# Patient Record
Sex: FEMALE | Race: WHITE | NOT HISPANIC OR LATINO | Employment: OTHER | URBAN - METROPOLITAN AREA
[De-identification: names, ages, dates, MRNs, and addresses within clinical notes are randomized per-mention and may not be internally consistent; named-entity substitution may affect disease eponyms.]

---

## 2018-09-10 DIAGNOSIS — Z12.31 VISIT FOR SCREENING MAMMOGRAM: Primary | ICD-10-CM

## 2020-10-20 ENCOUNTER — APPOINTMENT (OUTPATIENT)
Dept: WOMENS IMAGING | Facility: HOSPITAL | Age: 85
End: 2020-10-20

## 2020-10-20 ENCOUNTER — PROCEDURE VISIT (OUTPATIENT)
Dept: OBSTETRICS AND GYNECOLOGY | Facility: CLINIC | Age: 85
End: 2020-10-20

## 2020-10-20 ENCOUNTER — OFFICE VISIT (OUTPATIENT)
Dept: OBSTETRICS AND GYNECOLOGY | Facility: CLINIC | Age: 85
End: 2020-10-20

## 2020-10-20 VITALS
DIASTOLIC BLOOD PRESSURE: 57 MMHG | HEIGHT: 61 IN | BODY MASS INDEX: 29.27 KG/M2 | SYSTOLIC BLOOD PRESSURE: 111 MMHG | WEIGHT: 155 LBS

## 2020-10-20 DIAGNOSIS — Z01.419 WELL WOMAN EXAM: Primary | ICD-10-CM

## 2020-10-20 DIAGNOSIS — Z12.31 ENCOUNTER FOR SCREENING MAMMOGRAM FOR MALIGNANT NEOPLASM OF BREAST: ICD-10-CM

## 2020-10-20 DIAGNOSIS — Z12.31 VISIT FOR SCREENING MAMMOGRAM: Primary | ICD-10-CM

## 2020-10-20 PROCEDURE — 77063 BREAST TOMOSYNTHESIS BI: CPT | Performed by: RADIOLOGY

## 2020-10-20 PROCEDURE — 77067 SCR MAMMO BI INCL CAD: CPT | Performed by: RADIOLOGY

## 2020-10-20 PROCEDURE — G0101 CA SCREEN;PELVIC/BREAST EXAM: HCPCS | Performed by: STUDENT IN AN ORGANIZED HEALTH CARE EDUCATION/TRAINING PROGRAM

## 2020-10-20 PROCEDURE — 77063 BREAST TOMOSYNTHESIS BI: CPT | Performed by: STUDENT IN AN ORGANIZED HEALTH CARE EDUCATION/TRAINING PROGRAM

## 2020-10-20 PROCEDURE — 77067 SCR MAMMO BI INCL CAD: CPT | Performed by: STUDENT IN AN ORGANIZED HEALTH CARE EDUCATION/TRAINING PROGRAM

## 2020-10-20 RX ORDER — PANTOPRAZOLE SODIUM 40 MG/1
TABLET, DELAYED RELEASE ORAL
COMMUNITY
Start: 2020-10-12

## 2020-10-20 RX ORDER — ATORVASTATIN CALCIUM 10 MG/1
TABLET, FILM COATED ORAL
COMMUNITY
Start: 2020-09-09

## 2020-10-20 RX ORDER — HYDRALAZINE HYDROCHLORIDE 100 MG/1
TABLET, FILM COATED ORAL
COMMUNITY
Start: 2020-08-20

## 2020-10-20 RX ORDER — METOPROLOL SUCCINATE 100 MG/1
TABLET, EXTENDED RELEASE ORAL
COMMUNITY
Start: 2020-08-04

## 2020-10-20 RX ORDER — GABAPENTIN 300 MG/1
CAPSULE ORAL
COMMUNITY
Start: 2020-08-02

## 2020-10-20 RX ORDER — APIXABAN 5 MG/1
TABLET, FILM COATED ORAL
COMMUNITY
Start: 2020-10-12

## 2020-10-20 RX ORDER — LOSARTAN POTASSIUM AND HYDROCHLOROTHIAZIDE 25; 100 MG/1; MG/1
TABLET ORAL
COMMUNITY
Start: 2020-09-09

## 2020-10-20 NOTE — PROGRESS NOTES
GYN Annual Exam     CC- Here for annual exam.     Mayuri Early is a 85 y.o. female who presents for annual well woman exam. Postmenopausal status. She has no complaints today and reports that she thinks she is doing pretty well for her age. She denies vaginal bleeding, spotting, or discharge.    OB History        4    Para   4    Term   4            AB        Living           SAB        TAB        Ectopic        Molar        Multiple        Live Births                  S/p Hysterectomy in  for uterine prolapse and painful periods   Current contraception: post menopausal status  History of abnormal Pap smear: no  Family history of uterine, colon or ovarian cancer: no  History of abnormal mammogram: yes- She has underwent needle aspiration and cyst excision of bilateral breasts with benign results.   Family history of breast cancer: no  Last Pap : unsure   Last mammogram: 2019- Normal per patient and performed on [facility] [place].   Last colonoscopy: Unsure of when it was performed but up to date per patient.  Last DEXA: Reports performed in 2019- normal   Parental Hip Fracture: denies    Past Medical History:   Diagnosis Date   • Fibrocystic breast        Past Surgical History:   Procedure Laterality Date   • BREAST CYST ASPIRATION     • BREAST CYST EXCISION     • HYSTERECTOMY           Current Outpatient Medications:   •  Aspirin Buf,CaCarb-MgCarb-MgO, 81 MG tablet, Take 81 mg by mouth Daily., Disp: , Rfl:   •  atorvastatin (LIPITOR) 10 MG tablet, , Disp: , Rfl:   •  Cetirizine HCl 10 MG capsule, Take 1 tablet by mouth., Disp: , Rfl:   •  Eliquis 5 MG tablet tablet, , Disp: , Rfl:   •  gabapentin (NEURONTIN) 300 MG capsule, , Disp: , Rfl:   •  hydrALAZINE (APRESOLINE) 100 MG tablet, , Disp: , Rfl:   •  losartan-hydrochlorothiazide (HYZAAR) 100-25 MG per tablet, , Disp: , Rfl:   •  metoprolol succinate XL (TOPROL-XL) 100 MG 24 hr tablet, , Disp: , Rfl:   •  pantoprazole (PROTONIX) 40 MG EC tablet, ,  "Disp: , Rfl:     No Known Allergies    Social History     Tobacco Use   • Smoking status: Former Smoker   Substance Use Topics   • Alcohol use: Never     Frequency: Never   • Drug use: Never       History reviewed. No pertinent family history.    Review of Systems   Constitutional: Negative for chills and fever.   HENT: Negative for congestion and sore throat.    Eyes: Negative for pain and discharge.   Respiratory: Negative for cough and shortness of breath.    Cardiovascular: Negative for chest pain.   Gastrointestinal: Negative for abdominal pain and blood in stool.   Endocrine: Negative for cold intolerance and heat intolerance.   Genitourinary: Negative for breast discharge, breast lump, breast pain, hematuria, vaginal bleeding and vaginal discharge.   Musculoskeletal: Negative for arthralgias and myalgias.   Skin: Negative for rash and bruise.   Allergic/Immunologic: Positive for environmental allergies.   Neurological: Negative for dizziness and headache.   Hematological: Negative for adenopathy. Does not bruise/bleed easily.   Psychiatric/Behavioral: Negative for agitation. The patient is not nervous/anxious.        /57   Ht 155 cm (61.02\")   Wt 70.3 kg (155 lb)   BMI 29.26 kg/m²     Physical Exam  Vitals signs reviewed. Exam conducted with a chaperone present.   Constitutional:       Appearance: Normal appearance.   HENT:      Head: Normocephalic and atraumatic.      Right Ear: External ear normal.      Left Ear: External ear normal.   Eyes:      Extraocular Movements: Extraocular movements intact.      Pupils: Pupils are equal, round, and reactive to light.   Neck:      Musculoskeletal: Normal range of motion and neck supple.   Cardiovascular:      Rate and Rhythm: Normal rate and regular rhythm.   Pulmonary:      Effort: Pulmonary effort is normal. No respiratory distress.   Chest:      Breasts:         Right: Normal. No swelling, bleeding, inverted nipple, mass, nipple discharge, skin change or " tenderness.         Left: Normal. No swelling, bleeding, inverted nipple, mass, nipple discharge, skin change or tenderness.   Abdominal:      General: There is no distension.      Palpations: Abdomen is soft. There is no mass.      Tenderness: There is no abdominal tenderness. There is no guarding or rebound.      Hernia: No hernia is present.   Genitourinary:     General: Normal vulva.      Labia:         Right: No rash, tenderness, lesion or injury.         Left: No rash, tenderness, lesion or injury.       Urethra: No prolapse or urethral swelling.      Vagina: No vaginal discharge, erythema, tenderness, bleeding or lesions.      Uterus: Absent.       Adnexa: Right adnexa normal and left adnexa normal.        Right: No mass, tenderness or fullness.          Left: No mass, tenderness or fullness.        Comments: Atrophic vaginal mucosa. Absent cervix.   Musculoskeletal: Normal range of motion.         General: No swelling.   Lymphadenopathy:      Upper Body:      Right upper body: No supraclavicular or axillary adenopathy.      Left upper body: No supraclavicular or axillary adenopathy.      Lower Body: No right inguinal adenopathy. No left inguinal adenopathy.   Skin:     General: Skin is warm and dry.   Neurological:      General: No focal deficit present.      Mental Status: She is alert and oriented to person, place, and time.   Psychiatric:         Mood and Affect: Mood normal.         Behavior: Behavior normal.       Assessment     1) GYN annual well woman exam.   2) Breast cancer screening      Plan     1) Breast Health - Clinical breast exam & mammogram yearly, Self breast awareness monthly. Mammogram ordered today.   2) Pap - not indicated given age and no history of cervical dysplasia.   3) Smoking status- non-smoker   4) Colon health - screening colonoscopy recommended if not up to date  5) Bone health - Weight bearing exercise, dietary calcium recommendations and vitamin D reviewed.   6) Activity  recommends - Adult 150-300 min/week of multi-component physical activities that include balance training, aerobic and physical strengthening.    7) Follow up prn and two years       Flor Mccloud MD  10/25/2020  22:45 EDT

## 2020-10-25 PROBLEM — Z01.419 WELL WOMAN EXAM: Status: ACTIVE | Noted: 2020-10-20

## 2021-10-26 ENCOUNTER — PROCEDURE VISIT (OUTPATIENT)
Dept: OBSTETRICS AND GYNECOLOGY | Facility: CLINIC | Age: 86
End: 2021-10-26

## 2021-10-26 ENCOUNTER — APPOINTMENT (OUTPATIENT)
Dept: WOMENS IMAGING | Facility: HOSPITAL | Age: 86
End: 2021-10-26

## 2021-10-26 DIAGNOSIS — Z12.31 VISIT FOR SCREENING MAMMOGRAM: Primary | ICD-10-CM

## 2021-10-26 PROCEDURE — 77067 SCR MAMMO BI INCL CAD: CPT | Performed by: RADIOLOGY

## 2021-10-26 PROCEDURE — 77067 SCR MAMMO BI INCL CAD: CPT | Performed by: STUDENT IN AN ORGANIZED HEALTH CARE EDUCATION/TRAINING PROGRAM

## 2021-10-26 PROCEDURE — 77063 BREAST TOMOSYNTHESIS BI: CPT | Performed by: RADIOLOGY

## 2021-10-26 PROCEDURE — 77063 BREAST TOMOSYNTHESIS BI: CPT | Performed by: STUDENT IN AN ORGANIZED HEALTH CARE EDUCATION/TRAINING PROGRAM

## 2024-01-04 ENCOUNTER — HOSPITAL ENCOUNTER (INPATIENT)
Facility: HOSPITAL | Age: 89
LOS: 4 days | Discharge: NURSING, REHAB OR LONG-TERM CARE | DRG: 552 | End: 2024-01-08
Attending: STUDENT IN AN ORGANIZED HEALTH CARE EDUCATION/TRAINING PROGRAM | Admitting: INTERNAL MEDICINE
Payer: MEDICARE

## 2024-01-04 ENCOUNTER — APPOINTMENT (OUTPATIENT)
Dept: CT IMAGING | Facility: HOSPITAL | Age: 89
DRG: 552 | End: 2024-01-04
Payer: MEDICARE

## 2024-01-04 ENCOUNTER — APPOINTMENT (OUTPATIENT)
Dept: GENERAL RADIOLOGY | Facility: HOSPITAL | Age: 89
DRG: 552 | End: 2024-01-04
Payer: MEDICARE

## 2024-01-04 DIAGNOSIS — S32.010A CLOSED COMPRESSION FRACTURE OF L1 VERTEBRA, INITIAL ENCOUNTER: ICD-10-CM

## 2024-01-04 DIAGNOSIS — S22.080A T12 COMPRESSION FRACTURE, INITIAL ENCOUNTER: Primary | ICD-10-CM

## 2024-01-04 PROBLEM — G89.29 CHRONIC BACK PAIN: Status: ACTIVE | Noted: 2024-01-04

## 2024-01-04 PROBLEM — Z95.0 PRESENCE OF CARDIAC PACEMAKER: Status: ACTIVE | Noted: 2024-01-04

## 2024-01-04 PROBLEM — I10 HTN (HYPERTENSION): Status: ACTIVE | Noted: 2024-01-04

## 2024-01-04 PROBLEM — N18.9 CKD (CHRONIC KIDNEY DISEASE): Status: ACTIVE | Noted: 2024-01-04

## 2024-01-04 PROBLEM — I48.0 PAF (PAROXYSMAL ATRIAL FIBRILLATION): Status: ACTIVE | Noted: 2024-01-04

## 2024-01-04 PROBLEM — M54.9 CHRONIC BACK PAIN: Status: ACTIVE | Noted: 2024-01-04

## 2024-01-04 PROBLEM — Z79.01 CHRONIC ANTICOAGULATION: Status: ACTIVE | Noted: 2024-01-04

## 2024-01-04 PROBLEM — J45.909 ASTHMA: Status: ACTIVE | Noted: 2024-01-04

## 2024-01-04 PROBLEM — D64.9 ANEMIA: Status: ACTIVE | Noted: 2024-01-04

## 2024-01-04 LAB
ALBUMIN SERPL-MCNC: 4.1 G/DL (ref 3.5–5.2)
ALBUMIN/GLOB SERPL: 1.4 G/DL
ALP SERPL-CCNC: 78 U/L (ref 39–117)
ALT SERPL W P-5'-P-CCNC: 14 U/L (ref 1–33)
ANION GAP SERPL CALCULATED.3IONS-SCNC: 10.9 MMOL/L (ref 5–15)
APTT PPP: 35.8 SECONDS (ref 22.7–35.4)
AST SERPL-CCNC: 16 U/L (ref 1–32)
BASOPHILS # BLD AUTO: 0.02 10*3/MM3 (ref 0–0.2)
BASOPHILS NFR BLD AUTO: 0.3 % (ref 0–1.5)
BILIRUB SERPL-MCNC: 1.1 MG/DL (ref 0–1.2)
BUN SERPL-MCNC: 21 MG/DL (ref 8–23)
BUN/CREAT SERPL: 18.9 (ref 7–25)
CALCIUM SPEC-SCNC: 9.8 MG/DL (ref 8.6–10.5)
CHLORIDE SERPL-SCNC: 101 MMOL/L (ref 98–107)
CO2 SERPL-SCNC: 27.1 MMOL/L (ref 22–29)
CREAT SERPL-MCNC: 1.11 MG/DL (ref 0.57–1)
DEPRECATED RDW RBC AUTO: 43.4 FL (ref 37–54)
EGFRCR SERPLBLD CKD-EPI 2021: 47.9 ML/MIN/1.73
EOSINOPHIL # BLD AUTO: 0.17 10*3/MM3 (ref 0–0.4)
EOSINOPHIL NFR BLD AUTO: 2.2 % (ref 0.3–6.2)
ERYTHROCYTE [DISTWIDTH] IN BLOOD BY AUTOMATED COUNT: 12.1 % (ref 12.3–15.4)
GLOBULIN UR ELPH-MCNC: 3 GM/DL
GLUCOSE SERPL-MCNC: 106 MG/DL (ref 65–99)
HCT VFR BLD AUTO: 34.7 % (ref 34–46.6)
HGB BLD-MCNC: 11.6 G/DL (ref 12–15.9)
IMM GRANULOCYTES # BLD AUTO: 0.02 10*3/MM3 (ref 0–0.05)
IMM GRANULOCYTES NFR BLD AUTO: 0.3 % (ref 0–0.5)
INR PPP: 1.55 (ref 0.9–1.1)
LYMPHOCYTES # BLD AUTO: 0.96 10*3/MM3 (ref 0.7–3.1)
LYMPHOCYTES NFR BLD AUTO: 12.2 % (ref 19.6–45.3)
MCH RBC QN AUTO: 32.9 PG (ref 26.6–33)
MCHC RBC AUTO-ENTMCNC: 33.4 G/DL (ref 31.5–35.7)
MCV RBC AUTO: 98.3 FL (ref 79–97)
MONOCYTES # BLD AUTO: 0.7 10*3/MM3 (ref 0.1–0.9)
MONOCYTES NFR BLD AUTO: 8.9 % (ref 5–12)
NEUTROPHILS NFR BLD AUTO: 6.03 10*3/MM3 (ref 1.7–7)
NEUTROPHILS NFR BLD AUTO: 76.1 % (ref 42.7–76)
NRBC BLD AUTO-RTO: 0 /100 WBC (ref 0–0.2)
PLATELET # BLD AUTO: 172 10*3/MM3 (ref 140–450)
PMV BLD AUTO: 9.1 FL (ref 6–12)
POTASSIUM SERPL-SCNC: 4.2 MMOL/L (ref 3.5–5.2)
PROT SERPL-MCNC: 7.1 G/DL (ref 6–8.5)
PROTHROMBIN TIME: 18.9 SECONDS (ref 11.7–14.2)
RBC # BLD AUTO: 3.53 10*6/MM3 (ref 3.77–5.28)
SODIUM SERPL-SCNC: 139 MMOL/L (ref 136–145)
WBC NRBC COR # BLD AUTO: 7.9 10*3/MM3 (ref 3.4–10.8)

## 2024-01-04 PROCEDURE — 85025 COMPLETE CBC W/AUTO DIFF WBC: CPT | Performed by: PHYSICIAN ASSISTANT

## 2024-01-04 PROCEDURE — 99285 EMERGENCY DEPT VISIT HI MDM: CPT

## 2024-01-04 PROCEDURE — 25010000002 ONDANSETRON PER 1 MG: Performed by: INTERNAL MEDICINE

## 2024-01-04 PROCEDURE — 73502 X-RAY EXAM HIP UNI 2-3 VIEWS: CPT

## 2024-01-04 PROCEDURE — 80053 COMPREHEN METABOLIC PANEL: CPT | Performed by: PHYSICIAN ASSISTANT

## 2024-01-04 PROCEDURE — 85610 PROTHROMBIN TIME: CPT | Performed by: PHYSICIAN ASSISTANT

## 2024-01-04 PROCEDURE — L0464 TLSO 4MOD SACRO-SCAP PRE: HCPCS

## 2024-01-04 PROCEDURE — 85730 THROMBOPLASTIN TIME PARTIAL: CPT | Performed by: PHYSICIAN ASSISTANT

## 2024-01-04 PROCEDURE — 72110 X-RAY EXAM L-2 SPINE 4/>VWS: CPT

## 2024-01-04 PROCEDURE — 25010000002 KETOROLAC TROMETHAMINE PER 15 MG: Performed by: PHYSICIAN ASSISTANT

## 2024-01-04 PROCEDURE — 99221 1ST HOSP IP/OBS SF/LOW 40: CPT | Performed by: NURSE PRACTITIONER

## 2024-01-04 PROCEDURE — 72131 CT LUMBAR SPINE W/O DYE: CPT

## 2024-01-04 RX ORDER — NALOXONE HCL 0.4 MG/ML
0.4 VIAL (ML) INJECTION
Status: DISCONTINUED | OUTPATIENT
Start: 2024-01-04 | End: 2024-01-08 | Stop reason: HOSPADM

## 2024-01-04 RX ORDER — BISACODYL 5 MG/1
5 TABLET, DELAYED RELEASE ORAL DAILY PRN
Status: DISCONTINUED | OUTPATIENT
Start: 2024-01-04 | End: 2024-01-08 | Stop reason: HOSPADM

## 2024-01-04 RX ORDER — ACETAMINOPHEN 325 MG/1
650 TABLET ORAL EVERY 4 HOURS PRN
Status: DISCONTINUED | OUTPATIENT
Start: 2024-01-04 | End: 2024-01-08 | Stop reason: HOSPADM

## 2024-01-04 RX ORDER — BISACODYL 10 MG
10 SUPPOSITORY, RECTAL RECTAL DAILY PRN
Status: DISCONTINUED | OUTPATIENT
Start: 2024-01-04 | End: 2024-01-08 | Stop reason: HOSPADM

## 2024-01-04 RX ORDER — ONDANSETRON 2 MG/ML
4 INJECTION INTRAMUSCULAR; INTRAVENOUS EVERY 6 HOURS PRN
Status: DISCONTINUED | OUTPATIENT
Start: 2024-01-04 | End: 2024-01-08 | Stop reason: HOSPADM

## 2024-01-04 RX ORDER — CHLORAL HYDRATE 500 MG
1000 CAPSULE ORAL
COMMUNITY

## 2024-01-04 RX ORDER — ALBUTEROL SULFATE 2.5 MG/3ML
2.5 SOLUTION RESPIRATORY (INHALATION) EVERY 6 HOURS PRN
Status: DISCONTINUED | OUTPATIENT
Start: 2024-01-04 | End: 2024-01-08 | Stop reason: HOSPADM

## 2024-01-04 RX ORDER — KETOROLAC TROMETHAMINE 30 MG/ML
30 INJECTION, SOLUTION INTRAMUSCULAR; INTRAVENOUS ONCE
Status: DISCONTINUED | OUTPATIENT
Start: 2024-01-04 | End: 2024-01-04

## 2024-01-04 RX ORDER — METOPROLOL SUCCINATE 100 MG/1
100 TABLET, EXTENDED RELEASE ORAL 2 TIMES DAILY
Status: DISCONTINUED | OUTPATIENT
Start: 2024-01-04 | End: 2024-01-08 | Stop reason: HOSPADM

## 2024-01-04 RX ORDER — PANTOPRAZOLE SODIUM 40 MG/1
40 TABLET, DELAYED RELEASE ORAL
Status: DISCONTINUED | OUTPATIENT
Start: 2024-01-05 | End: 2024-01-08 | Stop reason: HOSPADM

## 2024-01-04 RX ORDER — ACETAMINOPHEN 160 MG/5ML
650 SOLUTION ORAL EVERY 4 HOURS PRN
Status: DISCONTINUED | OUTPATIENT
Start: 2024-01-04 | End: 2024-01-08 | Stop reason: HOSPADM

## 2024-01-04 RX ORDER — ASPIRIN 81 MG/1
81 TABLET ORAL DAILY
COMMUNITY
End: 2024-01-08 | Stop reason: HOSPADM

## 2024-01-04 RX ORDER — MORPHINE SULFATE 2 MG/ML
4 INJECTION, SOLUTION INTRAMUSCULAR; INTRAVENOUS EVERY 4 HOURS PRN
Status: DISCONTINUED | OUTPATIENT
Start: 2024-01-04 | End: 2024-01-07

## 2024-01-04 RX ORDER — POLYETHYLENE GLYCOL 3350 17 G/17G
17 POWDER, FOR SOLUTION ORAL DAILY PRN
Status: DISCONTINUED | OUTPATIENT
Start: 2024-01-04 | End: 2024-01-08 | Stop reason: HOSPADM

## 2024-01-04 RX ORDER — KETOROLAC TROMETHAMINE 15 MG/ML
15 INJECTION, SOLUTION INTRAMUSCULAR; INTRAVENOUS ONCE
Status: COMPLETED | OUTPATIENT
Start: 2024-01-04 | End: 2024-01-04

## 2024-01-04 RX ORDER — SODIUM CHLORIDE 0.9 % (FLUSH) 0.9 %
10 SYRINGE (ML) INJECTION EVERY 12 HOURS SCHEDULED
Status: DISCONTINUED | OUTPATIENT
Start: 2024-01-04 | End: 2024-01-08 | Stop reason: HOSPADM

## 2024-01-04 RX ORDER — HYDROCHLOROTHIAZIDE 25 MG/1
25 TABLET ORAL
Status: DISCONTINUED | OUTPATIENT
Start: 2024-01-04 | End: 2024-01-06

## 2024-01-04 RX ORDER — LOSARTAN POTASSIUM 100 MG/1
100 TABLET ORAL
Status: DISCONTINUED | OUTPATIENT
Start: 2024-01-04 | End: 2024-01-06

## 2024-01-04 RX ORDER — SODIUM CHLORIDE 9 MG/ML
40 INJECTION, SOLUTION INTRAVENOUS AS NEEDED
Status: DISCONTINUED | OUTPATIENT
Start: 2024-01-04 | End: 2024-01-08 | Stop reason: HOSPADM

## 2024-01-04 RX ORDER — LIDOCAINE 4 G/G
2 PATCH TOPICAL ONCE
Status: COMPLETED | OUTPATIENT
Start: 2024-01-04 | End: 2024-01-05

## 2024-01-04 RX ORDER — SODIUM CHLORIDE 0.9 % (FLUSH) 0.9 %
10 SYRINGE (ML) INJECTION AS NEEDED
Status: DISCONTINUED | OUTPATIENT
Start: 2024-01-04 | End: 2024-01-08 | Stop reason: HOSPADM

## 2024-01-04 RX ORDER — ACETAMINOPHEN 650 MG/1
650 SUPPOSITORY RECTAL EVERY 4 HOURS PRN
Status: DISCONTINUED | OUTPATIENT
Start: 2024-01-04 | End: 2024-01-08 | Stop reason: HOSPADM

## 2024-01-04 RX ORDER — AMOXICILLIN 250 MG
2 CAPSULE ORAL 2 TIMES DAILY
Status: DISCONTINUED | OUTPATIENT
Start: 2024-01-04 | End: 2024-01-08 | Stop reason: HOSPADM

## 2024-01-04 RX ORDER — ATORVASTATIN CALCIUM 20 MG/1
10 TABLET, FILM COATED ORAL NIGHTLY
Status: DISCONTINUED | OUTPATIENT
Start: 2024-01-04 | End: 2024-01-08 | Stop reason: HOSPADM

## 2024-01-04 RX ORDER — MULTIPLE VITAMINS W/ MINERALS TAB 9MG-400MCG
1 TAB ORAL DAILY
COMMUNITY

## 2024-01-04 RX ORDER — CALCIUM CARBONATE 500 MG/1
2 TABLET, CHEWABLE ORAL 3 TIMES DAILY PRN
Status: DISCONTINUED | OUTPATIENT
Start: 2024-01-04 | End: 2024-01-08 | Stop reason: HOSPADM

## 2024-01-04 RX ORDER — ALBUTEROL SULFATE 90 UG/1
2 AEROSOL, METERED RESPIRATORY (INHALATION) EVERY 4 HOURS PRN
COMMUNITY

## 2024-01-04 RX ORDER — HYDROCODONE BITARTRATE AND ACETAMINOPHEN 5; 325 MG/1; MG/1
1 TABLET ORAL ONCE
Status: COMPLETED | OUTPATIENT
Start: 2024-01-04 | End: 2024-01-04

## 2024-01-04 RX ORDER — HYDROCODONE BITARTRATE AND ACETAMINOPHEN 5; 325 MG/1; MG/1
1 TABLET ORAL EVERY 4 HOURS PRN
Status: DISCONTINUED | OUTPATIENT
Start: 2024-01-04 | End: 2024-01-08 | Stop reason: HOSPADM

## 2024-01-04 RX ADMIN — LIDOCAINE 2 PATCH: 4 PATCH TOPICAL at 12:27

## 2024-01-04 RX ADMIN — METOPROLOL SUCCINATE 100 MG: 100 TABLET, EXTENDED RELEASE ORAL at 21:45

## 2024-01-04 RX ADMIN — ONDANSETRON HYDROCHLORIDE 4 MG: 2 INJECTION, SOLUTION INTRAMUSCULAR; INTRAVENOUS at 18:55

## 2024-01-04 RX ADMIN — SENNOSIDES AND DOCUSATE SODIUM 2 TABLET: 50; 8.6 TABLET ORAL at 21:44

## 2024-01-04 RX ADMIN — ANTACID TABLETS 2 TABLET: 500 TABLET, CHEWABLE ORAL at 21:51

## 2024-01-04 RX ADMIN — ATORVASTATIN CALCIUM 10 MG: 20 TABLET, FILM COATED ORAL at 21:44

## 2024-01-04 RX ADMIN — KETOROLAC TROMETHAMINE 15 MG: 15 INJECTION, SOLUTION INTRAMUSCULAR; INTRAVENOUS at 14:31

## 2024-01-04 RX ADMIN — Medication 10 ML: at 21:46

## 2024-01-04 RX ADMIN — HYDROCODONE BITARTRATE AND ACETAMINOPHEN 1 TABLET: 5; 325 TABLET ORAL at 12:25

## 2024-01-04 RX ADMIN — HYDROCODONE BITARTRATE AND ACETAMINOPHEN 1 TABLET: 5; 325 TABLET ORAL at 21:51

## 2024-01-04 NOTE — H&P
Internal medicine history and physical  INTERNAL MEDICINE   [facility]       Patient Identification:  Name: Mayuri Early  Age: 88 y.o.  Sex: female  :  1935  MRN: [id number]                   Primary Care Physician: Gunjan Hidalgo MD                               Date of admission:2024    Chief Complaint: Progressive pain and discomfort in her buttock and lower back after a fall on .    History of Present Illness:   Patient is a 88-year-old female with past medical history remarkable for paroxysmal atrial fibrillation on chronic anticoagulation therapy, history of hypertension, chronic kidney disease, chronic back pain as well as asthma and anemia was in her usual state of her health and was planning to go out to eat lunch with her friends and her son on .  She was waiting for her friends to come and was sitting in the recliner and dozed off.  According to her when the doorbell ring for her friends to come in she was half asleep and woke up and went to the door and lost her footing and fell and landed on her buttocks with subsequent pain and discomfort which has been progressively getting worse.  Initially she was able to move around but her symptom has gotten to the point that minimal movement is causing her to have pain.  She did not hit her head or loss consciousness.  Patient denies any loss of continence or pain shooting in her legs.  Her pain has reached a point of movement caused pain to go across her lower back.  Workup in the emergency room revealed T12 and L1 compression fracture.  Patient did take her dose of Eliquis this morning.  Patient denies any chest pain shortness of breath orthopnea PND or dyspnea on exertion or hematemesis or melena.      Past Medical History:  Past Medical History:   Diagnosis Date    Fibrocystic breast     Well woman exam 10/20/2020     Past Surgical History:  Past Surgical History:   Procedure Laterality Date     BREAST CYST ASPIRATION      BREAST CYST EXCISION      HYSTERECTOMY        Home Meds:  Medications Prior to Admission   Medication Sig Dispense Refill Last Dose    albuterol sulfate  (90 Base) MCG/ACT inhaler Inhale 2 puffs Every 4 (Four) Hours As Needed for Wheezing.       aspirin 81 MG EC tablet Take 1 tablet by mouth Daily.   1/4/2024    atorvastatin (LIPITOR) 10 MG tablet Take 1 tablet by mouth Every Night.   1/3/2024    Eliquis 5 MG tablet tablet Take 1 tablet by mouth Every 12 (Twelve) Hours.   1/4/2024    losartan-hydrochlorothiazide (HYZAAR) 100-25 MG per tablet Take 1 tablet by mouth Daily.   1/4/2024    metoprolol succinate XL (TOPROL-XL) 100 MG 24 hr tablet Take 1 tablet by mouth 2 (Two) Times a Day.   1/4/2024    multivitamin with minerals (MULTIVITAMIN ADULT PO) Take 1 tablet by mouth Daily.   1/4/2024    Omega-3 Fatty Acids (fish oil) 1000 MG capsule capsule Take 1 capsule by mouth Daily With Breakfast.   1/4/2024    Aspirin Buf,CaCarb-MgCarb-MgO, 81 MG tablet Take 81 mg by mouth Daily.       Cetirizine HCl 10 MG capsule Take 1 tablet by mouth.       gabapentin (NEURONTIN) 300 MG capsule        hydrALAZINE (APRESOLINE) 100 MG tablet        pantoprazole (PROTONIX) 40 MG EC tablet         Current Meds:     Current Facility-Administered Medications:     Lidocaine 4 % 2 patch, 2 patch, Transdermal, Once, King Rodriguez MD, 2 patch at 01/04/24 1227    [COMPLETED] Insert Peripheral IV, , , Once **AND** sodium chloride 0.9 % flush 10 mL, 10 mL, Intravenous, PRN, Daryl Nava PA  Allergies:  No Known Allergies  Social History:   Social History     Tobacco Use    Smoking status: Former    Smokeless tobacco: Not on file   Substance Use Topics    Alcohol use: Never      Family History:  No family history on file.       Review of Systems  See history of present illness and past medical history.    As described in history of presenting illness.      Vitals:   /82 (BP Location: Right arm,  "Patient Position: Lying)   Pulse 67   Temp 98 °F (36.7 °C) (Oral)   Resp 18   Ht 157.5 cm (62\")   Wt 67.1 kg (148 lb)   SpO2 97%   BMI 27.07 kg/m²   I/O: No intake or output data in the 24 hours ending 01/04/24 1696  Exam:  Patient is examined using the personal protective equipment as per guidelines from infection control for this particular patient as enacted.  Hand washing was performed before and after patient interaction.  General Appearance:    Alert, cooperative, no distress, appears stated age   Head:    Normocephalic, without obvious abnormality, atraumatic no cranial injury noted   Eyes:    PERRL, conjunctiva/corneas clear, EOM's intact, both eyes   Ears:    Normal external ear canals, both ears   Nose: No nasal drainage noted   Throat:   Lips, tongue, gums normal; oral mucosa pink and moist   Neck: Supple and no adenopathy noted   Back:   Lower back tenderness with paraspinal muscle spasm noted   Lungs:     Clear to auscultation bilaterally, respirations unlabored   Chest Wall:    No tenderness or deformity    Heart:  S1-S2 irregular   Abdomen:   Soft nontender   Extremities: Passive range of motion of the lower extremities intact.  No edema noted   Pulses:   Pulses palpable in all extremities; symmetric all extremities   Skin: Significant bruising in the right posterior thigh and gluteal and lower back area.   Neurologic: Gait not tested alert and oriented x 3       Data Review:      I reviewed the patient's new clinical results.  Results from last 7 days   Lab Units 01/04/24  1338   WBC 10*3/mm3 7.90   HEMOGLOBIN g/dL 11.6*   PLATELETS 10*3/mm3 172     Results from last 7 days   Lab Units 01/04/24  1338   SODIUM mmol/L 139   POTASSIUM mmol/L 4.2   CHLORIDE mmol/L 101   CO2 mmol/L 27.1   BUN mg/dL 21   CREATININE mg/dL 1.11*   CALCIUM mg/dL 9.8   GLUCOSE mg/dL 106*     CT Lumbar Spine Without Contrast    Result Date: 1/4/2024  1.  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 Careful correlation prior to any intervention is required given the presence of transitional anatomy. 2.  Compression deformities involving T12 and L1 are noted which are likely acute or acute to subacute as there is adjacent mild edema. A fracture plane is appreciated above the superior endplate of L1. There is mild bony retropulsion. The loss of vertical height is estimated to be approximately 40 to 50% in severity at each level. Further evaluation could be performed with MRI examination of the lumbar spine if the patient's cardiac pacer is MR-compatible. 3.  Multilevel degenerative disease involving lumbar spine is noted as described in detail above including a grade 1 anterolisthesis of L4 upon L5, grade 1 retrolisthesis of L1 upon L2, multilevel facet degenerative disease, broad-based disc osteophyte complexes and mild foraminal stenosis. See above. 4.  Multiple cysts are appreciated by the kidneys bilaterally. There are 2 areas of increased attenuation involving the left kidney which likely represent complex cysts. The largest measures approximately 2.5 cm in diameter. Comparison with prior studies is recommended.   Radiation dose reduction techniques were utilized, including automated exposure control and exposure modulation based on body size.   This report was finalized on 1/4/2024 7:45 PM by Dr. Ministerio Diez M.D on Workstation: [id number]      XR Spine Lumbar Complete 4+VW    Result Date: 1/4/2024  Compression fractures involving T12 and L1 are noted with loss of approximately 50% of vertical height centrally. There is no evidence of bony retropulsion. The fractures are age indeterminate. Further evaluation could be performed with MRI examination of the lumbar spine as indicated. Degenerative disease involving the lumbar spine is appreciated including anterolisthesis of L4 upon L5 and to lesser extent L5 upon S1 and facet degenerative disease. See above.  This report was finalized on  1/4/2024 7:21 PM by Dr. Ministerio Diez M.D on Workstation: [id number]     No orders to display     Microbiology Results (last 10 days)       ** No results found for the last 240 hours. **            Assessment:  Active Hospital Problems    Diagnosis  POA    **Closed compression fracture of L1 vertebra, initial encounter [S32.010A]  Yes    Compression fracture of T12 vertebra [S22.080A]  Yes    PAF (paroxysmal atrial fibrillation) [I48.0]  Unknown    Presence of cardiac pacemaker [Z95.0]  Unknown    HTN (hypertension) [I10]  Unknown    CKD (chronic kidney disease) [N18.9]  Unknown    Chronic anticoagulation [Z79.01]  Not Applicable    Chronic back pain [M54.9, G89.29]  Unknown    Anemia [D64.9]  Unknown    Asthma [J45.909]  Unknown       Medical decision making/care plan: See admitting orders  Mechanical fall with T12 and L1 compression fracture-plan is to admit the patient neurosurgery consultation, pain control OT PT evaluation fall precautions.  Hold her anticoagulation therapy including aspirin and aspirin related products and Eliquis.  Keep her n.p.o. after midnight until reassessed by neurosurgery service in case if she is a candidate for kyphoplasty.  Follow-up on CT scan of lumbar spine.  Paroxysmal atrial fibrillation with history of pacemaker placement-currently heart rate is controlled will check EKG continue her rate control regimen while her anticoagulation therapy is being held for potential intervention as stated above.  Hypertension-continue antihypertensive regimen and avoid hypotensive episodes.  Chronic kidney disease-avoid nephrotoxic agent and hypotensive episodes.  Chronic back pain-continue her pain regimen and monitor.  History of asthma-continue her as needed nebulizer treatment and provide her with continuous pulse ox monitoring while she is receiving narcotic pain medications.  Significant ecchymosis/bruising involving the lower back and buttock area plan is to monitor.    Jawed Bishop,  MD   1/4/2024  17:56 EST    Parts of this note may be an electronic transcription/translation of spoken language to printed text using the Dragon dictation system.

## 2024-01-04 NOTE — CONSULTS
[facility]   Consult Note    Patient Name: Mayuri Early  : 1935  MRN: [id number]  Primary Care Physician:  Gunjan Hidalgo MD  Referring Physician: King Rodriguez MD  Date of admission: 2024    Neurosurgery (on-call MD unless specified)  Consult performed by: Lizbeth Ruby APRN  Consult ordered by: Daryl Nava PA  Reason for consult: Back pain following a fall        Subjective   Subjective     Reason for Consult/ Chief Complaint: Back pain following a fall     History of Present Illness  Mayuri Early is a 88 y.o. female with a history of hypertension.  She had experienced a fall at home 2 days ago.  She has been having worsening pain in the low back and right hip region for the last 2 to 3 days.  The fall occurred after she was getting up from the couch to go answer the door.  She lost her balance and fell onto the right side.  She denied any head trauma.  She denied any headache or neck pain when she presented to the emergency department.  She denies any bowel or bladder incontinence.  She denies any weakness of her lower extremities or numbness or tingling.  Her pain is described as an ache and it radiates across the low back.  She had tried to manage her symptoms without any medical advice/intervention however her pain has become worse to the point that she is having difficulty with ambulation.  She therefore presented to the emergency room for further evaluation and management.  Lumbar spine x-rays revealed a compression fracture at both T12 and L1 with approximately 50% body height loss.  The x-rays did not show any evidence of bony retropulsion.  She also has lumbar degenerative changes and facet arthropathy with anterolisthesis of L4 on L5 and L5 on S1.  X-rays of the right hip were negative for fracture.  CT lumbar spine reveals grade 1 anterolisthesis of L4 on L5 by approximately 4 mm.  Compression fractures again noted at T12 and L1 with 40 to 50% body height loss and  mild bony retropulsion of L1 and T12.  These findings are believed to be acute or subacute in nature.  Also degenerative changes consisting of facet arthropathy L4-5 more so than L3-4 and bilateral foraminal stenosis at these above-mentioned levels.  Neurosurgery has been asked to evaluate give further recommendations.       Review of Systems   Constitutional:  Positive for activity change. Negative for appetite change, chills, fatigue and fever.   HENT: Negative.     Eyes: Negative.    Respiratory: Negative.     Cardiovascular: Negative.    Gastrointestinal: Negative.         Denies any history of bowel incontinence.   Endocrine: Negative.    Genitourinary: Negative.         Nuys any history of bladder incontinence.   Musculoskeletal:  Positive for back pain.        Right sided hip pain with hematoma secondary to recent fall   Skin: Negative.    Neurological:  Negative for dizziness, speech difficulty, weakness, light-headedness and numbness.   Hematological: Negative.    Psychiatric/Behavioral:  Positive for sleep disturbance.         Having difficulty sleeping due to severe lumbar pain.   All other systems reviewed and are negative.       Personal History     Past Medical History:   Diagnosis Date   • Fibrocystic breast    • Well woman exam 10/20/2020       Past Surgical History:   Procedure Laterality Date   • BREAST CYST ASPIRATION     • BREAST CYST EXCISION     • HYSTERECTOMY         Family History: family history is not on file. Otherwise pertinent FHx was reviewed and not pertinent to current issue.    Social History:  reports that she has quit smoking. She does not have any smokeless tobacco history on file. She reports that she does not drink alcohol and does not use drugs.    Home Medications:   Aspirin Buf(CaCarb-MgCarb-MgO), Cetirizine HCl, albuterol sulfate HFA, apixaban, aspirin, atorvastatin, fish oil, gabapentin, hydrALAZINE, losartan-hydrochlorothiazide, metoprolol succinate XL, multivitamin with  minerals, and pantoprazole    Allergies:  No Known Allergies    Objective    Objective     Vitals:  Temp:  [98 °F (36.7 °C)] 98 °F (36.7 °C)  Heart Rate:  [67-76] 75  Resp:  [16-20] 17  BP: (101-167)/(66-82) 101/66    Physical Exam  Vitals reviewed.   Constitutional:       General: She is not in acute distress.     Appearance: She is well-developed and normal weight. She is not ill-appearing, toxic-appearing or diaphoretic.   HENT:      Head: Normocephalic and atraumatic.      Right Ear: External ear normal.      Left Ear: External ear normal.      Nose: Nose normal.   Eyes:      General:         Right eye: No discharge.         Left eye: No discharge.      Extraocular Movements: Extraocular movements intact.      Conjunctiva/sclera: Conjunctivae normal.   Neck:      Trachea: No tracheal deviation.   Cardiovascular:      Rate and Rhythm: Normal rate.   Pulmonary:      Effort: Pulmonary effort is normal. No respiratory distress.   Abdominal:      General: There is no distension.      Palpations: Abdomen is soft.      Tenderness: There is no abdominal tenderness.   Musculoskeletal:         General: Tenderness present. Normal range of motion.      Cervical back: Normal range of motion and neck supple. No tenderness.      Comments: Tender to palpation in the upper lumbar spine   Skin:     General: Skin is warm and dry.      Findings: No erythema.   Neurological:      General: No focal deficit present.      Mental Status: She is alert and oriented to person, place, and time.      GCS: GCS eye subscore is 4. GCS verbal subscore is 5. GCS motor subscore is 6.      Sensory: No sensory deficit.      Motor: No weakness or abnormal muscle tone.      Coordination: Coordination normal.      Deep Tendon Reflexes: Reflexes are normal and symmetric. Reflexes normal.      Comments: No motor or sensory deficits. DTR's normal. Negative Whitaker's; negative clonus. SLR and Willy's negative bilaterally.  Gait not tested due to safety  concerns.   Psychiatric:         Behavior: Behavior is cooperative.         Thought Content: Thought content normal.         Result Review    Result Review:  I have personally reviewed the results from the time of this admission to 1/4/2024 21:41 EST and agree with these findings:  []  Laboratory list / accordion  []  Microbiology  []  Radiology  []  EKG/Telemetry   []  Cardiology/Vascular   []  Pathology  []  Old records  []  Other:  Most notable findings include:     Lumbar CT scan performed today was reviewed independently by myself and discussed further with Dr. Sha Mclain.    There are compression fractures at both T12 and L1 with approximately 40 to 50% body height loss and mild associated bony retropulsion.  Stranding adjacent to the vertebral bodies suggest these fractures are acute to subacute in nature.  There is also degenerative grade 1 anterolisthesis of L4 upon L5 estimated to be approximately 4 mm.  No evidence of a pars defect.    Assessment & Plan   Assessment / Plan     Brief Patient Summary:  Mayuri Early is a 88 y.o. female who fell at home a couple of days ago landing on her right hip.  She tried to manage at home however the back pain became more intractable interfering with sleep and ability to get around at home.  CT imaging of the lumbar spine indicates a T12 and L1 acute to subacute compression fracture.  There is no signs of right hip fracture although the patient does have a significant hematoma over the right hip.  She is not a candidate for ride due to pacemaker.      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  The patient has been admitted to the hospitalist service.  We will try some physical therapy to see if she is able to mobilize in the brace.  Further recommendations to follow.    A TLSO brace has been ordered due to diagnosis of T12, L1 compression fracture.  The purpose of the brace is to support weak muscles, stabilize and restrict movement of the trunk to aid in healing and pain relief of the compression fractures.    Lizbeth Ruby, APRN

## 2024-01-04 NOTE — ED NOTES
Jaime Called, patient had a medical back brace ordered and they will  be coming to give that to the patient today.

## 2024-01-04 NOTE — PROGRESS NOTES
Clinical Pharmacy Services: Medication History    Mayuri Early is a 88 y.o. female presenting to [facility] for   Chief Complaint   Patient presents with    Fall       She  has a past medical history of Fibrocystic breast.    Allergies as of 01/04/2024    (No Known Allergies)       Medication information was obtained from: Patient   Pharmacy and Phone Number:     Prior to Admission Medications       Prescriptions Last Dose Informant Patient Reported? Taking?    albuterol sulfate  (90 Base) MCG/ACT inhaler  Self Yes Yes    Inhale 2 puffs Every 4 (Four) Hours As Needed for Wheezing.    aspirin 81 MG EC tablet 1/4/2024 Self Yes Yes    Take 1 tablet by mouth Daily.    atorvastatin (LIPITOR) 10 MG tablet 1/3/2024 Self Yes Yes    Take 1 tablet by mouth Every Night.    Eliquis 5 MG tablet tablet 1/4/2024 Self Yes Yes    Take 1 tablet by mouth Every 12 (Twelve) Hours.    losartan-hydrochlorothiazide (HYZAAR) 100-25 MG per tablet 1/4/2024 Self Yes Yes    Take 1 tablet by mouth Daily.    metoprolol succinate XL (TOPROL-XL) 100 MG 24 hr tablet 1/4/2024 Self Yes Yes    Take 1 tablet by mouth 2 (Two) Times a Day.    multivitamin with minerals (MULTIVITAMIN ADULT PO) 1/4/2024 Self Yes Yes    Take 1 tablet by mouth Daily.    Omega-3 Fatty Acids (fish oil) 1000 MG capsule capsule 1/4/2024 Self Yes Yes    Take 1 capsule by mouth Daily With Breakfast.    Aspirin Buf,CaCarb-MgCarb-MgO, 81 MG tablet   Yes No    Take 81 mg by mouth Daily.    Cetirizine HCl 10 MG capsule   Yes No    Take 1 tablet by mouth.    gabapentin (NEURONTIN) 300 MG capsule   Yes No    hydrALAZINE (APRESOLINE) 100 MG tablet   Yes No    pantoprazole (PROTONIX) 40 MG EC tablet   Yes No              Medication notes: Patient stated she was taking gabapentin for sabina horses but stop taking due to running out of medication.     This medication list is complete to the best of my knowledge as of 1/4/2024    Please call if questions.    Milena  Steve  Medication History Technician   [phone]    1/4/2024 14:27 EST

## 2024-01-04 NOTE — ED PROVIDER NOTES
EMERGENCY DEPARTMENT ENCOUNTER    Room Number:  T02/02  Date of encounter:  1/4/2024  PCP: Gunjan Hidalgo MD  Historian: Patient  Full history not obtainable due to: None    HPI:  Chief Complaint: Right hip pain    Context: Mayuri Early is a 88 y.o. female with a PMH significant for HTN who presents to the ED c/o low back pain and right hip pain after fall 2 days ago.  The patient states that she was getting off the couch to answer the front door when she lost her balance and fell onto her right side.  No injury to the head or neck or loss of consciousness.  Since that time she has had difficulty ambulating secondary to pain.  No numbness or weakness of the extremities, bowel or bladder incontinence, saddle paresthesias.  She has noticed bruising over the right hip where there is aching and nonradiating pain.  She also describes an aching pain across her lower back diffusely.       MEDICAL RECORD REVIEW:    Upon review of the medical record it appears the patient was evaluated in the office with family medicine for cramping in the lower limbs on 11/22/2023.  The patient had a normal COVID test and UA on 10/6/2023.    PAST MEDICAL HISTORY    Active Ambulatory Problems     Diagnosis Date Noted    Well woman exam 10/20/2020     Resolved Ambulatory Problems     Diagnosis Date Noted    No Resolved Ambulatory Problems     Past Medical History:   Diagnosis Date    Fibrocystic breast          PAST SURGICAL HISTORY  Past Surgical History:   Procedure Laterality Date    BREAST CYST ASPIRATION      BREAST CYST EXCISION      HYSTERECTOMY           FAMILY HISTORY  No family history on file.      SOCIAL HISTORY  Social History     Socioeconomic History    Marital status:    Tobacco Use    Smoking status: Former   Substance and Sexual Activity    Alcohol use: Never    Drug use: Never    Sexual activity: Defer         ALLERGIES  Patient has no known allergies.        REVIEW OF SYSTEMS    All systems reviewed and marked  as negative except as listed in HPI     PHYSICAL EXAM    I have reviewed the triage vital signs and nursing notes.    ED Triage Vitals   Temp Heart Rate Resp BP SpO2   01/04/24 1030 01/04/24 1030 01/04/24 1030 01/04/24 1033 01/04/24 1030   98 °F (36.7 °C) 76 16 145/72 97 %      Temp src Heart Rate Source Patient Position BP Location FiO2 (%)   01/04/24 1030 01/04/24 1030 -- -- --   Tympanic Monitor          Physical Exam  Constitutional:       General: She is not in acute distress.     Appearance: She is well-developed.   HENT:      Head: Normocephalic and atraumatic.   Eyes:      General: No scleral icterus.     Conjunctiva/sclera: Conjunctivae normal.   Neck:      Trachea: No tracheal deviation.   Cardiovascular:      Rate and Rhythm: Normal rate and regular rhythm.   Pulmonary:      Effort: Pulmonary effort is normal.      Breath sounds: Normal breath sounds.   Abdominal:      Palpations: Abdomen is soft.      Tenderness: There is no abdominal tenderness.   Musculoskeletal:         General: No deformity.      Cervical back: Normal range of motion.      Lumbar back: No bony tenderness. Decreased range of motion. Negative right straight leg raise test and negative left straight leg raise test.        Back:    Lymphadenopathy:      Cervical: No cervical adenopathy.   Skin:     General: Skin is warm and dry.   Neurological:      Mental Status: She is alert and oriented to person, place, and time.      Sensory: Sensation is intact.      Motor: Motor function is intact.   Psychiatric:         Behavior: Behavior normal.         Vital signs and nursing notes reviewed.            LAB RESULTS  No results found for this or any previous visit (from the past 24 hour(s)).    Ordered the above labs and independently reviewed the results.        RADIOLOGY  XR Spine Lumbar Complete 4+VW    Result Date: 1/4/2024  LUMBAR SPINE FOUR VIEWS  HISTORY: Fall, back pain.  COMPARISON: None.  FINDINGS: AP, lateral and bilateral oblique  views of the lumbar spine demonstrates mild dextroscoliosis of the lumbar spine. There is a grade 1 anterolisthesis of L3 upon L4 estimated to be approximately 3 mm. Anterolisthesis of L4 upon L5 is appreciated estimated to be 5 to 6 mm. Moderate facet degenerative disease is noted at L4-L5 and L5-S1.  Mild to moderate compression deformities involving T12 and L1 are noted. Loss of vertical height centrally at each level is approximately 50%. The fractures are age indeterminate. A gastric band and extensive vascular calcification is noted.      Compression fractures involving T12 and L1 are noted with loss of approximately 50% of vertical height centrally. There is no evidence of bony retropulsion. The fractures are age indeterminate. Further evaluation could be performed with MRI examination of the lumbar spine as indicated. Degenerative disease involving the lumbar spine is appreciated including anterolisthesis of L4 upon L5 and to lesser extent L5 upon S1 and facet degenerative disease. See above.      XR Hip With or Without Pelvis 2 - 3 View Right    Result Date: 1/4/2024  2 VIEW RIGHT HIP AND 1 VIEW AP PELVIS  HISTORY: Fell 2 days ago. Right hip pain.  FINDINGS: There are prominent degenerative changes involving the right hip more than left with joint space narrowing and some marginal spurring. No acute fracture is seen.  This report was finalized on 1/4/2024 12:37 PM by Dr. Jd Biswas M.D on Workstation: [id number]       I ordered the above noted radiological studies. Independently reviewed by me and discussed with radiologist.  See dictation above for official radiology interpretation.      PROCEDURES    Procedures        MEDICATIONS GIVEN IN ER    Medications   Lidocaine 4 % 2 patch (2 patches Transdermal Medication Applied 1/4/24 1227)   ketorolac (TORADOL) injection 30 mg (has no administration in time range)   sodium chloride 0.9 % flush 10 mL (has no administration in time range)    HYDROcodone-acetaminophen (NORCO) 5-325 MG per tablet 1 tablet (1 tablet Oral Given 1/4/24 1225)         PROGRESS, DATA ANALYSIS, CONSULTS, AND MEDICAL DECISION MAKING    All labs have been independently interpreted by me.  All radiology studies have been interpreted by me.  Discussion below represents my analysis of pertinent findings related to patient's condition, differential diagnosis, treatment plan and final disposition.      - Chronic or social conditions impacting care: None      DIFFERENTIAL DIAGNOSIS INCLUDE BUT NOT LIMITED TO:     Differential diagnosis includes but is not limited to:    - Lumbar strain  - Lumbar radiculopathy  - Lumbar disc bulge/herniation  - Lumbar spinal stenosis  - Vertebral fracture  - Cauda equina syndrome  - Vertebral osteomyelitis  - Kidney stone  - Shingles      Orders placed during this visit:  Orders Placed This Encounter   Procedures    [facility].  Spine Brace TLSO    XR Spine Lumbar Complete 4+VW    XR Hip With or Without Pelvis 2 - 3 View Right    CT Lumbar Spine Without Contrast    Comprehensive Metabolic Panel    Protime-INR    aPTT    CBC Auto Differential    Neurosurgery (on-call MD unless specified)    LHA (on-call MD unless specified) Details    Insert Peripheral IV    Inpatient Admission    CBC & Differential         ED Course as of 01/04/24 1336   Thu Jan 04, 2024   1226 My independent interpretation of the XR Lumbar Spine is no acute fracture.   [DC]   1313 I discussed the case with ABI Nieves with NS at this time regarding the patient.  I discussed work-up, results, concerns.  I discussed the consulting provider's desire for obtaining CT scan without contrast of the lumbar spine to further evaluate her fractures.  States that if her pain is poorly controlled she can be admitted to the hospitalist service for neurosurgical consultation and further workup as needed.   [DC]   1329 Patient presentation and evaluation consistent with multiple  uncomplicated compression fractures with pain that is preventing her from been able to ambulate safely.  Plan to admit to the hospital for pain control, neurosurgical consultation. [DC]   1335 I discussed the case with MD Bishop at this time regarding the patient.  I discussed work-up, results, concerns.  I discussed the consulting provider's desire for med surg admit.   [DC]      ED Course User Index  [DC] Daryl Nava PA       AS OF 13:36 EST VITALS:    BP - 166/76  HR - 70  TEMP - 98 °F (36.7 °C) (Tympanic)  02 SATS - 97%    1336 I rechecked the patient.  I discussed the patient's labs, radiology findings (including all incidental findings), diagnosis, and plan for admission. The patient understands and agrees with the plan.      DIAGNOSIS  Final diagnoses:   T12 compression fracture, initial encounter   Closed compression fracture of L1 vertebra, initial encounter         DISPOSITION  Admit    Pt masked in first look. I wore a surgical mask throughout my encounters with the pt. I performed hand hygiene on entry into the pt room and upon exit.     Dictated utilizing Dragon dictation     Note Disclaimer: At [facility], we believe that sharing information builds trust and better relationships. You are receiving this note because you recently visited [facility]. It is possible you will see health information before a provider has talked with you about it. This kind of information can be easy to misunderstand. To help you fully understand what it means for your health, we urge you to discuss this note with your provider.      Daryl Nava PA  01/04/24 0224

## 2024-01-04 NOTE — ED PROVIDER NOTES
MD ATTESTATION NOTE    The ABI and I have discussed this patient's history, physical exam, MDM, and treatment plan.  I have reviewed the documentation and personally had a face to face interaction with the patient. I affirm the documentation and agree with the treatment and plan.  The attached note describes my personal findings.      I provided a substantive portion of the medical decision making.        Brief HPI: 88-year-old female presenting for evaluation of back and hip pain after a fall 2 days ago.  Fall was mechanical in nature.  Patient did not hit her head.  Patient has had some difficulty ambulating    PHYSICAL EXAM  ED Triage Vitals   Temp Heart Rate Resp BP SpO2   01/04/24 1030 01/04/24 1030 01/04/24 1030 01/04/24 1033 01/04/24 1030   98 °F (36.7 °C) 76 16 145/72 97 %      Temp src Heart Rate Source Patient Position BP Location FiO2 (%)   01/04/24 1030 01/04/24 1030 -- -- --   Tympanic Monitor            GENERAL: no acute distress  HENT: nares patent  EYES: no scleral icterus  CV: regular rhythm, normal rate  RESPIRATORY: normal effort  ABDOMEN: soft  MUSCULOSKELETAL: no deformity.  Tenderness to palpation in the left lumbar region  NEURO: alert, moves all extremities, follows commands  PSYCH:  calm, cooperative  SKIN: warm, dry    Vital signs and nursing notes reviewed.        Medical Decision Making:  ED Course as of 01/06/24 2143   Thu Jan 04, 2024   1226 My independent interpretation of the XR Lumbar Spine is no acute fracture.   [DC]   1313 I discussed the case with ABI Nieves with NS at this time regarding the patient.  I discussed work-up, results, concerns.  I discussed the consulting provider's desire for obtaining CT scan without contrast of the lumbar spine to further evaluate her fractures.  States that if her pain is poorly controlled she can be admitted to the hospitalist service for neurosurgical consultation and further workup as needed.   [DC]   1329 Patient presentation and evaluation  consistent with multiple uncomplicated compression fractures with pain that is preventing her from been able to ambulate safely.  Plan to admit to the hospital for pain control, neurosurgical consultation. [DC]   7439 I discussed the case with MD Bishop at this time regarding the patient.  I discussed work-up, results, concerns.  I discussed the consulting provider's desire for med surg admit.   [DC]      ED Course User Index  [DC] Daryl Nava, PA     88-year-old female presenting for evaluation of back and hip pain after a fall.  Radiological imaging demonstrates findings concerning for compression fractures in the T12 and L1 distribution.  Neurosurgery consulted and request CT of the lumbar spine.  Patient will be admitted for pain control, neurosurgical consultation and further management.       King Rodriguez MD  01/06/24 2729

## 2024-01-04 NOTE — PLAN OF CARE
Goal Outcome Evaluation:  Plan of Care Reviewed With: patient        Progress: improving  Outcome Evaluation: vss. alert and oriented x4, room air. pt/ot consulted. brace at bedside. pure-wick in place. bed alarm

## 2024-01-04 NOTE — ED NOTES
.Nursing report ED to floor  Mayuri Early  88 y.o.  female    HPI :   Chief Complaint   Patient presents with    Fall   Mayuri Early is a 88 y.o. female with a PMH significant for HTN who presents to the ED c/o low back pain and right hip pain after fall 2 days ago.  The patient states that she was getting off the couch to answer the front door when she lost her balance and fell onto her right side.  No injury to the head or neck or loss of consciousness.  Since that time she has had difficulty ambulating secondary to pain.  No numbness or weakness of the extremities, bowel or bladder incontinence, saddle paresthesias.  She has noticed bruising over the right hip where there is aching and nonradiating pain.  She also describes an aching pain across her lower back diffusely.       MEDICAL RECORD REVIEW:     Upon review of the medical record it appears the patient was evaluated in the office with family medicine for cramping in the lower limbs on 11/22/2023.  The patient had a normal COVID test and UA on 10/6/2023.    Admitting doctor:   Lg Alas MD    Admitting diagnosis:   The primary encounter diagnosis was T12 compression fracture, initial encounter. A diagnosis of Closed compression fracture of L1 vertebra, initial encounter was also pertinent to this visit.    Code status:   Current Code Status       Date Active Code Status Order ID Comments User Context       Not on file        Allergies:   Patient has no known allergies.    Isolation:   No active isolations    Intake and Output  No intake or output data in the 24 hours ending 01/04/24 1616    Weight:       01/04/24  1033   Weight: 67.1 kg (148 lb)     Most recent vitals:   Vitals:    01/04/24 1030 01/04/24 1033 01/04/24 1232 01/04/24 1429   BP:  145/72 166/76 167/70   BP Location:    Left arm   Patient Position:    Lying   Pulse: 76  70    Resp: 16  20 16   Temp: 98 °F (36.7 °C)      TempSrc: Tympanic      SpO2: 97%  97%    Weight:  67.1 kg (148 lb)    "  Height:  157.5 cm (62\")       Active LDAs/IV Access:   Lines, Drains & Airways       Active LDAs       Name Placement date Placement time Site Days    Peripheral IV 01/04/24 1339 Right Antecubital 01/04/24  1339  Antecubital  less than 1                  Labs (abnormal labs have a star):   Labs Reviewed   COMPREHENSIVE METABOLIC PANEL - Abnormal; Notable for the following components:       Result Value    Glucose 106 (*)     Creatinine 1.11 (*)     eGFR 47.9 (*)     All other components within normal limits    Narrative:     GFR Normal >60  Chronic Kidney Disease <60  Kidney Failure <15    The GFR formula is only valid for adults with stable renal function between ages 18 and 70.   PROTIME-INR - Abnormal; Notable for the following components:    Protime 18.9 (*)     INR 1.55 (*)     All other components within normal limits   APTT - Abnormal; Notable for the following components:    PTT 35.8 (*)     All other components within normal limits   CBC WITH AUTO DIFFERENTIAL - Abnormal; Notable for the following components:    RBC 3.53 (*)     Hemoglobin 11.6 (*)     MCV 98.3 (*)     RDW 12.1 (*)     Neutrophil % 76.1 (*)     Lymphocyte % 12.2 (*)     All other components within normal limits   CBC AND DIFFERENTIAL    Narrative:     The following orders were created for panel order CBC & Differential.  Procedure                               Abnormality         Status                     ---------                               -----------         ------                     CBC Auto Differential[[id number]]        Abnormal            Final result                 Please view results for these tests on the individual orders.     EKG:   No orders to display     Meds given in ED:   Medications   Lidocaine 4 % 2 patch (2 patches Transdermal Medication Applied 1/4/24 1227)   sodium chloride 0.9 % flush 10 mL (has no administration in time range)   HYDROcodone-acetaminophen (NORCO) 5-325 MG per tablet 1 tablet (1 tablet Oral " Given 1/4/24 1225)   ketorolac (TORADOL) injection 15 mg (15 mg Intravenous Given 1/4/24 1431)       Imaging results:  CT Lumbar Spine Without Contrast    Result Date: 1/4/2024  1.  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 Careful correlation prior to any intervention is required given the presence of transitional anatomy. 2.  Compression deformities involving T12 and L1 are noted which are likely acute or acute to subacute as there is adjacent mild edema. A fracture plane is appreciated above the superior endplate of L1. There is mild bony retropulsion. The loss of vertical height is estimated to be approximately 40 to 50% in severity at each level. Further evaluation could be performed with MRI examination of the lumbar spine if the patient's cardiac pacer is MR-compatible. 3.  Multilevel degenerative disease involving lumbar spine is noted as described in detail above including a grade 1 anterolisthesis of L4 upon L5, grade 1 retrolisthesis of L1 upon L2, multilevel facet degenerative disease, broad-based disc osteophyte complexes and mild foraminal stenosis. See above. 4.  Multiple cysts are appreciated by the kidneys bilaterally. There are 2 areas of increased attenuation involving the left kidney which likely represent complex cyst. The largest measures approximately 2.5 cm in diameter. Comparison with prior studies is recommended.   Radiation dose reduction techniques were utilized, including automated exposure control and exposure modulation based on body size.       XR Spine Lumbar Complete 4+VW    Result Date: 1/4/2024  Compression fractures involving T12 and L1 are noted with loss of approximately 50% of vertical height centrally. There is no evidence of bony retropulsion. The fractures are age indeterminate. Further evaluation could be performed with MRI examination of the lumbar spine as indicated. Degenerative disease involving the lumbar spine is appreciated  including anterolisthesis of L4 upon L5 and to lesser extent L5 upon S1 and facet degenerative disease. See above.       Ambulatory status:   - can get up w/ brace on     Social issues:   Social History     Socioeconomic History    Marital status:    Tobacco Use    Smoking status: Former   Substance and Sexual Activity    Alcohol use: Never    Drug use: Never    Sexual activity: Defer     NIH Stroke Scale:     Barbara Vanegas RN  01/04/24 16:16 EST

## 2024-01-05 LAB
ALBUMIN SERPL-MCNC: 3.2 G/DL (ref 3.5–5.2)
ALBUMIN/GLOB SERPL: 1.2 G/DL
ALP SERPL-CCNC: 61 U/L (ref 39–117)
ALT SERPL W P-5'-P-CCNC: 9 U/L (ref 1–33)
ANION GAP SERPL CALCULATED.3IONS-SCNC: 9.3 MMOL/L (ref 5–15)
AST SERPL-CCNC: 15 U/L (ref 1–32)
BASOPHILS # BLD AUTO: 0.02 10*3/MM3 (ref 0–0.2)
BASOPHILS NFR BLD AUTO: 0.3 % (ref 0–1.5)
BILIRUB SERPL-MCNC: 0.8 MG/DL (ref 0–1.2)
BUN SERPL-MCNC: 33 MG/DL (ref 8–23)
BUN/CREAT SERPL: 21.6 (ref 7–25)
CALCIUM SPEC-SCNC: 8.7 MG/DL (ref 8.6–10.5)
CHLORIDE SERPL-SCNC: 103 MMOL/L (ref 98–107)
CO2 SERPL-SCNC: 25.7 MMOL/L (ref 22–29)
CREAT SERPL-MCNC: 1.53 MG/DL (ref 0.57–1)
DEPRECATED RDW RBC AUTO: 41 FL (ref 37–54)
EGFRCR SERPLBLD CKD-EPI 2021: 32.6 ML/MIN/1.73
EOSINOPHIL # BLD AUTO: 0.37 10*3/MM3 (ref 0–0.4)
EOSINOPHIL NFR BLD AUTO: 5.4 % (ref 0.3–6.2)
ERYTHROCYTE [DISTWIDTH] IN BLOOD BY AUTOMATED COUNT: 11.8 % (ref 12.3–15.4)
GEN 5 2HR TROPONIN T REFLEX: 15 NG/L
GLOBULIN UR ELPH-MCNC: 2.6 GM/DL
GLUCOSE SERPL-MCNC: 105 MG/DL (ref 65–99)
HCT VFR BLD AUTO: 28 % (ref 34–46.6)
HGB BLD-MCNC: 9.3 G/DL (ref 12–15.9)
IMM GRANULOCYTES # BLD AUTO: 0.02 10*3/MM3 (ref 0–0.05)
IMM GRANULOCYTES NFR BLD AUTO: 0.3 % (ref 0–0.5)
LYMPHOCYTES # BLD AUTO: 1.25 10*3/MM3 (ref 0.7–3.1)
LYMPHOCYTES NFR BLD AUTO: 18.2 % (ref 19.6–45.3)
MCH RBC QN AUTO: 32 PG (ref 26.6–33)
MCHC RBC AUTO-ENTMCNC: 33.2 G/DL (ref 31.5–35.7)
MCV RBC AUTO: 96.2 FL (ref 79–97)
MONOCYTES # BLD AUTO: 0.75 10*3/MM3 (ref 0.1–0.9)
MONOCYTES NFR BLD AUTO: 10.9 % (ref 5–12)
NEUTROPHILS NFR BLD AUTO: 4.46 10*3/MM3 (ref 1.7–7)
NEUTROPHILS NFR BLD AUTO: 64.9 % (ref 42.7–76)
NRBC BLD AUTO-RTO: 0 /100 WBC (ref 0–0.2)
PLATELET # BLD AUTO: 151 10*3/MM3 (ref 140–450)
PMV BLD AUTO: 9 FL (ref 6–12)
POTASSIUM SERPL-SCNC: 4.4 MMOL/L (ref 3.5–5.2)
PROT SERPL-MCNC: 5.8 G/DL (ref 6–8.5)
QT INTERVAL: 385 MS
QT INTERVAL: 391 MS
QTC INTERVAL: 428 MS
QTC INTERVAL: 479 MS
RBC # BLD AUTO: 2.91 10*6/MM3 (ref 3.77–5.28)
SODIUM SERPL-SCNC: 138 MMOL/L (ref 136–145)
TROPONIN T DELTA: 2 NG/L
TROPONIN T SERPL HS-MCNC: 13 NG/L
WBC NRBC COR # BLD AUTO: 6.87 10*3/MM3 (ref 3.4–10.8)

## 2024-01-05 PROCEDURE — 84484 ASSAY OF TROPONIN QUANT: CPT | Performed by: STUDENT IN AN ORGANIZED HEALTH CARE EDUCATION/TRAINING PROGRAM

## 2024-01-05 PROCEDURE — 93010 ELECTROCARDIOGRAM REPORT: CPT | Performed by: INTERNAL MEDICINE

## 2024-01-05 PROCEDURE — 97535 SELF CARE MNGMENT TRAINING: CPT

## 2024-01-05 PROCEDURE — 25010000002 ONDANSETRON PER 1 MG: Performed by: INTERNAL MEDICINE

## 2024-01-05 PROCEDURE — 97162 PT EVAL MOD COMPLEX 30 MIN: CPT

## 2024-01-05 PROCEDURE — 93005 ELECTROCARDIOGRAM TRACING: CPT | Performed by: INTERNAL MEDICINE

## 2024-01-05 PROCEDURE — 97530 THERAPEUTIC ACTIVITIES: CPT

## 2024-01-05 PROCEDURE — 97166 OT EVAL MOD COMPLEX 45 MIN: CPT

## 2024-01-05 PROCEDURE — 25810000003 LACTATED RINGERS SOLUTION: Performed by: STUDENT IN AN ORGANIZED HEALTH CARE EDUCATION/TRAINING PROGRAM

## 2024-01-05 PROCEDURE — 85025 COMPLETE CBC W/AUTO DIFF WBC: CPT | Performed by: INTERNAL MEDICINE

## 2024-01-05 PROCEDURE — 93005 ELECTROCARDIOGRAM TRACING: CPT | Performed by: STUDENT IN AN ORGANIZED HEALTH CARE EDUCATION/TRAINING PROGRAM

## 2024-01-05 PROCEDURE — 99231 SBSQ HOSP IP/OBS SF/LOW 25: CPT | Performed by: NURSE PRACTITIONER

## 2024-01-05 PROCEDURE — 80053 COMPREHEN METABOLIC PANEL: CPT | Performed by: INTERNAL MEDICINE

## 2024-01-05 RX ORDER — ASPIRIN 81 MG/1
81 TABLET ORAL DAILY
Status: DISCONTINUED | OUTPATIENT
Start: 2024-01-06 | End: 2024-01-06

## 2024-01-05 RX ORDER — SODIUM CHLORIDE, SODIUM LACTATE, POTASSIUM CHLORIDE, CALCIUM CHLORIDE 600; 310; 30; 20 MG/100ML; MG/100ML; MG/100ML; MG/100ML
75 INJECTION, SOLUTION INTRAVENOUS CONTINUOUS
Status: DISCONTINUED | OUTPATIENT
Start: 2024-01-05 | End: 2024-01-07

## 2024-01-05 RX ORDER — GABAPENTIN 100 MG/1
100 CAPSULE ORAL NIGHTLY
Status: DISCONTINUED | OUTPATIENT
Start: 2024-01-05 | End: 2024-01-08 | Stop reason: HOSPADM

## 2024-01-05 RX ADMIN — Medication 10 ML: at 09:27

## 2024-01-05 RX ADMIN — SENNOSIDES AND DOCUSATE SODIUM 2 TABLET: 50; 8.6 TABLET ORAL at 20:54

## 2024-01-05 RX ADMIN — HYDROCODONE BITARTRATE AND ACETAMINOPHEN 1 TABLET: 5; 325 TABLET ORAL at 02:05

## 2024-01-05 RX ADMIN — METOPROLOL SUCCINATE 100 MG: 100 TABLET, EXTENDED RELEASE ORAL at 20:54

## 2024-01-05 RX ADMIN — ONDANSETRON HYDROCHLORIDE 4 MG: 2 INJECTION, SOLUTION INTRAMUSCULAR; INTRAVENOUS at 14:17

## 2024-01-05 RX ADMIN — SODIUM CHLORIDE, POTASSIUM CHLORIDE, SODIUM LACTATE AND CALCIUM CHLORIDE 500 ML: 600; 310; 30; 20 INJECTION, SOLUTION INTRAVENOUS at 15:10

## 2024-01-05 RX ADMIN — HYDROCODONE BITARTRATE AND ACETAMINOPHEN 1 TABLET: 5; 325 TABLET ORAL at 09:31

## 2024-01-05 RX ADMIN — HYDROCODONE BITARTRATE AND ACETAMINOPHEN 1 TABLET: 5; 325 TABLET ORAL at 14:17

## 2024-01-05 RX ADMIN — Medication 10 ML: at 20:55

## 2024-01-05 RX ADMIN — ATORVASTATIN CALCIUM 10 MG: 20 TABLET, FILM COATED ORAL at 20:53

## 2024-01-05 RX ADMIN — SENNOSIDES AND DOCUSATE SODIUM 2 TABLET: 50; 8.6 TABLET ORAL at 09:27

## 2024-01-05 RX ADMIN — APIXABAN 2.5 MG: 2.5 TABLET, FILM COATED ORAL at 20:54

## 2024-01-05 RX ADMIN — GABAPENTIN 100 MG: 100 CAPSULE ORAL at 20:54

## 2024-01-05 RX ADMIN — ONDANSETRON HYDROCHLORIDE 4 MG: 2 INJECTION, SOLUTION INTRAMUSCULAR; INTRAVENOUS at 09:27

## 2024-01-05 RX ADMIN — PANTOPRAZOLE SODIUM 40 MG: 40 TABLET, DELAYED RELEASE ORAL at 05:41

## 2024-01-05 RX ADMIN — METOPROLOL SUCCINATE 100 MG: 100 TABLET, EXTENDED RELEASE ORAL at 09:28

## 2024-01-05 RX ADMIN — HYDROCODONE BITARTRATE AND ACETAMINOPHEN 1 TABLET: 5; 325 TABLET ORAL at 21:06

## 2024-01-05 NOTE — SIGNIFICANT NOTE
01/05/24 1629   Post Acute Pre-Cert Documentation   Request Submitted by Facility - Type: Hospital   Post-Acute Authorization Type Submitted: SNF   Date Post Acute Pre-Cert Inititated per Facility 01/05/24   Date Post Acute Pre-Cert Completed 01/05/24   Accepting Facility [facility] Discharge Date Requested 01/06/24   All Clinicals Submitted? Yes   Had Accepting Facility at Time of Submission Yes   Response Received from Insurance? Approval   Response Communicated to: ;Accepting Facility Auth Department;Accepting Facility Liaison   Authorization Number: [id number]  (STARTING 1/6/24; PRE-CERT VALID TO ADMIT TO PAC FACILITY UP TO MIDNIGHT ON 1/8/24.)   Post Acute Pre-Cert Initiated Comment KRYSTLE DIAZ

## 2024-01-05 NOTE — PROGRESS NOTES
LOS: 1 day   Patient Care Team:  Gunjan Hidalgo MD as PCP - General (Family Medicine)    Chief Complaint:  back pain, compression fracture    Subjective       Interval History: Reports lumbar pain is more manageable with medications. Using brace when mobilizing and tolerating well. Ambulated 20 ft today with PT in brace.     History taken from: patient chart family    Objective        Vital Signs  Temp:  [97.3 °F (36.3 °C)-98 °F (36.7 °C)] 97.5 °F (36.4 °C)  Heart Rate:  [67-76] 70  Resp:  [16-18] 16  BP: (101-157)/(57-82) 105/57     Physical Exam:     AA&O x 3.    to palpation upper lumbar region.   Moving LE's without difficulty.     Assessment & Plan       Closed compression fracture of L1 vertebra, initial encounter    Compression fracture of T12 vertebra    PAF (paroxysmal atrial fibrillation)    Presence of cardiac pacemaker    HTN (hypertension)    CKD (chronic kidney disease)    Chronic anticoagulation    Chronic back pain    Anemia    Asthma    Acute/subacute T12, L1 compression fracture after a fall a few days ago  Back pain improved with medications; using TLSO brace with mobilization.    Patient states pain is improving. Not interested in any other treatment or kyphoplasty.   Pain should improve with time.   Instructions given to wear brace when out of bed except to shower. Use brace for comfort.   If pain does not improve, the patient was encouraged to call Neurosurgery office.   Nothing further to add at this time; call Neurosurgery for any questions or concerns.   OK to resume home AC anytime.     Lizbeth Ruby, APRN  01/05/24  16:30 EST

## 2024-01-05 NOTE — THERAPY EVALUATION
Patient Name: Mayuri Early  : 1935    MRN: [id number]                              Today's Date: 2024       Admit Date: 2024    Visit Dx:     ICD-10-CM ICD-9-CM   1. T12 compression fracture, initial encounter  S22.080A 805.2   2. Closed compression fracture of L1 vertebra, initial encounter  S32.010A 805.4     Patient Active Problem List   Diagnosis    Well woman exam    Closed compression fracture of L1 vertebra, initial encounter    Compression fracture of T12 vertebra    PAF (paroxysmal atrial fibrillation)    Presence of cardiac pacemaker    HTN (hypertension)    CKD (chronic kidney disease)    Chronic anticoagulation    Chronic back pain    Anemia    Asthma     Past Medical History:   Diagnosis Date    Fibrocystic breast     Well woman exam 10/20/2020     Past Surgical History:   Procedure Laterality Date    BREAST CYST ASPIRATION      BREAST CYST EXCISION      HYSTERECTOMY        General Information       Row Name 24 1034          Physical Therapy Time and Intention    Document Type evaluation  Pt. admitted with a T12 and L1 Compression Fx (non-operative)  -MS     Mode of Treatment occupational therapy;physical therapy;co-treatment  Activity limitation due to fatigue/weakness; Working on functional balance and strengthening while performing ADL's  -MS       Row Name 24 1034          General Information    Patient Profile Reviewed yes  -MS     Prior Level of Function independent:  No use of A.D.  -MS     Existing Precautions/Restrictions spinal;TLSO;fall   Exit alarm  -MS     Barriers to Rehab none identified  -MS       Row Name 24 1034          Cognition    Orientation Status (Cognition) oriented x 3  -MS       Row Name 24 1034          Safety Issues, Functional Mobility    Comment, Safety Issues/Impairments (Mobility) Gait belt used for safety.  -MS               User Key  (r) = Recorded By, (t) = Taken By, (c) = Cosigned By      Initials Name Provider Type    MS  [place], PT Physical Therapist                   Mobility       Row Name 01/05/24 1035          Bed Mobility    Bed Mobility supine-sit;sit-supine  -MS     Supine-Sit [place] (Bed Mobility) minimum assist (75% patient effort)  -MS     Sit-Supine [place] (Bed Mobility) minimum assist (75% patient effort)  -MS     Comment, (Bed Mobility) Via logroll;  Once sitting EOB, assisted pt. in donning her TLSO prior to ambulation  -MS       Row Name 01/05/24 1035          Sit-Stand Transfer    Sit-Stand [place] (Transfers) minimum assist (75% patient effort)  -MS     Assistive Device (Sit-Stand Transfers) --  HHA  -MS       Row Name 01/05/24 1035          Gait/Stairs (Locomotion)    [place] Level (Gait) minimum assist (75% patient effort);2 person assist  -MS     Assistive Device (Gait) --  HHA x 1-2  -MS     Distance in Feet (Gait) 20 feet  -MS     Deviations/Abnormal Patterns (Gait) mahesh decreased  -MS     Bilateral Gait Deviations forward flexed posture  -MS     Comment, (Gait/Stairs) Verbal/tactile cues given for posture correction.  -MS               User Key  (r) = Recorded By, (t) = Taken By, (c) = Cosigned By      Initials Name Provider Type    MS KearneyerKing PT Physical Therapist                   Obj/Interventions       Row Name 01/05/24 1036          Range of Motion Comprehensive    Comment, General Range of Motion BLE (WFL's)  -MS       Row Name 01/05/24 1036          Strength Comprehensive (MMT)    Comment, General Manual Muscle Testing (MMT) Assessment BLE (3/5)  -MS               User Key  (r) = Recorded By, (t) = Taken By, (c) = Cosigned By      Initials Name Provider Type    MS [place], PT Physical Therapist                   Goals/Plan       Row Name 01/05/24 1037          Bed Mobility Goal 1 (PT)    Activity/Assistive Device (Bed Mobility Goal 1, PT) bed mobility activities, all  -MS     [place] Level/Cues Needed (Bed Mobility Goal 1, PT) standby assist   -MS     Time Frame (Bed Mobility Goal 1, PT) long term goal (LTG);1 week  -MS     Strategies/Barriers (Bed Mobility Goal 1, PT) via logroll  -MS       Row Name 01/05/24 1037          Transfer Goal 1 (PT)    Activity/Assistive Device (Transfer Goal 1, PT) transfers, all;walker, rolling  -MS     [place] Level/Cues Needed (Transfer Goal 1, PT) standby assist  -MS     Time Frame (Transfer Goal 1, PT) long term goal (LTG);1 week  -MS       Row Name 01/05/24 1037          Gait Training Goal 1 (PT)    Activity/Assistive Device (Gait Training Goal 1, PT) gait (walking locomotion);walker, rolling  -MS     [place] Level (Gait Training Goal 1, PT) standby assist  -MS     Distance (Gait Training Goal 1, PT) 80 feet  -MS     Time Frame (Gait Training Goal 1, PT) long term goal (LTG);1 week  -MS       Row Name 01/05/24 1037          Therapy Assessment/Plan (PT)    Planned Therapy Interventions (PT) balance training;bed mobility training;gait training;home exercise program;patient/family education;postural re-education;transfer training;strengthening  -MS               User Key  (r) = Recorded By, (t) = Taken By, (c) = Cosigned By      Initials Name Provider Type    King Briggs, PT Physical Therapist                   Clinical Impression       Row Name 01/05/24 1037          Pain    Pretreatment Pain Rating 8/10  -MS     Posttreatment Pain Rating 8/10  -MS     Pain Location - back  -MS     Pain Intervention(s) Medication (See MAR);Nursing Notified;Repositioned  -MS       Row Name 01/05/24 1037          Plan of Care Review    Plan of Care Reviewed With patient  -MS       Row Name 01/05/24 1037          Therapy Assessment/Plan (PT)    Rehab Potential (PT) good, to achieve stated therapy goals  -MS     Criteria for Skilled Interventions Met (PT) skilled treatment is necessary  -MS     Therapy Frequency (PT) 6 times/wk  -MS       Row Name 01/05/24 1037          Positioning and Restraints    Pre-Treatment Position in  bed  -MS     Post Treatment Position bed  -MS     In Bed notified nsg;call light within reach;supine;encouraged to call for assist;exit alarm on  -MS               User Key  (r) = Recorded By, (t) = Taken By, (c) = Cosigned By      Initials Name Provider Type    King Briggs PT Physical Therapist                   Outcome Measures       Row Name 01/05/24 1038          How much help from another person do you currently need...    Turning from your back to your side while in flat bed without using bedrails? 3  -MS     Moving from lying on back to sitting on the side of a flat bed without bedrails? 3  -MS     Moving to and from a bed to a chair (including a wheelchair)? 2  -MS     Standing up from a chair using your arms (e.g., wheelchair, bedside chair)? 2  -MS     Climbing 3-5 steps with a railing? 2  -MS     To walk in hospital room? 2  -MS     AM-PAC 6 Clicks Score (PT) 14  -MS     Highest Level of Mobility Goal 4 --> Transfer to chair/commode  -MS       Row Name 01/05/24 1038          Functional Assessment    Outcome Measure Options AM-PAC 6 Clicks Basic Mobility (PT)  -MS               User Key  (r) = Recorded By, (t) = Taken By, (c) = Cosigned By      Initials Name Provider Type    King Briggs PT Physical Therapist                                 Physical Therapy Education       Title: PT OT SLP Therapies (In Progress)       Topic: Physical Therapy (In Progress)       Point: Mobility training (Done)       Learning Progress Summary             Patient Acceptance, E,D, VU,NR by MS at 1/5/2024 1038                         Point: Home exercise program (Not Started)       Learner Progress:  Not documented in this visit.              Point: Body mechanics (Done)       Learning Progress Summary             Patient Acceptance, E,D, VU,NR by MS at 1/5/2024 1038                         Point: Precautions (Done)       Learning Progress Summary             Patient Acceptance, E,D, VU,NR by MS at 1/5/2024  1038                                         User Key       Initials Effective Dates Name Provider Type Discipline    MS 06/16/21 -  King Hill, PT Physical Therapist PT                  PT Recommendation and Plan  Planned Therapy Interventions (PT): balance training, bed mobility training, gait training, home exercise program, patient/family education, postural re-education, transfer training, strengthening  Plan of Care Reviewed With: patient  Outcome Evaluation: Pt. is an 88 year old Female admitted to the hospital with a T12 and L1 compression fx. (Non-operative; TLSO).  Pt. reports that prior to admission she was independent with functional mobility and used no A.D. during ambulation.  Pt. currently presents with decreased strength, decreased balance, and decreased tolerance to functional activity. This AM, pt. able to ambulate 20 feet, Min. assist x 1-2, with HHA x 1-2.  Pt. requires Min. assist x 1 for bed mobility (via logroll) and Min. assist x 1 for sit <-> stand transfers.  Once sitting EOB, assisted pt. in donning her TLSO prior to ambulation.  Verbal/tactile cues given throughout for posture correction.  Pt. will benefit from skilled inpt. P.T. to address her functional deficits and to assist pt. in regaining her maximum level of independence with functional mobility.     Time Calculation:         PT Charges       Row Name 01/05/24 1041             Time Calculation    Start Time 0943  -MS      Stop Time 0957  -MS      Time Calculation (min) 14 min  -MS      PT Received On 01/05/24  -MS      PT - Next Appointment 01/06/24  -MS      PT Goal Re-Cert Due Date 01/12/24  -MS         Time Calculation- PT    Total Timed Code Minutes- PT 13 minute(s)  -MS                User Key  (r) = Recorded By, (t) = Taken By, (c) = Cosigned By      Initials Name Provider Type    MS [place], PT Physical Therapist                  Therapy Charges for Today       Code Description Service Date Service Provider  Modifiers Qty    90500242477 HC PT EVAL MOD COMPLEXITY 2 1/5/2024 King Hill, PT GP 1    43231224140 HC PT THERAPEUTIC ACT EA 15 MIN 1/5/2024 King Hill, PT GP 1            PT G-Codes  Outcome Measure Options: AM-PAC 6 Clicks Basic Mobility (PT)  AM-PAC 6 Clicks Score (PT): 14  PT Discharge Summary  Anticipated Discharge Disposition (PT): skilled nursing facility    King Hill, PT  1/5/2024

## 2024-01-05 NOTE — PLAN OF CARE
Goal Outcome Evaluation:  Plan of Care Reviewed With: patient           Outcome Evaluation: Pt. is an 88 year old Female admitted to the hospital with a T12 and L1 compression fx. (Non-operative; TLSO).  Pt. reports that prior to admission she was independent with functional mobility and used no A.D. during ambulation.  Pt. currently presents with decreased strength, decreased balance, and decreased tolerance to functional activity. This AM, pt. able to ambulate 20 feet, Min. assist x 1-2, with HHA x 1-2.  Pt. requires Min. assist x 1 for bed mobility (via logroll) and Min. assist x 1 for sit <-> stand transfers.  Once sitting EOB, assisted pt. in donning her TLSO prior to ambulation.  Verbal/tactile cues given throughout for posture correction.  Pt. will benefit from skilled inpt. P.T. to address her functional deficits and to assist pt. in regaining her maximum level of independence with functional mobility.      Anticipated Discharge Disposition (PT): skilled nursing facility

## 2024-01-05 NOTE — PROGRESS NOTES
Name: Mayuri Early ADMIT: 2024   : 1935  PCP: Gunjan Hidalgo MD    MRN: [id number] LOS: 1 days   AGE/SEX: 88 y.o. female  ROOM: [facility]     Subjective   Subjective   Patient lying in bed.  Pain medication is working well but does seem to wear off.  Patient worked with physical therapy.  Friend at bedside.  Patient called son, Torres, and we discussed patient's plan of care.  Patient is hopeful that no surgery is needed.    Review of Systems   Constitutional:  Negative for chills and fever.   Respiratory:  Negative for cough and shortness of breath.    Cardiovascular:  Negative for chest pain and leg swelling.   Gastrointestinal:  Positive for nausea. Negative for abdominal pain and diarrhea.     As above     Objective   Objective   Vital Signs  Temp:  [97.3 °F (36.3 °C)-98 °F (36.7 °C)] 97.5 °F (36.4 °C)  Heart Rate:  [67-76] 70  Resp:  [16-18] 16  BP: (101-157)/(57-82) 105/57  SpO2:  [92 %-100 %] 94 %  on   ;   Device (Oxygen Therapy): room air  Body mass index is 27.07 kg/m².  Physical Exam  Constitutional:       General: She is not in acute distress.     Appearance: She is not ill-appearing.      Comments: Elderly, frail   Cardiovascular:      Rate and Rhythm: Normal rate and regular rhythm.   Pulmonary:      Effort: Pulmonary effort is normal. No respiratory distress.   Abdominal:      General: Abdomen is flat. There is no distension.      Tenderness: There is no abdominal tenderness.   Musculoskeletal:         General: No swelling or deformity. Normal range of motion.      Comments: Lumbar tenderness   Skin:     General: Skin is warm and dry.   Neurological:      General: No focal deficit present.      Mental Status: She is alert. Mental status is at baseline.         Results Review     I reviewed the patient's new clinical results.  Results from last 7 days   Lab Units 24  0435 24  1338   WBC 10*3/mm3 6.87 7.90   HEMOGLOBIN g/dL 9.3* 11.6*   PLATELETS 10*3/mm3 151 172     Results  "from last 7 days   Lab Units 01/05/24  0435 01/04/24  1338   SODIUM mmol/L 138 139   POTASSIUM mmol/L 4.4 4.2   CHLORIDE mmol/L 103 101   CO2 mmol/L 25.7 27.1   BUN mg/dL 33* 21   CREATININE mg/dL 1.53* 1.11*   GLUCOSE mg/dL 105* 106*   Estimated Creatinine Clearance: 22.8 mL/min (A) (by C-G formula based on SCr of 1.53 mg/dL (H)).  Results from last 7 days   Lab Units 01/05/24  0435 01/04/24  1338   ALBUMIN g/dL 3.2* 4.1   BILIRUBIN mg/dL 0.8 1.1   ALK PHOS U/L 61 78   AST (SGOT) U/L 15 16   ALT (SGPT) U/L 9 14     Results from last 7 days   Lab Units 01/05/24  0435 01/04/24  1338   CALCIUM mg/dL 8.7 9.8   ALBUMIN g/dL 3.2* 4.1       SARS-CoV-2, ROSA   Date Value Ref Range Status   10/06/2023 NEGATIVE Negative Final     Comment:     The 2019-CoV rRT-PCR Assay is only for use under a Food and Drug Administration Emergency Use Authorization. The performance characteristics of the assay were verified by the Clinical Laboratory at [facility]. Results should be used in   conjunction with the patient's clinical symptoms, medical history, and other clinical/laboratory findings to determine an overall clinical diagnosis. Negative results do not preclude infection with SARS-CoV-2 (COVID-19).    Test parameters have not been validated for screening asymptomatic patients.   12/19/2022 NEGATIVE Negative Final     Comment:     The 2019-CoV rRT-PCR Assay is only for use under a Food and Drug Administration Emergency Use Authorization. The performance characteristics of the assay were verified by the Clinical Laboratory at [facility]. Results should be used in   conjunction with the patient's clinical symptoms, medical history, and other clinical/laboratory findings to determine an overall clinical diagnosis. Negative results do not preclude infection with SARS-CoV-2 (COVID-19).    Test parameters have not been validated for screening asymptomatic patients.     No results found for: \"HGBA1C\", \"POCGLU\"    CT " Lumbar Spine Without Contrast  Narrative: CT LUMBAR SPINE WITHOUT CONTRAST     HISTORY: Fall, back pain.     COMPARISON: Plain film examination of the lumbar spine 01/04/2024.     FINDINGS: A gastric band and cardiac pacing wires are partially  visualized.     There is a grade 1 anterolisthesis of L4 upon L5 estimated to be  approximately 4 mm. There is no evidence of a pars defect.     Compression fractures involving T12 and L1 are noted. There is a loss of  approximately 40 to 50% of vertical height centrally at T12 and L1.  There is mild bony retropulsion of the superior endplate of L1 1 and  T12. There is mild stranding adjacent to the vertebral bodies anteriorly  and anterior laterally suggesting that the fractures are acute or  subacute in nature.     Extensive vascular calcification is noted. A large renal cyst is  appreciated on the right, only partially visualized and measuring  approximately 4.4 cm in diameter. Areas of increased attenuation likely  representing complex renal cysts are appreciated on the left, the  largest of which measures 2.5 cm in transverse dimension and the smaller  measuring approximately 12 mm in transverse dimension.     T11-T12: A mild broad-based disc osteophyte complex is present resulting  in mild flattening of ventral surface of the thecal sac.     T12-L1: There is no evidence of disc bulge or herniation.     L1-L2: There is a grade 1 retrolisthesis of L1 upon L2, estimated to be  approximately 3-4 mm. Mild foraminal stenosis is present bilaterally,  slightly more prominent on the right secondary to the retrolisthesis of  L1 upon L2 and a broad-based disc osteophyte complex.     L2-L3: There is no evidence of disc herniation. A mild central  broad-based disc osteophyte complex is present.     L3-L4: Mild facet degenerative disease is present bilaterally. A mild  broad-based disc bulge is present which results in mild flattening  ventral surface of the thecal sac and  contributing and contributes to  mild lateral recess narrowing bilaterally. Mild foraminal stenosis is  present bilaterally secondary to extension of a small disc osteophyte  complex into the neural foramen.     L4-L5: Moderate to severe facet degenerative disease is present on the  left and there is moderate facet degenerative disease on the right. A  central broad-based disc osteophyte complex is present resulting in mild  flattening of the ventral surface of the thecal sac and contributing to  mild foraminal stenosis bilaterally.     L5-S1: Transitional anatomy is appreciated consisting of partial  sacralization of L5. Mild facet degenerative disease is present  bilaterally.     Impression: 1.  Transitional anatomy is appreciated consisting of partial  sacralization of L5. Careful correlation prior to any intervention is  required given the presence of transitional anatomy.  2.  Compression deformities involving T12 and L1 are noted which are  likely acute or acute to subacute as there is adjacent mild edema. A  fracture plane is appreciated above the superior endplate of L1. There  is mild bony retropulsion. The loss of vertical height is estimated to  be approximately 40 to 50% in severity at each level. Further evaluation  could be performed with MRI examination of the lumbar spine if the  patient's cardiac pacer is MR-compatible.  3.  Multilevel degenerative disease involving lumbar spine is noted as  described in detail above including a grade 1 anterolisthesis of L4 upon  L5, grade 1 retrolisthesis of L1 upon L2, multilevel facet degenerative  disease, broad-based disc osteophyte complexes and mild foraminal  stenosis. See above.  4.  Multiple cysts are appreciated by the kidneys bilaterally. There are  2 areas of increased attenuation involving the left kidney which likely  represent complex cysts. The largest measures approximately 2.5 cm in  diameter. Comparison with prior studies is recommended.         Radiation dose reduction techniques were utilized, including automated  exposure control and exposure modulation based on body size.        This report was finalized on 1/4/2024 7:45 PM by Dr. Ministerio Diez M.D  on Workstation: [id number]     XR Spine Lumbar Complete 4+VW  Narrative: LUMBAR SPINE FOUR VIEWS     HISTORY: Fall, back pain.     COMPARISON: None.     FINDINGS: AP, lateral and bilateral oblique views of the lumbar spine  demonstrates mild dextroscoliosis of the lumbar spine. There is a grade  1 anterolisthesis of L3 upon L4 estimated to be approximately 3 mm.  Anterolisthesis of L4 upon L5 is appreciated estimated to be 5 to 6 mm.  Moderate facet degenerative disease is noted at L4-L5 and L5-S1.     Mild to moderate compression deformities involving T12 and L1 are noted.  Loss of vertical height centrally at each level is approximately 50%.  The fractures are age indeterminate. A gastric band and extensive  vascular calcification is noted.     Impression: Compression fractures involving T12 and L1 are noted with  loss of approximately 50% of vertical height centrally. There is no  evidence of bony retropulsion. The fractures are age indeterminate.  Further evaluation could be performed with MRI examination of the lumbar  spine as indicated. Degenerative disease involving the lumbar spine is  appreciated including anterolisthesis of L4 upon L5 and to lesser extent  L5 upon S1 and facet degenerative disease. See above.     This report was finalized on 1/4/2024 7:21 PM by Dr. Ministerio Diez M.D  on Workstation: [id number]     XR Hip With or Without Pelvis 2 - 3 View Right  2 VIEW RIGHT HIP AND 1 VIEW AP PELVIS     HISTORY: Fell 2 days ago. Right hip pain.     FINDINGS: There are prominent degenerative changes involving the right  hip more than left with joint space narrowing and some marginal  spurring. No acute fracture is seen.     This report was finalized on 1/4/2024 12:37 PM by Dr. Jd Biswas M.D  on  Workstation: [id number]       I reviewed the patient's daily medications.  Scheduled Medications  atorvastatin, 10 mg, Oral, Nightly  gabapentin, 100 mg, Oral, Nightly  losartan, 100 mg, Oral, Q24H   And  hydroCHLOROthiazide, 25 mg, Oral, Q24H  lactated ringers, 500 mL, Intravenous, Once  metoprolol succinate XL, 100 mg, Oral, BID  pantoprazole, 40 mg, Oral, Q AM  senna-docusate sodium, 2 tablet, Oral, BID  sodium chloride, 10 mL, Intravenous, Q12H    Infusions   Diet  Diet: Regular/House Diet; Texture: Regular Texture (IDDSI 7); Fluid Consistency: Thin (IDDSI 0)         I have personally reviewed:  [x]  Laboratory   []  Microbiology   [x]  Radiology   [x]  EKG/Telemetry   []  Cardiology/Vascular   []  Pathology   [x]  Records     Assessment/Plan     Active Hospital Problems    Diagnosis  POA   • **Closed compression fracture of L1 vertebra, initial encounter [S32.010A]  Yes   • Compression fracture of T12 vertebra [S22.080A]  Yes   • PAF (paroxysmal atrial fibrillation) [I48.0]  Unknown   • Presence of cardiac pacemaker [Z95.0]  Unknown   • HTN (hypertension) [I10]  Unknown   • CKD (chronic kidney disease) [N18.9]  Unknown   • Chronic anticoagulation [Z79.01]  Not Applicable   • Chronic back pain [M54.9, G89.29]  Unknown   • Anemia [D64.9]  Unknown   • Asthma [J45.909]  Unknown      Resolved Hospital Problems   No resolved problems to display.       88 y.o. female admitted with Closed compression fracture of L1 vertebra, initial encounter.    T12 and L1 Compression fracture from mechanical fall  Chronic back pain  -Neurosurgery evaluated in the ER and plan for TLSO brace, further recommendations after seeing how she mobilizes in the brace  -Hold aspirin and Eliquis for now, resume when okay with neurosurgery if no procedures are planned  -Resume home Feliz hernandez reviewed    ALLYSON on CKD 3 A  -IV fluid bolus and maintenance fluids  -Recheck creatinine in the morning    Paroxysmal atrial  fibrillation  Hypertension  -Hold Eliquis as above  -Continue home losartan, hydrochlorothiazide, metoprolol  -Hydralazine hold for now  -EKG with a flutter, rate controlled    Hyperlipidemia-continue home Lipitor    SCDs for DVT prophylaxis.  Full code.  Discussed with patient, family, and nursing staff.  Anticipate discharge to SNU facility in 1-2 days. When okay with consultants    Expected discharge date/ time has not been documented.      Joseph Damon MD  [facility]  01/05/24  16:03 EST

## 2024-01-05 NOTE — DISCHARGE PLACEMENT REQUEST
"Mary Early (88 y.o. Female)       Date of Birth   1935    Social Security Number       Address   [address] [address]    Home Phone   [phone]    MRN   [id number]       [employer]   [employer]    Marital Status                               Admission Date   1/4/24    Admission Type   Emergency    Admitting Provider   Lg Alas MD    Attending Provider   Joseph Damon MD    Department, Room/Bed   [address], P898/1       Discharge Date       Discharge Disposition       Discharge Destination                                 Attending Provider: Joseph Damon MD    Allergies: No Known Allergies    Isolation: None   Infection: None   Code Status: CPR    Ht: 157.5 cm (62\")   Wt: 67.1 kg (148 lb)    Admission Cmt: None   Principal Problem: Closed compression fracture of L1 vertebra, initial encounter [S32.010A]                   Active Insurance as of 1/4/2024       Primary Coverage       Payor Plan Insurance Group Employer/Plan Group    ANTHEM MEDICARE REPLACEMENT ANTHEM MEDICARE ADVANTAGE [id number]       Payor Plan Address Payor Plan Phone Number Payor Plan Fax Number Effective Dates    [address] [phone]  1/1/2016 - None Entered    [facility] [address]         Subscriber Name Subscriber Birth Date Member ID       MARY EARLY 1935 [id number]               Secondary Coverage       Payor Plan Insurance Group Employer/Plan Group    [id number]         Payor Plan Address Payor Plan Phone Number Payor Plan Fax Number Effective Dates    [address] [address] [phone]  4/28/2005 - None Entered    [facility] [address]         Subscriber Name Subscriber Birth Date Member ID       MARY EARLY 1935 [id number]                     Emergency Contacts        (Rel.) Home Phone Work Phone Mobile Phone    Torres Early (Son) [phone] -- --              {Outbreak/Travel/Exposure Documentation......;  Question Available " Choices Patient Response   COVID-19 Outbreak Screen:  Do you currently have a new onset of the following symptoms?        Fever/Chills, Cough, Shortness of air, Loss of taste or smell, No, Unknown  No (01/04/24 1755)   COVID-19 Outbreak Screen: In the last 14 days, have you had contact with anyone who is ill, has show any of the symptoms listed above and/or has been diagnosis with the 2019 Novel Coronavirus? This includes any immediate household members but excludes any patients with whom you have been in contact within your normal work duties wearing proper PPE, if you are a healthcare worker.  Yes, No, Unknown              (not recorded)   COVID-19 Outbreak Screen: Who was notified? Free text (not recorded)   Ebola Screening Outbreak Screen: Have you traveled to the [place] the [place] or [place] within the past 21 days?  Yes, No, Unknown (not recorded)   Ebola Screening Outbreak Screen: Do you have ANY of the following symptoms: Fever/Chills, Vomiting, Diarrhea, Fatigue, Headache, Muscle pain, Unexplained bleeding, Abdominal (stomach) pain, No, Unknown (not recorded)   Ebola Screening Outbreak Screen: Name of Person notified Free text (not recorded)   Travel Screen: Have you traveled in the last month? If so, to what country have you traveled? If [place] what state? Yes, No, Unknown  List of all countries  List of all States No (01/04/24 1755)  (not recorded)  (not recorded)   Infection Risk: Do you currently have the following symptoms?  (If cough is selected, the Tuberculosis Screen is performed.) Cough, Fever, Rash, No No (01/04/24 1755)   Tuberculosis Screen: Do you have any of the following Tuberculosis Risks?  Have you lived or spent time with anyone who had or may have TB?  Have you lived in or visited any of the following areas for more than one month: [place], [place], [place], [place] or [place], [place] or [place]?  Do you have HIV/AIDS?  Have you lived in or worked in a nursing  home, homeless shelter, correctional facility, or substance abuse treatment facility?   No    If Yes do you have any of the following symptoms? Yes responses display to the right    If Yes, symptoms listed are:  Cough greater than or equal to 3 weeks, Loss of appetite, Unexplained weight loss, Night sweats, Bloody sputum or hemoptysis, Hoarseness, Fever, Fatigue, Chest pain, No (not recorded)  (not recorded)   Exposure Screen: Have you been exposed to any of these contagious diseases in the last month? Measles, Chickenpox, Meningitis, Pertussis, Whooping Cough, No No (01/04/24 3411)

## 2024-01-05 NOTE — CASE MANAGEMENT/SOCIAL WORK
Continued Stay Note  [facility]     Patient Name: Mayuri Early  MRN: [id number]  Today's Date: 1/5/2024    Admit Date: 1/4/2024    Plan: [facility]- penid pre cert   Discharge Plan       Row Name 01/05/24 1523       Plan    Plan [facility]- penid pre cert    Patient/Family in Agreement with Plan yes    Plan Comments CCP spoke with Sarai/Jess, no beds at [facility] and [facility] and [facility] both out of network. [facility] has no beds per [facility] and [facility] has no beds per [place]. Per Arvin/[facility], patient accepted and can admit pending pre cert. CCP updated patient at bedside and son Torres over phone and both agreeable to [facility]. CCP emailed  post acute to initiate auth. Packet on chart. Pharm updated. CCP to follow. Ian SAINI RN CCP                   Discharge Codes    No documentation.                       Ian Suh RN

## 2024-01-05 NOTE — PLAN OF CARE
Goal Outcome Evaluation:  Plan of Care Reviewed With: patient        Progress: improving  Outcome Evaluation: Patient is here with closed compression fracture of T12 and L1. Swelling / hematoma to right hip. VSS. PO pain medication helping with pain. TLSO brace at bedside . Voiding fine per [facility]P. No surgical intervention at this time. Education provided on blood pressure monitoring and pain control. Patient verbalized understanding.

## 2024-01-05 NOTE — THERAPY EVALUATION
Patient Name: Mayuri Early  : 1935    MRN: [id number]                              Today's Date: 2024       Admit Date: 2024    Visit Dx:     ICD-10-CM ICD-9-CM   1. T12 compression fracture, initial encounter  S22.080A 805.2   2. Closed compression fracture of L1 vertebra, initial encounter  S32.010A 805.4     Patient Active Problem List   Diagnosis    Well woman exam    Closed compression fracture of L1 vertebra, initial encounter    Compression fracture of T12 vertebra    PAF (paroxysmal atrial fibrillation)    Presence of cardiac pacemaker    HTN (hypertension)    CKD (chronic kidney disease)    Chronic anticoagulation    Chronic back pain    Anemia    Asthma     Past Medical History:   Diagnosis Date    Fibrocystic breast     Well woman exam 10/20/2020     Past Surgical History:   Procedure Laterality Date    BREAST CYST ASPIRATION      BREAST CYST EXCISION      HYSTERECTOMY        General Information       Row Name 24 1340          OT Time and Intention    Document Type evaluation  -     Mode of Treatment occupational therapy;co-treatment  -       Row Name 24 1340          General Information    Patient Profile Reviewed yes  -JW     Prior Level of Function independent:;ADL's;all household mobility  -     Existing Precautions/Restrictions spinal;TLSO;fall  -     Barriers to Rehab none identified  -       Row Name 24 1340          Living Environment    People in Home alone  -       Row Name 24 1340          Cognition    Orientation Status (Cognition) oriented x 3  -       Row Name 24 1345          Safety Issues, Functional Mobility    Impairments Affecting Function (Mobility) pain;endurance/activity tolerance;strength;balance  -               User Key  (r) = Recorded By, (t) = Taken By, (c) = Cosigned By      Initials Name Provider Type    Shawnee Lofton OT Occupational Therapist                     Mobility/ADL's       Row Name 24 1392           Bed Mobility    Bed Mobility supine-sit;sit-supine  -     Supine-Sit [place] (Bed Mobility) minimum assist (75% patient effort);verbal cues  -     Sit-Supine [place] (Bed Mobility) moderate assist (50% patient effort);verbal cues  -     Assistive Device (Bed Mobility) bed rails;head of bed elevated  -     Comment, (Bed Mobility) cues for log roll, difficulty following instructions to return to bed. TLSO donned at EOB  -       Row Name 01/05/24 1342          Transfers    Transfers toilet transfer;sit-stand transfer  -       Row Name 01/05/24 1342          Sit-Stand Transfer    Sit-Stand [place] (Transfers) minimum assist (75% patient effort);verbal cues  -       Row Name 01/05/24 1342          Toilet Transfer    Type (Toilet Transfer) sit-stand;stand-sit  -     [place] Level (Toilet Transfer) minimum assist (75% patient effort)  -     Assistive Device (Toilet Transfer) commode;grab bars/safety frame  -       Row Name 01/05/24 1342          Functional Mobility    Functional Mobility- Ind. Level minimum assist (75% patient effort)  -     Functional Mobility- Comment fxl ambulation into bathroom with HHA/Jarred provided. Reports pain with mobility  -       Row Name 01/05/24 1342          Activities of Daily Living    BADL Assessment/Intervention lower body dressing;toileting  -       Row Name 01/05/24 1342          Lower Body Dressing Assessment/Training    Comment, (Lower Body Dressing) discussed LB dressing adhering to spinal px  -       Row Name 01/05/24 1342          Toileting Assessment/Training    [place] Level (Toileting) adjust/manage clothing;minimum assist (75% patient effort)  -     Assistive Devices (Toileting) commode;grab bar/safety frame  -               User Key  (r) = Recorded By, (t) = Taken By, (c) = Cosigned By      Initials Name Provider Type    Shawnee Lofton OT Occupational Therapist                   Obj/Interventions       Row Name  01/05/24 1345          Sensory Assessment (Somatosensory)    Sensory Assessment (Somatosensory) sensation intact  -[facility] Name 01/05/24 1345          Vision Assessment/Intervention    Visual Impairment/Limitations WNL  -[facility] Name 01/05/24 1345          Range of Motion Comprehensive    General Range of Motion bilateral upper extremity ROM WNL  -[facility] Name 01/05/24 1345          Strength Comprehensive (MMT)    Comment, General Manual Muscle Testing (MMT) Assessment generalized weakness  -JW       Row Name 01/05/24 1345          Motor Skills    Motor Skills functional endurance  -     Functional Endurance limited by pain  -JW       Row Name 01/05/24 1345          Balance    Balance Assessment sitting static balance;sitting dynamic balance;standing static balance;standing dynamic balance  -     Static Sitting Balance standby assist  -     Dynamic Sitting Balance standby assist  -     Position, Sitting Balance sitting edge of bed  -     Static Standing Balance contact guard  -     Dynamic Standing Balance minimal assist  -     Balance Interventions occupation based/functional task  -               User Key  (r) = Recorded By, (t) = Taken By, (c) = Cosigned By      Initials Name Provider Type    JW Shawnee Oropeza OT Occupational Therapist                   Goals/Plan       Row Name 01/05/24 1406          Transfer Goal 1 (OT)    Activity/Assistive Device (Transfer Goal 1, OT) transfers, all  -     [place] Level/Cues Needed (Transfer Goal 1, OT) contact guard required  -     Time Frame (Transfer Goal 1, OT) short term goal (STG);2 weeks  -     Progress/Outcome (Transfer Goal 1, OT) goal ongoing  -JW       Row Name 01/05/24 1406          Dressing Goal 1 (OT)    Activity/Device (Dressing Goal 1, OT) lower body dressing;long-handled shoe horn;reacher;sock-aid  -     [place]/Cues Needed (Dressing Goal 1, OT) minimum assist (75% or more patient effort)  -     Time Frame  (Dressing Goal 1, OT) short term goal (STG);2 weeks  -JW     Strategies/Barriers (Dressing Goal 1, OT) using AD as needed  -JW     Progress/Outcome (Dressing Goal 1, OT) goal ongoing  -       Row Name 01/05/24 1406          Toileting Goal 1 (OT)    Activity/Device (Toileting Goal 1, OT) toileting skills, all  -JW     [place] Level/Cues Needed (Toileting Goal 1, OT) standby assist  -JW     Time Frame (Toileting Goal 1, OT) short term goal (STG);2 weeks  -JW     Progress/Outcome (Toileting Goal 1, OT) goal ongoing  -       Row Name 01/05/24 1406          Grooming Goal 1 (OT)    Activity/Device (Grooming Goal 1, OT) grooming skills, all  -JW     [place] (Grooming Goal 1, OT) supervision required  -JW     Time Frame (Grooming Goal 1, OT) short term goal (STG);2 weeks  -JW     Progress/Outcome (Grooming Goal 1, OT) goal ongoing  -       Row Name 01/05/24 1406          Problem Specific Goal 1 (OT)    Problem Specific Goal 1 (OT) Pt will be able to don/doff TLSO independently  -JW     Time Frame (Problem Specific Goal 1, OT) short term goal (STG);2 weeks  -JW     Progress/Outcome (Problem Specific Goal 1, OT) goal ongoing  -       Row Name 01/05/24 1406          Therapy Assessment/Plan (OT)    Planned Therapy Interventions (OT) activity tolerance training;adaptive equipment training;BADL retraining;functional balance retraining;occupation/activity based interventions;patient/caregiver education/training;transfer/mobility retraining;strengthening exercise  -               User Key  (r) = Recorded By, (t) = Taken By, (c) = Cosigned By      Initials Name Provider Type    JW Shawnee Oropeza, OT Occupational Therapist                   Clinical Impression       Row Name 01/05/24 1346          Pain Assessment    Pretreatment Pain Rating 8/10  -     Posttreatment Pain Rating 8/10  -     Pain Location - back  -     Pain Intervention(s) Repositioned;Ambulation/increased activity  -       Row Name 01/05/24  1346          Plan of Care Review    Plan of Care Reviewed With patient  -     Outcome Evaluation Pt is an 87 y/o F admitted s/p fall resulting in T12 and L1 compression fx. LO recommending TLSO for conservative mgt. Pt lives alone and is indep with ADLs and fxl mobility w/o AD. Educated on log roll for spinal px, William to sit EOB and assist to don TLSO. MaxA for LB dressing w/in px. Pt stands and performs fxl ambulation into bathroom with William/HHA, limited by pain. William for toilet TF with use of grabbar. She returns to supine in bed wanting to take a nap. Recommending rehab at d/c to promtoe return to PLOF  -       Row Name 01/05/24 1346          Therapy Assessment/Plan (OT)    Rehab Potential (OT) good, to achieve stated therapy goals  -     Criteria for Skilled Therapeutic Interventions Met (OT) yes;skilled treatment is necessary  -     Therapy Frequency (OT) 5 times/wk  -       Row Name 01/05/24 1346          Therapy Plan Review/Discharge Plan (OT)    Anticipated Discharge Disposition (OT) inpatient rehabilitation facility;skilled nursing facility  -       Row Name 01/05/24 1346          Positioning and Restraints    Pre-Treatment Position in bed  -     Post Treatment Position bed  -JW     In Bed notified nsg;fowlers;call light within reach;encouraged to call for assist;exit alarm on  -               User Key  (r) = Recorded By, (t) = Taken By, (c) = Cosigned By      Initials Name Provider Type     Shawnee Oroepza OT Occupational Therapist                   Outcome Measures       Row Name 01/05/24 8110          How much help from another is currently needed...    Putting on and taking off regular lower body clothing? 2  -JW     Bathing (including washing, rinsing, and drying) 2  -JW     Toileting (which includes using toilet bed pan or urinal) 2  -JW     Putting on and taking off regular upper body clothing 3  -JW     Taking care of personal grooming (such as brushing teeth) 3  -JW     Eating  meals 4  -JW     AM-PAC 6 Clicks Score (OT) 16  -JW       Row Name 01/05/24 1038          How much help from another person do you currently need...    Turning from your back to your side while in flat bed without using bedrails? 3  -MS     Moving from lying on back to sitting on the side of a flat bed without bedrails? 3  -MS     Moving to and from a bed to a chair (including a wheelchair)? 2  -MS     Standing up from a chair using your arms (e.g., wheelchair, bedside chair)? 2  -MS     Climbing 3-5 steps with a railing? 2  -MS     To walk in hospital room? 2  -MS     AM-PAC 6 Clicks Score (PT) 14  -MS     Highest Level of Mobility Goal 4 --> Transfer to chair/commode  -MS       Row Name 01/05/24 1408 01/05/24 1038       Functional Assessment    Outcome Measure Options AM-PAC 6 Clicks Daily Activity (OT)  - AM-PAC 6 Clicks Basic Mobility (PT)  -MS              User Key  (r) = Recorded By, (t) = Taken By, (c) = Cosigned By      Initials Name Provider Type    MS Hill Kign VISHNU, PT Physical Therapist    Shawnee Lofton, OT Occupational Therapist                    Occupational Therapy Education       Title: PT OT SLP Therapies (In Progress)       Topic: Occupational Therapy (Done)       Point: ADL training (Done)       Description:   Instruct learner(s) on proper safety adaptation and remediation techniques during self care or transfers.   Instruct in proper use of assistive devices.                  Learning Progress Summary             Patient Acceptance, E, VU,NR by WILLIAN at 1/5/2024 1408                         Point: Home exercise program (Done)       Description:   Instruct learner(s) on appropriate technique for monitoring, assisting and/or progressing therapeutic exercises/activities.                  Learning Progress Summary             Patient Acceptance, E, VU,NR by WILLIAN at 1/5/2024 1408                         Point: Precautions (Done)       Description:   Instruct learner(s) on prescribed precautions  during self-care and functional transfers.                  Learning Progress Summary             Patient Acceptance, E, VU,NR by WILLIAN at 1/5/2024 1408                         Point: Body mechanics (Done)       Description:   Instruct learner(s) on proper positioning and spine alignment during self-care, functional mobility activities and/or exercises.                  Learning Progress Summary             Patient Acceptance, E, VU,NR by WILLIAN at 1/5/2024 1408                                         User Key       Initials Effective Dates Name Provider Type Discipline    WILLIAN 06/10/21 -  Shawnee Oropeza OT Occupational Therapist OT                  OT Recommendation and Plan  Planned Therapy Interventions (OT): activity tolerance training, adaptive equipment training, BADL retraining, functional balance retraining, occupation/activity based interventions, patient/caregiver education/training, transfer/mobility retraining, strengthening exercise  Therapy Frequency (OT): 5 times/wk  Plan of Care Review  Plan of Care Reviewed With: patient  Outcome Evaluation: Pt is an 89 y/o F admitted s/p fall resulting in T12 and L1 compression fx. LO recommending TLSO for conservative mgt. Pt lives alone and is indep with ADLs and fxl mobility w/o AD. Educated on log roll for spinal px, William to sit EOB and assist to don TLSO. MaxA for LB dressing w/in px. Pt stands and performs fxl ambulation into bathroom with William/HHA, limited by pain. William for toilet TF with use of grabbar. She returns to supine in bed wanting to take a nap. Recommending rehab at d/c to promtoe return to PLOF     Time Calculation:   Evaluation Complexity (OT)  Review Occupational Profile/Medical/Therapy History Complexity: expanded/moderate complexity  Assessment, Occupational Performance/Identification of Deficit Complexity: 3-5 performance deficits  Clinical Decision Making Complexity (OT): detailed assessment/moderate complexity  Overall Complexity of Evaluation (OT):  moderate complexity     Time Calculation- OT       Row Name 01/05/24 1409             Time Calculation- OT    OT Start Time 0943  -JW      OT Stop Time 0957  -JW      OT Time Calculation (min) 14 min  -JW      Total Timed Code Minutes- OT 9 minute(s)  -JW      OT Received On 01/05/24  -JW      OT - Next Appointment 01/08/24  -      OT Goal Re-Cert Due Date 01/19/24  -JW         Timed Charges    92729 - OT Self Care/Mgmt Minutes 9  -JW         Untimed Charges    OT Eval/Re-eval Minutes 5  -JW         Total Minutes    Timed Charges Total Minutes 9  -JW      Untimed Charges Total Minutes 5  -JW       Total Minutes 14  -JW                User Key  (r) = Recorded By, (t) = Taken By, (c) = Cosigned By      Initials Name Provider Type    Shawnee Lofton OT Occupational Therapist                  Therapy Charges for Today       Code Description Service Date Service Provider Modifiers Qty    79243187568 HC OT SELF CARE/MGMT/TRAIN EA 15 MIN 1/5/2024 Shawnee Oropeza OT GO 1    33447879161 HC OT EVAL MOD COMPLEXITY 2 1/5/2024 Shawnee Oropeza OT GO 1                 Shawnee Oropeza OT  1/5/2024

## 2024-01-05 NOTE — PLAN OF CARE
Goal Outcome Evaluation:  Plan of Care Reviewed With: patient           Outcome Evaluation: Pt is an 87 y/o F admitted s/p fall resulting in T12 and L1 compression fx. LO recommending TLSO for conservative mgt. Pt lives alone and is indep with ADLs and fxl mobility w/o AD. Educated on log roll for spinal px, William to sit EOB and assist to don TLSO. MaxA for LB dressing w/in px. Pt stands and performs fxl ambulation into bathroom with William/HHA, limited by pain. William for toilet TF with use of grabbar. She returns to supine in bed wanting to take a nap. Recommending rehab at d/c to promtoe return to PLOF

## 2024-01-05 NOTE — PLAN OF CARE
Goal Outcome Evaluation:  Plan of Care Reviewed With: patient        Progress: no change  Outcome Evaluation: vss. alert and oriented x4, activity encouraged with tlso brace. room air. iv fluids started. eliquis to be given tonight.

## 2024-01-05 NOTE — CASE MANAGEMENT/SOCIAL WORK
Discharge Planning Assessment  [facility]     Patient Name: Mayuri Early  MRN: [id number]  Today's Date: 1/5/2024    Admit Date: 1/4/2024    Plan: SNF- referrals pending   Discharge Needs Assessment       Row Name 01/05/24 1107       Living Environment    People in Home alone    Current Living Arrangements home    Primary Care Provided by self    Provides Primary Care For no one    Family Caregiver if Needed child(mathew), adult    Quality of Family Relationships helpful;involved;supportive       Transition Planning    Patient/Family Anticipates Transition to inpatient rehabilitation facility    Patient/Family Anticipated Services at Transition skilled nursing    Transportation Anticipated family or friend will provide       Discharge Needs Assessment    Readmission Within the Last 30 Days no previous admission in last 30 days    Equipment Currently Used at Home none    Concerns to be Addressed discharge planning    Discharge Facility/Level of Care Needs nursing facility, skilled                   Discharge Plan       Row Name 01/05/24 1116       Plan    Plan SNF- referrals pending    Patient/Family in Agreement with Plan yes    Plan Comments CCP met with patient and spoke with son, Torres, over phone to discuss discharge planning. CCP role explained and facesheet verified with patient. The patient lives alone and she usses no DME. She drives and is IADL's. She has no HH history and went to SNF years wgo and doesn't recall name. PT recs reviewed with patient regarding SNF and patient agreeable. Patient has no SNF preferences. CCP spoke with Torres who also has no SNF preferences. He lives in the [facility] and ok with referrals to [facility] along with [facility]. CCP placed referrals and will follow. Patient will need pre cert. Ian SAINI RN CCP                  Continued Care and Services - Admitted Since 1/4/2024       Destination       Service Provider Request Status Selected Services Address Phone Fax Patient  Preferred    [facility] Pending - Request Sent N/A [address] [facility] [address] [phone] --    [facility] P[facility] - [facility] N[facility] [address] [facility] [facility] [address] [phone] --    [facility] Pending - Request Sent N/A [address] [facility] [id number] [phone] --    [facility] Pending - Request Sent N/A [address] [facility] [address] [phone] --    [facility] Pending - Request Sent N/A [address] [facility] [id number] [phone] --    [facility] Pending - [facility] Sent N/A [address] [facility] [address] [phone] --                     Demographic Summary       Row Name 01/05/24 1107       General Information    Arrived From home    Preferred Language English                   Functional Status       Row Name 01/05/24 1107       Functional Status    Usual Activity Tolerance good    Current Activity Tolerance moderate       Functional Status, IADL    Medications independent    Meal Preparation independent    Housekeeping independent    Laundry independent    Shopping independent                   Psychosocial    No documentation.                  Abuse/Neglect    No documentation.                  Legal    No documentation.                  Substance Abuse    No documentation.                  Patient Forms    No documentation.                     Ian Suh RN

## 2024-01-06 LAB
ABO GROUP BLD: NORMAL
ANION GAP SERPL CALCULATED.3IONS-SCNC: 9 MMOL/L (ref 5–15)
BLD GP AB SCN SERPL QL: NEGATIVE
BUN SERPL-MCNC: 60 MG/DL (ref 8–23)
BUN/CREAT SERPL: 45.1 (ref 7–25)
CALCIUM SPEC-SCNC: 8.7 MG/DL (ref 8.6–10.5)
CHLORIDE SERPL-SCNC: 105 MMOL/L (ref 98–107)
CO2 SERPL-SCNC: 25 MMOL/L (ref 22–29)
CREAT SERPL-MCNC: 1.33 MG/DL (ref 0.57–1)
DEPRECATED RDW RBC AUTO: 42.4 FL (ref 37–54)
EGFRCR SERPLBLD CKD-EPI 2021: 38.6 ML/MIN/1.73
ERYTHROCYTE [DISTWIDTH] IN BLOOD BY AUTOMATED COUNT: 11.8 % (ref 12.3–15.4)
FERRITIN SERPL-MCNC: 77.2 NG/ML (ref 13–150)
GLUCOSE SERPL-MCNC: 99 MG/DL (ref 65–99)
HCT VFR BLD AUTO: 22.3 % (ref 34–46.6)
HCT VFR BLD AUTO: 22.6 % (ref 34–46.6)
HCT VFR BLD AUTO: 22.9 % (ref 34–46.6)
HGB BLD-MCNC: 7.2 G/DL (ref 12–15.9)
HGB BLD-MCNC: 7.3 G/DL (ref 12–15.9)
HGB BLD-MCNC: 7.5 G/DL (ref 12–15.9)
IRON 24H UR-MRATE: 69 MCG/DL (ref 37–145)
IRON SATN MFR SERPL: 23 % (ref 20–50)
MCH RBC QN AUTO: 32 PG (ref 26.6–33)
MCHC RBC AUTO-ENTMCNC: 32.3 G/DL (ref 31.5–35.7)
MCV RBC AUTO: 99.1 FL (ref 79–97)
PLATELET # BLD AUTO: 163 10*3/MM3 (ref 140–450)
PMV BLD AUTO: 9.3 FL (ref 6–12)
POTASSIUM SERPL-SCNC: 4.9 MMOL/L (ref 3.5–5.2)
RBC # BLD AUTO: 2.28 10*6/MM3 (ref 3.77–5.28)
RH BLD: POSITIVE
SODIUM SERPL-SCNC: 139 MMOL/L (ref 136–145)
T&S EXPIRATION DATE: NORMAL
TIBC SERPL-MCNC: 295 MCG/DL (ref 298–536)
TRANSFERRIN SERPL-MCNC: 198 MG/DL (ref 200–360)
WBC NRBC COR # BLD AUTO: 7.91 10*3/MM3 (ref 3.4–10.8)

## 2024-01-06 PROCEDURE — 80048 BASIC METABOLIC PNL TOTAL CA: CPT | Performed by: STUDENT IN AN ORGANIZED HEALTH CARE EDUCATION/TRAINING PROGRAM

## 2024-01-06 PROCEDURE — 86850 RBC ANTIBODY SCREEN: CPT | Performed by: STUDENT IN AN ORGANIZED HEALTH CARE EDUCATION/TRAINING PROGRAM

## 2024-01-06 PROCEDURE — 83540 ASSAY OF IRON: CPT | Performed by: STUDENT IN AN ORGANIZED HEALTH CARE EDUCATION/TRAINING PROGRAM

## 2024-01-06 PROCEDURE — 85027 COMPLETE CBC AUTOMATED: CPT | Performed by: STUDENT IN AN ORGANIZED HEALTH CARE EDUCATION/TRAINING PROGRAM

## 2024-01-06 PROCEDURE — 84466 ASSAY OF TRANSFERRIN: CPT | Performed by: STUDENT IN AN ORGANIZED HEALTH CARE EDUCATION/TRAINING PROGRAM

## 2024-01-06 PROCEDURE — 86901 BLOOD TYPING SEROLOGIC RH(D): CPT | Performed by: STUDENT IN AN ORGANIZED HEALTH CARE EDUCATION/TRAINING PROGRAM

## 2024-01-06 PROCEDURE — 97530 THERAPEUTIC ACTIVITIES: CPT | Performed by: PHYSICAL THERAPIST

## 2024-01-06 PROCEDURE — 25010000002 ONDANSETRON PER 1 MG: Performed by: INTERNAL MEDICINE

## 2024-01-06 PROCEDURE — 85014 HEMATOCRIT: CPT | Performed by: STUDENT IN AN ORGANIZED HEALTH CARE EDUCATION/TRAINING PROGRAM

## 2024-01-06 PROCEDURE — 86923 COMPATIBILITY TEST ELECTRIC: CPT

## 2024-01-06 PROCEDURE — 82728 ASSAY OF FERRITIN: CPT | Performed by: STUDENT IN AN ORGANIZED HEALTH CARE EDUCATION/TRAINING PROGRAM

## 2024-01-06 PROCEDURE — 86900 BLOOD TYPING SEROLOGIC ABO: CPT | Performed by: STUDENT IN AN ORGANIZED HEALTH CARE EDUCATION/TRAINING PROGRAM

## 2024-01-06 PROCEDURE — 85018 HEMOGLOBIN: CPT | Performed by: STUDENT IN AN ORGANIZED HEALTH CARE EDUCATION/TRAINING PROGRAM

## 2024-01-06 RX ADMIN — SENNOSIDES AND DOCUSATE SODIUM 2 TABLET: 50; 8.6 TABLET ORAL at 20:46

## 2024-01-06 RX ADMIN — METOPROLOL SUCCINATE 100 MG: 100 TABLET, EXTENDED RELEASE ORAL at 20:47

## 2024-01-06 RX ADMIN — ONDANSETRON HYDROCHLORIDE 4 MG: 2 INJECTION, SOLUTION INTRAMUSCULAR; INTRAVENOUS at 01:05

## 2024-01-06 RX ADMIN — PANTOPRAZOLE SODIUM 40 MG: 40 TABLET, DELAYED RELEASE ORAL at 05:46

## 2024-01-06 RX ADMIN — APIXABAN 2.5 MG: 2.5 TABLET, FILM COATED ORAL at 08:31

## 2024-01-06 RX ADMIN — HYDROCODONE BITARTRATE AND ACETAMINOPHEN 1 TABLET: 5; 325 TABLET ORAL at 20:57

## 2024-01-06 RX ADMIN — ATORVASTATIN CALCIUM 10 MG: 20 TABLET, FILM COATED ORAL at 20:47

## 2024-01-06 RX ADMIN — Medication 10 ML: at 08:34

## 2024-01-06 RX ADMIN — GABAPENTIN 100 MG: 100 CAPSULE ORAL at 20:46

## 2024-01-06 RX ADMIN — ASPIRIN 81 MG: 81 TABLET, COATED ORAL at 08:31

## 2024-01-06 RX ADMIN — ONDANSETRON HYDROCHLORIDE 4 MG: 2 INJECTION, SOLUTION INTRAMUSCULAR; INTRAVENOUS at 12:24

## 2024-01-06 RX ADMIN — HYDROCODONE BITARTRATE AND ACETAMINOPHEN 1 TABLET: 5; 325 TABLET ORAL at 01:05

## 2024-01-06 RX ADMIN — SENNOSIDES AND DOCUSATE SODIUM 2 TABLET: 50; 8.6 TABLET ORAL at 08:31

## 2024-01-06 RX ADMIN — HYDROCODONE BITARTRATE AND ACETAMINOPHEN 1 TABLET: 5; 325 TABLET ORAL at 11:07

## 2024-01-06 RX ADMIN — Medication 10 ML: at 20:55

## 2024-01-06 RX ADMIN — HYDROCODONE BITARTRATE AND ACETAMINOPHEN 1 TABLET: 5; 325 TABLET ORAL at 05:46

## 2024-01-06 NOTE — PLAN OF CARE
Goal Outcome Evaluation:  Plan of Care Reviewed With: patient           Outcome Evaluation: Pt AO x 4. Pt transferred supine to sit with min x 1 and performed log roll correctly. Pt dependent for donning TLSO and reports she still feels groggy. Pt transferred sit to stand with min x 1 and RW. Pt ambulated 25 ft with RW and min x 1 before becoming nauseated and needing to return to bed. Pt transferred sit to supine with min x 1 and performed log roll correctly.

## 2024-01-06 NOTE — THERAPY TREATMENT NOTE
Patient Name: Mayuri Early  : 1935    MRN: [id number]                              Today's Date: 2024       Admit Date: 2024    Visit Dx:     ICD-10-CM ICD-9-CM   1. T12 compression fracture, initial encounter  S22.080A 805.2   2. Closed compression fracture of L1 vertebra, initial encounter  S32.010A 805.4     Patient Active Problem List   Diagnosis    Well woman exam    Closed compression fracture of L1 vertebra, initial encounter    Compression fracture of T12 vertebra    PAF (paroxysmal atrial fibrillation)    Presence of cardiac pacemaker    HTN (hypertension)    CKD (chronic kidney disease)    Chronic anticoagulation    Chronic back pain    Anemia    Asthma     Past Medical History:   Diagnosis Date    Fibrocystic breast     Well woman exam 10/20/2020     Past Surgical History:   Procedure Laterality Date    BREAST CYST ASPIRATION      BREAST CYST EXCISION      HYSTERECTOMY        General Information       Row Name 24 1101          Physical Therapy Time and Intention    Document Type therapy note (daily note)  -     Mode of Treatment individual therapy;physical therapy  -       Row Name 24 1101          General Information    Patient Profile Reviewed yes  -     Existing Precautions/Restrictions spinal;TLSO;fall  -       Row Name 24 1101          Cognition    Orientation Status (Cognition) oriented x 3  -       Row Name 24 1101          Safety Issues, Functional Mobility    Impairments Affecting Function (Mobility) pain;endurance/activity tolerance;strength;balance  -               User Key  (r) = Recorded By, (t) = Taken By, (c) = Cosigned By      Initials Name Provider Type    LC Edgar Mccoy, PT DPT Physical Therapist                   Mobility       Row Name 24 1101          Bed Mobility    Bed Mobility bed mobility (all) activities  -     All Activities, [place] (Bed Mobility) minimum assist (75% patient effort)  -     Comment, (Bed  Mobility) perrformed log roll correctly  -       Row Name 01/06/24 1101          Sit-Stand Transfer    Sit-Stand [place] (Transfers) minimum assist (75% patient effort)  -     Assistive Device (Sit-Stand Transfers) walker, front-wheeled  -LC       Row Name 01/06/24 1101          Gait/Stairs (Locomotion)    [place] Level (Gait) minimum assist (75% patient effort)  -     Assistive Device (Gait) walker, front-wheeled  -LC     Patient was able to Ambulate yes  -LC     Distance in Feet (Gait) 25 ft but became nauseated and had to return to bed  -LC     Deviations/Abnormal Patterns (Gait) mahesh decreased  -LC     Bilateral Gait Deviations forward flexed posture  -LC               User Key  (r) = Recorded By, (t) = Taken By, (c) = Cosigned By      Initials Name Provider Type    Edgar Patel, PT DPT Physical Therapist                   Obj/Interventions    No documentation.                  Goals/Plan    No documentation.                  Clinical Impression       Row Name 01/06/24 1102          Pain    Pretreatment Pain Rating 5/10  -LC     Posttreatment Pain Rating 5/10  -LC     Pain Location - back  -LC     Pain Intervention(s) Medication (See MAR);Repositioned  -LC       Row Name 01/06/24 1102          Plan of Care Review    Plan of Care Reviewed With patient  -LC     Outcome Evaluation Pt AO x 4. Pt transferred supine to sit with min x 1 and performed log roll correctly. Pt dependent for donning TLSO and reports she still feels groggy. Pt transferred sit to stand with min x 1 and RW. Pt ambulated 25 ft with RW and min x 1 before becoming nauseated and needing to return to bed. Pt transferred sit to supine with min x 1 and performed log roll correctly.  -       Row Name 01/06/24 1102          Positioning and Restraints    Pre-Treatment Position in bed  -LC     Post Treatment Position bed  -LC     In Bed notified nsg;supine;call light within reach;encouraged to call for assist;exit alarm on;with  nsg  -               User Key  (r) = Recorded By, (t) = Taken By, (c) = Cosigned By      Initials Name Provider Type    Edgar Patel, PT DPT Physical Therapist                   Outcome Measures       Row Name 01/06/24 1105 01/06/24 0834       How much help from another person do you currently need...    Turning from your back to your side while in flat bed without using bedrails? 3  -LC 3  -EV    Moving from lying on back to sitting on the side of a flat bed without bedrails? 3  -LC 3  -EV    Moving to and from a bed to a chair (including a wheelchair)? 3  -LC 2  -EV    Standing up from a chair using your arms (e.g., wheelchair, bedside chair)? 3  -LC 2  -EV    Climbing 3-5 steps with a railing? 2  -LC 2  -EV    To walk in hospital room? 3  -LC 2  -EV    AM-PAC 6 Clicks Score (PT) 17  -LC 14  -EV    Highest Level of Mobility Goal 5 --> Static standing  - 4 --> Transfer to chair/commode  -EV      Row Name 01/06/24 1105          Functional Assessment    Outcome Measure Options AM-PAC 6 Clicks Basic Mobility (PT)  -               User Key  (r) = Recorded By, (t) = Taken By, (c) = Cosigned By      Initials Name Provider Type    Edgar Patel, PT DPT Physical Therapist    Jared Robins RN Registered Nurse                                 Physical Therapy Education       Title: PT OT SLP Therapies (Done)       Topic: Physical Therapy (Done)       Point: Mobility training (Done)       Learning Progress Summary             Patient Acceptance, E,D, DU,NR by  at 1/6/2024 1105    Acceptance, E,D, VU,NR by MS at 1/5/2024 1038                         Point: Home exercise program (Done)       Learning Progress Summary             Patient Acceptance, E,D, DU,NR by  at 1/6/2024 1105                         Point: Body mechanics (Done)       Learning Progress Summary             Patient Acceptance, E,D, DU,NR by  at 1/6/2024 1105    Acceptance, E,D, VU,NR by MS at 1/5/2024 1038                          Point: Precautions (Done)       Learning Progress Summary             Patient Acceptance, E,D, DU,NR by  at 1/6/2024 1105    Acceptance, E,D, VU,NR by MS at 1/5/2024 1038                                         User Key       Initials Effective Dates Name Provider Type Discipline    MS 06/16/21 -  [place], PT Physical Therapist PT     07/11/23 -  Edgar Mccoy, PT DPT Physical Therapist PT                  PT Recommendation and Plan     Plan of Care Reviewed With: patient  Outcome Evaluation: Pt AO x 4. Pt transferred supine to sit with min x 1 and performed log roll correctly. Pt dependent for donning TLSO and reports she still feels groggy. Pt transferred sit to stand with min x 1 and RW. Pt ambulated 25 ft with RW and min x 1 before becoming nauseated and needing to return to bed. Pt transferred sit to supine with min x 1 and performed log roll correctly.     Time Calculation:         PT Charges       Row Name 01/06/24 1105             Time Calculation    Start Time 1040  -      Stop Time 1105  -      Time Calculation (min) 25 min  -      PT Received On 01/06/24  -      PT - Next Appointment 01/08/24  -         Time Calculation- PT    Total Timed Code Minutes- PT 25 minute(s)  -         Timed Charges    25408 - PT Therapeutic Activity Minutes 25  -         Total Minutes    Timed Charges Total Minutes 25  -       Total Minutes 25  -                User Key  (r) = Recorded By, (t) = Taken By, (c) = Cosigned By      Initials Name Provider Type    LC Edgar Mccoy, PT DPT Physical Therapist                  Therapy Charges for Today       Code Description Service Date Service Provider Modifiers Qty    [id number]  PT THERAPEUTIC ACT EA 15 MIN 1/6/2024 Edgar Mccoy, PT DPT GP 2            PT G-Codes  Outcome Measure Options: AM-PAC 6 Clicks Basic Mobility (PT)  AM-PAC 6 Clicks Score (PT): 17  AM-PAC 6 Clicks Score (OT): 16       Edgar KWAKU Mccoy PT DPT  1/6/2024

## 2024-01-06 NOTE — PLAN OF CARE
Goal Outcome Evaluation:  Plan of Care Reviewed With: patient        Progress: improving  Outcome Evaluation: Patient with a closed compression fracture of L1 and T12 after a fall. Vss. Po pain medication helping with pain. TLSO brace at bedside. Voiding per purewick. Bruising and hematoma to right hip. Education provided on pain control and safety. Patient verbalized understanding. Needs brace when out of bed.

## 2024-01-06 NOTE — PROGRESS NOTES
Name: Mayuri Early ADMIT: 2024   : 1935  PCP: Gunjan Hidalgo MD    MRN: [id number] LOS: 2 days   AGE/SEX: 88 y.o. female  ROOM: [facility]     Subjective   Subjective   Patient lying in bed.  Was nauseous about 30 minutes ago, but not anymore.  No vomiting.  About to try to eat breakfast.  Pain is well-controlled on current regimen.  No plans for surgical intervention at this time.    Review of Systems   Constitutional:  Negative for chills and fever.   Respiratory:  Negative for cough and shortness of breath.    Cardiovascular:  Negative for chest pain and leg swelling.   Gastrointestinal:  Positive for nausea. Negative for abdominal pain and diarrhea.     As above     Objective   Objective   Vital Signs  Temp:  [97.5 °F (36.4 °C)-98.8 °F (37.1 °C)] 98.2 °F (36.8 °C)  Heart Rate:  [70-95] 95  Resp:  [16] 16  BP: (100-116)/(51-72) 100/51  SpO2:  [90 %-95 %] 93 %  on   ;   Device (Oxygen Therapy): room air  Body mass index is 27.07 kg/m².  Physical Exam  Constitutional:       General: She is not in acute distress.     Appearance: She is not ill-appearing.      Comments: Elderly, frail   Cardiovascular:      Rate and Rhythm: Normal rate and regular rhythm.   Pulmonary:      Effort: Pulmonary effort is normal. No respiratory distress.   Abdominal:      General: Abdomen is flat. There is no distension.      Tenderness: There is no abdominal tenderness.   Musculoskeletal:         General: No swelling or deformity. Normal range of motion.      Comments: Lumbar tenderness   Skin:     General: Skin is warm and dry.   Neurological:      General: No focal deficit present.      Mental Status: She is alert. Mental status is at baseline.         Results Review     I reviewed the patient's new clinical results.  Results from last 7 days   Lab Units 24  0350 24  0435 24  1338   WBC 10*3/mm3 7.91 6.87 7.90   HEMOGLOBIN g/dL 7.3* 9.3* 11.6*   PLATELETS 10*3/mm3 163 151 172     Results from last 7  days   Lab Units 01/06/24  0350 01/05/24  0435 01/04/24  1338   SODIUM mmol/L 139 138 139   POTASSIUM mmol/L 4.9 4.4 4.2   CHLORIDE mmol/L 105 103 101   CO2 mmol/L 25.0 25.7 27.1   BUN mg/dL 60* 33* 21   CREATININE mg/dL 1.33* 1.53* 1.11*   GLUCOSE mg/dL 99 105* 106*   Estimated Creatinine Clearance: 26.3 mL/min (A) (by C-G formula based on SCr of 1.33 mg/dL (H)).  Results from last 7 days   Lab Units 01/05/24  0435 01/04/24  1338   ALBUMIN g/dL 3.2* 4.1   BILIRUBIN mg/dL 0.8 1.1   ALK PHOS U/L 61 78   AST (SGOT) U/L 15 16   ALT (SGPT) U/L 9 14     Results from last 7 days   Lab Units 01/06/24  0350 01/05/24  0435 01/04/24  1338   CALCIUM mg/dL 8.7 8.7 9.8   ALBUMIN g/dL  --  3.2* 4.1       SARS-CoV-2, ROSA   Date Value Ref Range Status   10/06/2023 NEGATIVE Negative Final     Comment:     The 2019-CoV rRT-PCR Assay is only for use under a Food and Drug Administration Emergency Use Authorization. The performance characteristics of the assay were verified by the Clinical Laboratory at [facility]. Results should be used in   conjunction with the patient's clinical symptoms, medical history, and other clinical/laboratory findings to determine an overall clinical diagnosis. Negative results do not preclude infection with SARS-CoV-2 (COVID-19).    Test parameters have not been validated for screening asymptomatic patients.   12/19/2022 NEGATIVE Negative Final     Comment:     The 2019-CoV rRT-PCR Assay is only for use under a Food and Drug Administration Emergency Use Authorization. The performance characteristics of the assay were verified by the Clinical Laboratory at [facility]. Results should be used in   conjunction with the patient's clinical symptoms, medical history, and other clinical/laboratory findings to determine an overall clinical diagnosis. Negative results do not preclude infection with SARS-CoV-2 (COVID-19).    Test parameters have not been validated for screening asymptomatic  "patients.     No results found for: \"HGBA1C\", \"POCGLU\"    CT Lumbar Spine Without Contrast  Narrative: CT LUMBAR SPINE WITHOUT CONTRAST     HISTORY: Fall, back pain.     COMPARISON: Plain film examination of the lumbar spine 01/04/2024.     FINDINGS: A gastric band and cardiac pacing wires are partially  visualized.     There is a grade 1 anterolisthesis of L4 upon L5 estimated to be  approximately 4 mm. There is no evidence of a pars defect.     Compression fractures involving T12 and L1 are noted. There is a loss of  approximately 40 to 50% of vertical height centrally at T12 and L1.  There is mild bony retropulsion of the superior endplate of L1 1 and  T12. There is mild stranding adjacent to the vertebral bodies anteriorly  and anterior laterally suggesting that the fractures are acute or  subacute in nature.     Extensive vascular calcification is noted. A large renal cyst is  appreciated on the right, only partially visualized and measuring  approximately 4.4 cm in diameter. Areas of increased attenuation likely  representing complex renal cysts are appreciated on the left, the  largest of which measures 2.5 cm in transverse dimension and the smaller  measuring approximately 12 mm in transverse dimension.     T11-T12: A mild broad-based disc osteophyte complex is present resulting  in mild flattening of ventral surface of the thecal sac.     T12-L1: There is no evidence of disc bulge or herniation.     L1-L2: There is a grade 1 retrolisthesis of L1 upon L2, estimated to be  approximately 3-4 mm. Mild foraminal stenosis is present bilaterally,  slightly more prominent on the right secondary to the retrolisthesis of  L1 upon L2 and a broad-based disc osteophyte complex.     L2-L3: There is no evidence of disc herniation. A mild central  broad-based disc osteophyte complex is present.     L3-L4: Mild facet degenerative disease is present bilaterally. A mild  broad-based disc bulge is present which results in mild " flattening  ventral surface of the thecal sac and contributing and contributes to  mild lateral recess narrowing bilaterally. Mild foraminal stenosis is  present bilaterally secondary to extension of a small disc osteophyte  complex into the neural foramen.     L4-L5: Moderate to severe facet degenerative disease is present on the  left and there is moderate facet degenerative disease on the right. A  central broad-based disc osteophyte complex is present resulting in mild  flattening of the ventral surface of the thecal sac and contributing to  mild foraminal stenosis bilaterally.     L5-S1: Transitional anatomy is appreciated consisting of partial  sacralization of L5. Mild facet degenerative disease is present  bilaterally.     Impression: 1.  Transitional anatomy is appreciated consisting of partial  sacralization of L5. Careful correlation prior to any intervention is  required given the presence of transitional anatomy.  2.  Compression deformities involving T12 and L1 are noted which are  likely acute or acute to subacute as there is adjacent mild edema. A  fracture plane is appreciated above the superior endplate of L1. There  is mild bony retropulsion. The loss of vertical height is estimated to  be approximately 40 to 50% in severity at each level. Further evaluation  could be performed with MRI examination of the lumbar spine if the  patient's cardiac pacer is MR-compatible.  3.  Multilevel degenerative disease involving lumbar spine is noted as  described in detail above including a grade 1 anterolisthesis of L4 upon  L5, grade 1 retrolisthesis of L1 upon L2, multilevel facet degenerative  disease, broad-based disc osteophyte complexes and mild foraminal  stenosis. See above.  4.  Multiple cysts are appreciated by the kidneys bilaterally. There are  2 areas of increased attenuation involving the left kidney which likely  represent complex cysts. The largest measures approximately 2.5 cm in  diameter.  Comparison with prior studies is recommended.        Radiation dose reduction techniques were utilized, including automated  exposure control and exposure modulation based on body size.        This report was finalized on 1/4/2024 7:45 PM by Dr. Ministerio Diez M.D  on Workstation: [id number]     XR Spine Lumbar Complete 4+VW  Narrative: LUMBAR SPINE FOUR VIEWS     HISTORY: Fall, back pain.     COMPARISON: None.     FINDINGS: AP, lateral and bilateral oblique views of the lumbar spine  demonstrates mild dextroscoliosis of the lumbar spine. There is a grade  1 anterolisthesis of L3 upon L4 estimated to be approximately 3 mm.  Anterolisthesis of L4 upon L5 is appreciated estimated to be 5 to 6 mm.  Moderate facet degenerative disease is noted at L4-L5 and L5-S1.     Mild to moderate compression deformities involving T12 and L1 are noted.  Loss of vertical height centrally at each level is approximately 50%.  The fractures are age indeterminate. A gastric band and extensive  vascular calcification is noted.     Impression: Compression fractures involving T12 and L1 are noted with  loss of approximately 50% of vertical height centrally. There is no  evidence of bony retropulsion. The fractures are age indeterminate.  Further evaluation could be performed with MRI examination of the lumbar  spine as indicated. Degenerative disease involving the lumbar spine is  appreciated including anterolisthesis of L4 upon L5 and to lesser extent  L5 upon S1 and facet degenerative disease. See above.     This report was finalized on 1/4/2024 7:21 PM by Dr. Ministerio Diez M.D  on Workstation: [id number]     XR Hip With or Without Pelvis 2 - 3 View Right  2 VIEW RIGHT HIP AND 1 VIEW AP PELVIS     HISTORY: Fell 2 days ago. Right hip pain.     FINDINGS: There are prominent degenerative changes involving the right  hip more than left with joint space narrowing and some marginal  spurring. No acute fracture is seen.     This report was finalized  on 1/4/2024 12:37 PM by Dr. Jd Biswas M.D  on Workstation: [id number]       I reviewed the patient's daily medications.  Scheduled Medications  apixaban, 2.5 mg, Oral, Q12H  aspirin, 81 mg, Oral, Daily  atorvastatin, 10 mg, Oral, Nightly  gabapentin, 100 mg, Oral, Nightly  losartan, 100 mg, Oral, Q24H   And  hydroCHLOROthiazide, 25 mg, Oral, Q24H  metoprolol succinate XL, 100 mg, Oral, BID  pantoprazole, 40 mg, Oral, Q AM  senna-docusate sodium, 2 tablet, Oral, BID  sodium chloride, 10 mL, Intravenous, Q12H    Infusions  lactated ringers, 75 mL/hr, Last Rate: 75 mL/hr (01/05/24 1655)    Diet  Diet: Regular/House Diet; Texture: Regular Texture (IDDSI 7); Fluid Consistency: Thin (IDDSI 0)         I have personally reviewed:  [x]  Laboratory   []  Microbiology   [x]  Radiology   [x]  EKG/Telemetry   []  Cardiology/Vascular   []  Pathology   [x]  Records     Assessment/Plan     Active Hospital Problems    Diagnosis  POA    **Closed compression fracture of L1 vertebra, initial encounter [S32.010A]  Yes    Compression fracture of T12 vertebra [S22.080A]  Yes    PAF (paroxysmal atrial fibrillation) [I48.0]  Unknown    Presence of cardiac pacemaker [Z95.0]  Unknown    HTN (hypertension) [I10]  Unknown    CKD (chronic kidney disease) [N18.9]  Unknown    Chronic anticoagulation [Z79.01]  Not Applicable    Chronic back pain [M54.9, G89.29]  Unknown    Anemia [D64.9]  Unknown    Asthma [J45.909]  Unknown      Resolved Hospital Problems   No resolved problems to display.       88 y.o. female admitted with Closed compression fracture of L1 vertebra, initial encounter.    T12 and L1 Compression fracture from mechanical fall  Chronic back pain  -Neurosurgery consulted-plan for TLSO brace with mobilization.  No procedures planned at this time  -Resume home Feliz hernandez reviewed    Anemia  -Significant hemoglobin drop since admission, 11.6-7.3  -No active signs of bleeding  -Iron studies, B12, folate pending  -Recheck  hemoglobin hematocrit this afternoon  -FOBT  -Stop ASA and eliquis    ALLYSON on CKD 3 A, improving  -I IV fluids  -Creatinine improved, it was 1.1 on admission, no previous values to compare to    Paroxysmal atrial fibrillation  Hypertension  -Hold home eliquis for anemia  -Continue home metoprolol  -Hydralazine, losartan, hydrochlorothiazide held with normotension and this anemia  -EKG with a flutter, rate controlled    Hyperlipidemia-continue home Lipitor    SCDs for DVT prophylaxis.  Full code.  Discussed with patient, family, and nursing staff.  Anticipate discharge to SNU facility in 1-2 days.  Precert is pending, but hemoglobin will need to be stable.    Expected discharge date/ time has not been documented.      Joseph Damon MD  [facility]  01/06/24  09:20 EST

## 2024-01-07 LAB
ANION GAP SERPL CALCULATED.3IONS-SCNC: 13 MMOL/L (ref 5–15)
BUN SERPL-MCNC: 73 MG/DL (ref 8–23)
BUN/CREAT SERPL: 53.7 (ref 7–25)
CALCIUM SPEC-SCNC: 9.1 MG/DL (ref 8.6–10.5)
CHLORIDE SERPL-SCNC: 103 MMOL/L (ref 98–107)
CO2 SERPL-SCNC: 23 MMOL/L (ref 22–29)
CREAT SERPL-MCNC: 1.36 MG/DL (ref 0.57–1)
DEPRECATED RDW RBC AUTO: 43.2 FL (ref 37–54)
DEPRECATED RDW RBC AUTO: 50.1 FL (ref 37–54)
EGFRCR SERPLBLD CKD-EPI 2021: 37.5 ML/MIN/1.73
ERYTHROCYTE [DISTWIDTH] IN BLOOD BY AUTOMATED COUNT: 12.1 % (ref 12.3–15.4)
ERYTHROCYTE [DISTWIDTH] IN BLOOD BY AUTOMATED COUNT: 13.6 % (ref 12.3–15.4)
FOLATE SERPL-MCNC: >20 NG/ML (ref 4.78–24.2)
GLUCOSE SERPL-MCNC: 101 MG/DL (ref 65–99)
HCT VFR BLD AUTO: 19.7 % (ref 34–46.6)
HCT VFR BLD AUTO: 25.9 % (ref 34–46.6)
HGB BLD-MCNC: 6.4 G/DL (ref 12–15.9)
HGB BLD-MCNC: 8.3 G/DL (ref 12–15.9)
MCH RBC QN AUTO: 32 PG (ref 26.6–33)
MCH RBC QN AUTO: 32.3 PG (ref 26.6–33)
MCHC RBC AUTO-ENTMCNC: 32 G/DL (ref 31.5–35.7)
MCHC RBC AUTO-ENTMCNC: 32.5 G/DL (ref 31.5–35.7)
MCV RBC AUTO: 100 FL (ref 79–97)
MCV RBC AUTO: 99.5 FL (ref 79–97)
PLATELET # BLD AUTO: 184 10*3/MM3 (ref 140–450)
PLATELET # BLD AUTO: 196 10*3/MM3 (ref 140–450)
PMV BLD AUTO: 9.4 FL (ref 6–12)
PMV BLD AUTO: 9.6 FL (ref 6–12)
POTASSIUM SERPL-SCNC: 4.6 MMOL/L (ref 3.5–5.2)
RBC # BLD AUTO: 1.98 10*6/MM3 (ref 3.77–5.28)
RBC # BLD AUTO: 2.59 10*6/MM3 (ref 3.77–5.28)
SODIUM SERPL-SCNC: 139 MMOL/L (ref 136–145)
VIT B12 BLD-MCNC: 501 PG/ML (ref 211–946)
WBC NRBC COR # BLD AUTO: 11.03 10*3/MM3 (ref 3.4–10.8)
WBC NRBC COR # BLD AUTO: 9.56 10*3/MM3 (ref 3.4–10.8)

## 2024-01-07 PROCEDURE — 85027 COMPLETE CBC AUTOMATED: CPT | Performed by: STUDENT IN AN ORGANIZED HEALTH CARE EDUCATION/TRAINING PROGRAM

## 2024-01-07 PROCEDURE — 85027 COMPLETE CBC AUTOMATED: CPT | Performed by: INTERNAL MEDICINE

## 2024-01-07 PROCEDURE — 82607 VITAMIN B-12: CPT | Performed by: STUDENT IN AN ORGANIZED HEALTH CARE EDUCATION/TRAINING PROGRAM

## 2024-01-07 PROCEDURE — 86900 BLOOD TYPING SEROLOGIC ABO: CPT

## 2024-01-07 PROCEDURE — 80048 BASIC METABOLIC PNL TOTAL CA: CPT | Performed by: STUDENT IN AN ORGANIZED HEALTH CARE EDUCATION/TRAINING PROGRAM

## 2024-01-07 PROCEDURE — 25010000002 ONDANSETRON PER 1 MG: Performed by: INTERNAL MEDICINE

## 2024-01-07 PROCEDURE — 82746 ASSAY OF FOLIC ACID SERUM: CPT | Performed by: STUDENT IN AN ORGANIZED HEALTH CARE EDUCATION/TRAINING PROGRAM

## 2024-01-07 PROCEDURE — 86901 BLOOD TYPING SEROLOGIC RH(D): CPT

## 2024-01-07 PROCEDURE — 36430 TRANSFUSION BLD/BLD COMPNT: CPT

## 2024-01-07 PROCEDURE — P9016 RBC LEUKOCYTES REDUCED: HCPCS

## 2024-01-07 RX ADMIN — HYDROCODONE BITARTRATE AND ACETAMINOPHEN 1 TABLET: 5; 325 TABLET ORAL at 16:26

## 2024-01-07 RX ADMIN — HYDROCODONE BITARTRATE AND ACETAMINOPHEN 1 TABLET: 5; 325 TABLET ORAL at 12:16

## 2024-01-07 RX ADMIN — BISACODYL 10 MG: 10 SUPPOSITORY RECTAL at 18:58

## 2024-01-07 RX ADMIN — Medication 10 ML: at 20:32

## 2024-01-07 RX ADMIN — GABAPENTIN 100 MG: 100 CAPSULE ORAL at 21:46

## 2024-01-07 RX ADMIN — PANTOPRAZOLE SODIUM 40 MG: 40 TABLET, DELAYED RELEASE ORAL at 06:37

## 2024-01-07 RX ADMIN — SENNOSIDES AND DOCUSATE SODIUM 2 TABLET: 50; 8.6 TABLET ORAL at 21:46

## 2024-01-07 RX ADMIN — ONDANSETRON HYDROCHLORIDE 4 MG: 2 INJECTION, SOLUTION INTRAMUSCULAR; INTRAVENOUS at 16:26

## 2024-01-07 RX ADMIN — SENNOSIDES AND DOCUSATE SODIUM 2 TABLET: 50; 8.6 TABLET ORAL at 07:46

## 2024-01-07 RX ADMIN — METOPROLOL SUCCINATE 100 MG: 100 TABLET, EXTENDED RELEASE ORAL at 07:45

## 2024-01-07 RX ADMIN — HYDROCODONE BITARTRATE AND ACETAMINOPHEN 1 TABLET: 5; 325 TABLET ORAL at 06:36

## 2024-01-07 RX ADMIN — ATORVASTATIN CALCIUM 10 MG: 20 TABLET, FILM COATED ORAL at 21:46

## 2024-01-07 RX ADMIN — HYDROCODONE BITARTRATE AND ACETAMINOPHEN 1 TABLET: 5; 325 TABLET ORAL at 01:26

## 2024-01-07 NOTE — CASE MANAGEMENT/SOCIAL WORK
Continued Stay Note  [facility]     Patient Name: Mayuri Early  MRN: [id number]  Today's Date: 1/7/2024    Admit Date: 1/4/2024    Plan: [facility] when medically ready, pre-cert obtained   Discharge Plan       Row Name 01/07/24 0946       Plan    Plan [facility] when medically ready, pre-cert obtained    Patient/Family in Agreement with Plan yes    Plan Comments Received call from Arvin/Preeti that pre-cert has been obtained and bed is available today. MD and RN notified. Also updated pt and son Torres over the phone and both agreeable. [facility]                   Discharge Codes    No documentation.                       Piter Pitt, RN

## 2024-01-07 NOTE — PLAN OF CARE
Goal Outcome Evaluation:  Plan of Care Reviewed With: patient        Progress: improving  Outcome Evaluation: Patient here with closed compression fracture of L1 and right hip pain after a fall at home. VSS. PO pain medication helping with pain. TLSO brace when out of bed. Swelling  and bruising to right hip. Voiding per bsc.  Education provided on blood pressure monitoring and pain control. Patient verbalized understanding.

## 2024-01-08 VITALS
OXYGEN SATURATION: 95 % | RESPIRATION RATE: 17 BRPM | SYSTOLIC BLOOD PRESSURE: 103 MMHG | HEIGHT: 62 IN | HEART RATE: 77 BPM | DIASTOLIC BLOOD PRESSURE: 63 MMHG | TEMPERATURE: 97.5 F | WEIGHT: 148 LBS | BODY MASS INDEX: 27.23 KG/M2

## 2024-01-08 PROBLEM — D62 ACUTE BLOOD LOSS ANEMIA: Status: ACTIVE | Noted: 2024-01-08

## 2024-01-08 PROBLEM — S70.00XA HEMATOMA OF HIP: Status: ACTIVE | Noted: 2024-01-08

## 2024-01-08 LAB
ANION GAP SERPL CALCULATED.3IONS-SCNC: 12.5 MMOL/L (ref 5–15)
BH BB BLOOD EXPIRATION DATE: NORMAL
BH BB BLOOD TYPE BARCODE: 6200
BH BB DISPENSE STATUS: NORMAL
BH BB PRODUCT CODE: NORMAL
BH BB UNIT NUMBER: NORMAL
BUN SERPL-MCNC: 64 MG/DL (ref 8–23)
BUN/CREAT SERPL: 49.2 (ref 7–25)
CALCIUM SPEC-SCNC: 9 MG/DL (ref 8.6–10.5)
CHLORIDE SERPL-SCNC: 101 MMOL/L (ref 98–107)
CO2 SERPL-SCNC: 24.5 MMOL/L (ref 22–29)
CREAT SERPL-MCNC: 1.3 MG/DL (ref 0.57–1)
CROSSMATCH INTERPRETATION: NORMAL
DEPRECATED RDW RBC AUTO: 49.7 FL (ref 37–54)
EGFRCR SERPLBLD CKD-EPI 2021: 39.6 ML/MIN/1.73
ERYTHROCYTE [DISTWIDTH] IN BLOOD BY AUTOMATED COUNT: 13.8 % (ref 12.3–15.4)
GLUCOSE SERPL-MCNC: 124 MG/DL (ref 65–99)
HCT VFR BLD AUTO: 25.7 % (ref 34–46.6)
HEMOCCULT STL QL: POSITIVE
HGB BLD-MCNC: 8.5 G/DL (ref 12–15.9)
MCH RBC QN AUTO: 32.6 PG (ref 26.6–33)
MCHC RBC AUTO-ENTMCNC: 33.1 G/DL (ref 31.5–35.7)
MCV RBC AUTO: 98.5 FL (ref 79–97)
PLATELET # BLD AUTO: 209 10*3/MM3 (ref 140–450)
PMV BLD AUTO: 9.2 FL (ref 6–12)
POTASSIUM SERPL-SCNC: 4 MMOL/L (ref 3.5–5.2)
RBC # BLD AUTO: 2.61 10*6/MM3 (ref 3.77–5.28)
SODIUM SERPL-SCNC: 138 MMOL/L (ref 136–145)
UNIT  ABO: NORMAL
UNIT  RH: NORMAL
WBC NRBC COR # BLD AUTO: 10.22 10*3/MM3 (ref 3.4–10.8)

## 2024-01-08 PROCEDURE — 85027 COMPLETE CBC AUTOMATED: CPT | Performed by: STUDENT IN AN ORGANIZED HEALTH CARE EDUCATION/TRAINING PROGRAM

## 2024-01-08 PROCEDURE — 80048 BASIC METABOLIC PNL TOTAL CA: CPT | Performed by: STUDENT IN AN ORGANIZED HEALTH CARE EDUCATION/TRAINING PROGRAM

## 2024-01-08 PROCEDURE — 82272 OCCULT BLD FECES 1-3 TESTS: CPT | Performed by: STUDENT IN AN ORGANIZED HEALTH CARE EDUCATION/TRAINING PROGRAM

## 2024-01-08 RX ORDER — HYDROCODONE BITARTRATE AND ACETAMINOPHEN 5; 325 MG/1; MG/1
1 TABLET ORAL EVERY 4 HOURS PRN
Qty: 15 TABLET | Refills: 0 | Status: SHIPPED | OUTPATIENT
Start: 2024-01-08

## 2024-01-08 RX ORDER — SUCRALFATE 1 G/1
1 TABLET ORAL
Start: 2024-01-08 | End: 2024-01-15

## 2024-01-08 RX ORDER — GABAPENTIN 100 MG/1
100 CAPSULE ORAL NIGHTLY
Qty: 30 CAPSULE | Refills: 0 | Status: SHIPPED | OUTPATIENT
Start: 2024-01-08

## 2024-01-08 RX ORDER — SUCRALFATE 1 G/1
1 TABLET ORAL
Status: DISCONTINUED | OUTPATIENT
Start: 2024-01-08 | End: 2024-01-08 | Stop reason: HOSPADM

## 2024-01-08 RX ORDER — ONDANSETRON 4 MG/1
4 TABLET, FILM COATED ORAL EVERY 8 HOURS PRN
Qty: 12 TABLET | Refills: 0 | Status: SHIPPED | OUTPATIENT
Start: 2024-01-08

## 2024-01-08 RX ORDER — PANTOPRAZOLE SODIUM 40 MG/1
40 TABLET, DELAYED RELEASE ORAL DAILY
Start: 2024-01-08

## 2024-01-08 RX ORDER — ACETAMINOPHEN 325 MG/1
650 TABLET ORAL EVERY 6 HOURS PRN
Start: 2024-01-08

## 2024-01-08 RX ADMIN — PANTOPRAZOLE SODIUM 40 MG: 40 TABLET, DELAYED RELEASE ORAL at 06:02

## 2024-01-08 RX ADMIN — HYDROCODONE BITARTRATE AND ACETAMINOPHEN 1 TABLET: 5; 325 TABLET ORAL at 00:29

## 2024-01-08 RX ADMIN — HYDROCODONE BITARTRATE AND ACETAMINOPHEN 1 TABLET: 5; 325 TABLET ORAL at 18:02

## 2024-01-08 RX ADMIN — METOPROLOL SUCCINATE 100 MG: 100 TABLET, EXTENDED RELEASE ORAL at 09:08

## 2024-01-08 RX ADMIN — SUCRALFATE 1 G: 1 TABLET ORAL at 13:11

## 2024-01-08 RX ADMIN — HYDROCODONE BITARTRATE AND ACETAMINOPHEN 1 TABLET: 5; 325 TABLET ORAL at 04:30

## 2024-01-08 RX ADMIN — SENNOSIDES AND DOCUSATE SODIUM 2 TABLET: 50; 8.6 TABLET ORAL at 09:08

## 2024-01-08 RX ADMIN — HYDROCODONE BITARTRATE AND ACETAMINOPHEN 1 TABLET: 5; 325 TABLET ORAL at 09:10

## 2024-01-08 RX ADMIN — SUCRALFATE 1 G: 1 TABLET ORAL at 17:12

## 2024-01-08 RX ADMIN — Medication 10 ML: at 12:41

## 2024-01-08 NOTE — PLAN OF CARE
Goal Outcome Evaluation:   Patient is alert and oriented. Assist x1 with ambulation.  TLSO brace worn. Patient received one unit of RBC'S yesterday. Hemoglobin today was 8.5  Fleets enema was ordered this am.  Patient had results.  Patient to discharge to [facility] this evening. [facility] to transport patient at 6:00 pm.

## 2024-01-08 NOTE — PLAN OF CARE
Goal Outcome Evaluation:  Plan of Care Reviewed With: patient        Progress: improving  Outcome Evaluation: Pt remains stable. Up with 1 assist and walker use, TLSO brace at bedside for comfort. Voiding per BSC, was able to have very small BM this shift. Stool sent to lab and is positive for blood, monitoring hgb levels. Pain well controlled with Norco. Educated on htn management, BP has been soft but stable. Anticoagulants on hold at this time. Bed available at [facility] when she is stable for dc.

## 2024-01-08 NOTE — CASE MANAGEMENT/SOCIAL WORK
Continued Stay Note  [facility]     Patient Name: Mayuri Early  MRN: [id number]  Today's Date: 1/8/2024    Admit Date: 1/4/2024    Plan: [facility] SNF -- Accepted & Pre-cert Obtained   Discharge Plan       Row Name 01/08/24 1027       Plan    Plan [facility] -- Accepted & Pre-cert Obtained    Patient/Family in Agreement with Plan yes    Plan Comments Spoke with Arvin/Preeti who has obtained pre-cert and has a SNF bed for the patient today at [facility]. CCP updated the patient's son/Torres who's agreeable. CCP discussed the different modes of transport and he & the patient request a wheelchair van transport. Scheduled a [facility] Wheelchair Van for today at 1800 (spoke with Felisa; confirmation #: [id number]). Felisa said [facility] will provide the wheelchair and the approx cost of the transport is $80. Updated Torres and the patient who are agreeable. Torres plans to call [employer] and pay for the transport now. Updated the patient's nurse/Amisha RN and Arvin/Preeti. No other needs identified. Packet is on the chart.                   Discharge Codes    No documentation.                 Expected Discharge Date and Time       Expected Discharge Date Expected Discharge Time    Jan 8, 2024               Ml PALMER, RN

## 2024-01-08 NOTE — DISCHARGE SUMMARY
[facility] Medicine Services  DISCHARGE SUMMARY    Patient Name: Mayuri Early  : 1935  MRN: [id number]    Date of Admission: 2024 11:26 AM  Date of Discharge: 2024  Primary Care Physician: Gunjan Hidalgo MD    Consults       Date and Time Order Name Status Description    2024  2:27 PM Inpatient Neurosurgery Consult Completed     2024  1:17 PM [facility] (on-call MD unless specified) Details      2024 12:45 PM Neurosurgery (on-call MD unless specified) Completed             Hospital Course       Active Hospital Problems    Diagnosis  POA    **Closed compression fracture of L1 vertebra, initial encounter [S32.010A]  Yes    Hematoma of hip [S70.00XA]  Yes    Acute blood loss anemia [D62]  Yes    Compression fracture of T12 vertebra [S22.080A]  Yes    PAF (paroxysmal atrial fibrillation) [I48.0]  Yes    Presence of cardiac pacemaker [Z95.0]  Yes    HTN (hypertension) [I10]  Yes    CKD (chronic kidney disease) [N18.9]  Yes    Chronic anticoagulation [Z79.01]  Not Applicable    Chronic back pain [M54.9, G89.29]  Yes    Anemia [D64.9]  Yes    Asthma [J45.909]  Yes      Resolved Hospital Problems   No resolved problems to display.          Hospital Course:  Mayuri Early is a 88 y.o. female presents the hospital after a fall with a hip contusion and hematoma and closed compression fracture of vertebra L1 and T12.  Patient was anticoagulated on Eliquis during her fall and suffered from acute blood loss anemia due to her hip hematoma which is quite large.  She received a blood transfusion and her hemoglobin has now stabilized.  She is having no further signs of bleeding.  She has had no visible blood in her stools.  An occult stool test was sent and was positive.  Patient is now on daily PPI therapy and Carafate has been added.  Patient was offered GI consultation for consideration for endoscopy but said she was not interested.  No gross GI bleeding has been noted and clinically it appears  that the primary source of blood loss was from the very large hematoma.  Now that the hematoma is stabilized I discussed with patient and her son and we will restart anticoagulation with Eliquis at a more conservative lower dose as she has some chronic kidney disease and advanced age.  She can follow-up with primary care provider to determine if she will ultimately need to go back to the higher dose Eliquis at a later date.  She will need to weigh in the risk versus benefits.  I recommend a repeat hemoglobin in 2 to 3 days at the skilled facility to monitor her anemia level.  Patient will need to follow-up with primary care provider and with neurosurgery.  Of note patient has been requiring much less blood pressure medication while hospitalized following her acute illness than she typically does at home.  Recommend intermittent blood pressure monitoring at the skilled facility and adjust blood pressure medications as needed.  Treatment plan discussed with patient and her son today and all were in agreement.  Patient is felt to be stable to go to skilled nursing facility for subacute rehabilitation.    At the time of discharge patient was told to take all medications as prescribed, keep all follow-up appointments, and call their doctor or return to the hospital with any worsening or concerning symptoms.    Please note that this note was made using Dragon voice recognition software            Day of Discharge     Subjective:  Patient says she feels better.  Pain is improving.  She is eager to get out of the hospital and go to subacute today.  She denies any new complaints.  She denies any sign of bleeding.  She says she does not want endoscopy or any GI workup at this point.  She agrees with plan for PPI and Carafate for now.  No other new complaints.    No current fevers or chills  No current shortness of breath or cough  No current nausea, vomiting, or diarrhea  No current chest pain or palpitations    Vital Signs:  "  Temp:  [97.8 °F (36.6 °C)-98.7 °F (37.1 °C)] 97.8 °F (36.6 °C)  Heart Rate:  [79-89] 86  Resp:  [16] 16  BP: ()/(63-66) 107/66     Physical Exam:    Constitutional:Awake, alert, elderly frail, somewhat chronically debilitated appearance, BMI is 27, mild lower extremity edema  HENT: NCAT, mucous membranes moist, neck supple  Respiratory: No cough or wheezes, normal respirations, nonlabored breathing   Cardiovascular: Pulse rate is normal, palpable radial pulses  Gastrointestinal:  soft, nontender, nondistended  Musculoskeletal: As outlined above  Psychiatric: Appropriate affect, cooperative, conversational  Neurologic: No slurred speech or facial droop, follows commands  Skin: Extensive bruising in the area of the right hip with hematoma, no rashes or jaundice, warm    Pertinent  and/or Most Recent Results     Results from last 7 days   Lab Units 01/08/24  0433 01/07/24  1403 01/07/24  0322 01/06/24  1543 01/06/24  1106 01/06/24  0350 01/05/24  0435 01/04/24  1338   WBC 10*3/mm3 10.22 9.56 11.03*  --   --  7.91 6.87 7.90   HEMOGLOBIN g/dL 8.5* 8.3* 6.4* 7.2* 7.5* 7.3* 9.3* 11.6*   HEMATOCRIT % 25.7* 25.9* 19.7* 22.3* 22.9* 22.6* 28.0* 34.7   PLATELETS 10*3/mm3 209 184 196  --   --  163 151 172   SODIUM mmol/L 138  --  139  --   --  139 138 139   POTASSIUM mmol/L 4.0  --  4.6  --   --  4.9 4.4 4.2   CHLORIDE mmol/L 101  --  103  --   --  105 103 101   CO2 mmol/L 24.5  --  23.0  --   --  25.0 25.7 27.1   BUN mg/dL 64*  --  73*  --   --  60* 33* 21   CREATININE mg/dL 1.30*  --  1.36*  --   --  1.33* 1.53* 1.11*   GLUCOSE mg/dL 124*  --  101*  --   --  99 105* 106*   CALCIUM mg/dL 9.0  --  9.1  --   --  8.7 8.7 9.8     Results from last 7 days   Lab Units 01/05/24  0435 01/04/24  1338   BILIRUBIN mg/dL 0.8 1.1   ALK PHOS U/L 61 78   ALT (SGPT) U/L 9 14   AST (SGOT) U/L 15 16   PROTIME Seconds  --  18.9*   INR   --  1.55*   APTT seconds  --  35.8*           Invalid input(s): \"TG\", \"LDLCALC\", \"LDLREALC\"  Results " from last 7 days   Lab Units 01/05/24  1828 01/05/24  1618   HSTROP T ng/L 15* 13       Imaging Results (All)       Procedure Component Value Units Date/Time    CT Lumbar Spine Without Contrast [[id number]] Collected: 01/04/24 1454     Updated: 01/04/24 1948    Narrative:      CT LUMBAR SPINE WITHOUT CONTRAST     HISTORY: Fall, back pain.     COMPARISON: Plain film examination of the lumbar spine 01/04/2024.     FINDINGS: A gastric band and cardiac pacing wires are partially  visualized.     There is a grade 1 anterolisthesis of L4 upon L5 estimated to be  approximately 4 mm. There is no evidence of a pars defect.     Compression fractures involving T12 and L1 are noted. There is a loss of  approximately 40 to 50% of vertical height centrally at T12 and L1.  There is mild bony retropulsion of the superior endplate of L1 1 and  T12. There is mild stranding adjacent to the vertebral bodies anteriorly  and anterior laterally suggesting that the fractures are acute or  subacute in nature.     Extensive vascular calcification is noted. A large renal cyst is  appreciated on the right, only partially visualized and measuring  approximately 4.4 cm in diameter. Areas of increased attenuation likely  representing complex renal cysts are appreciated on the left, the  largest of which measures 2.5 cm in transverse dimension and the smaller  measuring approximately 12 mm in transverse dimension.     T11-T12: A mild broad-based disc osteophyte complex is present resulting  in mild flattening of ventral surface of the thecal sac.     T12-L1: There is no evidence of disc bulge or herniation.     L1-L2: There is a grade 1 retrolisthesis of L1 upon L2, estimated to be  approximately 3-4 mm. Mild foraminal stenosis is present bilaterally,  slightly more prominent on the right secondary to the retrolisthesis of  L1 upon L2 and a broad-based disc osteophyte complex.     L2-L3: There is no evidence of disc herniation. A mild  central  broad-based disc osteophyte complex is present.     L3-L4: Mild facet degenerative disease is present bilaterally. A mild  broad-based disc bulge is present which results in mild flattening  ventral surface of the thecal sac and contributing and contributes to  mild lateral recess narrowing bilaterally. Mild foraminal stenosis is  present bilaterally secondary to extension of a small disc osteophyte  complex into the neural foramen.     L4-L5: Moderate to severe facet degenerative disease is present on the  left and there is moderate facet degenerative disease on the right. A  central broad-based disc osteophyte complex is present resulting in mild  flattening of the ventral surface of the thecal sac and contributing to  mild foraminal stenosis bilaterally.     L5-S1: Transitional anatomy is appreciated consisting of partial  sacralization of L5. Mild facet degenerative disease is present  bilaterally.       Impression:      1.  Transitional anatomy is appreciated consisting of partial  sacralization of L5. Careful correlation prior to any intervention is  required given the presence of transitional anatomy.  2.  Compression deformities involving T12 and L1 are noted which are  likely acute or acute to subacute as there is adjacent mild edema. A  fracture plane is appreciated above the superior endplate of L1. There  is mild bony retropulsion. The loss of vertical height is estimated to  be approximately 40 to 50% in severity at each level. Further evaluation  could be performed with MRI examination of the lumbar spine if the  patient's cardiac pacer is MR-compatible.  3.  Multilevel degenerative disease involving lumbar spine is noted as  described in detail above including a grade 1 anterolisthesis of L4 upon  L5, grade 1 retrolisthesis of L1 upon L2, multilevel facet degenerative  disease, broad-based disc osteophyte complexes and mild foraminal  stenosis. See above.  4.  Multiple cysts are appreciated  by the kidneys bilaterally. There are  2 areas of increased attenuation involving the left kidney which likely  represent complex cysts. The largest measures approximately 2.5 cm in  diameter. Comparison with prior studies is recommended.        Radiation dose reduction techniques were utilized, including automated  exposure control and exposure modulation based on body size.        This report was finalized on 1/4/2024 7:45 PM by Dr. Ministerio Diez M.D  on Workstation: [id number]       XR Spine Lumbar Complete 4+VW [[id number]] Collected: 01/04/24 1240     Updated: 01/04/24 1924    Narrative:      LUMBAR SPINE FOUR VIEWS     HISTORY: Fall, back pain.     COMPARISON: None.     FINDINGS: AP, lateral and bilateral oblique views of the lumbar spine  demonstrates mild dextroscoliosis of the lumbar spine. There is a grade  1 anterolisthesis of L3 upon L4 estimated to be approximately 3 mm.  Anterolisthesis of L4 upon L5 is appreciated estimated to be 5 to 6 mm.  Moderate facet degenerative disease is noted at L4-L5 and L5-S1.     Mild to moderate compression deformities involving T12 and L1 are noted.  Loss of vertical height centrally at each level is approximately 50%.  The fractures are age indeterminate. A gastric band and extensive  vascular calcification is noted.       Impression:      Compression fractures involving T12 and L1 are noted with  loss of approximately 50% of vertical height centrally. There is no  evidence of bony retropulsion. The fractures are age indeterminate.  Further evaluation could be performed with MRI examination of the lumbar  spine as indicated. Degenerative disease involving the lumbar spine is  appreciated including anterolisthesis of L4 upon L5 and to lesser extent  L5 upon S1 and facet degenerative disease. See above.     This report was finalized on 1/4/2024 7:21 PM by Dr. Ministerio Diez M.D  on Workstation: [id number]       XR Hip With or Without Pelvis 2 - 3 View Right [[id number]]  Collected: 01/04/24 1234     Updated: 01/04/24 1240    Narrative:      2 VIEW RIGHT HIP AND 1 VIEW AP PELVIS     HISTORY: Fell 2 days ago. Right hip pain.     FINDINGS: There are prominent degenerative changes involving the right  hip more than left with joint space narrowing and some marginal  spurring. No acute fracture is seen.     This report was finalized on 1/4/2024 12:37 PM by Dr. Jd Biswas M.D  on Workstation: [id number]                   Discharge Details        Discharge Medications        New Medications        Instructions Start Date   acetaminophen 325 MG tablet  Commonly known as: TYLENOL   650 mg, Oral, Every 6 Hours PRN      HYDROcodone-acetaminophen 5-325 MG per tablet  Commonly known as: NORCO   1 tablet, Oral, Every 4 Hours PRN      ondansetron 4 MG tablet  Commonly known as: Zofran   4 mg, Oral, Every 8 Hours PRN      sucralfate 1 g tablet  Commonly known as: CARAFATE   1 g, Oral, 3 Times Daily Before Meals, Through 1/15/2024             Changes to Medications        Instructions Start Date   apixaban 2.5 MG tablet tablet  Commonly known as: ELIQUIS  What changed:   medication strength  how much to take  when to take this   2.5 mg, Oral, 2 Times Daily      Cetirizine HCl 10 MG capsule  What changed:   how much to take  when to take this  reasons to take this   5 mg, Oral, Daily PRN      gabapentin 100 MG capsule  Commonly known as: NEURONTIN  What changed:   medication strength  how much to take  how to take this  when to take this   100 mg, Oral, Nightly      pantoprazole 40 MG EC tablet  Commonly known as: PROTONIX  What changed:   how much to take  how to take this  when to take this   40 mg, Oral, Daily             Continue These Medications        Instructions Start Date   albuterol sulfate  (90 Base) MCG/ACT inhaler  Commonly known as: PROVENTIL HFA;VENTOLIN HFA;PROAIR HFA   2 puffs, Inhalation, Every 4 Hours PRN      atorvastatin 10 MG tablet  Commonly known as: LIPITOR   10 mg,  Oral, Nightly      fish oil 1000 MG capsule capsule   1,000 mg, Oral, Daily With Breakfast      metoprolol succinate  MG 24 hr tablet  Commonly known as: TOPROL-XL   100 mg, Oral, 2 Times Daily      multivitamin with minerals tablet tablet   1 tablet, Oral, Daily             Stop These Medications      aspirin 81 MG EC tablet     Aspirin Buf(CaCarb-MgCarb-MgO) 81 MG tablet     hydrALAZINE 100 MG tablet  Commonly known as: APRESOLINE     losartan-hydrochlorothiazide 100-25 MG per tablet  Commonly known as: HYZAAR              No Known Allergies      Discharge Disposition:  Skilled Nursing Facility (DC - External)    Diet:  Hospital:  Diet Order   Procedures    Diet: Regular/House Diet; Texture: Regular Texture (IDDSI 7); Fluid Consistency: Thin (IDDSI 0)       Activity:  Activity Instructions       Activity as Tolerated      Up WIth Assist                   CODE STATUS:    Code Status and Medical Interventions:   Ordered at: 01/04/24 7751     Code Status (Patient has no pulse and is not breathing):    CPR (Attempt to Resuscitate)     Medical Interventions (Patient has pulse or is breathing):    Full Support       Additional Instructions for the Follow-ups that You Need to Schedule       Discharge Follow-up with PCP   As directed       Currently Documented PCP:    uGnjan Hidalgo MD    PCP Phone Number:    [phone]     Follow Up Details: Recommend primary care provider follow-up within 1 week after discharge from skilled facility.  Please call the day you are discharged from skilled facility to schedule               Follow-up Information       Gunjan Hidalgo MD .    Specialty: Family Medicine  Contact information:  [address] [facility] [address] [phone]               Lizbeth Ruby APRN Follow up in 2 week(s).    Specialties: Nurse Practitioner, Neurosurgery  Why: Follow-up in neurosurgery clinic in 2 to 3 weeks for follow-up of vertebral fractures.  Please call to schedule appointment or  with questions.  Contact information:  [address]  [facility] [address] [phone]               Gunjan Hidalgo MD .    Specialty: Family Medicine  Why: Recommend primary care provider follow-up within 1 week after discharge from skilled facility.  Please call the day you are discharged from skilled facility to schedule  Contact information:  [address]  [facility] [address] [phone]                                 Ministerio Rg MD  01/08/24      Time Spent on Discharge:  I spent greater than 40 minutes on this discharge activity which included: face-to-face encounter with the patient, reviewing the data in the system, coordination of the care with the nursing staff as well as consultants, documentation, and entering orders.

## 2024-01-09 NOTE — CASE MANAGEMENT/SOCIAL WORK
Case Management Discharge Note      Final Note: [facility].         Selected Continued Care - Discharged on 1/8/2024 Admission date: 1/4/2024 - Discharge disposition: Skilled Nursing Facility (DC - External)      Destination Coordination complete.      Service Provider Selected Services Address Phone Fax Patient Preferred    [facility] [address] [place] [address] [phone] --              Durable Medical Equipment    No services have been selected for the patient.                Dialysis/Infusion    No services have been selected for the patient.                Home Medical Care    No services have been selected for the patient.                Therapy    No services have been selected for the patient.                Community Resources    No services have been selected for the patient.                Community & DME    No services have been selected for the patient.                         Final Discharge Disposition Code: 03 - skilled nursing facility (SNF)

## 2024-02-23 ENCOUNTER — TRANSCRIBE ORDERS (OUTPATIENT)
Dept: HOME HEALTH SERVICES | Facility: HOME HEALTHCARE | Age: 89
End: 2024-02-23
Payer: MEDICARE

## 2024-02-23 ENCOUNTER — HOME HEALTH ADMISSION (OUTPATIENT)
Dept: HOME HEALTH SERVICES | Facility: HOME HEALTHCARE | Age: 89
End: 2024-02-23
Payer: MEDICARE

## 2024-02-23 DIAGNOSIS — S22.080A CLOSED WEDGE COMPRESSION FRACTURE OF T12 VERTEBRA, INITIAL ENCOUNTER: Primary | ICD-10-CM

## 2025-02-08 ENCOUNTER — APPOINTMENT (OUTPATIENT)
Dept: CT IMAGING | Facility: HOSPITAL | Age: OVER 89
End: 2025-02-08
Payer: MEDICARE

## 2025-02-08 ENCOUNTER — HOSPITAL ENCOUNTER (OUTPATIENT)
Facility: HOSPITAL | Age: OVER 89
Setting detail: OBSERVATION
Discharge: HOME | End: 2025-02-12
Attending: EMERGENCY MEDICINE | Admitting: HOSPITALIST
Payer: MEDICARE

## 2025-02-08 DIAGNOSIS — R11.2 NAUSEA AND VOMITING, UNSPECIFIED VOMITING TYPE: ICD-10-CM

## 2025-02-08 DIAGNOSIS — E83.42 HYPOMAGNESEMIA: ICD-10-CM

## 2025-02-08 DIAGNOSIS — R42 POSTURAL DIZZINESS: Primary | ICD-10-CM

## 2025-02-08 DIAGNOSIS — Z79.01 CHRONIC ANTICOAGULATION: ICD-10-CM

## 2025-02-08 LAB
ALBUMIN SERPL-MCNC: 3.8 G/DL (ref 3.5–5.2)
ALBUMIN/GLOB SERPL: 1.2 G/DL
ALP SERPL-CCNC: 91 U/L (ref 39–117)
ALT SERPL W P-5'-P-CCNC: 15 U/L (ref 1–33)
ANION GAP SERPL CALCULATED.3IONS-SCNC: 12 MMOL/L (ref 5–15)
AST SERPL-CCNC: 23 U/L (ref 1–32)
BACTERIA UR QL AUTO: ABNORMAL /HPF
BASOPHILS # BLD AUTO: 0.01 10*3/MM3 (ref 0–0.2)
BASOPHILS NFR BLD AUTO: 0.1 % (ref 0–1.5)
BILIRUB SERPL-MCNC: 0.6 MG/DL (ref 0–1.2)
BILIRUB UR QL STRIP: NEGATIVE
BUN SERPL-MCNC: 19 MG/DL (ref 8–23)
BUN/CREAT SERPL: 18.8 (ref 7–25)
CALCIUM SPEC-SCNC: 9.4 MG/DL (ref 8.6–10.5)
CHLORIDE SERPL-SCNC: 101 MMOL/L (ref 98–107)
CLARITY UR: ABNORMAL
CO2 SERPL-SCNC: 25 MMOL/L (ref 22–29)
COLOR UR: YELLOW
CREAT SERPL-MCNC: 1.01 MG/DL (ref 0.57–1)
DEPRECATED RDW RBC AUTO: 41.2 FL (ref 37–54)
EGFRCR SERPLBLD CKD-EPI 2021: 53.3 ML/MIN/1.73
EOSINOPHIL # BLD AUTO: 0.12 10*3/MM3 (ref 0–0.4)
EOSINOPHIL NFR BLD AUTO: 1.4 % (ref 0.3–6.2)
ERYTHROCYTE [DISTWIDTH] IN BLOOD BY AUTOMATED COUNT: 11.7 % (ref 12.3–15.4)
GEN 5 1HR TROPONIN T REFLEX: 9 NG/L
GLOBULIN UR ELPH-MCNC: 3.1 GM/DL
GLUCOSE SERPL-MCNC: 152 MG/DL (ref 65–99)
GLUCOSE UR STRIP-MCNC: NEGATIVE MG/DL
HCT VFR BLD AUTO: 38.8 % (ref 34–46.6)
HGB BLD-MCNC: 13 G/DL (ref 12–15.9)
HGB UR QL STRIP.AUTO: ABNORMAL
HYALINE CASTS UR QL AUTO: ABNORMAL /LPF
IMM GRANULOCYTES # BLD AUTO: 0.02 10*3/MM3 (ref 0–0.05)
IMM GRANULOCYTES NFR BLD AUTO: 0.2 % (ref 0–0.5)
KETONES UR QL STRIP: NEGATIVE
LEUKOCYTE ESTERASE UR QL STRIP.AUTO: ABNORMAL
LIPASE SERPL-CCNC: 40 U/L (ref 13–60)
LYMPHOCYTES # BLD AUTO: 0.77 10*3/MM3 (ref 0.7–3.1)
LYMPHOCYTES NFR BLD AUTO: 9 % (ref 19.6–45.3)
MAGNESIUM SERPL-MCNC: 1.3 MG/DL (ref 1.6–2.4)
MCH RBC QN AUTO: 32.5 PG (ref 26.6–33)
MCHC RBC AUTO-ENTMCNC: 33.5 G/DL (ref 31.5–35.7)
MCV RBC AUTO: 97 FL (ref 79–97)
MONOCYTES # BLD AUTO: 0.62 10*3/MM3 (ref 0.1–0.9)
MONOCYTES NFR BLD AUTO: 7.3 % (ref 5–12)
NEUTROPHILS NFR BLD AUTO: 6.97 10*3/MM3 (ref 1.7–7)
NEUTROPHILS NFR BLD AUTO: 82 % (ref 42.7–76)
NITRITE UR QL STRIP: NEGATIVE
NRBC BLD AUTO-RTO: 0 /100 WBC (ref 0–0.2)
PH UR STRIP.AUTO: 6 [PH] (ref 5–8)
PLATELET # BLD AUTO: 199 10*3/MM3 (ref 140–450)
PMV BLD AUTO: 8.9 FL (ref 6–12)
POTASSIUM SERPL-SCNC: 4.1 MMOL/L (ref 3.5–5.2)
PROT SERPL-MCNC: 6.9 G/DL (ref 6–8.5)
PROT UR QL STRIP: ABNORMAL
QT INTERVAL: 422 MS
QTC INTERVAL: 447 MS
RBC # BLD AUTO: 4 10*6/MM3 (ref 3.77–5.28)
RBC # UR STRIP: ABNORMAL /HPF
REF LAB TEST METHOD: ABNORMAL
SODIUM SERPL-SCNC: 138 MMOL/L (ref 136–145)
SP GR UR STRIP: >1.03 (ref 1–1.03)
SQUAMOUS #/AREA URNS HPF: ABNORMAL /HPF
TROPONIN T NUMERIC DELTA: 1 NG/L
TROPONIN T SERPL HS-MCNC: 8 NG/L
UROBILINOGEN UR QL STRIP: ABNORMAL
WBC # UR STRIP: ABNORMAL /HPF
WBC NRBC COR # BLD AUTO: 8.51 10*3/MM3 (ref 3.4–10.8)

## 2025-02-08 PROCEDURE — G0378 HOSPITAL OBSERVATION PER HR: HCPCS

## 2025-02-08 PROCEDURE — 70498 CT ANGIOGRAPHY NECK: CPT

## 2025-02-08 PROCEDURE — 93010 ELECTROCARDIOGRAM REPORT: CPT | Performed by: INTERNAL MEDICINE

## 2025-02-08 PROCEDURE — 93005 ELECTROCARDIOGRAM TRACING: CPT | Performed by: EMERGENCY MEDICINE

## 2025-02-08 PROCEDURE — 25010000002 ONDANSETRON PER 1 MG: Performed by: EMERGENCY MEDICINE

## 2025-02-08 PROCEDURE — 80053 COMPREHEN METABOLIC PANEL: CPT | Performed by: EMERGENCY MEDICINE

## 2025-02-08 PROCEDURE — 70496 CT ANGIOGRAPHY HEAD: CPT

## 2025-02-08 PROCEDURE — 81001 URINALYSIS AUTO W/SCOPE: CPT | Performed by: EMERGENCY MEDICINE

## 2025-02-08 PROCEDURE — 25010000002 MAGNESIUM SULFATE 2 GM/50ML SOLUTION: Performed by: EMERGENCY MEDICINE

## 2025-02-08 PROCEDURE — 96365 THER/PROPH/DIAG IV INF INIT: CPT

## 2025-02-08 PROCEDURE — 83735 ASSAY OF MAGNESIUM: CPT | Performed by: EMERGENCY MEDICINE

## 2025-02-08 PROCEDURE — 84484 ASSAY OF TROPONIN QUANT: CPT | Performed by: EMERGENCY MEDICINE

## 2025-02-08 PROCEDURE — 96376 TX/PRO/DX INJ SAME DRUG ADON: CPT

## 2025-02-08 PROCEDURE — 99285 EMERGENCY DEPT VISIT HI MDM: CPT

## 2025-02-08 PROCEDURE — 96375 TX/PRO/DX INJ NEW DRUG ADDON: CPT

## 2025-02-08 PROCEDURE — 83690 ASSAY OF LIPASE: CPT | Performed by: EMERGENCY MEDICINE

## 2025-02-08 PROCEDURE — 70450 CT HEAD/BRAIN W/O DYE: CPT

## 2025-02-08 PROCEDURE — 25510000001 IOPAMIDOL PER 1 ML: Performed by: EMERGENCY MEDICINE

## 2025-02-08 PROCEDURE — 36415 COLL VENOUS BLD VENIPUNCTURE: CPT

## 2025-02-08 PROCEDURE — 85025 COMPLETE CBC W/AUTO DIFF WBC: CPT | Performed by: EMERGENCY MEDICINE

## 2025-02-08 PROCEDURE — 96366 THER/PROPH/DIAG IV INF ADDON: CPT

## 2025-02-08 RX ORDER — ONDANSETRON 2 MG/ML
4 INJECTION INTRAMUSCULAR; INTRAVENOUS ONCE
Status: COMPLETED | OUTPATIENT
Start: 2025-02-08 | End: 2025-02-08

## 2025-02-08 RX ORDER — MECLIZINE HYDROCHLORIDE 25 MG/1
25 TABLET ORAL ONCE
Status: COMPLETED | OUTPATIENT
Start: 2025-02-08 | End: 2025-02-08

## 2025-02-08 RX ORDER — ATORVASTATIN CALCIUM 10 MG/1
10 TABLET, FILM COATED ORAL NIGHTLY
Status: DISCONTINUED | OUTPATIENT
Start: 2025-02-08 | End: 2025-02-12 | Stop reason: HOSPADM

## 2025-02-08 RX ORDER — ONDANSETRON 2 MG/ML
4 INJECTION INTRAMUSCULAR; INTRAVENOUS EVERY 6 HOURS PRN
Status: DISCONTINUED | OUTPATIENT
Start: 2025-02-08 | End: 2025-02-12

## 2025-02-08 RX ORDER — MECLIZINE HYDROCHLORIDE 25 MG/1
25 TABLET ORAL 3 TIMES DAILY PRN
Status: DISCONTINUED | OUTPATIENT
Start: 2025-02-08 | End: 2025-02-12

## 2025-02-08 RX ORDER — IOPAMIDOL 755 MG/ML
100 INJECTION, SOLUTION INTRAVASCULAR
Status: COMPLETED | OUTPATIENT
Start: 2025-02-08 | End: 2025-02-08

## 2025-02-08 RX ORDER — METOPROLOL SUCCINATE 25 MG/1
25 TABLET, EXTENDED RELEASE ORAL
Status: DISCONTINUED | OUTPATIENT
Start: 2025-02-08 | End: 2025-02-11

## 2025-02-08 RX ORDER — ACETAMINOPHEN 325 MG/1
650 TABLET ORAL EVERY 6 HOURS PRN
Status: DISCONTINUED | OUTPATIENT
Start: 2025-02-08 | End: 2025-02-12

## 2025-02-08 RX ORDER — MAGNESIUM SULFATE HEPTAHYDRATE 40 MG/ML
2 INJECTION, SOLUTION INTRAVENOUS
Status: COMPLETED | OUTPATIENT
Start: 2025-02-08 | End: 2025-02-08

## 2025-02-08 RX ORDER — SODIUM CHLORIDE 9 MG/ML
75 INJECTION, SOLUTION INTRAVENOUS CONTINUOUS
Status: ACTIVE | OUTPATIENT
Start: 2025-02-08 | End: 2025-02-09

## 2025-02-08 RX ORDER — PANTOPRAZOLE SODIUM 40 MG/1
40 TABLET, DELAYED RELEASE ORAL
Status: DISCONTINUED | OUTPATIENT
Start: 2025-02-09 | End: 2025-02-09

## 2025-02-08 RX ADMIN — ONDANSETRON 4 MG: 2 INJECTION INTRAMUSCULAR; INTRAVENOUS at 12:42

## 2025-02-08 RX ADMIN — METOPROLOL SUCCINATE 25 MG: 25 TABLET, EXTENDED RELEASE ORAL at 20:01

## 2025-02-08 RX ADMIN — MAGNESIUM SULFATE IN WATER FOR 2 G: 40 INJECTION INTRAVENOUS at 14:00

## 2025-02-08 RX ADMIN — APIXABAN 2.5 MG: 2.5 TABLET, FILM COATED ORAL at 20:01

## 2025-02-08 RX ADMIN — MAGNESIUM SULFATE IN WATER FOR 2 G: 40 INJECTION INTRAVENOUS at 16:22

## 2025-02-08 RX ADMIN — MAGNESIUM SULFATE IN WATER FOR 2 G: 40 INJECTION INTRAVENOUS at 18:15

## 2025-02-08 RX ADMIN — MECLIZINE HYDROCHLORIDE 25 MG: 25 TABLET ORAL at 13:57

## 2025-02-08 RX ADMIN — ACETAMINOPHEN 325MG 650 MG: 325 TABLET ORAL at 23:36

## 2025-02-08 RX ADMIN — ONDANSETRON 4 MG: 2 INJECTION, SOLUTION INTRAMUSCULAR; INTRAVENOUS at 13:25

## 2025-02-08 RX ADMIN — SODIUM CHLORIDE 75 ML/HR: 9 INJECTION, SOLUTION INTRAVENOUS at 20:00

## 2025-02-08 RX ADMIN — ATORVASTATIN CALCIUM 10 MG: 10 TABLET ORAL at 20:01

## 2025-02-08 RX ADMIN — IOPAMIDOL 95 ML: 755 INJECTION, SOLUTION INTRAVENOUS at 14:56

## 2025-02-08 NOTE — PLAN OF CARE
Goal Outcome Evaluation:           Problem: Adult Inpatient Plan of Care  Goal: Optimal Comfort and Wellbeing  Outcome: Progressing  Intervention: Provide Person-Centered Care  Recent Flowsheet Documentation  Taken 2/8/2025 1722 by Angelica Qureshi RN  Trust Relationship/Rapport:   care explained   choices provided   thoughts/feelings acknowledged     Problem: Adult Inpatient Plan of Care  Goal: Absence of Hospital-Acquired Illness or Injury  Intervention: Identify and Manage Fall Risk  Recent Flowsheet Documentation  Taken 2/8/2025 1722 by Angelica Qureshi RN  Safety Promotion/Fall Prevention:   assistive device/personal items within reach   activity supervised   clutter free environment maintained   fall prevention program maintained   nonskid shoes/slippers when out of bed   room organization consistent   safety round/check completed  Intervention: Prevent Skin Injury  Recent Flowsheet Documentation  Taken 2/8/2025 1722 by Angelica Qureshi RN  Body Position: position changed independently  Intervention: Prevent and Manage VTE (Venous Thromboembolism) Risk  Recent Flowsheet Documentation  Taken 2/8/2025 1722 by Angelica Qureshi RN  VTE Prevention/Management: (eliquis) other (see comments)  Intervention: Prevent Infection  Recent Flowsheet Documentation  Taken 2/8/2025 1722 by Angelica Qureshi RN  Infection Prevention:   hand hygiene promoted   rest/sleep promoted  Goal: Readiness for Transition of Care  Intervention: Mutually Develop Transition Plan  Recent Flowsheet Documentation  Taken 2/8/2025 1746 by Angelica Qureshi RN  Transportation Anticipated: family or friend will provide  Patient/Family Anticipated Services at Transition: none  Patient/Family Anticipates Transition to: home  Taken 2/8/2025 1736 by Angelica Qureshi RN  Equipment Currently Used at Home: none     Problem: Comorbidity Management  Goal: Blood Pressure in Desired Range  Intervention: Maintain Blood Pressure  Management  Recent Flowsheet Documentation  Taken 2/8/2025 1722 by Angelica Qureshi, RN  Medication Review/Management: medications reviewed

## 2025-02-08 NOTE — ED PROVIDER NOTES
EMERGENCY DEPARTMENT ENCOUNTER    Room Number:  22/22  PCP: Gunjan Hidalgo MD  Historian: Patient      HPI:  Chief Complaint: Dizziness, nausea  A complete HPI/ROS/PMH/PSH/SH/FH are unobtainable due to: None    Context: Mayuri Early is a 89 y.o. female who presents to the ED via EMS from home c/o acute couple days of increasing postural dizziness with nausea and dry heaving.  No diarrhea.  No fevers.  Has had a mild cough and some slight wheezing at times.  Decreased p.o. intake.  Reports foul-smelling urine.  Denies any headache, no focal numbness or weakness of the arms or legs.  Has had some sinus congestion recently.      MEDICAL RECORD REVIEW    External (non-ED) record review: Adult transthoracic echo reviewed from November 4, 2022 at [facility] shows ejection fraction of 60%, severe mitral regurgitation, moderate to severe tricuspid regurgitation, pacemaker in place              PAST MEDICAL HISTORY  Active Ambulatory Problems     Diagnosis Date Noted    Well woman exam 10/20/2020    Closed compression fracture of L1 vertebra, initial encounter 01/04/2024    Compression fracture of T12 vertebra 01/04/2024    PAF (paroxysmal atrial fibrillation) 01/04/2024    Presence of cardiac pacemaker 01/04/2024    HTN (hypertension) 01/04/2024    CKD (chronic kidney disease) 01/04/2024    Chronic anticoagulation 01/04/2024    Chronic back pain 01/04/2024    Anemia 01/04/2024    Asthma 01/04/2024    Hematoma of hip 01/08/2024    Acute blood loss anemia 01/08/2024     Resolved Ambulatory Problems     Diagnosis Date Noted    No Resolved Ambulatory Problems     Past Medical History:   Diagnosis Date    Fibrocystic breast          PAST SURGICAL HISTORY  Past Surgical History:   Procedure Laterality Date    BREAST CYST ASPIRATION      BREAST CYST EXCISION      HYSTERECTOMY           FAMILY HISTORY  No family history on file.      SOCIAL HISTORY  Social History     Socioeconomic History    Marital status:    Tobacco Use     Smoking status: Former   Vaping Use    Vaping status: Never Used   Substance and Sexual Activity    Alcohol use: Never    Drug use: Never    Sexual activity: Defer         ALLERGIES  Patient has no known allergies.        REVIEW OF SYSTEMS  Review of Systems     All systems reviewed and negative except for those discussed in HPI.       PHYSICAL EXAM    I have reviewed the triage vital signs and nursing notes.    ED Triage Vitals [02/08/25 1159]   Temp Heart Rate Resp BP SpO2   98.7 °F (37.1 °C) 90 18 163/76 96 %      Temp src Heart Rate Source Patient Position BP Location FiO2 (%)   -- -- -- -- --       Physical Exam  General: Mildly uncomfortable, nontoxic, nondiaphoretic  HEENT: Mucous membranes moist, atraumatic, EOMI  Neck: Full ROM  Pulm: Symmetric chest rise, nonlabored, lungs CTAB  Cardiovascular: Regular rate and rhythm, intact distal pulses  GI: Soft, nontender, nondistended, no rebound, no guarding, bowel sounds present  MSK: Full ROM, no deformity  Skin: Warm, dry  Neuro: Awake, alert, oriented x 4, GCS 15, reproducible dizziness with head movements, resolves with rest, moving all extremities, no focal deficits  Psych: Calm, cooperative        LAB RESULTS  Recent Results (from the past 24 hours)   ECG 12 Lead Other; dizziness    Collection Time: 02/08/25 12:31 PM   Result Value Ref Range    QT Interval 422 ms    QTC Interval 447 ms   Comprehensive Metabolic Panel    Collection Time: 02/08/25 12:32 PM    Specimen: Blood   Result Value Ref Range    Glucose 152 (H) 65 - 99 mg/dL    BUN 19 8 - 23 mg/dL    Creatinine 1.01 (H) 0.57 - 1.00 mg/dL    Sodium 138 136 - 145 mmol/L    Potassium 4.1 3.5 - 5.2 mmol/L    Chloride 101 98 - 107 mmol/L    CO2 25.0 22.0 - 29.0 mmol/L    Calcium 9.4 8.6 - 10.5 mg/dL    Total Protein 6.9 6.0 - 8.5 g/dL    Albumin 3.8 3.5 - 5.2 g/dL    ALT (SGPT) 15 1 - 33 U/L    AST (SGOT) 23 1 - 32 U/L    Alkaline Phosphatase 91 39 - 117 U/L    Total Bilirubin 0.6 0.0 - 1.2 mg/dL     Globulin 3.1 gm/dL    A/G Ratio 1.2 g/dL    BUN/Creatinine Ratio 18.8 7.0 - 25.0    Anion Gap 12.0 5.0 - 15.0 mmol/L    eGFR 53.3 (L) >60.0 mL/min/1.73   Lipase    Collection Time: 02/08/25 12:32 PM    Specimen: Blood   Result Value Ref Range    Lipase 40 13 - 60 U/L   High Sensitivity Troponin T    Collection Time: 02/08/25 12:32 PM    Specimen: Blood   Result Value Ref Range    HS Troponin T 8 <14 ng/L   Magnesium    Collection Time: 02/08/25 12:32 PM    Specimen: Blood   Result Value Ref Range    Magnesium 1.3 (L) 1.6 - 2.4 mg/dL   CBC Auto Differential    Collection Time: 02/08/25 12:32 PM    Specimen: Blood   Result Value Ref Range    WBC 8.51 3.40 - 10.80 10*3/mm3    RBC 4.00 3.77 - 5.28 10*6/mm3    Hemoglobin 13.0 12.0 - 15.9 g/dL    Hematocrit 38.8 34.0 - 46.6 %    MCV 97.0 79.0 - 97.0 fL    MCH 32.5 26.6 - 33.0 pg    MCHC 33.5 31.5 - 35.7 g/dL    RDW 11.7 (L) 12.3 - 15.4 %    RDW-SD 41.2 37.0 - 54.0 fl    MPV 8.9 6.0 - 12.0 fL    Platelets 199 140 - 450 10*3/mm3    Neutrophil % 82.0 (H) 42.7 - 76.0 %    Lymphocyte % 9.0 (L) 19.6 - 45.3 %    Monocyte % 7.3 5.0 - 12.0 %    Eosinophil % 1.4 0.3 - 6.2 %    Basophil % 0.1 0.0 - 1.5 %    Immature Grans % 0.2 0.0 - 0.5 %    Neutrophils, Absolute 6.97 1.70 - 7.00 10*3/mm3    Lymphocytes, Absolute 0.77 0.70 - 3.10 10*3/mm3    Monocytes, Absolute 0.62 0.10 - 0.90 10*3/mm3    Eosinophils, Absolute 0.12 0.00 - 0.40 10*3/mm3    Basophils, Absolute 0.01 0.00 - 0.20 10*3/mm3    Immature Grans, Absolute 0.02 0.00 - 0.05 10*3/mm3    nRBC 0.0 0.0 - 0.2 /100 WBC   High Sensitivity Troponin T 1Hr    Collection Time: 02/08/25  2:13 PM    Specimen: Hand, Left; Blood   Result Value Ref Range    HS Troponin T 9 <14 ng/L    Troponin T Numeric Delta 1 Abnormal if >/=3 ng/L       Ordered the above labs and independently interpreted results. My findings will be discussed in the medical decision making section below        RADIOLOGY  CT Head Without Contrast    Result Date:  2/8/2025  EMERGENCY CT SCAN OF THE HEAD WITHOUT CONTRAST ON 02/08/2025  CLINICAL HISTORY: This is an 89-year-old female patient who has dizziness and nausea and vomiting.  TECHNIQUE: Spiral CT images were obtained from the base of the skull to the vertex without intravenous contrast. The images were reformatted and are submitted in 3 mm thick axial, sagittal and coronal CT sections with brain algorithm.  There are no prior head CTs from [facility] for comparison.  FINDINGS: There are extensive patchy and confluent areas of low-density throughout the periventricular and subcortical white matter of the cerebral hemispheres most consistent with severe small vessel disease. The remainder of the brain parenchyma is normal in attenuation. There is diffuse cerebral atrophy. The lateral and third ventricles are mildly prominent size felt to be due to central volume loss or atrophy. I see no focal mass effect. There is no midline shift. No extra-axial fluid collections are identified. There is no evidence of acute intra cranial hemorrhage. The calvarium and the skull base are normal in appearance. The paranasal sinuses and the mastoid air cells and the middle ear cavities are clear. There are lens implants in place in the globes bilaterally from previous bilateral cataract surgery. Otherwise orbits are unremarkable.      1. No discernible acute intracranial abnormality is identified  2. There is extensive confluent low-density throughout the cerebral white matter consistent with moderate to severe small vessel disease and there is diffuse cerebral atrophy. There are lens implants in the globes from previous bilateral cataract surgery. The remainder of the head CT is within normal limits. The etiology of this patient's acute dizziness and nausea and vomiting is not established on this exam. If there remains any clinical suspicion of an acute stroke I recommend an MRI of the brain for more complete assessment.   Radiation dose reduction techniques were utilized, including automated exposure control and exposure modulation based on body size.        Ordered the above noted radiological studies.  Independently interpreted by me and my independent review of findings can be found in the ED Course.  See dictation for official radiology interpretation.      PROCEDURES    Procedures      EKG - Per my independent interpretation at 1236:    EKG Time: 1231  Rhythm/Rate: Paced rhythm with rate of 67  Normal axis  Right bundle branch block  No acute ischemic changes  No STEMI       No emergent changes as compared to January 5, 2024      MEDICATIONS GIVEN IN ER    Medications   Magnesium Standard Dose Replacement - Follow Nurse / BPA Driven Protocol (has no administration in time range)   magnesium sulfate 2g/50 mL (PREMIX) infusion (2 g Intravenous New Bag 2/8/25 1400)   ondansetron (ZOFRAN) injection 4 mg (4 mg Intravenous Given 2/8/25 1242)   meclizine (ANTIVERT) tablet 25 mg (25 mg Oral Given 2/8/25 1357)   ondansetron (ZOFRAN) injection 4 mg (4 mg Intravenous Given 2/8/25 1325)   iopamidol (ISOVUE-370) 76 % injection 100 mL (95 mL Intravenous Given by Other 2/8/25 1456)         PROGRESS, DATA ANALYSIS, CONSULTS, AND MEDICAL DECISION MAKING    Please note that this section constitutes my independent interpretation of clinical data including lab results, radiology, EKG's.  This constitutes my independent professional opinion regarding differential diagnosis and management of this patient.  It may include any factors such as history from outside sources, review of external records, social determinants of health, management of medications, response to those treatments, and discussions with other providers.    Differential Diagnosis and Plan: Initial concern for BPPV, viral syndrome, deep hydration, renal failure, electrolyte abnormalities, arrhythmia, intracranial hemorrhage, among others.  Plan for labs, CT head, supportive care, and  reevaluation with results.    Additional sources:  - Discussed/ obtained information from independent historians:       - (Social Determinants of Health): None     - Shared decision making:  Patient and family at bedside fully updated on and in agreement with the course and plan moving forward    ED Course as of 02/08/25 1601   Sat Feb 08, 2025   1256 WBC: 8.51 [DC]   1256 Hemoglobin: 13.0 [DC]   1256 Platelets: 199 [DC]   1256 CT Head Without Contrast  Per my independent interpretation of the CT head, no overtly evident intracranial hemorrhage although subtle finding could be missed will defer to final radiology report  [DC]   1308 WBC: 8.51 [DC]   1309 Hemoglobin: 13.0 [DC]   1309 Platelets: 199 [DC]   1312 Glucose(!): 152 [DC]   1312 BUN: 19 [DC]   1312 Creatinine(!): 1.01 [DC]   1312 Sodium: 138 [DC]   1312 Potassium: 4.1 [DC]   1312 ALT (SGPT): 15 [DC]   1312 AST (SGOT): 23 [DC]   1313 Alkaline Phosphatase: 91 [DC]   1313 Total Bilirubin: 0.6 [DC]   1313 Magnesium(!): 1.3 [DC]   1313 Lipase: 40 [DC]   1313 HS Troponin T: 8 [DC]   1313 CT Head Without Contrast  Discussed with Dr. Platt, radiology, patient with diffuse small vessel disease but no evidence of acute stroke or ICH [DC]   1557 CT Angiogram Neck  Discussed with Dr. Platt, Radiologist, no acute emergent findings [DC]   1600 Discussed with CHANTEL Aviles, discussed patient's clinical course and findings today, treatment modalities, and need for hospitalization. [DC]      ED Course User Index  [DC] Chavez Raphael MD       Hospitalization Considered?: yes    Orders Placed During This Visit:  Orders Placed This Encounter   Procedures    CT Head Without Contrast    CT Angiogram Neck    CT Angiogram Head    Comprehensive Metabolic Panel    Lipase    Urinalysis With Microscopic If Indicated (No Culture) - Urine, Clean Catch    High Sensitivity Troponin T    Magnesium    CBC Auto Differential    High Sensitivity Troponin T 1Hr    Magnesium    Orthostatic  Vitals    ECG 12 Lead Other; dizziness    Initiate Observation Status    CBC & Differential       Additional orders considered but not placed:      Independent interpretation of labs, radiology studies, and discussions with consultants: See ED Course        AS OF 16:01 EST VITALS:    BP - 145/64  HR - 61  TEMP - 98.7 °F (37.1 °C)  02 SATS - 92%          DIAGNOSIS  Final diagnoses:   Postural dizziness   Nausea and vomiting, unspecified vomiting type   Chronic anticoagulation   Hypomagnesemia         DISPOSITION  ED Disposition       ED Disposition   Decision to Admit    Condition   --    Comment   Level of Care: Telemetry [5]   Diagnosis: Postural dizziness [062681]   Admitting Physician: HE OSWALD [[id number]]   Attending Physician: HE OSWALD [[id number]]   Is patient appropriate for Inpatient Observation Unit?: Yes [1]                  Please note that portions of this document were completed with a voice recognition program.    Note Disclaimer: At [facility], we believe that sharing information builds trust and better relationships. You are receiving this note because you recently visited [facility]. It is possible you will see health information before a provider has talked with you about it. This kind of information can be easy to misunderstand. To help you fully understand what it means for your health, we urge you to discuss this note with your provider.                       Chavez Raphael MD  02/08/25 1963

## 2025-02-08 NOTE — ED TRIAGE NOTES
Pt from home via ems, called for dizziness, n/dry heaves started 3-4 days ago, describes as spinning, also reports an odor to urine

## 2025-02-08 NOTE — ED NOTES
Nursing report ED to floor  Mayuri Early  89 y.o.  female    HPI :  HPI  Stated Reason for Visit: dizzy  History Obtained From: patient, EMS    Chief Complaint  Chief Complaint   Patient presents with    Dizziness    Nausea       Admitting doctor:   Kike Awad MD    Admitting diagnosis:   The primary encounter diagnosis was Postural dizziness. Diagnoses of Nausea and vomiting, unspecified vomiting type, Chronic anticoagulation, and Hypomagnesemia were also pertinent to this visit.    Code status:   Current Code Status       Date Active Code Status Order ID Comments User Context       Prior            Allergies:   Patient has no known allergies.    Isolation:   No active isolations    Intake and Output    Intake/Output Summary (Last 24 hours) at 2/8/2025 1619  Last data filed at 2/8/2025 1615  Gross per 24 hour   Intake 50 ml   Output 250 ml   Net -200 ml       Weight:       02/08/25  1159   Weight: 56.7 kg (125 lb)       Most recent vitals:   Vitals:    02/08/25 1237 02/08/25 1307 02/08/25 1404 02/08/25 1536   BP: 150/75 140/67 141/59 145/64   Pulse: 71 63 62 61   Resp:       Temp:       SpO2: 94% 93% 96% 92%   Weight:       Height:           Active LDAs/IV Access:   Lines, Drains & Airways       Active LDAs       Name Placement date Placement time Site Days    Peripheral IV 02/08/25 1233 Right Antecubital 02/08/25  1233  Antecubital  less than 1                    Labs (abnormal labs have a star):   Labs Reviewed   COMPREHENSIVE METABOLIC PANEL - Abnormal; Notable for the following components:       Result Value    Glucose 152 (*)     Creatinine 1.01 (*)     eGFR 53.3 (*)     All other components within normal limits    Narrative:     GFR Categories in Chronic Kidney Disease (CKD)      GFR Category          GFR (mL/min/1.73)    Interpretation  G1                     90 or greater         Normal or high (1)  G2                      60-89                Mild decrease (1)  G3a                   45-59                 Mild to moderate decrease  G3b                   30-44                Moderate to severe decrease  G4                    15-29                Severe decrease  G5                    14 or less           Kidney failure          (1)In the absence of evidence of kidney disease, neither GFR category G1 or G2 fulfill the criteria for CKD.    eGFR calculation 2021 CKD-EPI creatinine equation, which does not include race as a factor   MAGNESIUM - Abnormal; Notable for the following components:    Magnesium 1.3 (*)     All other components within normal limits   CBC WITH AUTO DIFFERENTIAL - Abnormal; Notable for the following components:    RDW 11.7 (*)     Neutrophil % 82.0 (*)     Lymphocyte % 9.0 (*)     All other components within normal limits   LIPASE - Normal   TROPONIN - Normal    Narrative:     High Sensitive Troponin T Reference Range:  <14.0 ng/L- Negative Female for AMI  <22.0 ng/L- Negative Male for AMI  >=14 - Abnormal Female indicating possible myocardial injury.  >=22 - Abnormal Male indicating possible myocardial injury.   Clinicians would have to utilize clinical acumen, EKG, Troponin, and serial changes to determine if it is an Acute Myocardial Infarction or myocardial injury due to an underlying chronic condition.        HIGH SENSITIVITIY TROPONIN T 1HR - Normal    Narrative:     High Sensitive Troponin T Reference Range:  <14.0 ng/L- Negative Female for AMI  <22.0 ng/L- Negative Male for AMI  >=14 - Abnormal Female indicating possible myocardial injury.  >=22 - Abnormal Male indicating possible myocardial injury.   Clinicians would have to utilize clinical acumen, EKG, Troponin, and serial changes to determine if it is an Acute Myocardial Infarction or myocardial injury due to an underlying chronic condition.        URINALYSIS W/ MICROSCOPIC IF INDICATED (NO CULTURE)   CBC AND DIFFERENTIAL    Narrative:     The following orders were created for panel order CBC & Differential.  Procedure                                Abnormality         Status                     ---------                               -----------         ------                     CBC Auto Differential[[id number]]        Abnormal            Final result                 Please view results for these tests on the individual orders.       EKG:   ECG 12 Lead Other; dizziness   Final Result   HEART RATE=67  bpm   RR Tgreqlcq=863  ms   WA Interval=  ms   P Horizontal Axis=  deg   P Front Axis=  deg   QRSD Gnjrshyd=871  ms   QT Wjawndrl=529  ms   VDvV=216  ms   QRS Axis=67  deg   T Wave Axis=-48  deg   - ABNORMAL ECG -   Afib/flut and V-paced complexes   Right bundle branch block   When compared with ECG of 05-Jan-2024 16:38:08,   Ventricular pacing is new   Electronically Signed By: Sterling Avlarez ([facility]) 2025-02-08 16:09:56   Date and Time of Study:2025-02-08 12:31:08          Meds given in ED:   Medications   Magnesium Standard Dose Replacement - Follow Nurse / BPA Driven Protocol (has no administration in time range)   magnesium sulfate 2g/50 mL (PREMIX) infusion (0 g Intravenous Stopped 2/8/25 1605)   ondansetron (ZOFRAN) injection 4 mg (4 mg Intravenous Given 2/8/25 1242)   meclizine (ANTIVERT) tablet 25 mg (25 mg Oral Given 2/8/25 1357)   ondansetron (ZOFRAN) injection 4 mg (4 mg Intravenous Given 2/8/25 1325)   iopamidol (ISOVUE-370) 76 % injection 100 mL (95 mL Intravenous Given by Other 2/8/25 1456)       Imaging results:  CT Head Without Contrast    Result Date: 2/8/2025  1. No discernible acute intracranial abnormality is identified  2. There is extensive confluent low-density throughout the cerebral white matter consistent with moderate to severe small vessel disease and there is diffuse cerebral atrophy. There are lens implants in the globes from previous bilateral cataract surgery. The remainder of the head CT is within normal limits. The etiology of this patient's acute dizziness and nausea and vomiting is not established on this exam. If  there remains any clinical suspicion of an acute stroke I recommend an MRI of the brain for more complete assessment.  Radiation dose reduction techniques were utilized, including automated exposure control and exposure modulation based on body size.        Ambulatory status:   - assist x1    Social issues:   Social History     Socioeconomic History    Marital status:    Tobacco Use    Smoking status: Former   Vaping Use    Vaping status: Never Used   Substance and Sexual Activity    Alcohol use: Never    Drug use: Never    Sexual activity: Defer       Peripheral Neurovascular  Peripheral Neurovascular (Adult)  Peripheral Neurovascular WDL: WDL    Neuro Cognitive  Neuro Cognitive (Adult)  Cognitive/Neuro/Behavioral WDL: orientation, speech, level of consciousness, WDL  Level of Consciousness: Alert  Orientation: oriented x 4  Speech: clear, spontaneous, logical  Additional Documentation: Dizziness Assessment (Group)  [place] Coma Scale  Best Eye Response: 4-->(E4) spontaneous  Best Motor Response: 6-->(M6) obeys commands  Best Verbal Response: 5-->(V5) oriented  Harris Coma Scale Score: 15  Dizziness Assessment  Dizziness Reported Symptoms: unsteady  Dizziness Occurs With: head movement    Learning  Learning Assessment  Learning Readiness and Ability: no barriers identified  Education Provided  Person Taught: patient  Teaching Method: verbal instruction  Teaching Focus: symptom/problem overview, diagnostic test  Education Outcome Evaluation: verbalizes understanding, acceptance expressed    Respiratory  Respiratory WDL  Respiratory WDL: WDL    Abdominal Pain       Pain Assessments  Pain (Adult)  (0-10) Pain Rating: Rest: 5  Pain Location: head    NIH Stroke Scale       Ariadne Hopkins RN  02/08/25 16:19 EST

## 2025-02-09 LAB
ANION GAP SERPL CALCULATED.3IONS-SCNC: 9.1 MMOL/L (ref 5–15)
BASOPHILS # BLD AUTO: 0.02 10*3/MM3 (ref 0–0.2)
BASOPHILS NFR BLD AUTO: 0.3 % (ref 0–1.5)
BUN SERPL-MCNC: 17 MG/DL (ref 8–23)
BUN/CREAT SERPL: 18.9 (ref 7–25)
CALCIUM SPEC-SCNC: 8.5 MG/DL (ref 8.6–10.5)
CHLORIDE SERPL-SCNC: 103 MMOL/L (ref 98–107)
CO2 SERPL-SCNC: 25.9 MMOL/L (ref 22–29)
CREAT SERPL-MCNC: 0.9 MG/DL (ref 0.57–1)
DEPRECATED RDW RBC AUTO: 42.1 FL (ref 37–54)
EGFRCR SERPLBLD CKD-EPI 2021: 61.2 ML/MIN/1.73
EOSINOPHIL # BLD AUTO: 0.15 10*3/MM3 (ref 0–0.4)
EOSINOPHIL NFR BLD AUTO: 2.1 % (ref 0.3–6.2)
ERYTHROCYTE [DISTWIDTH] IN BLOOD BY AUTOMATED COUNT: 12.1 % (ref 12.3–15.4)
GLUCOSE SERPL-MCNC: 80 MG/DL (ref 65–99)
HCT VFR BLD AUTO: 34.6 % (ref 34–46.6)
HGB BLD-MCNC: 11.6 G/DL (ref 12–15.9)
IMM GRANULOCYTES # BLD AUTO: 0.01 10*3/MM3 (ref 0–0.05)
IMM GRANULOCYTES NFR BLD AUTO: 0.1 % (ref 0–0.5)
LYMPHOCYTES # BLD AUTO: 1.25 10*3/MM3 (ref 0.7–3.1)
LYMPHOCYTES NFR BLD AUTO: 17.2 % (ref 19.6–45.3)
MAGNESIUM SERPL-MCNC: 2.7 MG/DL (ref 1.6–2.4)
MCH RBC QN AUTO: 32.1 PG (ref 26.6–33)
MCHC RBC AUTO-ENTMCNC: 33.5 G/DL (ref 31.5–35.7)
MCV RBC AUTO: 95.8 FL (ref 79–97)
MONOCYTES # BLD AUTO: 0.82 10*3/MM3 (ref 0.1–0.9)
MONOCYTES NFR BLD AUTO: 11.3 % (ref 5–12)
NEUTROPHILS NFR BLD AUTO: 5 10*3/MM3 (ref 1.7–7)
NEUTROPHILS NFR BLD AUTO: 69 % (ref 42.7–76)
NRBC BLD AUTO-RTO: 0 /100 WBC (ref 0–0.2)
NT-PROBNP SERPL-MCNC: 5096 PG/ML (ref 0–1800)
PLATELET # BLD AUTO: 164 10*3/MM3 (ref 140–450)
PMV BLD AUTO: 9 FL (ref 6–12)
POTASSIUM SERPL-SCNC: 4.2 MMOL/L (ref 3.5–5.2)
RBC # BLD AUTO: 3.61 10*6/MM3 (ref 3.77–5.28)
SODIUM SERPL-SCNC: 138 MMOL/L (ref 136–145)
WBC NRBC COR # BLD AUTO: 7.25 10*3/MM3 (ref 3.4–10.8)

## 2025-02-09 PROCEDURE — 80048 BASIC METABOLIC PNL TOTAL CA: CPT | Performed by: HOSPITALIST

## 2025-02-09 PROCEDURE — G0378 HOSPITAL OBSERVATION PER HR: HCPCS

## 2025-02-09 PROCEDURE — 85025 COMPLETE CBC W/AUTO DIFF WBC: CPT | Performed by: HOSPITALIST

## 2025-02-09 PROCEDURE — 99203 OFFICE O/P NEW LOW 30 MIN: CPT | Performed by: PSYCHIATRY & NEUROLOGY

## 2025-02-09 PROCEDURE — 83880 ASSAY OF NATRIURETIC PEPTIDE: CPT | Performed by: HOSPITALIST

## 2025-02-09 PROCEDURE — 36415 COLL VENOUS BLD VENIPUNCTURE: CPT | Performed by: HOSPITALIST

## 2025-02-09 PROCEDURE — 83735 ASSAY OF MAGNESIUM: CPT | Performed by: EMERGENCY MEDICINE

## 2025-02-09 RX ORDER — ONDANSETRON 2 MG/ML
4 INJECTION INTRAMUSCULAR; INTRAVENOUS ONCE
Status: DISCONTINUED | OUTPATIENT
Start: 2025-02-09 | End: 2025-02-09

## 2025-02-09 RX ORDER — PANTOPRAZOLE SODIUM 40 MG/1
40 TABLET, DELAYED RELEASE ORAL
Status: DISCONTINUED | OUTPATIENT
Start: 2025-02-09 | End: 2025-02-12 | Stop reason: HOSPADM

## 2025-02-09 RX ORDER — ALBUTEROL SULFATE 0.83 MG/ML
2.5 SOLUTION RESPIRATORY (INHALATION) EVERY 6 HOURS PRN
Status: DISCONTINUED | OUTPATIENT
Start: 2025-02-09 | End: 2025-02-12

## 2025-02-09 RX ORDER — IOPAMIDOL 755 MG/ML
100 INJECTION, SOLUTION INTRAVASCULAR
Status: DISCONTINUED | OUTPATIENT
Start: 2025-02-09 | End: 2025-02-09

## 2025-02-09 RX ORDER — CETIRIZINE HYDROCHLORIDE 10 MG/1
10 TABLET ORAL DAILY
Status: DISCONTINUED | OUTPATIENT
Start: 2025-02-09 | End: 2025-02-12 | Stop reason: HOSPADM

## 2025-02-09 RX ORDER — MAGNESIUM SULFATE HEPTAHYDRATE 40 MG/ML
2 INJECTION, SOLUTION INTRAVENOUS
Status: DISCONTINUED | OUTPATIENT
Start: 2025-02-09 | End: 2025-02-09

## 2025-02-09 RX ORDER — MULTIPLE VITAMINS W/ MINERALS TAB 9MG-400MCG
1 TAB ORAL DAILY
Status: DISCONTINUED | OUTPATIENT
Start: 2025-02-09 | End: 2025-02-09

## 2025-02-09 RX ADMIN — PANTOPRAZOLE SODIUM 40 MG: 40 TABLET, DELAYED RELEASE ORAL at 08:19

## 2025-02-09 RX ADMIN — PANTOPRAZOLE SODIUM 40 MG: 40 TABLET, DELAYED RELEASE ORAL at 17:12

## 2025-02-09 RX ADMIN — APIXABAN 2.5 MG: 2.5 TABLET, FILM COATED ORAL at 20:19

## 2025-02-09 RX ADMIN — ATORVASTATIN CALCIUM 10 MG: 10 TABLET ORAL at 20:19

## 2025-02-09 RX ADMIN — CETIRIZINE HYDROCHLORIDE 10 MG: 10 TABLET, FILM COATED ORAL at 11:08

## 2025-02-09 RX ADMIN — METOPROLOL SUCCINATE 25 MG: 25 TABLET, EXTENDED RELEASE ORAL at 08:19

## 2025-02-09 RX ADMIN — APIXABAN 2.5 MG: 2.5 TABLET, FILM COATED ORAL at 08:19

## 2025-02-09 NOTE — CONSULTS
"Neurology Consult Note    Consult Date: 2/9/2025    Referring MD: Kike Awad MD    Reason for Consult I have been asked to see the patient in neurological consultation to render advice and opinion regarding dizziness    Mayuri Early is a 89 y.o. female with no previous history of vertigo who presented to the hospital complaining of orthostatic dizziness.  She describes gait imbalance and a sense of spinning when she attempts to get up or turn her head.  This started suddenly yesterday when attempting to stand up from her chair, causing her to fall back down into the chair.  She reports that she was unable to stand or walk without assistance.  Her son came to help her get up.  She was brought to the emergency department and admitted.  She reports today that her symptoms are significantly better but have not resolved.  She endorses some associated fullness and pain in the right ear which is more chronic, she denies double vision or loss of vision or aphasia or unilateral weakness or numbness.    Past Medical History:   Diagnosis Date    Fibrocystic breast     Well woman exam 10/20/2020       Exam  /68 (BP Location: Left arm, Patient Position: Lying)   Pulse 72   Temp 98.5 °F (36.9 °C) (Oral)   Resp 18   Ht 154.9 cm (61\")   Wt 56.7 kg (125 lb)   SpO2 98%   BMI 23.62 kg/m²   Gen: NAD, vitals reviewed  MS: oriented x3, recent/remote memory intact, normal attention/concentration, language intact, no neglect.  CN: visual acuity grossly normal, PERRL, EOMI with no nystagmus, negative head impulse test bilaterally, negative test of skew, negative Redfield-Hallpike bilaterally, no facial droop, no dysarthria  Motor: 5/5 throughout upper and lower extremities, normal tone  Coordination: No dysmetria or overshoot  Sensory: Diminished to cold temperature and vibratory sensation distal lower extremities  Gait: Lightly unsteady station, positive Romberg, mild ataxia    DATA:    Lab Results   Component Value Date    " GLUCOSE 80 02/09/2025    CALCIUM 8.5 (L) 02/09/2025     02/09/2025    K 4.2 02/09/2025    CO2 25.9 02/09/2025     02/09/2025    BUN 17 02/09/2025    CREATININE 0.90 02/09/2025    EGFRIFAFRI 53 (L) 08/09/2021    EGFRIFNONA 45 (L) 08/09/2021    BCR 18.9 02/09/2025    ANIONGAP 9.1 02/09/2025     Lab Results   Component Value Date    WBC 7.25 02/09/2025    HGB 11.6 (L) 02/09/2025    HCT 34.6 02/09/2025    MCV 95.8 02/09/2025     02/09/2025       Lab review: CBC, BMP reviewed    Imaging review: CTA head and neck performed in the emergency department shows extensive aortic plaque but no significant extracranial or intracranial stenosis.  Radiology report reviewed.    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  I was unable to confirm this on exam.  I think she should probably have a vestibular PT assessment and actually should be referred to vestibular PT on discharge.  She also has peripheral neuropathy which is exacerbating her gait impairment but this is a more longstanding issue.  I do not appreciate definite signs or symptoms of stroke and right brain is unnecessary.    PLAN:  Vestibular PT assessment and outpatient vestibular PT referral  No further inpatient neurologic workup needed    Will see prn

## 2025-02-09 NOTE — H&P
History and physical    Primary care physician  Dr. Hidalgo    Chief complaint  Dizziness    History of present illness  89-year-old white female with history of paroxysmal atrial fibrillation hypertension hyperlipidemia asthma who lives by herself brought to the emergency room by the family with severe dizziness and nausea started yesterday.  Patient has no vomiting headache vision problem speech problem.  Patient stated that she felt dizzy with she never had fell before with nausea.  Patient denies any chest pain shortness of breath palpitation.  Patient workup in ER revealed hypomagnesemia and admitted for further workup of dizziness.    PAST MEDICAL HISTORY             Date Noted     10/20/2020    Closed compression fracture of L1 vertebra, initial encounter 01/04/2024    Compression fracture of T12 vertebra 01/04/2024    PAF (paroxysmal atrial fibrillation) 01/04/2024    Presence of cardiac pacemaker 01/04/2024    HTN (hypertension) 01/04/2024    CKD (chronic kidney disease) 01/04/2024    Chronic anticoagulation 01/04/2024    Chronic back pain 01/04/2024    Anemia 01/04/2024    Asthma 01/04/2024    Hematoma of hip 01/08/2024    Acute blood loss anemia 01/08/2024      PAST SURGICAL HISTORY              Procedure Laterality Date    BREAST CYST ASPIRATION        BREAST CYST EXCISION        HYSTERECTOMY             FAMILY HISTORY  No family history on file.     SOCIAL HISTORY                Socioeconomic History    Marital status:    Tobacco Use    Smoking status: Former   Vaping Use    Vaping status: Never Used   Substance and Sexual Activity    Alcohol use: Never    Drug use: Never    Sexual activity: Defer         ALLERGIES  Patient has no known allergies.  Home medications reviewed     REVIEW OF SYSTEMS  All systems reviewed and negative except for those discussed in HPI.      PHYSICAL EXAM   Blood pressure 145/59, pulse 72, temperature 97.7 °F (36.5 °C), temperature source Oral, resp. rate 18, height  "154.9 cm (61\"), weight 56.7 kg (125 lb), SpO2 96%.    General: Awake and alert in no distress  HEENT: Unremarkable  Neck: Full ROM  Pulm: Normal effort and moving air bilaterally  Cardiovascular: Regular rate and rhythm, intact distal pulses  GI: Soft, nontender, nondistended, no rebound, no guarding, bowel sounds present  MSK: Full ROM, no deformity  Skin: Warm, dry  Neuro: Awake, alert, no focal deficit  Psych: Calm, cooperative     LAB RESULTS  Lab Results (last 24 hours)       Procedure Component Value Units Date/Time    Magnesium [[id number]]  (Abnormal) Collected: 02/09/25 0523    Specimen: Blood Updated: 02/09/25 0632     Magnesium 2.7 mg/dL     Basic Metabolic Panel [[id number]]  (Abnormal) Collected: 02/09/25 0523    Specimen: Blood Updated: 02/09/25 0610     Glucose 80 mg/dL      BUN 17 mg/dL      Creatinine 0.90 mg/dL      Sodium 138 mmol/L      Potassium 4.2 mmol/L      Chloride 103 mmol/L      CO2 25.9 mmol/L      Calcium 8.5 mg/dL      BUN/Creatinine Ratio 18.9     Anion Gap 9.1 mmol/L      eGFR 61.2 mL/min/1.73     Narrative:      GFR Categories in Chronic Kidney Disease (CKD)      GFR Category          GFR (mL/min/1.73)    Interpretation  G1                     90 or greater         Normal or high (1)  G2                      60-89                Mild decrease (1)  G3a                   45-59                Mild to moderate decrease  G3b                   30-44                Moderate to severe decrease  G4                    15-29                Severe decrease  G5                    14 or less           Kidney failure          (1)In the absence of evidence of kidney disease, neither GFR category G1 or G2 fulfill the criteria for CKD.    eGFR calculation 2021 CKD-EPI creatinine equation, which does not include race as a factor    CBC & Differential [[id number]]  (Abnormal) Collected: 02/09/25 0523    Specimen: Blood Updated: 02/09/25 0550    Narrative:      The following orders were created for panel " order CBC & Differential.  Procedure                               Abnormality         Status                     ---------                               -----------         ------                     CBC Auto Differential[[id number]]        Abnormal            Final result                 Please view results for these tests on the individual orders.    CBC Auto Differential [[id number]]  (Abnormal) Collected: 02/09/25 0523    Specimen: Blood Updated: 02/09/25 0550     WBC 7.25 10*3/mm3      RBC 3.61 10*6/mm3      Hemoglobin 11.6 g/dL      Hematocrit 34.6 %      MCV 95.8 fL      MCH 32.1 pg      MCHC 33.5 g/dL      RDW 12.1 %      RDW-SD 42.1 fl      MPV 9.0 fL      Platelets 164 10*3/mm3      Neutrophil % 69.0 %      Lymphocyte % 17.2 %      Monocyte % 11.3 %      Eosinophil % 2.1 %      Basophil % 0.3 %      Immature Grans % 0.1 %      Neutrophils, Absolute 5.00 10*3/mm3      Lymphocytes, Absolute 1.25 10*3/mm3      Monocytes, Absolute 0.82 10*3/mm3      Eosinophils, Absolute 0.15 10*3/mm3      Basophils, Absolute 0.02 10*3/mm3      Immature Grans, Absolute 0.01 10*3/mm3      nRBC 0.0 /100 WBC     Urinalysis, Microscopic Only - Urine, Clean Catch [[id number]]  (Abnormal) Collected: 02/08/25 1614    Specimen: Urine, Clean Catch Updated: 02/08/25 1726     RBC, UA 3-5 /HPF      WBC, UA Too Numerous to Count /HPF      Bacteria, UA 2+ /HPF      Squamous Epithelial Cells, UA 0-2 /HPF      Hyaline Casts, UA 0-2 /LPF      Methodology Automated Microscopy    Urinalysis With Microscopic If Indicated (No Culture) - Urine, Clean Catch [[id number]]  (Abnormal) Collected: 02/08/25 1614    Specimen: Urine, Clean Catch Updated: 02/08/25 1705     Color, UA Yellow     Appearance, UA Cloudy     pH, UA 6.0     Specific Gravity, UA >1.030     Glucose, UA Negative     Ketones, UA Negative     Bilirubin, UA Negative     Blood, UA Small (1+)     Protein,  mg/dL (2+)     Leuk Esterase, UA Moderate (2+)     Nitrite, UA Negative      Urobilinogen, UA 1.0 E.U./dL    High Sensitivity Troponin T 1Hr [[id number]]  (Normal) Collected: 02/08/25 1413    Specimen: Blood from Hand, Left Updated: 02/08/25 1444     HS Troponin T 9 ng/L      Troponin T Numeric Delta 1 ng/L     Narrative:      High Sensitive Troponin T Reference Range:  <14.0 ng/L- Negative Female for AMI  <22.0 ng/L- Negative Male for AMI  >=14 - Abnormal Female indicating possible myocardial injury.  >=22 - Abnormal Male indicating possible myocardial injury.   Clinicians would have to utilize clinical acumen, EKG, Troponin, and serial changes to determine if it is an Acute Myocardial Infarction or myocardial injury due to an underlying chronic condition.         Comprehensive Metabolic Panel [[id number]]  (Abnormal) Collected: 02/08/25 1232    Specimen: Blood Updated: 02/08/25 1310     Glucose 152 mg/dL      BUN 19 mg/dL      Creatinine 1.01 mg/dL      Sodium 138 mmol/L      Potassium 4.1 mmol/L      Chloride 101 mmol/L      CO2 25.0 mmol/L      Calcium 9.4 mg/dL      Total Protein 6.9 g/dL      Albumin 3.8 g/dL      ALT (SGPT) 15 U/L      AST (SGOT) 23 U/L      Alkaline Phosphatase 91 U/L      Total Bilirubin 0.6 mg/dL      Globulin 3.1 gm/dL      A/G Ratio 1.2 g/dL      BUN/Creatinine Ratio 18.8     Anion Gap 12.0 mmol/L      eGFR 53.3 mL/min/1.73     Narrative:      GFR Categories in Chronic Kidney Disease (CKD)      GFR Category          GFR (mL/min/1.73)    Interpretation  G1                     90 or greater         Normal or high (1)  G2                      60-89                Mild decrease (1)  G3a                   45-59                Mild to moderate decrease  G3b                   30-44                Moderate to severe decrease  G4                    15-29                Severe decrease  G5                    14 or less           Kidney failure          (1)In the absence of evidence of kidney disease, neither GFR category G1 or G2 fulfill the criteria for CKD.    eGFR  calculation 2021 CKD-EPI creatinine equation, which does not include race as a factor    Lipase [[id number]]  (Normal) Collected: 02/08/25 1232    Specimen: Blood Updated: 02/08/25 1310     Lipase 40 U/L     High Sensitivity Troponin T [[id number]]  (Normal) Collected: 02/08/25 1232    Specimen: Blood Updated: 02/08/25 1310     HS Troponin T 8 ng/L     Narrative:      High Sensitive Troponin T Reference Range:  <14.0 ng/L- Negative Female for AMI  <22.0 ng/L- Negative Male for AMI  >=14 - Abnormal Female indicating possible myocardial injury.  >=22 - Abnormal Male indicating possible myocardial injury.   Clinicians would have to utilize clinical acumen, EKG, Troponin, and serial changes to determine if it is an Acute Myocardial Infarction or myocardial injury due to an underlying chronic condition.         Magnesium [[id number]]  (Abnormal) Collected: 02/08/25 1232    Specimen: Blood Updated: 02/08/25 1310     Magnesium 1.3 mg/dL     CBC & Differential [[id number]]  (Abnormal) Collected: 02/08/25 1232    Specimen: Blood Updated: 02/08/25 1253    Narrative:      The following orders were created for panel order CBC & Differential.  Procedure                               Abnormality         Status                     ---------                               -----------         ------                     CBC Auto Differential[[id number]]        Abnormal            Final result                 Please view results for these tests on the individual orders.    CBC Auto Differential [[id number]]  (Abnormal) Collected: 02/08/25 1232    Specimen: Blood Updated: 02/08/25 1253     WBC 8.51 10*3/mm3      RBC 4.00 10*6/mm3      Hemoglobin 13.0 g/dL      Hematocrit 38.8 %      MCV 97.0 fL      MCH 32.5 pg      MCHC 33.5 g/dL      RDW 11.7 %      RDW-SD 41.2 fl      MPV 8.9 fL      Platelets 199 10*3/mm3      Neutrophil % 82.0 %      Lymphocyte % 9.0 %      Monocyte % 7.3 %      Eosinophil % 1.4 %      Basophil % 0.1 %      Immature  Grans % 0.2 %      Neutrophils, Absolute 6.97 10*3/mm3      Lymphocytes, Absolute 0.77 10*3/mm3      Monocytes, Absolute 0.62 10*3/mm3      Eosinophils, Absolute 0.12 10*3/mm3      Basophils, Absolute 0.01 10*3/mm3      Immature Grans, Absolute 0.02 10*3/mm3      nRBC 0.0 /100 WBC           Imaging Results (Last 24 Hours)       Procedure Component Value Units Date/Time    CT Angiogram Neck [[id number]] Collected: 02/08/25 1654     Updated: 02/08/25 1654    Narrative:      CONTRAST ENHANCED CT ANGIOGRAM OF THE HEAD AND NECK ON 02/08/2025     CLINICAL HISTORY: This is an 89-year-old female patient who presents to  the emergency room with dizziness and vomiting.     TECHNIQUE: Spiral CT images were obtained from the base of the skull to  the vertex both pre and postintravenous contrast. The images were  reformatted and submitted in 3 mm thick axial CT sections with brain  algorithm and 2 mm thick sagittal and coronal reconstructions were  performed and submitted in brain algorithm. Additional spiral CT angio  images were obtained from the top of the aortic arch up through the  great vessels of the head and neck during the arterial phase of contrast  and the images were reformatted and submitted in 1 mm thick axial,  sagittal and coronal CT sections with soft tissue algorithm and 3D  reconstructions were performed to complete the CT angiogram of the head  and neck.     COMPARISON: This is correlated to a head CT without contrast earlier  this afternoon on 02/08/2025 at 12:55 p.m. There are no additional prior  studies for comparison.     FINDINGS: There is extensive confluent low-density throughout the  periventricular and subcortical white matter of the cerebral hemispheres  most consistent with severe small vessel disease, unchanged. The  remainder of the brain parenchyma is normal in attenuation. There is  mild diffuse cerebral atrophy. The lateral and third ventricles are  mildly prominent in size that is felt to be  due to central volume loss  or atrophy. I see no mass effect. There is no midline shift. No  extra-axial fluid collections are identified. There is no evidence of  acute intracranial hemorrhage. Following contrast, no abnormal areas of  enhancement are seen in the head. The calvarium and the skull base are  normal in appearance. The paranasal sinuses and the mastoid air cells  and the middle ear cavities are clear. There are lens implants in the  globes from previous bilateral cataract surgery. Otherwise, the orbits  are unremarkable.     CT ANGIOGRAM OF THE NECK: The nasopharynx, oropharynx, the hypopharynx,  the true cords and the subglottic airway are normal in appearance. The  thyroid gland is unremarkable. There is a tiny posterior layering left  effusion and small posterior layering right pleural effusion. The lung  apices are clear. The parotid, , parapharyngeal and  submandibular spaces are symmetric and normal in appearance. Mild  cervical spondylosis is present with some disc space narrowing and  degenerative endplate changes from C3 to C7 with posterior spurs  contributing to at most mild canal narrowing at C5-6 and C6-7 and  uncovertebral joint spurs contributing to mild right and mild to  moderate left bony foraminal narrowing at C5-6 and C6-7. Otherwise, the  cervical spine is unremarkable. There is a left subclavian transvenous  pacemaker in place with its distal leads coursing in the superior vena  cava cava and the distal aspect of the subclavian transvenous pacemaker  leads are not assessed on this exam. There are extensive calcified  atherosclerotic plaque throughout the visualized ascending aorta and  aortic arch. There is anatomic origin of the great vessels off the  aortic arch. There is calcified plaque that moderately narrows the left  subclavian artery origin beyond which the left subclavian artery is  patent. Calcified plaque moderately narrows the left vertebral artery  origin.  Proximal left vertebral artery is not adequately assessed. The  cervical segment of the left vertebral artery is widely patent without  stenosis. The left common carotid origin is normal in appearance. No  stenosis is seen in the left common carotid artery. There is a calcified  plaque involving the anterior medial wall of the left common carotid  bifurcation that extends into the origin mild to moderately narrows the  left external carotid artery origin but there is no stenosis in the  origin or the cervical segment of the left internal carotid artery using  the NASCET criteria. The brachiocephalic artery origin is normal in  appearance and no stenosis is seen the brachiocephalic artery. Calcified  plaque mildly narrows the right subclavian artery origin and proximal  right subclavian artery. Right vertebral artery origin is normal in  appearance. The right vertebral artery is a dominant right vertebral  artery and is widely patent without stenosis. The right common carotid  origin is normal in appearance, no stenosis seen in the right common  carotid artery. There is peripherally calcified plaque involving the  right common carotid bifurcation extends into and mild to moderately  narrows the right external carotid origin. I see no stenosis in the  cervical segment of the right internal carotid artery using the NASCET  criteria.     CT ANGIOGRAM OF THE HEAD: The intracranial segments of distal vertebral  arteries are widely patent to the vertebrobasilar junction without  stenosis. The basilar artery and basilar tip is normal in appearance.  The posterior cerebral and superior cerebellar arteries are normal in  appearance bilaterally. The upper cervical, petrous cavernous and  supracavernous segments of the internal carotid arteries are normal in  appearance. There is a hypoplastic A1 segment of the right anterior  cerebral artery which is a normal anatomic variation, dominant left A1  segment is widely patent. The  anterior communicating artery origin and  A2 segments of the anterior cerebral arteries are normal in appearance.  The M1 segments and middle cerebral arteries and the middle cerebral  artery bifurcations are within normal limits.       Impression:         1. The CT scan of the head demonstrates no acute intracranial  abnormality with no change when compared to the head CT earlier this  afternoon on 02/08/2025 at 12:55 p.m. There is severe small vessel  disease in the cerebral white matter. There are lens implants in the  globes from previous bilateral cataract surgery, otherwise the orbits  are unremarkable. The remainder of the head CT is within normal limits  with no discernible acute infarct or intracranial hemorrhage identified.  The etiology of this patient's acute dizziness is not established on  this exam. If there remains any clinical suspicion of acute stroke an  MRI of the brain is recommended for a more comprehensive assessment.     2. Cervical spondylosis is present with some disc space narrowing and  degenerative endplate changes from C3 to C7, posterior spurs contribute  to at most mild canal narrowing at C5-6 and C6-7, uncovertebral joint  spurs contribute to mild right and mild to moderate left bony foraminal  narrowing at C5-6 and C6-7. Otherwise the cervical spine is  unremarkable.     3. There are extensive calcified plaques throughout the aortic arch and  calcified plaque moderately narrows the left subclavian artery origin,  calcified plaque mildly narrows the left vertebral artery origin, no  additional vertebral artery stenosis is seen. Calcified plaques involve  the common carotid bifurcations and result in mild to moderate narrowing  of the left external carotid origin as well as the right external  carotid origin but I see no stenosis in the cervical segments of the  internal carotid arteries using the NASCET criteria. The remainder of  the CT angiogram of the head and neck is normal with  no intracranial  vascular stenoses or occlusions identified. The results and  recommendations of this study were communicated to Dr. Raphael in the  emergency room by telephone on 02/08/2025 at 3:58 p.m.     Radiation dose reduction techniques were utilized, including automated  exposure control and exposure modulation based on body size.          CT Angiogram Head [[id number]] Collected: 02/08/25 1654     Updated: 02/08/25 1654    Narrative:      CONTRAST ENHANCED CT ANGIOGRAM OF THE HEAD AND NECK ON 02/08/2025     CLINICAL HISTORY: This is an 89-year-old female patient who presents to  the emergency room with dizziness and vomiting.     TECHNIQUE: Spiral CT images were obtained from the base of the skull to  the vertex both pre and postintravenous contrast. The images were  reformatted and submitted in 3 mm thick axial CT sections with brain  algorithm and 2 mm thick sagittal and coronal reconstructions were  performed and submitted in brain algorithm. Additional spiral CT angio  images were obtained from the top of the aortic arch up through the  great vessels of the head and neck during the arterial phase of contrast  and the images were reformatted and submitted in 1 mm thick axial,  sagittal and coronal CT sections with soft tissue algorithm and 3D  reconstructions were performed to complete the CT angiogram of the head  and neck.     COMPARISON: This is correlated to a head CT without contrast earlier  this afternoon on 02/08/2025 at 12:55 p.m. There are no additional prior  studies for comparison.     FINDINGS: There is extensive confluent low-density throughout the  periventricular and subcortical white matter of the cerebral hemispheres  most consistent with severe small vessel disease, unchanged. The  remainder of the brain parenchyma is normal in attenuation. There is  mild diffuse cerebral atrophy. The lateral and third ventricles are  mildly prominent in size that is felt to be due to central volume  loss  or atrophy. I see no mass effect. There is no midline shift. No  extra-axial fluid collections are identified. There is no evidence of  acute intracranial hemorrhage. Following contrast, no abnormal areas of  enhancement are seen in the head. The calvarium and the skull base are  normal in appearance. The paranasal sinuses and the mastoid air cells  and the middle ear cavities are clear. There are lens implants in the  globes from previous bilateral cataract surgery. Otherwise, the orbits  are unremarkable.     CT ANGIOGRAM OF THE NECK: The nasopharynx, oropharynx, the hypopharynx,  the true cords and the subglottic airway are normal in appearance. The  thyroid gland is unremarkable. There is a tiny posterior layering left  effusion and small posterior layering right pleural effusion. The lung  apices are clear. The parotid, , parapharyngeal and  submandibular spaces are symmetric and normal in appearance. Mild  cervical spondylosis is present with some disc space narrowing and  degenerative endplate changes from C3 to C7 with posterior spurs  contributing to at most mild canal narrowing at C5-6 and C6-7 and  uncovertebral joint spurs contributing to mild right and mild to  moderate left bony foraminal narrowing at C5-6 and C6-7. Otherwise, the  cervical spine is unremarkable. There is a left subclavian transvenous  pacemaker in place with its distal leads coursing in the superior vena  cava cava and the distal aspect of the subclavian transvenous pacemaker  leads are not assessed on this exam. There are extensive calcified  atherosclerotic plaque throughout the visualized ascending aorta and  aortic arch. There is anatomic origin of the great vessels off the  aortic arch. There is calcified plaque that moderately narrows the left  subclavian artery origin beyond which the left subclavian artery is  patent. Calcified plaque moderately narrows the left vertebral artery  origin. Proximal left vertebral  artery is not adequately assessed. The  cervical segment of the left vertebral artery is widely patent without  stenosis. The left common carotid origin is normal in appearance. No  stenosis is seen in the left common carotid artery. There is a calcified  plaque involving the anterior medial wall of the left common carotid  bifurcation that extends into the origin mild to moderately narrows the  left external carotid artery origin but there is no stenosis in the  origin or the cervical segment of the left internal carotid artery using  the NASCET criteria. The brachiocephalic artery origin is normal in  appearance and no stenosis is seen the brachiocephalic artery. Calcified  plaque mildly narrows the right subclavian artery origin and proximal  right subclavian artery. Right vertebral artery origin is normal in  appearance. The right vertebral artery is a dominant right vertebral  artery and is widely patent without stenosis. The right common carotid  origin is normal in appearance, no stenosis seen in the right common  carotid artery. There is peripherally calcified plaque involving the  right common carotid bifurcation extends into and mild to moderately  narrows the right external carotid origin. I see no stenosis in the  cervical segment of the right internal carotid artery using the NASCET  criteria.     CT ANGIOGRAM OF THE HEAD: The intracranial segments of distal vertebral  arteries are widely patent to the vertebrobasilar junction without  stenosis. The basilar artery and basilar tip is normal in appearance.  The posterior cerebral and superior cerebellar arteries are normal in  appearance bilaterally. The upper cervical, petrous cavernous and  supracavernous segments of the internal carotid arteries are normal in  appearance. There is a hypoplastic A1 segment of the right anterior  cerebral artery which is a normal anatomic variation, dominant left A1  segment is widely patent. The anterior communicating  artery origin and  A2 segments of the anterior cerebral arteries are normal in appearance.  The M1 segments and middle cerebral arteries and the middle cerebral  artery bifurcations are within normal limits.       Impression:         1. The CT scan of the head demonstrates no acute intracranial  abnormality with no change when compared to the head CT earlier this  afternoon on 02/08/2025 at 12:55 p.m. There is severe small vessel  disease in the cerebral white matter. There are lens implants in the  globes from previous bilateral cataract surgery, otherwise the orbits  are unremarkable. The remainder of the head CT is within normal limits  with no discernible acute infarct or intracranial hemorrhage identified.  The etiology of this patient's acute dizziness is not established on  this exam. If there remains any clinical suspicion of acute stroke an  MRI of the brain is recommended for a more comprehensive assessment.     2. Cervical spondylosis is present with some disc space narrowing and  degenerative endplate changes from C3 to C7, posterior spurs contribute  to at most mild canal narrowing at C5-6 and C6-7, uncovertebral joint  spurs contribute to mild right and mild to moderate left bony foraminal  narrowing at C5-6 and C6-7. Otherwise the cervical spine is  unremarkable.     3. There are extensive calcified plaques throughout the aortic arch and  calcified plaque moderately narrows the left subclavian artery origin,  calcified plaque mildly narrows the left vertebral artery origin, no  additional vertebral artery stenosis is seen. Calcified plaques involve  the common carotid bifurcations and result in mild to moderate narrowing  of the left external carotid origin as well as the right external  carotid origin but I see no stenosis in the cervical segments of the  internal carotid arteries using the NASCET criteria. The remainder of  the CT angiogram of the head and neck is normal with no  intracranial  vascular stenoses or occlusions identified. The results and  recommendations of this study were communicated to Dr. Raphael in the  emergency room by telephone on 02/08/2025 at 3:58 p.m.     Radiation dose reduction techniques were utilized, including automated  exposure control and exposure modulation based on body size.          CT Head Without Contrast [[id number]] Collected: 02/08/25 1327     Updated: 02/08/25 1327    Narrative:      EMERGENCY CT SCAN OF THE HEAD WITHOUT CONTRAST ON 02/08/2025     CLINICAL HISTORY: This is an 89-year-old female patient who has  dizziness and nausea and vomiting.     TECHNIQUE: Spiral CT images were obtained from the base of the skull to  the vertex without intravenous contrast. The images were reformatted and  are submitted in 3 mm thick axial, sagittal and coronal CT sections with  brain algorithm.     There are no prior head CTs from [facility] for  comparison.     FINDINGS: There are extensive patchy and confluent areas of low-density  throughout the periventricular and subcortical white matter of the  cerebral hemispheres most consistent with severe small vessel disease.  The remainder of the brain parenchyma is normal in attenuation. There is  diffuse cerebral atrophy. The lateral and third ventricles are mildly  prominent size felt to be due to central volume loss or atrophy. I see  no focal mass effect. There is no midline shift. No extra-axial fluid  collections are identified. There is no evidence of acute intra cranial  hemorrhage. The calvarium and the skull base are normal in appearance.  The paranasal sinuses and the mastoid air cells and the middle ear  cavities are clear. There are lens implants in place in the globes  bilaterally from previous bilateral cataract surgery. Otherwise orbits  are unremarkable.       Impression:      1. No discernible acute intracranial abnormality is identified     2. There is extensive confluent low-density  throughout the cerebral  white matter consistent with moderate to severe small vessel disease and  there is diffuse cerebral atrophy. There are lens implants in the globes  from previous bilateral cataract surgery. The remainder of the head CT  is within normal limits. The etiology of this patient's acute dizziness  and nausea and vomiting is not established on this exam. If there  remains any clinical suspicion of an acute stroke I recommend an MRI of  the brain for more complete assessment.     Radiation dose reduction techniques were utilized, including automated  exposure control and exposure modulation based on body size.                Scan on 2/8/2025 1232 by [employer], [place]: ECG 12-LEAD         Author: -- Service: -- Author Type: --   Filed: Date of Service: Creation Time:   Status: (Other)   HEART RATE=67  bpm  RR Mireqkfg=621  ms  AZ Interval=  ms  P Horizontal Axis=  deg  P Front Axis=  deg  QRSD Icodntja=969  ms  QT Epukwael=541  ms  RBiV=495  ms  QRS Axis=67  deg  T Wave Axis=-48  deg  - ABNORMAL ECG -  Afib/flut and V-paced complexes  Right bundle branch block  When compared with ECG of 05-Jan-2024 16:38:08,  Ventricular pacing is new          Current Facility-Administered Medications:     acetaminophen (TYLENOL) tablet 650 mg, 650 mg, Oral, Q6H PRN, Kike Awad MD, 650 mg at 02/08/25 2336    albuterol (PROVENTIL) nebulizer solution 0.083% 2.5 mg/3mL, 2.5 mg, Nebulization, Q6H PRN, Kike Awad MD    apixaban (ELIQUIS) tablet 2.5 mg, 2.5 mg, Oral, Q12H, Kike Awad MD, 2.5 mg at 02/09/25 0819    atorvastatin (LIPITOR) tablet 10 mg, 10 mg, Oral, Nightly, Kike Awad MD, 10 mg at 02/08/25 2001    cetirizine (zyrTEC) tablet 10 mg, 10 mg, Oral, Daily, Kike Awad MD    Magnesium Standard Dose Replacement - Follow Nurse / BPA Driven Protocol, , Not Applicable, PRN, Kike Awad MD    meclizine (ANTIVERT) tablet 25 mg, 25 mg, Oral, TID PRN, Kike Awad MD    metoprolol succinate XL  (TOPROL-XL) 24 hr tablet 25 mg, 25 mg, Oral, Q24H, He Awad MD, 25 mg at 02/09/25 0819    multivitamin with minerals 1 tablet, 1 tablet, Oral, Daily, He Awad MD    ondansetron (ZOFRAN) injection 4 mg, 4 mg, Intravenous, Q6H PRN, He Awad MD    pantoprazole (PROTONIX) EC tablet 40 mg, 40 mg, Oral, Q AM, He Awad MD, 40 mg at 02/09/25 0819    sodium chloride 0.9 % infusion, 75 mL/hr, Intravenous, Continuous, He Awad MD, Last Rate: 75 mL/hr at 02/08/25 2000, 75 mL/hr at 02/08/25 2000     ASSESSMENT  Severe dizziness with nausea probably benign positional vertigo  Paroxysmal atrial fibrillation  Dehydration  Hypomagnesemia  Hypertension  Hyperlipidemia  History of asthma  Status post permanent pacemaker placement  Gastroesophageal reflux disease    PLAN  Admit  IVF  Replace magnesium  Neurology consult  Adjust her medications  Stress ulcer DVT prophylaxis  Supportive care  PT OT  Patient is full code  Discussed with family nursing staff  Follow closely further recommendation current hospital course    HE AWAD MD

## 2025-02-09 NOTE — PLAN OF CARE
Goal Outcome Evaluation:           Progress: improving  Outcome Evaluation: No c/o nausea or dizziness this shift, tylenol given for pain.

## 2025-02-09 NOTE — PLAN OF CARE
Goal Outcome Evaluation:              Problem: Adult Inpatient Plan of Care  Goal: Optimal Comfort and Wellbeing  Outcome: Progressing  Intervention: Provide Person-Centered Care  Recent Flowsheet Documentation  Taken 2/9/2025 1400 by Angelica Qureshi RN  Trust Relationship/Rapport:   care explained   choices provided   thoughts/feelings acknowledged  Taken 2/9/2025 0811 by Angelica Qureshi RN  Trust Relationship/Rapport:   care explained   choices provided   thoughts/feelings acknowledged     Problem: Fall Injury Risk  Goal: Absence of Fall and Fall-Related Injury  Outcome: Progressing  Intervention: Identify and Manage Contributors  Recent Flowsheet Documentation  Taken 2/9/2025 1627 by Angelica Qureshi RN  Medication Review/Management: medications reviewed  Taken 2/9/2025 1400 by Angelica Qureshi RN  Medication Review/Management: medications reviewed  Taken 2/9/2025 1207 by Angelica Qureshi RN  Medication Review/Management: medications reviewed  Taken 2/9/2025 1048 by Angelica Qureshi RN  Medication Review/Management: medications reviewed  Taken 2/9/2025 0811 by Angelica Qureshi RN  Medication Review/Management: medications reviewed  Intervention: Promote Injury-Free Environment  Recent Flowsheet Documentation  Taken 2/9/2025 1627 by Angelica Qureshi RN  Safety Promotion/Fall Prevention:   activity supervised   assistive device/personal items within reach   clutter free environment maintained   fall prevention program maintained   nonskid shoes/slippers when out of bed   room organization consistent   safety round/check completed  Taken 2/9/2025 1400 by Angelica Qureshi RN  Safety Promotion/Fall Prevention:   activity supervised   assistive device/personal items within reach   clutter free environment maintained   fall prevention program maintained   nonskid shoes/slippers when out of bed   room organization consistent   safety round/check completed  Taken 2/9/2025  1207 by Angelica Qureshi, RN  Safety Promotion/Fall Prevention:   activity supervised   assistive device/personal items within reach   clutter free environment maintained   fall prevention program maintained   nonskid shoes/slippers when out of bed   room organization consistent   safety round/check completed  Taken 2/9/2025 1048 by Angelica Qureshi RN  Safety Promotion/Fall Prevention:   activity supervised   assistive device/personal items within reach   clutter free environment maintained   fall prevention program maintained   nonskid shoes/slippers when out of bed   safety round/check completed   room organization consistent  Taken 2/9/2025 0811 by Angelica Qureshi, RN  Safety Promotion/Fall Prevention:   assistive device/personal items within reach   clutter free environment maintained   activity supervised   fall prevention program maintained   nonskid shoes/slippers when out of bed   room organization consistent   safety round/check completed     Problem: Adult Inpatient Plan of Care  Goal: Absence of Hospital-Acquired Illness or Injury  Intervention: Identify and Manage Fall Risk  Recent Flowsheet Documentation  Taken 2/9/2025 1627 by Angelica Qureshi RN  Safety Promotion/Fall Prevention:   activity supervised   assistive device/personal items within reach   clutter free environment maintained   fall prevention program maintained   nonskid shoes/slippers when out of bed   room organization consistent   safety round/check completed  Taken 2/9/2025 1400 by Angelica Qureshi RN  Safety Promotion/Fall Prevention:   activity supervised   assistive device/personal items within reach   clutter free environment maintained   fall prevention program maintained   nonskid shoes/slippers when out of bed   room organization consistent   safety round/check completed  Taken 2/9/2025 1207 by Angelica Qureshi, RN  Safety Promotion/Fall Prevention:   activity supervised   assistive device/personal  items within reach   clutter free environment maintained   fall prevention program maintained   nonskid shoes/slippers when out of bed   room organization consistent   safety round/check completed  Taken 2/9/2025 1048 by Angelica Qureshi RN  Safety Promotion/Fall Prevention:   activity supervised   assistive device/personal items within reach   clutter free environment maintained   fall prevention program maintained   nonskid shoes/slippers when out of bed   safety round/check completed   room organization consistent  Taken 2/9/2025 0811 by Angelica Qureshi RN  Safety Promotion/Fall Prevention:   assistive device/personal items within reach   clutter free environment maintained   activity supervised   fall prevention program maintained   nonskid shoes/slippers when out of bed   room organization consistent   safety round/check completed  Intervention: Prevent Skin Injury  Recent Flowsheet Documentation  Taken 2/9/2025 1627 by Angelica Qureshi RN  Body Position: position changed independently  Taken 2/9/2025 1400 by Angelica Qureshi RN  Body Position: position changed independently  Taken 2/9/2025 1207 by Angelica Qureshi RN  Body Position: position changed independently  Taken 2/9/2025 1048 by Angelica Qureshi RN  Body Position: position changed independently  Taken 2/9/2025 0811 by Angelica Qureshi RN  Body Position: position changed independently  Intervention: Prevent and Manage VTE (Venous Thromboembolism) Risk  Recent Flowsheet Documentation  Taken 2/9/2025 1400 by Angelica Qureshi RN  VTE Prevention/Management: (eliquis) other (see comments)  Taken 2/9/2025 0811 by Angelica Qureshi RN  VTE Prevention/Management: (eliquis) other (see comments)  Intervention: Prevent Infection  Recent Flowsheet Documentation  Taken 2/9/2025 1627 by Angelica Qureshi RN  Infection Prevention:   hand hygiene promoted   rest/sleep promoted  Taken 2/9/2025 1400 by Kiera  MAUREEN Dominguez  Infection Prevention:   hand hygiene promoted   rest/sleep promoted  Taken 2/9/2025 1207 by Angelica Qureshi RN  Infection Prevention:   hand hygiene promoted   rest/sleep promoted  Taken 2/9/2025 1048 by Angelica Qureshi RN  Infection Prevention:   hand hygiene promoted   rest/sleep promoted  Taken 2/9/2025 0811 by Angelica Qureshi RN  Infection Prevention:   hand hygiene promoted   rest/sleep promoted     Problem: Comorbidity Management  Goal: Blood Pressure in Desired Range  Intervention: Maintain Blood Pressure Management  Recent Flowsheet Documentation  Taken 2/9/2025 1627 by Angelica Qureshi RN  Medication Review/Management: medications reviewed  Taken 2/9/2025 1400 by Angelica Qureshi RN  Medication Review/Management: medications reviewed  Taken 2/9/2025 1207 by Angelica Qureshi RN  Medication Review/Management: medications reviewed  Taken 2/9/2025 1048 by Angelica Qureshi RN  Medication Review/Management: medications reviewed  Taken 2/9/2025 0811 by Angelica Qureshi RN  Medication Review/Management: medications reviewed

## 2025-02-10 LAB
ALBUMIN SERPL-MCNC: 3.1 G/DL (ref 3.5–5.2)
ALBUMIN/GLOB SERPL: 1.1 G/DL
ALP SERPL-CCNC: 80 U/L (ref 39–117)
ALT SERPL W P-5'-P-CCNC: 9 U/L (ref 1–33)
ANION GAP SERPL CALCULATED.3IONS-SCNC: 7.8 MMOL/L (ref 5–15)
AST SERPL-CCNC: 20 U/L (ref 1–32)
BASOPHILS # BLD AUTO: 0.01 10*3/MM3 (ref 0–0.2)
BASOPHILS NFR BLD AUTO: 0.1 % (ref 0–1.5)
BILIRUB SERPL-MCNC: 0.5 MG/DL (ref 0–1.2)
BUN SERPL-MCNC: 16 MG/DL (ref 8–23)
BUN/CREAT SERPL: 16.7 (ref 7–25)
CALCIUM SPEC-SCNC: 8.2 MG/DL (ref 8.6–10.5)
CHLORIDE SERPL-SCNC: 105 MMOL/L (ref 98–107)
CHOLEST SERPL-MCNC: 114 MG/DL (ref 0–200)
CO2 SERPL-SCNC: 24.2 MMOL/L (ref 22–29)
CREAT SERPL-MCNC: 0.96 MG/DL (ref 0.57–1)
DEPRECATED RDW RBC AUTO: 41.8 FL (ref 37–54)
EGFRCR SERPLBLD CKD-EPI 2021: 56.7 ML/MIN/1.73
EOSINOPHIL # BLD AUTO: 0.29 10*3/MM3 (ref 0–0.4)
EOSINOPHIL NFR BLD AUTO: 4 % (ref 0.3–6.2)
ERYTHROCYTE [DISTWIDTH] IN BLOOD BY AUTOMATED COUNT: 11.8 % (ref 12.3–15.4)
GLOBULIN UR ELPH-MCNC: 2.9 GM/DL
GLUCOSE SERPL-MCNC: 91 MG/DL (ref 65–99)
HBA1C MFR BLD: 5.5 % (ref 4.8–5.6)
HCT VFR BLD AUTO: 37.7 % (ref 34–46.6)
HDLC SERPL-MCNC: 45 MG/DL (ref 40–60)
HGB BLD-MCNC: 12.4 G/DL (ref 12–15.9)
IMM GRANULOCYTES # BLD AUTO: 0.01 10*3/MM3 (ref 0–0.05)
IMM GRANULOCYTES NFR BLD AUTO: 0.1 % (ref 0–0.5)
LDLC SERPL CALC-MCNC: 56 MG/DL (ref 0–100)
LDLC/HDLC SERPL: 1.28 {RATIO}
LYMPHOCYTES # BLD AUTO: 1.39 10*3/MM3 (ref 0.7–3.1)
LYMPHOCYTES NFR BLD AUTO: 19.2 % (ref 19.6–45.3)
MCH RBC QN AUTO: 32 PG (ref 26.6–33)
MCHC RBC AUTO-ENTMCNC: 32.9 G/DL (ref 31.5–35.7)
MCV RBC AUTO: 97.2 FL (ref 79–97)
MONOCYTES # BLD AUTO: 0.82 10*3/MM3 (ref 0.1–0.9)
MONOCYTES NFR BLD AUTO: 11.3 % (ref 5–12)
NEUTROPHILS NFR BLD AUTO: 4.72 10*3/MM3 (ref 1.7–7)
NEUTROPHILS NFR BLD AUTO: 65.3 % (ref 42.7–76)
NRBC BLD AUTO-RTO: 0 /100 WBC (ref 0–0.2)
PLATELET # BLD AUTO: 172 10*3/MM3 (ref 140–450)
PMV BLD AUTO: 8.9 FL (ref 6–12)
POTASSIUM SERPL-SCNC: 4.4 MMOL/L (ref 3.5–5.2)
PROT SERPL-MCNC: 6 G/DL (ref 6–8.5)
RBC # BLD AUTO: 3.88 10*6/MM3 (ref 3.77–5.28)
SODIUM SERPL-SCNC: 137 MMOL/L (ref 136–145)
TRIGL SERPL-MCNC: 57 MG/DL (ref 0–150)
TSH SERPL DL<=0.05 MIU/L-ACNC: 4.03 UIU/ML (ref 0.27–4.2)
VLDLC SERPL-MCNC: 13 MG/DL (ref 5–40)
WBC NRBC COR # BLD AUTO: 7.24 10*3/MM3 (ref 3.4–10.8)

## 2025-02-10 PROCEDURE — G0378 HOSPITAL OBSERVATION PER HR: HCPCS

## 2025-02-10 PROCEDURE — 97535 SELF CARE MNGMENT TRAINING: CPT

## 2025-02-10 PROCEDURE — 94799 UNLISTED PULMONARY SVC/PX: CPT

## 2025-02-10 PROCEDURE — 97166 OT EVAL MOD COMPLEX 45 MIN: CPT

## 2025-02-10 PROCEDURE — 94640 AIRWAY INHALATION TREATMENT: CPT

## 2025-02-10 PROCEDURE — 83036 HEMOGLOBIN GLYCOSYLATED A1C: CPT | Performed by: HOSPITALIST

## 2025-02-10 PROCEDURE — 80053 COMPREHEN METABOLIC PANEL: CPT | Performed by: HOSPITALIST

## 2025-02-10 PROCEDURE — 85025 COMPLETE CBC W/AUTO DIFF WBC: CPT | Performed by: HOSPITALIST

## 2025-02-10 PROCEDURE — 97530 THERAPEUTIC ACTIVITIES: CPT

## 2025-02-10 PROCEDURE — 97162 PT EVAL MOD COMPLEX 30 MIN: CPT

## 2025-02-10 PROCEDURE — 80061 LIPID PANEL: CPT | Performed by: HOSPITALIST

## 2025-02-10 PROCEDURE — 84443 ASSAY THYROID STIM HORMONE: CPT | Performed by: HOSPITALIST

## 2025-02-10 RX ORDER — LOSARTAN POTASSIUM AND HYDROCHLOROTHIAZIDE 25; 100 MG/1; MG/1
1 TABLET ORAL DAILY
COMMUNITY

## 2025-02-10 RX ADMIN — ALBUTEROL SULFATE 2.5 MG: 2.5 SOLUTION RESPIRATORY (INHALATION) at 16:35

## 2025-02-10 RX ADMIN — APIXABAN 2.5 MG: 2.5 TABLET, FILM COATED ORAL at 08:34

## 2025-02-10 RX ADMIN — APIXABAN 2.5 MG: 2.5 TABLET, FILM COATED ORAL at 20:13

## 2025-02-10 RX ADMIN — PANTOPRAZOLE SODIUM 40 MG: 40 TABLET, DELAYED RELEASE ORAL at 08:32

## 2025-02-10 RX ADMIN — CETIRIZINE HYDROCHLORIDE 10 MG: 10 TABLET, FILM COATED ORAL at 08:34

## 2025-02-10 RX ADMIN — PANTOPRAZOLE SODIUM 40 MG: 40 TABLET, DELAYED RELEASE ORAL at 16:16

## 2025-02-10 RX ADMIN — ATORVASTATIN CALCIUM 10 MG: 10 TABLET ORAL at 20:13

## 2025-02-10 RX ADMIN — METOPROLOL SUCCINATE 25 MG: 25 TABLET, EXTENDED RELEASE ORAL at 08:32

## 2025-02-10 NOTE — PLAN OF CARE
Goal Outcome Evaluation:  Plan of Care Reviewed With: patient        Progress: improving  Pt alert and oriented x4, RA, paced, no c/o pain today. Pt did c/o some chest congestion and SOA today, lungs are clear diminished. Put on 1 L NC briefly while feeling SOA after she moved from chair to bed. Breathing treatment was given and pt did feel better. Pt now on RA and no c/o pain or SOA. She did have some dizziness today when up to chair with PT and then back to bed.

## 2025-02-10 NOTE — PLAN OF CARE
Goal Outcome Evaluation:  Plan of Care Reviewed With: patient        Progress: improving  Outcome Evaluation: Pt is a 89 y.o. female admitted to [facility] for c/o increasing postural dizziness with nausea and dry heaving for the last couple of days. Pmhx includes Afib, HTN, and hyperlipidemia.Pt reports improvement of symptoms today. She reports living home alone, independent with ADLs/mod w/o AD at BL. Pt sitting [facility] requesting to complete self care routine. She was Sba for Fxnl xfers/ mob w/o using AD. She tolerated G&H seated at sink w/ setup d/t c/o fatigue. She was Min A for brief change and Sba for sit to supine back to bed. Pt c/o being SOB and O2 noted to drop to 85% during act but returned to 90% at rest and cues for deep breathing tech. RN notified pt asking for O2. Pt may benefit from skilled OT to build stamina and strength for ADLs to maximize patient safety and promote (I) w/ ADLs and xfers prior to dc home. OT will continue to monitor intermittently.    Anticipated Discharge Disposition (OT): home

## 2025-02-10 NOTE — THERAPY EVALUATION
Patient Name: Mayuri Early  : 1935    MRN: [id number]                              Today's Date: 2/10/2025       Admit Date: 2025    Visit Dx:     ICD-10-CM ICD-9-CM   1. Postural dizziness  R42 780.4   2. Nausea and vomiting, unspecified vomiting type  R11.2 787.01   3. Chronic anticoagulation  Z79.01 V58.61   4. Hypomagnesemia  E83.42 275.2     Patient Active Problem List   Diagnosis    Well woman exam    Closed compression fracture of L1 vertebra, initial encounter    Compression fracture of T12 vertebra    PAF (paroxysmal atrial fibrillation)    Presence of cardiac pacemaker    HTN (hypertension)    CKD (chronic kidney disease)    Chronic anticoagulation    Chronic back pain    Anemia    Asthma    Hematoma of hip    Acute blood loss anemia    Postural dizziness     Past Medical History:   Diagnosis Date    Fibrocystic breast     Well woman exam 10/20/2020     Past Surgical History:   Procedure Laterality Date    BREAST CYST ASPIRATION      BREAST CYST EXCISION      HYSTERECTOMY        General Information       Row Name 02/10/25 1549          OT Time and Intention    Document Type evaluation  -PP     Mode of Treatment individual therapy;occupational therapy  -PP       Row Name 02/10/25 1549          General Information    Patient Profile Reviewed yes  -PP     Prior Level of Function independent:  -PP     Existing Precautions/Restrictions fall  -PP     Barriers to Rehab none identified  -PP       Row Name 02/10/25 1549          Living Environment    People in Home alone  -PP       Row Name 02/10/25 1549          Cognition    Orientation Status (Cognition) oriented x 4  -PP       Row Name 02/10/25 1549          Safety Issues/Impairments Affecting Functional Mobility    Impairments Affecting Function (Mobility) endurance/activity tolerance;strength  -PP     Comment, Safety Issues/Impairments (Mobility) gait belt and non skid socks worn for safety  -PP               User Key  (r) = Recorded By, (t) =  Taken By, (c) = Cosigned By      Initials Name Provider Type    PP Ermelinda Tamez OT Occupational Therapist                     Mobility/ADL's       Row Name 02/10/25 1550          Bed Mobility    Bed Mobility supine-sit;sit-supine  -PP     Sit-Supine [place] (Bed Mobility) standby assist  -PP     Assistive Device (Bed Mobility) bed rails  -PP     Comment, (Bed Mobility) UIC at session start  -PP       Row Name 02/10/25 1550          Transfers    Transfers stand-sit transfer;sit-stand transfer  -PP       Row Name 02/10/25 1550          Sit-Stand Transfer    Sit-Stand [place] (Transfers) standby assist  -PP       Row Name 02/10/25 1550          Functional Mobility    Functional Mobility- Ind. Level standby assist  -PP     Functional Mobility- Device --  -PP     Functional Mobility- Comment Pt completes fxnl mob around room trialed using rwx briefly but no AD used mostly. No overt LOB noted  -PP       Row Name 02/10/25 1550          Activities of Daily Living    BADL Assessment/Intervention lower body dressing;grooming;feeding;toileting;upper body dressing  -PP       Row Name 02/10/25 1550          Self-Feeding Assessment/Training    [place] Level (Feeding) feeding skills;independent  -PP       Row Name 02/10/25 1550          Lower Body Dressing Assessment/Training    Comment, (Lower Body Dressing) socks already donned  -PP       Row Name 02/10/25 1550          Grooming Assessment/Training    [place] Level (Grooming) oral care regimen;wash face, hands;set up  -PP     Position (Grooming) sink side;supported standing  -PP       Row Name 02/10/25 1550          Toileting Assessment/Training    [place] Level (Toileting) toileting skills;minimum assist (75% patient effort)  -PP     Position (Toileting) supported standing;supported sitting  -PP     Comment, (Toileting) purwick removed and pt had incontinent episode when coughing during self care req Min A for brief change  -PP       Row Name  02/10/25 1550          Upper Body Dressing Assessment/Training    [place] Level (Upper Body Dressing) upper body dressing skills;standby assist  -PP     Comment, (Upper Body Dressing) hospital gown  -PP               User Key  (r) = Recorded By, (t) = Taken By, (c) = Cosigned By      Initials Name Provider Type    PP Ermelinda Tamez OT Occupational Therapist                   Obj/Interventions       Row Name 02/10/25 1552          Sensory Assessment (Somatosensory)    Sensory Assessment (Somatosensory) UE sensation intact  -PP     Sensory Assessment per pt report  -PP       Row Name 02/10/25 1552          Vision Assessment/Intervention    Visual Impairment/Limitations WFL  -PP       Row Name 02/10/25 1552          Range of Motion Comprehensive    General Range of Motion bilateral upper extremity ROM WFL  -PP       Row Name 02/10/25 1552          Strength Comprehensive (MMT)    General Manual Muscle Testing (MMT) Assessment no strength deficits identified  -PP     Comment, General Manual Muscle Testing (MMT) Assessment Gen BUE weakness noted  -PP       Row Name 02/10/25 1552          Motor Skills    Motor Skills functional endurance  -PP     Functional Endurance fair  -PP       Row Name 02/10/25 1552          Balance    Balance Assessment sitting static balance;standing static balance;sitting dynamic balance;standing dynamic balance  -PP     Static Sitting Balance independent  -PP     Dynamic Sitting Balance modified independence  -PP     Position, Sitting Balance sitting edge of bed  -PP     Static Standing Balance supervision  -PP     Dynamic Standing Balance standby assist  -PP     Position/Device Used, Standing Balance unsupported  -PP     Balance Interventions sitting;standing;static;dynamic;occupation based/functional task  -PP               User Key  (r) = Recorded By, (t) = Taken By, (c) = Cosigned By      Initials Name Provider Type    PP Ermelinda Tamez OT Occupational Therapist                    Goals/Plan       Row Name 02/10/25 1558          Bed Mobility Goal 1 (OT)    Activity/Assistive Device (Bed Mobility Goal 1, OT) bed mobility activities, all  -PP     Downs Level/Cues Needed (Bed Mobility Goal 1, OT) modified independence  -PP     Time Frame (Bed Mobility Goal 1, OT) short term goal (STG);2 weeks  -PP       Row Name 02/10/25 1558          Transfer Goal 1 (OT)    Activity/Assistive Device (Transfer Goal 1, OT) transfers, all  -PP     Downs Level/Cues Needed (Transfer Goal 1, OT) modified independence  -PP     Time Frame (Transfer Goal 1, OT) short term goal (STG);2 weeks  -PP     Progress/Outcome (Transfer Goal 1, OT) new goal  -PP       Row Name 02/10/25 1558          Dressing Goal 1 (OT)    Activity/Device (Dressing Goal 1, OT) dressing skills, all;upper body dressing;lower body dressing  -PP     Downs/Cues Needed (Dressing Goal 1, OT) modified independence  -PP     Time Frame (Dressing Goal 1, OT) short term goal (STG);2 weeks  -PP     Progress/Outcome (Dressing Goal 1, OT) new goal  -PP       Row Name 02/10/25 1558          Toileting Goal 1 (OT)    Activity/Device (Toileting Goal 1, OT) toileting skills, all  -PP     Downs Level/Cues Needed (Toileting Goal 1, OT) modified independence  -PP     Time Frame (Toileting Goal 1, OT) short term goal (STG);2 weeks  -PP     Progress/Outcome (Toileting Goal 1, OT) new goal  -PP       Row Name 02/10/25 1558          Grooming Goal 1 (OT)    Activity/Device (Grooming Goal 1, OT) grooming skills, all  -PP     Downs (Grooming Goal 1, OT) modified independence  -PP     Time Frame (Grooming Goal 1, OT) short term goal (STG);2 weeks  -PP     Progress/Outcome (Grooming Goal 1, OT) new goal  -PP       Row Name 02/10/25 1558          Therapy Assessment/Plan (OT)    Planned Therapy Interventions (OT) activity tolerance training;BADL retraining;functional balance retraining;occupation/activity based  interventions;patient/caregiver education/training;strengthening exercise;transfer/mobility retraining  -PP               User Key  (r) = Recorded By, (t) = Taken By, (c) = Cosigned By      Initials Name Provider Type    PP Ermelinda Tamez OT Occupational Therapist                   Clinical Impression       Row Name 02/10/25 0539          Pain Assessment    Pretreatment Pain Rating 0/10 - no pain  -PP     Posttreatment Pain Rating 0/10 - no pain  -PP       Row Name 02/10/25 2478          Plan of Care Review    Plan of Care Reviewed With patient  -PP     Progress improving  -PP     Outcome Evaluation Pt is a 89 y.o. female admitted to [facility] for c/o increasing postural dizziness with nausea and dry heaving for the last couple of days. Pmhx includes Afib, HTN, and hyperlipidemia.Pt reports improvement of symptoms today. She reports living home alone, independent with ADLs/mod w/o AD at BL. Pt sitting [facility] requesting to complete self care routine. She was Sba for Fxnl xfers/ mob w/o using AD. She tolerated G&H seated at sink w/ setup d/t c/o fatigue. She was Min A for brief change and Sba for sit to supine back to bed. Pt c/o being SOB and O2 noted to drop to 85% during act but returned to 90% at rest and cues for deep breathing tech. RN notified pt asking for O2. Pt may benefit from skilled OT to build stamina and strength for ADLs to maximize patient safety and promote (I) w/ ADLs and xfers prior to dc home. OT will continue to monitor intermittently.  -PP       Row Name 02/10/25 7572          Therapy Assessment/Plan (OT)    Rehab Potential (OT) good  -PP     Criteria for Skilled Therapeutic Interventions Met (OT) yes;skilled treatment is necessary  -PP     Therapy Frequency (OT) 5 times/wk  -PP       Row Name 02/10/25 0669          Therapy Plan Review/Discharge Plan (OT)    Anticipated Discharge Disposition (OT) home  -PP       Row Name 02/10/25 2385          Vital Signs    Pre SpO2 (%) 90  -PP     O2 Delivery Pre  Treatment room air  -PP     Intra SpO2 (%) 85  -PP     O2 Delivery Intra Treatment room air  -PP     Post SpO2 (%) 90  -PP     O2 Delivery Post Treatment room air  -PP     Pre Patient Position Supine  -PP     Intra Patient Position Standing  -PP     Post Patient Position Supine  -PP       Row Name 02/10/25 1554          Positioning and Restraints    Pre-Treatment Position in bed  -PP     Post Treatment Position bed  -PP     In Bed notified nsg;call light within reach;encouraged to call for assist;exit alarm on;supine;with nsg  -PP               User Key  (r) = Recorded By, (t) = Taken By, (c) = Cosigned By      Initials Name Provider Type    PP Ermelinda Tamez, OT Occupational Therapist                   Outcome Measures       Row Name 02/10/25 0309          How much help from another is currently needed...    Putting on and taking off regular lower body clothing? 3  -PP     Bathing (including washing, rinsing, and drying) 3  -PP     Toileting (which includes using toilet bed pan or urinal) 3  -PP     Putting on and taking off regular upper body clothing 3  -PP     Taking care of personal grooming (such as brushing teeth) 3  -PP     Eating meals 4  -PP     AM-PAC 6 Clicks Score (OT) 19  -PP       Row Name 02/10/25 1154 02/10/25 0800       How much help from another person do you currently need...    Turning from your back to your side while in flat bed without using bedrails? 4  -SM 4  -CM    Moving from lying on back to sitting on the side of a flat bed without bedrails? 4  -SM 4  -CM    Moving to and from a bed to a chair (including a wheelchair)? 4  -SM 3  -CM    Standing up from a chair using your arms (e.g., wheelchair, bedside chair)? 4  -SM 3  -CM    Climbing 3-5 steps with a railing? 2  -SM 2  -CM    To walk in hospital room? 3  -SM 3  -CM    AM-PAC 6 Clicks Score (PT) 21  -SM 19  -CM    Highest Level of Mobility Goal 6 --> Walk 10 steps or more  -SM 6 --> Walk 10 steps or more  -CM      Row Name 02/10/25  1559 02/10/25 1154       Functional Assessment    Outcome Measure Options AM-PAC 6 Clicks Daily Activity (OT)  -PP AM-PAC 6 Clicks Basic Mobility (PT)  -SM              User Key  (r) = Recorded By, (t) = Taken By, (c) = Cosigned By      Initials Name Provider Type    CM Charlotte Donald, RN Registered Nurse    SM Francoise Naik, PT Physical Therapist    PP Ermelinda Tamez, OT Occupational Therapist                    Occupational Therapy Education       Title: PT OT SLP Therapies (In Progress)       Topic: Occupational Therapy (In Progress)       Point: ADL training (Done)       Description:   Instruct learner(s) on proper safety adaptation and remediation techniques during self care or transfers.   Instruct in proper use of assistive devices.                  Learning Progress Summary            Patient Acceptance, E, VU by PP at 2/10/2025 2970    Comment: Pt Ed on OT role and dc planning.                      Point: Home exercise program (Not Started)       Description:   Instruct learner(s) on appropriate technique for monitoring, assisting and/or progressing therapeutic exercises/activities.                  Learner Progress:  Not documented in this visit.              Point: Precautions (Not Started)       Description:   Instruct learner(s) on prescribed precautions during self-care and functional transfers.                  Learner Progress:  Not documented in this visit.              Point: Body mechanics (Not Started)       Description:   Instruct learner(s) on proper positioning and spine alignment during self-care, functional mobility activities and/or exercises.                  Learner Progress:  Not documented in this visit.                              User Key       Initials Effective Dates Name Provider Type Discipline    PP 06/09/23 -  Ermelinda Tamez OT Occupational Therapist OT                  OT Recommendation and Plan  Planned Therapy Interventions (OT): activity tolerance training,  BADL retraining, functional balance retraining, occupation/activity based interventions, patient/caregiver education/training, strengthening exercise, transfer/mobility retraining  Therapy Frequency (OT): 5 times/wk  Plan of Care Review  Plan of Care Reviewed With: patient  Progress: improving  Outcome Evaluation: Pt is a 89 y.o. female admitted to [facility] for c/o increasing postural dizziness with nausea and dry heaving for the last couple of days. Pmhx includes Afib, HTN, and hyperlipidemia.Pt reports improvement of symptoms today. She reports living home alone, independent with ADLs/mod w/o AD at BL. Pt sitting [facility] requesting to complete self care routine. She was Sba for Fxnl xfers/ mob w/o using AD. She tolerated G&H seated at sink w/ setup d/t c/o fatigue. She was Min A for brief change and Sba for sit to supine back to bed. Pt c/o being SOB and O2 noted to drop to 85% during act but returned to 90% at rest and cues for deep breathing tech. RN notified pt asking for O2. Pt may benefit from skilled OT to build stamina and strength for ADLs to maximize patient safety and promote (I) w/ ADLs and xfers prior to dc home. OT will continue to monitor intermittently.     Time Calculation:   Evaluation Complexity (OT)  Review Occupational Profile/Medical/Therapy History Complexity: expanded/moderate complexity  Assessment, Occupational Performance/Identification of Deficit Complexity: 3-5 performance deficits  Clinical Decision Making Complexity (OT): detailed assessment/moderate complexity  Overall Complexity of Evaluation (OT): moderate complexity     Time Calculation- OT       Row Name 02/10/25 1559             Time Calculation- OT    OT Start Time 1335  -PP      OT Stop Time 1413  -PP      OT Time Calculation (min) 38 min  -PP      Total Timed Code Minutes- OT 28 minute(s)  -PP      OT Received On 02/10/25  -PP      OT - Next Appointment 02/11/25  -PP      OT Goal Re-Cert Due Date 02/24/25  -PP         Timed Charges     81084 - OT Self Care/Mgmt Minutes 28  -PP         Untimed Charges    OT Eval/Re-eval Minutes 10  -PP         Total Minutes    Timed Charges Total Minutes 28  -PP      Untimed Charges Total Minutes 10  -PP       Total Minutes 38  -PP                User Key  (r) = Recorded By, (t) = Taken By, (c) = Cosigned By      Initials Name Provider Type    PP [place], Ermelinda, OT Occupational Therapist                  Therapy Charges for Today       Code Description Service Date Service Provider Modifiers Qty    15514405042 HC OT SELF CARE/MGMT/TRAIN EA 15 MIN 2/10/2025 Ermelinda Tamez, OT GO 2    80724335713 HC OT EVAL MOD COMPLEXITY 2 2/10/2025 Ermelinda Tamez, OT GO 1                 Ermelinda Tamez, OT  2/10/2025

## 2025-02-10 NOTE — PLAN OF CARE
Goal Outcome Evaluation:  Plan of Care Reviewed With: patient           Outcome Evaluation: Patient is an 89 y.o female who presented to [facility] with reports of dizziness. Patient reports improvement in her symptoms. Patient reports difficulty describing her dizziness but when asked denies room spinning sensation. Per neurology note allyn hallpike negative bilaterally. No nystagmus noted with smooth pursuits or head turns during PT eval. Patient reports she feels like she has a cold and feels congested along with mild headache pain in her suboccipital region. Patient lives at home alone, independent with no AD at BL. Patient satup to EOB with SBA. Patient ambulated in room with no AD and SBA. Gait slow but mostly steady with no overt LOB noted. Patient reported mild lightheadedness with upright activity but states she feels it is due to not being OOB in 2 days. Encouraged patient to continue mobilizing with nsg several times per day. No skilled acute vestibular PT needs identified. PT will continue to monitor while in this setting for mobility needs.    Anticipated Discharge Disposition (PT): home with assist

## 2025-02-10 NOTE — THERAPY EVALUATION
Patient Name: Mayuri Early  : 1935    MRN: [id number]                              Today's Date: 2/10/2025       Admit Date: 2025    Visit Dx:     ICD-10-CM ICD-9-CM   1. Postural dizziness  R42 780.4   2. Nausea and vomiting, unspecified vomiting type  R11.2 787.01   3. Chronic anticoagulation  Z79.01 V58.61   4. Hypomagnesemia  E83.42 275.2     Patient Active Problem List   Diagnosis    Well woman exam    Closed compression fracture of L1 vertebra, initial encounter    Compression fracture of T12 vertebra    PAF (paroxysmal atrial fibrillation)    Presence of cardiac pacemaker    HTN (hypertension)    CKD (chronic kidney disease)    Chronic anticoagulation    Chronic back pain    Anemia    Asthma    Hematoma of hip    Acute blood loss anemia    Postural dizziness     Past Medical History:   Diagnosis Date    Fibrocystic breast     Well woman exam 10/20/2020     Past Surgical History:   Procedure Laterality Date    BREAST CYST ASPIRATION      BREAST CYST EXCISION      HYSTERECTOMY        General Information       Row Name 02/10/25 1144          Physical Therapy Time and Intention    Document Type evaluation  -SM     Mode of Treatment individual therapy;physical therapy  -       Row Name 02/10/25 1144          General Information    Patient Profile Reviewed yes  -SM     Prior Level of Function independent:  -     Existing Precautions/Restrictions fall  -       Row Name 02/10/25 1144          Living Environment    People in Home alone  -       Row Name 02/10/25 1144          Home Main Entrance    Number of Stairs, Main Entrance none  -       Row Name 02/10/25 1144          Cognition    Orientation Status (Cognition) oriented x 4  -       Row Name 02/10/25 1144          Safety Issues/Impairments Affecting Functional Mobility    Impairments Affecting Function (Mobility) endurance/activity tolerance;strength  -               User Key  (r) = Recorded By, (t) = Taken By, (c) = Cosigned By       Initials Name Provider Type     Francoise Naik, KEITH Physical Therapist                   Mobility       Row Name 02/10/25 1146          Bed Mobility    Bed Mobility supine-sit  -     Supine-Sit [place] (Bed Mobility) standby assist  -     Assistive Device (Bed Mobility) head of bed elevated  -     Comment, (Bed Mobility) UIC at end of session  -       Row Name 02/10/25 1146          Sit-Stand Transfer    Sit-Stand [place] (Transfers) standby assist  -       Row Name 02/10/25 1146          Gait/Stairs (Locomotion)    [place] Level (Gait) standby assist  -     Distance in Feet (Gait) 35  -SM     Deviations/Abnormal Patterns (Gait) gait speed decreased  -     Comment, (Gait/Stairs) Gait slow but mostly steady with no overt LOB noted. Patient reported mild lightheadedness with upright activity but states she feels it is due to not being OOB in 2 days.  -               User Key  (r) = Recorded By, (t) = Taken By, (c) = Cosigned By      Initials Name Provider Type     Francoise Naik PT Physical Therapist                   Obj/Interventions       Row Name 02/10/25 1149          Range of Motion Comprehensive    General Range of Motion bilateral lower extremity ROM WFL  -       Row Name 02/10/25 1149          Strength Comprehensive (MMT)    General Manual Muscle Testing (MMT) Assessment lower extremity strength deficits identified  -     Comment, General Manual Muscle Testing (MMT) Assessment Generalized B LE weakness  -       Row Name 02/10/25 1149          Balance    Balance Assessment sitting static balance;sitting dynamic balance;standing static balance;standing dynamic balance  -     Static Sitting Balance independent  -     Dynamic Sitting Balance modified independence  -     Position, Sitting Balance sitting edge of bed;sitting in chair  -     Static Standing Balance supervision  -     Dynamic Standing Balance standby assist  -     Position/Device Used,  Standing Balance unsupported  -SM     Balance Interventions sitting;standing;sit to stand;static;dynamic  -SM               User Key  (r) = Recorded By, (t) = Taken By, (c) = Cosigned By      Initials Name Provider Type    Francoise Leigh PT Physical Therapist                   Goals/Plan       Row Name 02/10/25 1153          Gait Training Goal 1 (PT)    Activity/Assistive Device (Gait Training Goal 1, PT) gait (walking locomotion)  -SM     [place] Level (Gait Training Goal 1, PT) supervision required  -SM     Distance (Gait Training Goal 1, PT) 150ft  -SM     Time Frame (Gait Training Goal 1, PT) 1 week  -SM               User Key  (r) = Recorded By, (t) = Taken By, (c) = Cosigned By      Initials Name Provider Type    Francoise Leigh PT Physical Therapist                   Clinical Impression       Row Name 02/10/25 1150          Pain    Pretreatment Pain Rating 0/10 - no pain  -SM     Posttreatment Pain Rating 0/10 - no pain  -SM       Row Name 02/10/25 1150          Plan of Care Review    Plan of Care Reviewed With patient  -SM     Outcome Evaluation Patient is an 89 y.o female who presented to [facility] with reports of dizziness. Patient reports improvement in her symptoms. Patient reports difficulty describing her dizziness but when asked denies room spinning sensation. Per neurology note allyn hallpike negative bilaterally. No nystagmus noted with smooth pursuits or head turns during PT eval. Patient reports she feels like she has a cold and feels congested along with mild headache pain in her suboccipital region. Patient lives at home alone, independent with no AD at BL. Patient satup to EOB with SBA. Patient ambulated in room with no AD and SBA. Gait slow but mostly steady with no overt LOB noted. Patient reported mild lightheadedness with upright activity but states she feels it is due to not being OOB in 2 days. Encouraged patient to continue mobilizing with nsg several times per day. No skilled  acute vestibular PT needs identified. PT will continue to monitor while in this setting for mobility needs.  -       Row Name 02/10/25 1150          Therapy Assessment/Plan (PT)    Rehab Potential (PT) good  -     Criteria for Skilled Interventions Met (PT) yes  -     Therapy Frequency (PT) 5 times/wk  -       Row Name 02/10/25 1150          Vital Signs    Pre Patient Position Supine  -SM     Intra Patient Position Standing  -SM     Post Patient Position Sitting  -       Row Name 02/10/25 1150          Positioning and Restraints    Pre-Treatment Position in bed  -SM     Post Treatment Position chair  -SM     In Chair notified nsg;reclined;call light within reach;encouraged to call for assist;exit alarm on  -               User Key  (r) = Recorded By, (t) = Taken By, (c) = Cosigned By      Initials Name Provider Type    Francoise Leigh PT Physical Therapist                   Outcome Measures       Row Name 02/10/25 1154 02/10/25 0800       How much help from another person do you currently need...    Turning from your back to your side while in flat bed without using bedrails? 4  -SM 4  -CM    Moving from lying on back to sitting on the side of a flat bed without bedrails? 4  -SM 4  -CM    Moving to and from a bed to a chair (including a wheelchair)? 4  -SM 3  -CM    Standing up from a chair using your arms (e.g., wheelchair, bedside chair)? 4  -SM 3  -CM    Climbing 3-5 steps with a railing? 2  -SM 2  -CM    To walk in hospital room? 3  -SM 3  -CM    AM-PAC 6 Clicks Score (PT) 21  -SM 19  -CM    Highest Level of Mobility Goal 6 --> Walk 10 steps or more  - 6 --> Walk 10 steps or more  -CM      Row Name 02/10/25 1154          Functional Assessment    Outcome Measure Options AM-PAC 6 Clicks Basic Mobility (PT)  -               User Key  (r) = Recorded By, (t) = Taken By, (c) = Cosigned By      Initials Name Provider Type    Charlotte Starr, RN Registered Nurse    Francoise Leigh, KEITH  Physical Therapist                                 Physical Therapy Education       Title: PT OT SLP Therapies (Done)       Topic: Physical Therapy (Done)       Point: Mobility training (Done)       Learning Progress Summary            Patient Acceptance, E, VU by  at 2/10/2025 1154                      Point: Home exercise program (Done)       Learning Progress Summary            Patient Acceptance, E, VU by  at 2/10/2025 1154                      Point: Body mechanics (Done)       Learning Progress Summary            Patient Acceptance, E, VU by  at 2/10/2025 1154                      Point: Precautions (Done)       Learning Progress Summary            Patient Acceptance, E, VU by  at 2/10/2025 1154                                      User Key       Initials Effective Dates Name Provider Type Discipline     05/02/22 -  Francoise Naik, KEITH Physical Therapist PT                  PT Recommendation and Plan     Outcome Evaluation: Patient is an 89 y.o female who presented to [facility] with reports of dizziness. Patient reports improvement in her symptoms. Patient reports difficulty describing her dizziness but when asked denies room spinning sensation. Per neurology note allyn hallpike negative bilaterally. No nystagmus noted with smooth pursuits or head turns during PT eval. Patient reports she feels like she has a cold and feels congested along with mild headache pain in her suboccipital region. Patient lives at home alone, independent with no AD at BL. Patient satup to EOB with SBA. Patient ambulated in room with no AD and SBA. Gait slow but mostly steady with no overt LOB noted. Patient reported mild lightheadedness with upright activity but states she feels it is due to not being OOB in 2 days. Encouraged patient to continue mobilizing with nsg several times per day. No skilled acute vestibular PT needs identified. PT will continue to monitor while in this setting for mobility needs.     Time Calculation:          PT Charges       Row Name 02/10/25 1155             Time Calculation    Start Time 1059  -SM      Stop Time 1115  -SM      Time Calculation (min) 16 min  -SM      PT Received On 02/10/25  -      PT - Next Appointment 02/11/25  -      PT Goal Re-Cert Due Date 02/17/25  -         Time Calculation- PT    Total Timed Code Minutes- PT 8 minute(s)  -SM         Timed Charges    88637 - PT Therapeutic Activity Minutes 8  -SM         Total Minutes    Timed Charges Total Minutes 8  -SM       Total Minutes 8  -SM                User Key  (r) = Recorded By, (t) = Taken By, (c) = Cosigned By      Initials Name Provider Type     Francoise Naik, PT Physical Therapist                  Therapy Charges for Today       Code Description Service Date Service Provider Modifiers Qty    52010588481 HC PT THERAPEUTIC ACT EA 15 MIN 2/10/2025 Francoise Naik, PT GP 1    02871045741 HC PT EVAL MOD COMPLEXITY 3 2/10/2025 Francoise Naik, PT GP 1            PT G-Codes  Outcome Measure Options: AM-PAC 6 Clicks Basic Mobility (PT)  AM-PAC 6 Clicks Score (PT): 21  PT Discharge Summary  Anticipated Discharge Disposition (PT): home with assist    Francoise Naik PT  2/10/2025

## 2025-02-10 NOTE — PROGRESS NOTES
"DAILY PROGRESS NOTE  [facility]    Patient Identification:  Name: Mayuri Early  Age: 89 y.o.  Sex: female  :  1935  MRN: [id number]         Primary Care Physician: Gunjan Hidalgo MD    Subjective:patient is resting; was wheezing and short of air earlier  Interval History: follow up for dizziness, paf, hypertension, hyperlipidemia    Objective:    Scheduled Meds:apixaban, 2.5 mg, Oral, Q12H  atorvastatin, 10 mg, Oral, Nightly  cetirizine, 10 mg, Oral, Daily  metoprolol succinate XL, 25 mg, Oral, Q24H  pantoprazole, 40 mg, Oral, BID AC      Continuous Infusions:     Vital signs in last 24 hours:  Temp:  [97.6 °F (36.4 °C)-98.4 °F (36.9 °C)] 98.1 °F (36.7 °C)  Heart Rate:  [68-83] 77  Resp:  [16-18] 16  BP: (132-179)/(59-75) 165/59    Intake/Output:    Intake/Output Summary (Last 24 hours) at 2/10/2025 1704  Last data filed at 2/10/2025 1155  Gross per 24 hour   Intake 2925 ml   Output 700 ml   Net 2225 ml       Exam:  /59 (BP Location: Left arm, Patient Position: Sitting)   Pulse 77   Temp 98.1 °F (36.7 °C)   Resp 16   Ht 154.9 cm (61\")   Wt 56.7 kg (125 lb)   SpO2 100%   BMI 23.62 kg/m²     General Appearance:    Alert, cooperative, no distress, AAOx3                          Head:    Normocephalic, without obvious abnormality, atraumatic                           Eyes:    PERRL, conjunctivae/corneas clear, EOM's intact, both eyes                         Throat:   Lips, tongue, gums normal; oral mucosa pink and moist                           Neck:   Supple, symmetrical, trachea midline, no JVD                         Lungs:    decreased breath sounds, wheezes bilaterally, respirations unlabored                 Chest Wall:    No tenderness or deformity                          Heart:    Regular rate and rhythm, S1 and S2 normal, no murmur,no  rub or gallop                  Abdomen:     Soft, nontender, bowel sounds active, no masses, no organomegaly                  " Extremities:   Extremities normal, atraumatic, no cyanosis or edema                          Data Review:  Labs in chart were reviewed.  WBC   Date Value Ref Range Status   02/10/2025 7.24 3.40 - 10.80 10*3/mm3 Final     Hemoglobin   Date Value Ref Range Status   02/10/2025 12.4 12.0 - 15.9 g/dL Final     Hematocrit   Date Value Ref Range Status   02/10/2025 37.7 34.0 - 46.6 % Final     Platelets   Date Value Ref Range Status   02/10/2025 172 140 - 450 10*3/mm3 Final     Sodium   Date Value Ref Range Status   02/10/2025 137 136 - 145 mmol/L Final     Potassium   Date Value Ref Range Status   02/10/2025 4.4 3.5 - 5.2 mmol/L Final     Comment:     Slight hemolysis detected by analyzer. Result may be falsely elevated.     Chloride   Date Value Ref Range Status   02/10/2025 105 98 - 107 mmol/L Final     CO2   Date Value Ref Range Status   02/10/2025 24.2 22.0 - 29.0 mmol/L Final     BUN   Date Value Ref Range Status   02/10/2025 16 8 - 23 mg/dL Final     Creatinine   Date Value Ref Range Status   02/10/2025 0.96 0.57 - 1.00 mg/dL Final     Glucose   Date Value Ref Range Status   02/10/2025 91 65 - 99 mg/dL Final     Calcium   Date Value Ref Range Status   02/10/2025 8.2 (L) 8.6 - 10.5 mg/dL Final     Magnesium   Date Value Ref Range Status   02/09/2025 2.7 (H) 1.6 - 2.4 mg/dL Final     AST (SGOT)   Date Value Ref Range Status   02/10/2025 20 1 - 32 U/L Final     ALT (SGPT)   Date Value Ref Range Status   02/10/2025 9 1 - 33 U/L Final     Alkaline Phosphatase   Date Value Ref Range Status   02/10/2025 80 39 - 117 U/L Final         Assessment:  Active Hospital Problems    Diagnosis  POA    **Postural dizziness [R42]  Yes      Resolved Hospital Problems   No resolved problems to display.   Paf  Hypertension  Hyperlipidemia  asthma    Plan:  Mininebs prn  Neuro input noted  Mobilize as tolerated  Trend labs  Dw patient, nurse    Isela Richardson MD  2/10/2025  17:04 EST

## 2025-02-11 PROCEDURE — 96375 TX/PRO/DX INJ NEW DRUG ADDON: CPT

## 2025-02-11 PROCEDURE — 94799 UNLISTED PULMONARY SVC/PX: CPT

## 2025-02-11 PROCEDURE — G0378 HOSPITAL OBSERVATION PER HR: HCPCS

## 2025-02-11 PROCEDURE — 25010000002 HYDRALAZINE PER 20 MG: Performed by: HOSPITALIST

## 2025-02-11 PROCEDURE — 94761 N-INVAS EAR/PLS OXIMETRY MLT: CPT

## 2025-02-11 PROCEDURE — 94760 N-INVAS EAR/PLS OXIMETRY 1: CPT

## 2025-02-11 RX ORDER — HYDRALAZINE HYDROCHLORIDE 20 MG/ML
10 INJECTION INTRAMUSCULAR; INTRAVENOUS EVERY 4 HOURS PRN
Status: DISCONTINUED | OUTPATIENT
Start: 2025-02-11 | End: 2025-02-12

## 2025-02-11 RX ORDER — METOPROLOL SUCCINATE 50 MG/1
100 TABLET, EXTENDED RELEASE ORAL EVERY 12 HOURS SCHEDULED
Status: DISCONTINUED | OUTPATIENT
Start: 2025-02-11 | End: 2025-02-12 | Stop reason: HOSPADM

## 2025-02-11 RX ADMIN — APIXABAN 2.5 MG: 2.5 TABLET, FILM COATED ORAL at 08:35

## 2025-02-11 RX ADMIN — PANTOPRAZOLE SODIUM 40 MG: 40 TABLET, DELAYED RELEASE ORAL at 16:04

## 2025-02-11 RX ADMIN — ACETAMINOPHEN 325MG 650 MG: 325 TABLET ORAL at 10:52

## 2025-02-11 RX ADMIN — HYDRALAZINE HYDROCHLORIDE 10 MG: 20 INJECTION INTRAMUSCULAR; INTRAVENOUS at 16:31

## 2025-02-11 RX ADMIN — HYDROCHLOROTHIAZIDE: 25 TABLET ORAL at 14:56

## 2025-02-11 RX ADMIN — CETIRIZINE HYDROCHLORIDE 10 MG: 10 TABLET, FILM COATED ORAL at 08:34

## 2025-02-11 RX ADMIN — METOPROLOL SUCCINATE 25 MG: 25 TABLET, EXTENDED RELEASE ORAL at 08:35

## 2025-02-11 RX ADMIN — PANTOPRAZOLE SODIUM 40 MG: 40 TABLET, DELAYED RELEASE ORAL at 08:35

## 2025-02-11 RX ADMIN — ALBUTEROL SULFATE 2.5 MG: 2.5 SOLUTION RESPIRATORY (INHALATION) at 17:55

## 2025-02-11 RX ADMIN — METOPROLOL SUCCINATE 100 MG: 50 TABLET, EXTENDED RELEASE ORAL at 20:28

## 2025-02-11 RX ADMIN — ALBUTEROL SULFATE 2.5 MG: 2.5 SOLUTION RESPIRATORY (INHALATION) at 04:18

## 2025-02-11 RX ADMIN — ACETAMINOPHEN 325MG 650 MG: 325 TABLET ORAL at 23:40

## 2025-02-11 RX ADMIN — APIXABAN 2.5 MG: 2.5 TABLET, FILM COATED ORAL at 20:27

## 2025-02-11 RX ADMIN — ATORVASTATIN CALCIUM 10 MG: 10 TABLET ORAL at 20:27

## 2025-02-11 NOTE — PROGRESS NOTES
"Daily progress note    Primary care physician  Dr. Hidalgo    Subjective  Awake and alert and feeling better with no new complaints    History of present illness  89-year-old white female with history of paroxysmal atrial fibrillation hypertension hyperlipidemia asthma who lives by herself brought to the emergency room by the family with severe dizziness and nausea started yesterday.  Patient has no vomiting headache vision problem speech problem.  Patient stated that she felt dizzy with she never had fell before with nausea.  Patient denies any chest pain shortness of breath palpitation.  Patient workup in ER revealed hypomagnesemia and admitted for further workup of dizziness.     REVIEW OF SYSTEMS  All systems reviewed and negative except for those discussed in HPI.      PHYSICAL EXAM   Blood pressure (!) 182/66, pulse 70, temperature 97.5 °F (36.4 °C), temperature source Oral, resp. rate 15, height 154.9 cm (61\"), weight 56.7 kg (125 lb), SpO2 97%.    General: Awake and alert in no distress  HEENT: Unremarkable  Neck: Full ROM  Pulm: Normal effort and moving air bilaterally  Cardiovascular: Regular rate and rhythm, intact distal pulses  GI: Soft, nontender, nondistended, no rebound, no guarding, bowel sounds present  MSK: Full ROM, no deformity  Skin: Warm, dry  Neuro: Awake, alert, no focal deficit  Psych: Calm, cooperative     LAB RESULTS  Lab Results (last 24 hours)       ** No results found for the last 24 hours. **          Imaging Results (Last 24 Hours)       ** No results found for the last 24 hours. **          Scan on 2/8/2025 1232 by [facility], [place]: ECG 12-LEAD         Author: -- Service: -- Author Type: --   Filed: Date of Service: Creation Time:   Status: (Other)   HEART RATE=67  bpm  RR Hpxvenpw=555  ms  DC Interval=  ms  P Horizontal Axis=  deg  P Front Axis=  deg  QRSD Btqnwxpc=198  ms  QT Meahgzqc=465  ms  ZXmZ=288  ms  QRS Axis=67  deg  T Wave Axis=-48  deg  - ABNORMAL ECG -  Afib/flut and " V-paced complexes  Right bundle branch block  When compared with ECG of 05-Jan-2024 16:38:08,  Ventricular pacing is new          Current Facility-Administered Medications:     acetaminophen (TYLENOL) tablet 650 mg, 650 mg, Oral, Q6H PRN, He Awad MD, 650 mg at 02/11/25 1052    albuterol (PROVENTIL) nebulizer solution 0.083% 2.5 mg/3mL, 2.5 mg, Nebulization, Q6H PRN, He Awad MD, 2.5 mg at 02/11/25 0418    apixaban (ELIQUIS) tablet 2.5 mg, 2.5 mg, Oral, Q12H, He Awad MD, 2.5 mg at 02/11/25 0835    atorvastatin (LIPITOR) tablet 10 mg, 10 mg, Oral, Nightly, He Awad MD, 10 mg at 02/10/25 2013    cetirizine (zyrTEC) tablet 10 mg, 10 mg, Oral, Daily, He Awad MD, 10 mg at 02/11/25 0834    losartan (COZAAR) 100 mg, hydroCHLOROthiazide 25 mg for HYZAAR 100-25, , Oral, Daily, He Awad MD    Magnesium Standard Dose Replacement - Follow Nurse / BPA Driven Protocol, , Not Applicable, PRN, He Awad MD    meclizine (ANTIVERT) tablet 25 mg, 25 mg, Oral, TID PRN, He Awad MD    metoprolol succinate XL (TOPROL-XL) 24 hr tablet 100 mg, 100 mg, Oral, Q12H, He Awad MD    ondansetron (ZOFRAN) injection 4 mg, 4 mg, Intravenous, Q6H PRN, He Awad MD    pantoprazole (PROTONIX) EC tablet 40 mg, 40 mg, Oral, BID AC, He Awad MD, 40 mg at 02/11/25 0835     ASSESSMENT  Severe dizziness with nausea probably benign positional vertigo  Paroxysmal atrial fibrillation  Status post dehydration  Hypokalemia and hypomagnesemia replaced  Hypertension  Hyperlipidemia  History of asthma  Status post permanent pacemaker placement  Gastroesophageal reflux disease    PLAN  CPM  Discontinue IVF  Adjust blood pressure medication  Neurology consult appreciated  Adjust her medications  Stress ulcer DVT prophylaxis  Supportive care  PT OT  Discussed with family nursing staff  Discharge home in a.m. if more stable    HE AWAD MD    Copied text in this note has been reviewed and is accurate as of  02/11/25

## 2025-02-11 NOTE — DISCHARGE PLACEMENT REQUEST
"Mary Early (89 y.o. Female)       Date of Birth   1935    Social Security Number       Address   [address] [address]    Home Phone   [phone]    MRN   [id number]       Holiness   Rastafarian    Marital Status                               Admission Date   2/8/25    Admission Type   Emergency    Admitting Provider   Kike Awad MD    Attending Provider   Kike Awad MD    [facility]       Discharge Date       Discharge Disposition       Discharge Destination                                 Attending Provider: Kike Awad MD    Allergies: No Known Allergies    Isolation: None   Infection: None   Code Status: CPR    Ht: 154.9 cm (61\")   Wt: 56.7 kg (125 lb)    Admission Cmt: None   Principal Problem: Postural dizziness [R42]                   Active Insurance as of 2/8/2025       Primary Coverage       Payor Plan Insurance Group Employer/Plan Group    ANTHEM MEDICARE REPLACEMENT ANTHEM MED ADV HMO [id number]       Payor Plan Address Payor Plan Phone Number Payor Plan Fax Number Effective Dates    [address] [phone]  1/1/2025 - None Entered    [facility] [address]         Subscriber Name Subscriber Birth Date Member ID       MARY EARLY 1935 [id number]               Secondary Coverage       Payor Plan Insurance Group Employer/Plan Group    JAMA         Payor Plan Address Payor Plan Phone Number Payor Plan Fax Number Effective Dates    [facility] CARE [phone]  4/28/2005 - None Entered    [address] [address]       [facility] [address]         Subscriber Name Subscriber Birth Date Member ID       MARY EARLY 1935 [id number]                     Emergency Contacts        (Rel.) Home Phone Work Phone Mobile Phone    Torres Early (Son) [phone] -- --          "

## 2025-02-11 NOTE — CASE MANAGEMENT/SOCIAL WORK
Discharge Planning Assessment  [facility]     Patient Name: Mayuri Early  MRN: [id number]  Today's Date: 2/11/2025    Admit Date: 2/8/2025    Plan: Home, DME: [facility], son to transport   Discharge Needs Assessment       Row Name 02/11/25 1110       Living Environment    People in Home alone    Current Living Arrangements home    Potentially Unsafe Housing Conditions none    In the past 12 months has the electric, gas, oil, or water company threatened to shut off services in your home? No    Primary Care Provided by self    Provides Primary Care For no one    Family Caregiver if Needed child(mathew), adult    Family Caregiver Names Torres Early/son    Able to Return to Prior Arrangements yes       Resource/Environmental Concerns    Resource/Environmental Concerns none    Transportation Concerns none       Transportation Needs    In the past 12 months, has lack of transportation kept you from medical appointments or from getting medications? no    In the past 12 months, has lack of transportation kept you from meetings, work, or from getting things needed for daily living? No       Food Insecurity    Within the past 12 months, you worried that your food would run out before you got the money to buy more. Never true    Within the past 12 months, the food you bought just didn't last and you didn't have money to get more. Never true       Transition Planning    Patient/Family Anticipates Transition to home    Patient/Family Anticipated Services at Transition none    Transportation Anticipated family or friend will provide       Discharge Needs Assessment    Readmission Within the Last 30 Days no previous admission in last 30 days    Equipment Currently Used at Home cane, straight;walker, standard    Concerns to be Addressed discharge planning    Anticipated Changes Related to Illness none    Equipment Needed After Discharge other (see comments)  TBD, currently denies needs, but reports she has been requiring oxygen  on/off during admission    Provided Post Acute Provider List? N/A    N/A Provider List Comment Pt denied need    Patient's Choice of Community Agency(s) [facility]                   Discharge Plan       Row Name 02/11/25 1110       Plan    Plan Home, DME: [facility], son to transport    Patient/Family in Agreement with Plan yes    Plan Comments CCP met with pt at bedside, introduced self, role, & DC planning discussed. Face sheet information & pharmacy verified. Pt lives in a patio home alone. Pt denies YA & reports no steps inside.  Prior to admit, pt reports she drives, IADLs, & reports use of the following DME: cane & PRN use of walker that was her 's. Pt denies having a living will & declined need for information. Pt declines Meds to Beds. Pt denies issues with affording or managing medications, affording food, or affording utilities. Pt has history of home health but unsure agency nme. Pt reports she has been to [facility] for rehab. DC plan is return home with family to transport. Pt currently denies any DC needs, however, reports she has been requiring oxygen on/off during admission. CCP explained provider might order a walking oximetry test within 24 hours of DC to see if pt requires oxygen. Pt verbalized understanding & reports that would give her reassurance. Pt reports nebulizer & breathing treatments have been helping. Pt asking if she can get a nebulizer at DC. Pt also asking if she could have a CXR prior to DC. CCP explained ordering oxygen, walking oximetry, nebulizer, and/or breathing treatments will all be up to pt's provider. Pt verbalized understanding. CCP explained I will relay all pt's questions to pt's Dr. Awad. Pt verbalized understanding. CCP secure chatted pt's questions/requests to Dr. Awad & Felicity/primary RN. Pt reports her  used oxygen & she thinks supplied thru [facility]. Pt requests referral to [facility]. MEP report created. Epic referral placed. CCP notified  [facility]. DC plan home, son to transport, [facility] for any DME needs. CCP will follow. MAUREEN Franks/CCP                  Continued Care and Services - Admitted Since 2/8/2025       Durable Medical Equipment       Service Provider Request Status Services Address Phone Fax Patient Preferred    [facility] Pending - Request Sent -- [address], [facility] [address] [phone] --                  Expected Discharge Date and Time       Expected Discharge Date Expected Discharge Time    Feb 13, 2025            Demographic Summary       Row Name 02/11/25 1110       General Information    Admission Type inpatient    Arrived From emergency department    Referral Source admission list    Reason for Consult discharge planning    Preferred Language English       Contact Information    Permission Granted to Share Info With ;family/designee                   Functional Status       Row Name 02/11/25 1110       Functional Status    Usual Activity Tolerance good    Current Activity Tolerance good       Assessment of Health Literacy    How often do you have someone help you read hospital materials? Never    How often do you have problems learning about your medical condition because of difficulty understanding written information? Never    How often do you have a problem understanding what is told to you about your medical condition? Never    How confident are you filling out medical forms by yourself? Extremely    Health Literacy Excellent       Functional Status, IADL    Medications independent    Meal Preparation independent    Housekeeping independent    Laundry independent    Shopping independent    If for any reason you need help with day-to-day activities such as bathing, preparing meals, shopping, managing finances, etc., do you get the help you need? I get all the help I need       Mental Status    General Appearance WDL WDL       Mental Status Summary     Recent Changes in Mental Status/Cognitive Functioning no changes       Employment/    Employment Status retired           Jannette Bell, RN

## 2025-02-12 ENCOUNTER — READMISSION MANAGEMENT (OUTPATIENT)
Dept: CALL CENTER | Facility: HOSPITAL | Age: OVER 89
End: 2025-02-12
Payer: OTHER GOVERNMENT

## 2025-02-12 VITALS
HEIGHT: 61 IN | DIASTOLIC BLOOD PRESSURE: 70 MMHG | HEART RATE: 71 BPM | TEMPERATURE: 97.8 F | BODY MASS INDEX: 23.6 KG/M2 | RESPIRATION RATE: 16 BRPM | OXYGEN SATURATION: 98 % | WEIGHT: 125 LBS | SYSTOLIC BLOOD PRESSURE: 148 MMHG

## 2025-02-12 LAB
ANION GAP SERPL CALCULATED.3IONS-SCNC: 9.1 MMOL/L (ref 5–15)
BUN SERPL-MCNC: 12 MG/DL (ref 8–23)
BUN/CREAT SERPL: 15.8 (ref 7–25)
CALCIUM SPEC-SCNC: 8.9 MG/DL (ref 8.6–10.5)
CHLORIDE SERPL-SCNC: 106 MMOL/L (ref 98–107)
CO2 SERPL-SCNC: 24.9 MMOL/L (ref 22–29)
CREAT SERPL-MCNC: 0.76 MG/DL (ref 0.57–1)
EGFRCR SERPLBLD CKD-EPI 2021: 75 ML/MIN/1.73
GLUCOSE SERPL-MCNC: 99 MG/DL (ref 65–99)
POTASSIUM SERPL-SCNC: 4 MMOL/L (ref 3.5–5.2)
SODIUM SERPL-SCNC: 140 MMOL/L (ref 136–145)

## 2025-02-12 PROCEDURE — 96376 TX/PRO/DX INJ SAME DRUG ADON: CPT

## 2025-02-12 PROCEDURE — 25010000002 HYDRALAZINE PER 20 MG: Performed by: HOSPITALIST

## 2025-02-12 PROCEDURE — G0378 HOSPITAL OBSERVATION PER HR: HCPCS

## 2025-02-12 PROCEDURE — 94799 UNLISTED PULMONARY SVC/PX: CPT

## 2025-02-12 PROCEDURE — 80048 BASIC METABOLIC PNL TOTAL CA: CPT | Performed by: HOSPITALIST

## 2025-02-12 PROCEDURE — 94761 N-INVAS EAR/PLS OXIMETRY MLT: CPT

## 2025-02-12 RX ORDER — IPRATROPIUM BROMIDE AND ALBUTEROL SULFATE 2.5; .5 MG/3ML; MG/3ML
3 SOLUTION RESPIRATORY (INHALATION) EVERY 4 HOURS PRN
Qty: 360 ML | Refills: 0 | Status: SHIPPED | OUTPATIENT
Start: 2025-02-12 | End: 2025-03-14

## 2025-02-12 RX ORDER — IPRATROPIUM BROMIDE AND ALBUTEROL SULFATE 2.5; .5 MG/3ML; MG/3ML
3 SOLUTION RESPIRATORY (INHALATION) EVERY 4 HOURS PRN
Status: DISCONTINUED | OUTPATIENT
Start: 2025-02-12 | End: 2025-02-12 | Stop reason: HOSPADM

## 2025-02-12 RX ADMIN — APIXABAN 2.5 MG: 2.5 TABLET, FILM COATED ORAL at 08:00

## 2025-02-12 RX ADMIN — ALBUTEROL SULFATE 2.5 MG: 2.5 SOLUTION RESPIRATORY (INHALATION) at 05:34

## 2025-02-12 RX ADMIN — HYDROCHLOROTHIAZIDE: 25 TABLET ORAL at 08:00

## 2025-02-12 RX ADMIN — PANTOPRAZOLE SODIUM 40 MG: 40 TABLET, DELAYED RELEASE ORAL at 06:07

## 2025-02-12 RX ADMIN — ACETAMINOPHEN 325MG 650 MG: 325 TABLET ORAL at 07:59

## 2025-02-12 RX ADMIN — CETIRIZINE HYDROCHLORIDE 10 MG: 10 TABLET, FILM COATED ORAL at 08:00

## 2025-02-12 RX ADMIN — METOPROLOL SUCCINATE 100 MG: 50 TABLET, EXTENDED RELEASE ORAL at 10:01

## 2025-02-12 RX ADMIN — HYDRALAZINE HYDROCHLORIDE 10 MG: 20 INJECTION INTRAMUSCULAR; INTRAVENOUS at 03:59

## 2025-02-12 NOTE — CASE MANAGEMENT/SOCIAL WORK
Continued Stay Note  [facility]     Patient Name: Mayuri Early  MRN: [id number]  Today's Date: 2/12/2025    Admit Date: 2/8/2025    Plan: Home, family to transport, referral for outpatient vestibular rehab, [facility]   Discharge Plan       Row Name 02/12/25 1413       Plan    Plan Home, family to transport, referral for outpatient vestibular rehab, [facility]    Plan Comments Clinicals reviewed. CCP notes DC order. CCP met with pt at bedside & explained outpatient vestibular rehab has been ordered. CCP explained pt can take printed copy of order CCP provided to pt to any outpatient therapy location of her choice. Pt verbalized understanding. CCP provided pt with Patient Choice list for outpatient therapy. Pt verbalized understanding. Pt asked CCP again about order for breathing treatments & nebulizer. [facility] sent Dr. Awad a secure chat updating that pt is asking for breathing treatments & a nebulizer at [place]. Dr. Awad called CCP & asked CCP to enter order for breathing treatments & nebulizer. [facility] called Fang/MAUREEN Hospitalist Coordinator & updated. Fang entered order for nebulizer & reports she will update Dr. Awad that he has to enter order for breathing treatments. [facility] notified Simón/Jeff of new order for nebulizer. Simón reports she will deliver nebulizer to pt's bedside today. DC plan home, family to transport, referral for outpatient vestibular rehab, [facility]. MAUREEN Franks/BALJEET             Expected Discharge Date and Time       Expected Discharge Date Expected Discharge Time    Feb 12, 2025    Jannette Bell RN

## 2025-02-12 NOTE — CASE MANAGEMENT/SOCIAL WORK
Continued Stay Note  [facility]     Patient Name: Mayuri Early  MRN: [id number]  Today's Date: 2/12/2025    Admit Date: 2/8/2025    Plan: Home, family to transport, referral for outpatient vestibular rehab, [facility]   Discharge Plan       Row Name 02/12/25 1542       Plan    Plan Comments [facility] delivered nebulizer to bedside. MAUREEN Franks/BALJEET      Row Name 02/12/25 1418       Plan    Final Discharge Disposition Code 01 - home or self-care    Final Note Pt DC home with referral for outpatient vestibular rehab & [facility]; no other needs identified. MAUREEN Franks/BALJEET      Row Name 02/12/25 1413       Plan    Plan Home, family to transport, referral for outpatient vestibular rehab, [facility]    Plan Comments Clinicals reviewed. CCP notes DC order. CCP met with pt at bedside & explained outpatient vestibular rehab has been ordered. CCP explained pt can take printed copy of order CCP provided to pt to any outpatient therapy location of her choice. Pt verbalized understanding. CCP provided pt with Patient Choice list for outpatient therapy. Pt verbalized understanding. Pt asked CCP again about order for breathing treatments & nebulizer. [facility] sent Dr. Awad a secure chat updating that pt is asking for breathing treatments & a nebulizer at [place]. Dr. Awad called CCP & asked CCP to enter order for breathing treatments & nebulizer. CCP called Fang/MAUREEN Hospitalist Coordinator & updated. Fang entered order for nebulizer & reports she will update Dr. Awad that he has to enter order for breathing treatments. CCP notified Simón/Jeff of new order for nebulizer. Simón reports she will deliver nebulizer to pt's bedside today. DC plan home, family to transport, referral for outpatient vestibular rehab, [facility]. MAUREEN Franks/BALJEET               Expected Discharge Date and Time       Expected Discharge Date Expected Discharge Time    Feb 12, 2025    Jannette Bell RN     patient

## 2025-02-12 NOTE — PLAN OF CARE
Goal Outcome Evaluation:  Plan of Care Reviewed With: patient        Progress: improving  Pt will discharge today home with her son. She is alert and oriented x4, BP has been better today. She will need outpt vestibular PT.

## 2025-02-12 NOTE — PLAN OF CARE
Alert x 4. VSS 2L NC while sleeping. Brief, purewick. Tylenol given for leg pain.       Problem: Adult Inpatient Plan of Care  Goal: Absence of Hospital-Acquired Illness or Injury  Intervention: Prevent Skin Injury  Recent Flowsheet Documentation  Taken 2/12/2025 0154 by Lizbet Randall RN  Body Position: sitting up in bed  Taken 2/11/2025 2342 by Lizbet Randall RN  Body Position:   right   side-lying  Skin Protection: protective footwear used  Taken 2/11/2025 2159 by Lizbet Randall RN  Body Position:   right   side-lying  Taken 2/11/2025 1951 by Lizbet Randall RN  Body Position:   sitting up in bed   heels elevated  Skin Protection: incontinence pads utilized   Goal Outcome Evaluation:

## 2025-02-12 NOTE — DISCHARGE SUMMARY
Discharge summary    Date of admission 2/8/2025  Date of discharge 2/12/2025    Final diagnosis  Benign paroxysmal positional vertigo resolved  Paroxysmal atrial fibrillation  Status post dehydration  Hypokalemia and hypomagnesemia replaced  Hypertension  Hyperlipidemia  History of asthma  Status post permanent pacemaker placement  Gastroesophageal reflux disease    Discharge medications    Current Facility-Administered Medications:     apixaban (ELIQUIS) tablet 2.5 mg, 2.5 mg, Oral, Q12H, Kike Awad MD, 2.5 mg at 02/12/25 0800    atorvastatin (LIPITOR) tablet 10 mg, 10 mg, Oral, Nightly, Kike Awad MD, 10 mg at 02/11/25 2027    cetirizine (zyrTEC) tablet 10 mg, 10 mg, Oral, Daily, Kike Awad MD, 10 mg at 02/12/25 0800    losartan (COZAAR) 100 mg, hydroCHLOROthiazide 25 mg for HYZAAR 100-25, , Oral, Daily, Kike Awad MD, Given at 02/12/25 0800    metoprolol succinate XL (TOPROL-XL) 24 hr tablet 100 mg, 100 mg, Oral, Q12H, Kike Awad MD, 100 mg at 02/12/25 1001    pantoprazole (PROTONIX) EC tablet 40 mg, 40 mg, Oral, BID AC, Kike Awad MD, 40 mg at 02/12/25 0607     Consults obtained  Neurology    Procedures  None    Hospital course  89-year-old white female with history of atrial fibrillation hypertension hyperlipidemia asthma and gastroesophageal of disease who also has a permanent pacemaker admitted to emergency room with severe dizziness and nausea.  Patient evaluated in ER admitted for further workup.  Patient workup revealed benign paroxysmal positional vertigo and followed by neurology.  Patient was dehydrated received fluids.  Patient has low potassium and magnesium which is replaced.  Patient blood pressure medication adjusted.  Patient is feeling better and neurology recommend outpatient vestibular rehab which she will follow-up.  Patient going home with family.    Discharge diet regular    Activity as tolerated    Medication as above    Follow-up with prime doctor in 1 week and follow-up  with neurology per the instruction and also needs to follow-up with vestibular rehab as an outpatient.    HE OSWALD MD

## 2025-02-12 NOTE — CASE MANAGEMENT/SOCIAL WORK
Case Management Discharge Note      Final Note: Pt DC home with referral for outpatient vestibular rehab & [facility]; no other needs identified. Tiny RN/CCP    Provided Post Acute Provider List?: N/A  N/A Provider List Comment: Pt denied need    Selected Continued Care - Admitted Since 2/8/2025       Durable Medical Equipment Coordination complete.      Service Provider Services Address Phone Fax Patient Preferred    [facility] Medical [facility] [address] [address] [phone] --             Transportation Services  Private: Car    Final Discharge Disposition Code: 01 - home or self-care

## 2025-02-12 NOTE — PROGRESS NOTES
"Daily progress note    Primary care physician  Dr. Hidalgo    Subjective  Feeling much better with no new complaint and wants to go home.  Patient family at bedside with a lot of questions.    History of present illness  89-year-old white female with history of paroxysmal atrial fibrillation hypertension hyperlipidemia asthma who lives by herself brought to the emergency room by the family with severe dizziness and nausea started yesterday.  Patient has no vomiting headache vision problem speech problem.  Patient stated that she felt dizzy with she never had fell before with nausea.  Patient denies any chest pain shortness of breath palpitation.  Patient workup in ER revealed hypomagnesemia and admitted for further workup of dizziness.     REVIEW OF SYSTEMS  Unremarkable     PHYSICAL EXAM   Blood pressure 137/67, pulse 67, temperature 98 °F (36.7 °C), temperature source Oral, resp. rate 16, height 154.9 cm (61\"), weight 56.7 kg (125 lb), SpO2 98%.    General: Awake and alert in no distress  HEENT: Unremarkable  Neck: Full ROM  Pulm: Normal effort and moving air bilaterally  Cardiovascular: Regular rate and rhythm, intact distal pulses  GI: Soft, nontender, nondistended, no rebound, no guarding, bowel sounds present  MSK: Full ROM, no deformity  Skin: Warm, dry  Neuro: Awake, alert, no focal deficit  Psych: Calm, cooperative     LAB RESULTS  Lab Results (last 24 hours)       Procedure Component Value Units Date/Time    Basic Metabolic Panel [[id number]]  (Normal) Collected: 02/12/25 0456    Specimen: Blood Updated: 02/12/25 0549     Glucose 99 mg/dL      BUN 12 mg/dL      Creatinine 0.76 mg/dL      Sodium 140 mmol/L      Potassium 4.0 mmol/L      Chloride 106 mmol/L      CO2 24.9 mmol/L      Calcium 8.9 mg/dL      BUN/Creatinine Ratio 15.8     Anion Gap 9.1 mmol/L      eGFR 75.0 mL/min/1.73     Narrative:      GFR Categories in Chronic Kidney Disease (CKD)      GFR Category          GFR (mL/min/1.73)    " Interpretation  G1                     90 or greater         Normal or high (1)  G2                      60-89                Mild decrease (1)  G3a                   45-59                Mild to moderate decrease  G3b                   30-44                Moderate to severe decrease  G4                    15-29                Severe decrease  G5                    14 or less           Kidney failure          (1)In the absence of evidence of kidney disease, neither GFR category G1 or G2 fulfill the criteria for CKD.    eGFR calculation 2021 CKD-EPI creatinine equation, which does not include race as a factor          Imaging Results (Last 24 Hours)       ** No results found for the last 24 hours. **          Scan on 2/8/2025 1232 by [facility] Onbase, [place]: ECG 12-LEAD         Author: -- Service: -- Author Type: --   Filed: Date of Service: Creation Time:   Status: (Other)   HEART RATE=67  bpm  RR Esblsgnp=029  ms  LA Interval=  ms  P Horizontal Axis=  deg  P Front Axis=  deg  QRSD Bmibkjjy=006  ms  QT Zuaavnbl=619  ms  TPrZ=283  ms  QRS Axis=67  deg  T Wave Axis=-48  deg  - ABNORMAL ECG -  Afib/flut and V-paced complexes  Right bundle branch block  When compared with ECG of 05-Jan-2024 16:38:08,  Ventricular pacing is new          Current Facility-Administered Medications:     apixaban (ELIQUIS) tablet 2.5 mg, 2.5 mg, Oral, Q12H, Kike Awad MD, 2.5 mg at 02/12/25 0800    atorvastatin (LIPITOR) tablet 10 mg, 10 mg, Oral, Nightly, Kike Awad MD, 10 mg at 02/11/25 2027    cetirizine (zyrTEC) tablet 10 mg, 10 mg, Oral, Daily, Kike Awad MD, 10 mg at 02/12/25 0800    losartan (COZAAR) 100 mg, hydroCHLOROthiazide 25 mg for HYZAAR 100-25, , Oral, Daily, Kike Awad MD, Given at 02/12/25 0800    metoprolol succinate XL (TOPROL-XL) 24 hr tablet 100 mg, 100 mg, Oral, Q12H, Kike Awad MD, 100 mg at 02/12/25 1001    pantoprazole (PROTONIX) EC tablet 40 mg, 40 mg, Oral, BID AC, Kike Awad MD, 40 mg at 02/12/25  0607     ASSESSMENT  Benign positional vertigo resolved  Paroxysmal atrial fibrillation  Status post dehydration  Hypokalemia and hypomagnesemia replaced  Hypertension  Hyperlipidemia  History of asthma  Status post permanent pacemaker placement  Gastroesophageal reflux disease    PLAN  Discharge home  Discharge summary dictated    HE OSWALD MD    Copied text in this note has been reviewed and is accurate as of 02/12/25

## 2025-02-13 NOTE — OUTREACH NOTE
Prep Survey      Flowsheet Row Responses   [employer] facility patient discharged from? [place]   Is LACE score < 7 ? No   Eligibility Readm Mgmt   Discharge diagnosis Dizziness   Does the patient have one of the following disease processes/diagnoses(primary or secondary)? Other   Does the patient have Home health ordered? No   Is there a DME ordered? Yes   What DME was ordered? [facility]   Prep survey completed? Yes            NANCY SALDANA - Registered Nurse

## 2025-02-17 ENCOUNTER — READMISSION MANAGEMENT (OUTPATIENT)
Dept: CALL CENTER | Facility: HOSPITAL | Age: OVER 89
End: 2025-02-17
Payer: OTHER GOVERNMENT

## 2025-02-17 NOTE — OUTREACH NOTE
Medical Week 1 Survey      Flowsheet Row Responses   [facility] patient discharged from? [place]   Does the patient have one of the following disease processes/diagnoses(primary or secondary)? Other   Week 1 attempt successful? Yes   Call start time 1645   Call end time 1652   Discharge diagnosis Dizziness   Meds reviewed with patient/caregiver? Yes   Is the patient having any side effects they believe may be caused by any medication additions or changes? No   Does the patient have all medications ordered at discharge? Yes   Is the patient taking all medications as directed (includes completed medication regime)? Yes   Does the patient have a primary care provider?  Yes   Does the patient have an appointment with their PCP within 7 days of discharge? Yes   Comments regarding PCP PCP appt 2-19-25   Has the patient kept scheduled appointments due by today? N/A   DME comments Pt does not have home O2 or pulse oximeter but does have a home nebulizer   Comments Pt c/o ongoing dry cough but denies fever or chills, denies chest pain, but does report some pain in her back. Walking or lying supine causes the pt to have SOA, the pt states that she gets dizzy with the SOA, both symptoms resolve with rest and deep breathing. Patient reports no further nausea and states that she is tolerating po intake. Pt has continued to perform cough/deep breathing at home, she reports   Did the patient receive a copy of their discharge instructions? Yes   Nursing interventions Reviewed instructions with patient   What is the patient's perception of their health status since discharge? Improving   Is the patient/caregiver able to teach back signs and symptoms related to disease process for when to call PCP? Yes   Is the patient/caregiver able to teach back signs and symptoms related to disease process for when to call 911? Yes   Is the patient/caregiver able to teach back the hierarchy of who to call/visit for symptoms/problems? PCP,  Specialist, Home health nurse, Urgent Care, ED, 911 Yes   If the patient is a current smoker, are they able to teach back resources for cessation? Not a smoker   Week 1 call completed? Yes   Call end time [id number]            Angeles SAINI - Registered Nurse

## 2025-02-24 ENCOUNTER — APPOINTMENT (OUTPATIENT)
Dept: GENERAL RADIOLOGY | Facility: HOSPITAL | Age: OVER 89
End: 2025-02-24
Payer: MEDICARE

## 2025-02-24 ENCOUNTER — HOSPITAL ENCOUNTER (INPATIENT)
Facility: HOSPITAL | Age: OVER 89
LOS: 8 days | Discharge: NURSING, REHAB OR LONG-TERM CARE | End: 2025-03-04
Attending: EMERGENCY MEDICINE | Admitting: HOSPITALIST
Payer: MEDICARE

## 2025-02-24 DIAGNOSIS — R06.00 DYSPNEA, UNSPECIFIED TYPE: ICD-10-CM

## 2025-02-24 DIAGNOSIS — S32.010A CLOSED COMPRESSION FRACTURE OF L1 VERTEBRA, INITIAL ENCOUNTER: ICD-10-CM

## 2025-02-24 DIAGNOSIS — S22.080A T12 COMPRESSION FRACTURE, INITIAL ENCOUNTER: ICD-10-CM

## 2025-02-24 DIAGNOSIS — J90 PLEURAL EFFUSION: Primary | ICD-10-CM

## 2025-02-24 LAB
ALBUMIN SERPL-MCNC: 4 G/DL (ref 3.5–5.2)
ALBUMIN/GLOB SERPL: 1.3 G/DL
ALP SERPL-CCNC: 92 U/L (ref 39–117)
ALT SERPL W P-5'-P-CCNC: 16 U/L (ref 1–33)
ANION GAP SERPL CALCULATED.3IONS-SCNC: 9 MMOL/L (ref 5–15)
AST SERPL-CCNC: 22 U/L (ref 1–32)
BASOPHILS # BLD AUTO: 0.02 10*3/MM3 (ref 0–0.2)
BASOPHILS NFR BLD AUTO: 0.3 % (ref 0–1.5)
BILIRUB SERPL-MCNC: 0.6 MG/DL (ref 0–1.2)
BUN SERPL-MCNC: 18 MG/DL (ref 8–23)
BUN/CREAT SERPL: 19.1 (ref 7–25)
CALCIUM SPEC-SCNC: 9.5 MG/DL (ref 8.6–10.5)
CHLORIDE SERPL-SCNC: 103 MMOL/L (ref 98–107)
CO2 SERPL-SCNC: 29 MMOL/L (ref 22–29)
CREAT SERPL-MCNC: 0.94 MG/DL (ref 0.57–1)
D-LACTATE SERPL-SCNC: 0.9 MMOL/L (ref 0.5–2)
DEPRECATED RDW RBC AUTO: 39.8 FL (ref 37–54)
EGFRCR SERPLBLD CKD-EPI 2021: 58.1 ML/MIN/1.73
EOSINOPHIL # BLD AUTO: 0.21 10*3/MM3 (ref 0–0.4)
EOSINOPHIL NFR BLD AUTO: 3.3 % (ref 0.3–6.2)
ERYTHROCYTE [DISTWIDTH] IN BLOOD BY AUTOMATED COUNT: 11.6 % (ref 12.3–15.4)
GEN 5 1HR TROPONIN T REFLEX: 11 NG/L
GLOBULIN UR ELPH-MCNC: 3.2 GM/DL
GLUCOSE SERPL-MCNC: 99 MG/DL (ref 65–99)
HCT VFR BLD AUTO: 40.5 % (ref 34–46.6)
HGB BLD-MCNC: 13.2 G/DL (ref 12–15.9)
IMM GRANULOCYTES # BLD AUTO: 0.01 10*3/MM3 (ref 0–0.05)
IMM GRANULOCYTES NFR BLD AUTO: 0.2 % (ref 0–0.5)
LYMPHOCYTES # BLD AUTO: 1.48 10*3/MM3 (ref 0.7–3.1)
LYMPHOCYTES NFR BLD AUTO: 23 % (ref 19.6–45.3)
MCH RBC QN AUTO: 31.1 PG (ref 26.6–33)
MCHC RBC AUTO-ENTMCNC: 32.6 G/DL (ref 31.5–35.7)
MCV RBC AUTO: 95.5 FL (ref 79–97)
MONOCYTES # BLD AUTO: 0.66 10*3/MM3 (ref 0.1–0.9)
MONOCYTES NFR BLD AUTO: 10.3 % (ref 5–12)
NEUTROPHILS NFR BLD AUTO: 4.05 10*3/MM3 (ref 1.7–7)
NEUTROPHILS NFR BLD AUTO: 62.9 % (ref 42.7–76)
NRBC BLD AUTO-RTO: 0 /100 WBC (ref 0–0.2)
NT-PROBNP SERPL-MCNC: 3526 PG/ML (ref 0–1800)
PLATELET # BLD AUTO: 242 10*3/MM3 (ref 140–450)
PMV BLD AUTO: 8.7 FL (ref 6–12)
POTASSIUM SERPL-SCNC: 3.9 MMOL/L (ref 3.5–5.2)
PROCALCITONIN SERPL-MCNC: 0.05 NG/ML (ref 0–0.25)
PROT SERPL-MCNC: 7.2 G/DL (ref 6–8.5)
RBC # BLD AUTO: 4.24 10*6/MM3 (ref 3.77–5.28)
SODIUM SERPL-SCNC: 141 MMOL/L (ref 136–145)
TROPONIN T NUMERIC DELTA: 0 NG/L
TROPONIN T SERPL HS-MCNC: 11 NG/L
WBC NRBC COR # BLD AUTO: 6.43 10*3/MM3 (ref 3.4–10.8)

## 2025-02-24 PROCEDURE — 85025 COMPLETE CBC W/AUTO DIFF WBC: CPT | Performed by: PHYSICIAN ASSISTANT

## 2025-02-24 PROCEDURE — 93005 ELECTROCARDIOGRAM TRACING: CPT | Performed by: PHYSICIAN ASSISTANT

## 2025-02-24 PROCEDURE — 71045 X-RAY EXAM CHEST 1 VIEW: CPT

## 2025-02-24 PROCEDURE — 36415 COLL VENOUS BLD VENIPUNCTURE: CPT

## 2025-02-24 PROCEDURE — 83605 ASSAY OF LACTIC ACID: CPT | Performed by: PHYSICIAN ASSISTANT

## 2025-02-24 PROCEDURE — 80053 COMPREHEN METABOLIC PANEL: CPT | Performed by: PHYSICIAN ASSISTANT

## 2025-02-24 PROCEDURE — 84484 ASSAY OF TROPONIN QUANT: CPT | Performed by: PHYSICIAN ASSISTANT

## 2025-02-24 PROCEDURE — 0202U NFCT DS 22 TRGT SARS-COV-2: CPT | Performed by: PHYSICIAN ASSISTANT

## 2025-02-24 PROCEDURE — 84145 PROCALCITONIN (PCT): CPT | Performed by: PHYSICIAN ASSISTANT

## 2025-02-24 PROCEDURE — 25010000002 FUROSEMIDE PER 20 MG: Performed by: PHYSICIAN ASSISTANT

## 2025-02-24 PROCEDURE — 93010 ELECTROCARDIOGRAM REPORT: CPT | Performed by: STUDENT IN AN ORGANIZED HEALTH CARE EDUCATION/TRAINING PROGRAM

## 2025-02-24 PROCEDURE — 83880 ASSAY OF NATRIURETIC PEPTIDE: CPT | Performed by: PHYSICIAN ASSISTANT

## 2025-02-24 PROCEDURE — 99285 EMERGENCY DEPT VISIT HI MDM: CPT

## 2025-02-24 RX ORDER — SODIUM CHLORIDE 0.9 % (FLUSH) 0.9 %
10 SYRINGE (ML) INJECTION AS NEEDED
Status: DISCONTINUED | OUTPATIENT
Start: 2025-02-24 | End: 2025-03-04 | Stop reason: HOSPADM

## 2025-02-24 RX ORDER — FUROSEMIDE 10 MG/ML
40 INJECTION INTRAMUSCULAR; INTRAVENOUS ONCE
Status: COMPLETED | OUTPATIENT
Start: 2025-02-24 | End: 2025-02-24

## 2025-02-24 RX ADMIN — FUROSEMIDE 40 MG: 10 INJECTION, SOLUTION INTRAMUSCULAR; INTRAVENOUS at 23:13

## 2025-02-24 NOTE — ED NOTES
Pt arrives via EMS from home. Pt was diagnosed with pneumonia and admitted 2 weeks ago. Went to follow up with her PCP and they told her to come to the ER to get oxygen. EMS reports that pt was 100% on RA. Placed on 2L for comfort.

## 2025-02-25 ENCOUNTER — READMISSION MANAGEMENT (OUTPATIENT)
Dept: CALL CENTER | Facility: HOSPITAL | Age: OVER 89
End: 2025-02-25
Payer: OTHER GOVERNMENT

## 2025-02-25 PROCEDURE — 25010000002 FUROSEMIDE PER 20 MG: Performed by: HOSPITALIST

## 2025-02-25 PROCEDURE — 90791 PSYCH DIAGNOSTIC EVALUATION: CPT | Performed by: SOCIAL WORKER

## 2025-02-25 RX ORDER — MULTIPLE VITAMINS W/ MINERALS TAB 9MG-400MCG
1 TAB ORAL DAILY
Status: DISCONTINUED | OUTPATIENT
Start: 2025-02-25 | End: 2025-02-25

## 2025-02-25 RX ORDER — FUROSEMIDE 10 MG/ML
20 INJECTION INTRAMUSCULAR; INTRAVENOUS
Status: DISCONTINUED | OUTPATIENT
Start: 2025-02-25 | End: 2025-02-25

## 2025-02-25 RX ORDER — IPRATROPIUM BROMIDE AND ALBUTEROL SULFATE 2.5; .5 MG/3ML; MG/3ML
3 SOLUTION RESPIRATORY (INHALATION) EVERY 4 HOURS PRN
Status: DISCONTINUED | OUTPATIENT
Start: 2025-02-25 | End: 2025-03-04 | Stop reason: HOSPADM

## 2025-02-25 RX ORDER — PANTOPRAZOLE SODIUM 40 MG/1
40 TABLET, DELAYED RELEASE ORAL
Status: DISCONTINUED | OUTPATIENT
Start: 2025-02-25 | End: 2025-03-03

## 2025-02-25 RX ORDER — ACETAMINOPHEN 325 MG/1
650 TABLET ORAL EVERY 6 HOURS PRN
Status: DISCONTINUED | OUTPATIENT
Start: 2025-02-25 | End: 2025-03-04 | Stop reason: HOSPADM

## 2025-02-25 RX ORDER — TRAZODONE HYDROCHLORIDE 50 MG/1
50 TABLET ORAL NIGHTLY
COMMUNITY

## 2025-02-25 RX ORDER — FUROSEMIDE 10 MG/ML
40 INJECTION INTRAMUSCULAR; INTRAVENOUS
Status: DISCONTINUED | OUTPATIENT
Start: 2025-02-25 | End: 2025-02-26

## 2025-02-25 RX ORDER — CETIRIZINE HYDROCHLORIDE 10 MG/1
10 TABLET ORAL DAILY
Status: DISCONTINUED | OUTPATIENT
Start: 2025-02-25 | End: 2025-03-04 | Stop reason: HOSPADM

## 2025-02-25 RX ORDER — ALBUTEROL SULFATE 90 UG/1
2 INHALANT RESPIRATORY (INHALATION) EVERY 4 HOURS PRN
Status: DISCONTINUED | OUTPATIENT
Start: 2025-02-25 | End: 2025-03-04 | Stop reason: HOSPADM

## 2025-02-25 RX ORDER — GUAIFENESIN 600 MG/1
600 TABLET, EXTENDED RELEASE ORAL EVERY 12 HOURS SCHEDULED
Status: DISCONTINUED | OUTPATIENT
Start: 2025-02-25 | End: 2025-02-25

## 2025-02-25 RX ORDER — HYDROCODONE BITARTRATE AND ACETAMINOPHEN 5; 325 MG/1; MG/1
1 TABLET ORAL EVERY 4 HOURS PRN
Status: DISCONTINUED | OUTPATIENT
Start: 2025-02-25 | End: 2025-03-04 | Stop reason: HOSPADM

## 2025-02-25 RX ORDER — METOPROLOL SUCCINATE 100 MG/1
100 TABLET, EXTENDED RELEASE ORAL
Status: DISCONTINUED | OUTPATIENT
Start: 2025-02-25 | End: 2025-03-04 | Stop reason: HOSPADM

## 2025-02-25 RX ORDER — TRAZODONE HYDROCHLORIDE 50 MG/1
50 TABLET ORAL NIGHTLY PRN
Status: DISCONTINUED | OUTPATIENT
Start: 2025-02-25 | End: 2025-03-04 | Stop reason: HOSPADM

## 2025-02-25 RX ORDER — ATORVASTATIN CALCIUM 10 MG/1
10 TABLET, FILM COATED ORAL NIGHTLY
Status: DISCONTINUED | OUTPATIENT
Start: 2025-02-25 | End: 2025-03-04 | Stop reason: HOSPADM

## 2025-02-25 RX ORDER — LOSARTAN POTASSIUM 100 MG/1
100 TABLET ORAL
Status: DISCONTINUED | OUTPATIENT
Start: 2025-02-25 | End: 2025-03-01

## 2025-02-25 RX ADMIN — Medication 1 TABLET: at 11:16

## 2025-02-25 RX ADMIN — APIXABAN 2.5 MG: 5 TABLET, FILM COATED ORAL at 22:39

## 2025-02-25 RX ADMIN — GUAIFENESIN 600 MG: 600 TABLET, EXTENDED RELEASE ORAL at 11:15

## 2025-02-25 RX ADMIN — ATORVASTATIN CALCIUM 10 MG: 10 TABLET ORAL at 11:16

## 2025-02-25 RX ADMIN — APIXABAN 2.5 MG: 5 TABLET, FILM COATED ORAL at 12:36

## 2025-02-25 RX ADMIN — FUROSEMIDE 20 MG: 10 INJECTION, SOLUTION INTRAMUSCULAR; INTRAVENOUS at 12:36

## 2025-02-25 RX ADMIN — METOPROLOL SUCCINATE 100 MG: 25 TABLET, EXTENDED RELEASE ORAL at 10:28

## 2025-02-25 RX ADMIN — FUROSEMIDE 40 MG: 10 INJECTION, SOLUTION INTRAMUSCULAR; INTRAVENOUS at 22:39

## 2025-02-25 RX ADMIN — CETIRIZINE HYDROCHLORIDE 10 MG: 10 TABLET ORAL at 11:16

## 2025-02-25 RX ADMIN — LOSARTAN POTASSIUM 100 MG: 100 TABLET, FILM COATED ORAL at 11:16

## 2025-02-25 RX ADMIN — ATORVASTATIN CALCIUM 10 MG: 10 TABLET ORAL at 22:39

## 2025-02-25 NOTE — H&P
History and physical    Primary care physician  Dr. Hidalgo    Chief complaint  Shortness of breath    History of present illness  89-year-old white female with history of paroxysmal atrial fibrillation hypertension hyperlipidemia asthma and gastroesophageal reflux disease who is well-known to our service to the emergency room with shortness of breath for last several days.  Patient denies any chest pain palpitation abdominal pain nausea vomiting diarrhea.  Patient denies any fever cough congestion.  Patient evaluated in ER found to have pleural effusion admitted for management.  Patient fully alert oriented give me an good history and still wants to be a full code and lives by herself.    PAST MEDICAL HISTORY             Date Noted    Well woman exam 10/20/2020    Closed compression fracture of L1 vertebra, initial encounter 01/04/2024    Compression fracture of T12 vertebra 01/04/2024    PAF (paroxysmal atrial fibrillation) 01/04/2024    Presence of cardiac pacemaker 01/04/2024    HTN (hypertension) 01/04/2024    CKD (chronic kidney disease) 01/04/2024    Chronic anticoagulation 01/04/2024    Chronic back pain 01/04/2024    Anemia 01/04/2024    Asthma 01/04/2024    Hematoma of hip 01/08/2024    Acute blood loss anemia 01/08/2024    Postural dizziness 02/08/2025      PAST SURGICAL HISTORY              Procedure Laterality Date    BREAST CYST ASPIRATION        BREAST CYST EXCISION        HYSTERECTOMY             FAMILY HISTORY  No family history on file.     SOCIAL HISTORY                Socioeconomic History    Marital status:    Tobacco Use    Smoking status: Former    Smokeless tobacco: Never   Vaping Use    Vaping status: Never Used   Substance and Sexual Activity    Alcohol use: Never    Drug use: Never    Sexual activity: Defer         ALLERGIES  Patient has no known allergies.  Home medications reviewed     REVIEW OF SYSTEMS  All systems reviewed and negative except for those discussed in HPI.     "  PHYSICAL EXAM   Blood pressure 147/66, pulse 70, temperature 98 °F (36.7 °C), temperature source Oral, resp. rate 18, height 154.9 cm (61\"), weight 59 kg (130 lb), SpO2 96%.    GENERAL: Awake and alert, oriented, elderly, not distressed  HENT: head atraumatic, no nuchal rigidity  EYES: no scleral icterus, EOMI  CV: regular rhythm, regular rate, no murmur  RESPIRATORY: normal effort, decreased breath sounds in the right base  ABDOMEN: soft, nontender  MUSCULOSKELETAL: no deformity, FROM, 2+ pedal edema  NEURO: alert, moves all extremities, follows commands  SKIN: warm, dry     LAB RESULTS  Lab Results (last 24 hours)       Procedure Component Value Units Date/Time    Respiratory Panel PCR w/COVID-19(SARS-CoV-2) NEISHA/RATNA/CHELA/PAD/COR/DAVIN In-House, NP Swab in UTM/VTM, 2 HR TAT - Swab, Nasopharynx [[id number]]  (Normal) Collected: 02/24/25 2208    Specimen: Swab from Nasopharynx Updated: 02/25/25 0027     ADENOVIRUS, PCR Not Detected     Coronavirus 229E Not Detected     Coronavirus HKU1 Not Detected     Coronavirus NL63 Not Detected     Coronavirus OC43 Not Detected     COVID19 Not Detected     Human Metapneumovirus Not Detected     Human Rhinovirus/Enterovirus Not Detected     Influenza A PCR Not Detected     Influenza B PCR Not Detected     Parainfluenza Virus 1 Not Detected     Parainfluenza Virus 2 Not Detected     Parainfluenza Virus 3 Not Detected     Parainfluenza Virus 4 Not Detected     RSV, PCR Not Detected     Bordetella pertussis pcr Not Detected     Bordetella parapertussis PCR Not Detected     Chlamydophila pneumoniae PCR Not Detected     Mycoplasma pneumo by PCR Not Detected    Narrative:      In the setting of a positive respiratory panel with a viral infection PLUS a negative procalcitonin without other underlying concern for bacterial infection, consider observing off antibiotics or discontinuation of antibiotics and continue supportive care. If the respiratory panel is positive for atypical bacterial " "infection (Bordetella pertussis, Chlamydophila pneumoniae, or Mycoplasma pneumoniae), consider antibiotic de-escalation to target atypical bacterial infection.    High Sensitivity Troponin T 1Hr [[id number]]  (Normal) Collected: 02/24/25 2305    Specimen: Blood Updated: 02/24/25 2344     HS Troponin T 11 ng/L      Troponin T Numeric Delta 0 ng/L     Narrative:      High Sensitive Troponin T Reference Range:  <14.0 ng/L- Negative Female for AMI  <22.0 ng/L- Negative Male for AMI  >=14 - Abnormal Female indicating possible myocardial injury.  >=22 - Abnormal Male indicating possible myocardial injury.   Clinicians would have to utilize clinical acumen, EKG, Troponin, and serial changes to determine if it is an Acute Myocardial Infarction or myocardial injury due to an underlying chronic condition.         Procalcitonin [[id number]]  (Normal) Collected: 02/24/25 2205    Specimen: Blood Updated: 02/24/25 2250     Procalcitonin 0.05 ng/mL     Narrative:      As a Marker for Sepsis (Non-Neonates):    1. <0.5 ng/mL represents a low risk of severe sepsis and/or septic shock.  2. >2 ng/mL represents a high risk of severe sepsis and/or septic shock.    As a Marker for Lower Respiratory Tract Infections that require antibiotic therapy:    PCT on Admission    Antibiotic Therapy       6-12 Hrs later    >0.5                Strongly Recommended  >0.25 - <0.5        Recommended   0.1 - 0.25          Discouraged              Remeasure/reassess PCT  <0.1                Strongly Discouraged     Remeasure/reassess PCT    As 28 day mortality risk marker: \"Change in Procalcitonin Result\" (>80% or <=80%) if Day 0 (or Day 1) and Day 4 values are available. Refer to http://www.[facility]-pct-calculator.com    Change in PCT <=80%  A decrease of PCT levels below or equal to 80% defines a positive change in PCT test result representing a higher risk for 28-day all-cause mortality of patients diagnosed with severe sepsis for septic shock.    Change " in PCT >80%  A decrease of PCT levels of more than 80% defines a negative change in PCT result representing a lower risk for 28-day all-cause mortality of patients diagnosed with severe sepsis or septic shock.       BNP [[id number]]  (Abnormal) Collected: 02/24/25 2205    Specimen: Blood Updated: 02/24/25 2250     proBNP 3,526.0 pg/mL     Narrative:      This assay is used as an aid in the diagnosis of individuals suspected of having heart failure. It can be used as an aid in the diagnosis of acute decompensated heart failure (ADHF) in patients presenting with signs and symptoms of ADHF to the emergency department (ED). In addition, NT-proBNP of <300 pg/mL indicates ADHF is not likely.    Age Range Result Interpretation  NT-proBNP Concentration (pg/mL:      <50             Positive            >450                   Gray                 300-450                    Negative             <300    50-75           Positive            >900                  Gray                300-900                  Negative            <300      >75             Positive            >1800                  Gray                300-1800                  Negative            <300    High Sensitivity Troponin T [[id number]]  (Normal) Collected: 02/24/25 2205    Specimen: Blood Updated: 02/24/25 2250     HS Troponin T 11 ng/L     Narrative:      High Sensitive Troponin T Reference Range:  <14.0 ng/L- Negative Female for AMI  <22.0 ng/L- Negative Male for AMI  >=14 - Abnormal Female indicating possible myocardial injury.  >=22 - Abnormal Male indicating possible myocardial injury.   Clinicians would have to utilize clinical acumen, EKG, Troponin, and serial changes to determine if it is an Acute Myocardial Infarction or myocardial injury due to an underlying chronic condition.         Comprehensive Metabolic Panel [[id number]]  (Abnormal) Collected: 02/24/25 2205    Specimen: Blood Updated: 02/24/25 2244     Glucose 99 mg/dL      BUN 18 mg/dL       Creatinine 0.94 mg/dL      Sodium 141 mmol/L      Potassium 3.9 mmol/L      Chloride 103 mmol/L      CO2 29.0 mmol/L      Calcium 9.5 mg/dL      Total Protein 7.2 g/dL      Albumin 4.0 g/dL      ALT (SGPT) 16 U/L      AST (SGOT) 22 U/L      Alkaline Phosphatase 92 U/L      Total Bilirubin 0.6 mg/dL      Globulin 3.2 gm/dL      A/G Ratio 1.3 g/dL      BUN/Creatinine Ratio 19.1     Anion Gap 9.0 mmol/L      eGFR 58.1 mL/min/1.73     Narrative:      GFR Categories in Chronic Kidney Disease (CKD)      GFR Category          GFR (mL/min/1.73)    Interpretation  G1                     90 or greater         Normal or high (1)  G2                      60-89                Mild decrease (1)  G3a                   45-59                Mild to moderate decrease  G3b                   30-44                Moderate to severe decrease  G4                    15-29                Severe decrease  G5                    14 or less           Kidney failure          (1)In the absence of evidence of kidney disease, neither GFR category G1 or G2 fulfill the criteria for CKD.    eGFR calculation 2021 CKD-EPI creatinine equation, which does not include race as a factor    Lactic Acid, Plasma [[id number]]  (Normal) Collected: 02/24/25 2205    Specimen: Blood Updated: 02/24/25 2237     Lactate 0.9 mmol/L     CBC & Differential [[id number]]  (Abnormal) Collected: 02/24/25 2205    Specimen: Blood Updated: 02/24/25 2225    Narrative:      The following orders were created for panel order CBC & Differential.  Procedure                               Abnormality         Status                     ---------                               -----------         ------                     CBC Auto Differential[[id number]]        Abnormal            Final result                 Please view results for these tests on the individual orders.    CBC Auto Differential [[id number]]  (Abnormal) Collected: 02/24/25 2205    Specimen: Blood Updated: 02/24/25 2225      WBC 6.43 10*3/mm3      RBC 4.24 10*6/mm3      Hemoglobin 13.2 g/dL      Hematocrit 40.5 %      MCV 95.5 fL      MCH 31.1 pg      MCHC 32.6 g/dL      RDW 11.6 %      RDW-SD 39.8 fl      MPV 8.7 fL      Platelets 242 10*3/mm3      Neutrophil % 62.9 %      Lymphocyte % 23.0 %      Monocyte % 10.3 %      Eosinophil % 3.3 %      Basophil % 0.3 %      Immature Grans % 0.2 %      Neutrophils, Absolute 4.05 10*3/mm3      Lymphocytes, Absolute 1.48 10*3/mm3      Monocytes, Absolute 0.66 10*3/mm3      Eosinophils, Absolute 0.21 10*3/mm3      Basophils, Absolute 0.02 10*3/mm3      Immature Grans, Absolute 0.01 10*3/mm3      nRBC 0.0 /100 WBC           Imaging Results (Last 24 Hours)       Procedure Component Value Units Date/Time    XR Chest 1 View [[id number]] Collected: 02/24/25 2252     Updated: 02/24/25 2258    Narrative:      CXR ONE VIEW      HISTORY: SOA     COMPARISON: None     TECHNIQUE: single portable AP       Impression:         Left subclavian approach dual lead pacemaker.     Allowing for submaximal inspiratory result, heart size appears within  normal limits.     There is a moderate to large right pleural effusion and a small to  moderate left pleural effusion.     There is right greater than left basilar atelectasis versus infiltrate,  but there is no pneumothorax.     This report was finalized on 2/24/2025 10:54 PM by Dr. Anthony Turcios M.D on Workstation: [id number]             Scan on 2/24/2025 2221 by [employer], [place]: ECG 12-LEAD         Author: -- Service: -- Author Type: --   Filed: Date of Service: Creation Time:   Status: (Other)   HEART RATE=62  bpm  RR Oogcdoaa=755  ms  IN Interval=  ms  P Horizontal Axis=  deg  P Front Axis=  deg  QRSD Fbnaiatu=329  ms  QT Phygkkbp=118  ms  BXfX=015  ms  QRS Axis=75  deg  T Wave Axis=-33  deg  - ABNORMAL ECG -  Afib/flut and V-paced complexes  No further rhythm analysis attempted due to paced rhythm  IVCD, consider RBBB          Current Facility-Administered  Medications:     acetaminophen (TYLENOL) tablet 650 mg, 650 mg, Oral, Q6H PRN, He Awad MD    albuterol sulfate HFA (PROVENTIL HFA;VENTOLIN HFA;PROAIR HFA) inhaler 2 puff, 2 puff, Inhalation, Q4H PRN, He Awad MD    apixaban (ELIQUIS) tablet 2.5 mg, 2.5 mg, Oral, Q12H, He Awad MD, 2.5 mg at 02/25/25 1236    atorvastatin (LIPITOR) tablet 10 mg, 10 mg, Oral, Nightly, He Awad MD, 10 mg at 02/25/25 1116    cetirizine (zyrTEC) tablet 10 mg, 10 mg, Oral, Daily, He Awad MD, 10 mg at 02/25/25 1116    furosemide (LASIX) injection 40 mg, 40 mg, Intravenous, BID Diuretics, He Awad MD    HYDROcodone-acetaminophen (NORCO) 5-325 MG per tablet 1 tablet, 1 tablet, Oral, Q4H PRN, He Awad MD    ipratropium-albuterol (DUO-NEB) nebulizer solution 3 mL, 3 mL, Nebulization, Q4H PRN, He Awad MD    losartan (COZAAR) tablet 100 mg, 100 mg, Oral, Q24H, He Awad MD, 100 mg at 02/25/25 1116    metoprolol succinate XL (TOPROL-XL) 24 hr tablet 100 mg, 100 mg, Oral, Q24H, He Awad MD, 100 mg at 02/25/25 1028    pantoprazole (PROTONIX) EC tablet 40 mg, 40 mg, Oral, Q AM, He Awad MD    [COMPLETED] Insert Peripheral IV, , , Once **AND** sodium chloride 0.9 % flush 10 mL, 10 mL, Intravenous, PRN, He Awad MD      ASSESSMENT  Large right pleural effusion  Paroxysmal atrial fibrillation  Hypertension  Hyperlipidemia  Gastroesophageal reflux disease  Status post permanent pacemaker placement    PLAN  Admit  IV diuresis  Check 2D echo  May need thoracentesis  Pulmonary consult  Adjust home medications  Stress ulcer DVT prophylaxis  Supportive care  PT OT  Patient is full code  Discussed with nursing staff  Follow closely further recommendation current hospital course    HE AWAD MD

## 2025-02-25 NOTE — OUTREACH NOTE
Medical Week 2 Survey      Flowsheet Row Responses   [facility], [place], operated by [facility] patient discharged from? [place]   Does the patient have one of the following disease processes/diagnoses(primary or secondary)? Other   Week 2 attempt successful? No   Unsuccessful attempts Attempt 1   Revoke Readmitted            Missy DEL CID - Registered Nurse

## 2025-02-25 NOTE — THERAPY DISCHARGE NOTE
[facility]  [facility]  [phone]    ACCESS CENTER STATEMENT OF DISPOSITION        I, Mayuri Early, was assessed in the [facility] at [facility] on 2/25/2025.  I understand the recommendations.    Outpatient Counseling Referrals:    -[facility] [phone] [id number] [facility] [id number]                ________________________________  Patient/Parent/Guardian/POA Signature    ________________________________  Clinician Signature    2/25/2025  16:54 EST

## 2025-02-25 NOTE — ED PROVIDER NOTES
EMERGENCY DEPARTMENT ENCOUNTER    Room Number:  05/05  Date of encounter:  2/24/2025  PCP: Gunjan Hidalgo MD  Historian: Patient, family  Chronic or social conditions impacting care (social determinants of health): Full code from home    HPI:  Chief Complaint: Shortness of breath  A complete HPI/ROS/PMH/PSH/SH/FH are unobtainable due to: Nothing    Context: Mayuri Early is a 89 y.o. female with a history of atrial fibrillation, chronic kidney disease, who presents to the ED c/o acute shortness of breath for the past several days.  Patient complains of exertional dyspnea as well as orthopnea.  She denies any overt chest pain.  She has also noticed some pedal edema over the past several days.  She denies any cough or fever.  Patient was seen by her primary care doctor who referred her to the ER.    Review of prior external notes (non-ED):   I reviewed admission from 2/8/2025.  Patient admitted for dizziness, vertigo.    Review of prior external test results outside of this encounter:  I reviewed a BMP from 2/12/2025.  Creatinine 0.76, potassium 4.0    PAST MEDICAL HISTORY  Active Ambulatory Problems     Diagnosis Date Noted    Well woman exam 10/20/2020    Closed compression fracture of L1 vertebra, initial encounter 01/04/2024    Compression fracture of T12 vertebra 01/04/2024    PAF (paroxysmal atrial fibrillation) 01/04/2024    Presence of cardiac pacemaker 01/04/2024    HTN (hypertension) 01/04/2024    CKD (chronic kidney disease) 01/04/2024    Chronic anticoagulation 01/04/2024    Chronic back pain 01/04/2024    Anemia 01/04/2024    Asthma 01/04/2024    Hematoma of hip 01/08/2024    Acute blood loss anemia 01/08/2024    Postural dizziness 02/08/2025     Resolved Ambulatory Problems     Diagnosis Date Noted    No Resolved Ambulatory Problems     Past Medical History:   Diagnosis Date    Fibrocystic breast          PAST SURGICAL HISTORY  Past Surgical History:   Procedure Laterality Date    BREAST CYST  ASPIRATION      BREAST CYST EXCISION      HYSTERECTOMY           FAMILY HISTORY  No family history on file.      SOCIAL HISTORY  Social History     Socioeconomic History    Marital status:    Tobacco Use    Smoking status: Former    Smokeless tobacco: Never   Vaping Use    Vaping status: Never Used   Substance and Sexual Activity    Alcohol use: Never    Drug use: Never    Sexual activity: Defer         ALLERGIES  Patient has no known allergies.        REVIEW OF SYSTEMS  All systems reviewed and negative except for those discussed in HPI.       PHYSICAL EXAM    I have reviewed the triage vital signs and nursing notes.    ED Triage Vitals   Temp Heart Rate Resp BP SpO2   02/24/25 1901 02/24/25 1901 02/24/25 1901 02/24/25 1901 02/24/25 1901   97 °F (36.1 °C) 96 16 (!) 170/110 100 %      Temp src Heart Rate Source Patient Position BP Location FiO2 (%)   02/24/25 1901 02/24/25 1901 02/24/25 1901 02/24/25 2122 --   Tympanic Monitor Lying Right arm        Physical Exam  GENERAL: Alert, oriented, elderly, not distressed  HENT: head atraumatic, no nuchal rigidity  EYES: no scleral icterus, EOMI  CV: regular rhythm, regular rate, no murmur  RESPIRATORY: normal effort, decreased breath sounds in the right base  ABDOMEN: soft, nontender  MUSCULOSKELETAL: no deformity, FROM, 2+ pedal edema  NEURO: alert, moves all extremities, follows commands  SKIN: warm, dry        LAB RESULTS  Recent Results (from the past 24 hours)   Comprehensive Metabolic Panel    Collection Time: 02/24/25 10:05 PM    Specimen: Blood   Result Value Ref Range    Glucose 99 65 - 99 mg/dL    BUN 18 8 - 23 mg/dL    Creatinine 0.94 0.57 - 1.00 mg/dL    Sodium 141 136 - 145 mmol/L    Potassium 3.9 3.5 - 5.2 mmol/L    Chloride 103 98 - 107 mmol/L    CO2 29.0 22.0 - 29.0 mmol/L    Calcium 9.5 8.6 - 10.5 mg/dL    Total Protein 7.2 6.0 - 8.5 g/dL    Albumin 4.0 3.5 - 5.2 g/dL    ALT (SGPT) 16 1 - 33 U/L    AST (SGOT) 22 1 - 32 U/L    Alkaline Phosphatase 92  39 - 117 U/L    Total Bilirubin 0.6 0.0 - 1.2 mg/dL    Globulin 3.2 gm/dL    A/G Ratio 1.3 g/dL    BUN/Creatinine Ratio 19.1 7.0 - 25.0    Anion Gap 9.0 5.0 - 15.0 mmol/L    eGFR 58.1 (L) >60.0 mL/min/1.73   Lactic Acid, Plasma    Collection Time: 02/24/25 10:05 PM    Specimen: Blood   Result Value Ref Range    Lactate 0.9 0.5 - 2.0 mmol/L   Procalcitonin    Collection Time: 02/24/25 10:05 PM    Specimen: Blood   Result Value Ref Range    Procalcitonin 0.05 0.00 - 0.25 ng/mL   BNP    Collection Time: 02/24/25 10:05 PM    Specimen: Blood   Result Value Ref Range    proBNP 3,526.0 (H) 0.0 - 1,800.0 pg/mL   High Sensitivity Troponin T    Collection Time: 02/24/25 10:05 PM    Specimen: Blood   Result Value Ref Range    HS Troponin T 11 <14 ng/L   CBC Auto Differential    Collection Time: 02/24/25 10:05 PM    Specimen: Blood   Result Value Ref Range    WBC 6.43 3.40 - 10.80 10*3/mm3    RBC 4.24 3.77 - 5.28 10*6/mm3    Hemoglobin 13.2 12.0 - 15.9 g/dL    Hematocrit 40.5 34.0 - 46.6 %    MCV 95.5 79.0 - 97.0 fL    MCH 31.1 26.6 - 33.0 pg    MCHC 32.6 31.5 - 35.7 g/dL    RDW 11.6 (L) 12.3 - 15.4 %    RDW-SD 39.8 37.0 - 54.0 fl    MPV 8.7 6.0 - 12.0 fL    Platelets 242 140 - 450 10*3/mm3    Neutrophil % 62.9 42.7 - 76.0 %    Lymphocyte % 23.0 19.6 - 45.3 %    Monocyte % 10.3 5.0 - 12.0 %    Eosinophil % 3.3 0.3 - 6.2 %    Basophil % 0.3 0.0 - 1.5 %    Immature Grans % 0.2 0.0 - 0.5 %    Neutrophils, Absolute 4.05 1.70 - 7.00 10*3/mm3    Lymphocytes, Absolute 1.48 0.70 - 3.10 10*3/mm3    Monocytes, Absolute 0.66 0.10 - 0.90 10*3/mm3    Eosinophils, Absolute 0.21 0.00 - 0.40 10*3/mm3    Basophils, Absolute 0.02 0.00 - 0.20 10*3/mm3    Immature Grans, Absolute 0.01 0.00 - 0.05 10*3/mm3    nRBC 0.0 0.0 - 0.2 /100 WBC   ECG 12 Lead Dyspnea    Collection Time: 02/24/25 10:19 PM   Result Value Ref Range    QT Interval 445 ms    QTC Interval 453 ms       Ordered the above labs and independently reviewed the  results.        RADIOLOGY  XR Chest 1 View    Result Date: 2/24/2025  CXR ONE VIEW  HISTORY: SOA  COMPARISON: None  TECHNIQUE: single portable AP       Left subclavian approach dual lead pacemaker.  Allowing for submaximal inspiratory result, heart size appears within normal limits.  There is a moderate to large right pleural effusion and a small to moderate left pleural effusion.  There is right greater than left basilar atelectasis versus infiltrate, but there is no pneumothorax.  This report was finalized on 2/24/2025 10:54 PM by Dr. Anthony Turcios M.D on Workstation: [id number]       I ordered the above noted radiological studies. Reviewed by me and discussed with radiologist.  See dictation for official radiology interpretation.      MEDICATIONS GIVEN IN ER    Medications   sodium chloride 0.9 % flush 10 mL (has no administration in time range)   furosemide (LASIX) injection 40 mg (40 mg Intravenous Given 2/24/25 2313)         ADDITIONAL ORDERS CONSIDERED BUT NOT ORDERED:  Nothing      PROGRESS, DATA ANALYSIS, CONSULTS, AND MEDICAL DECISION MAKING    All labs have been independently interpreted by myself.  All radiology studies have been independently interpreted by myself and discussed with radiologist dictating the report.   EKGs independently interpreted by myself.  Discussion below represents my analysis of pertinent findings related to patient's condition, differential diagnosis, treatment plan and final disposition.    I have discussed case with Dr. Martines, emergency room physician.  He has performed his own bedside examination and agrees with treatment plan.    ED Course as of 02/24/25 2321   Mon Feb 24, 2025   2201 Patient presents with several day history of shortness of breath.  Differential diagnoses include but not limited to CHF, pneumonia, viral URI. [EE]   2223 EKG PROCEDURE    EKG time: 2219  Rhythm/Rate: Ventricular paced, rate 62    Independently Interpreted by me  Not significantly  changed compared to prior 2/8/2025   [TR]   2227 WBC: 6.43 [EE]   2252 Chest x-ray independently interpreted myself shows right worse than left pleural effusions [EE]   2254 proBNP(!): 3,526.0 [EE]   2255 Lactate: 0.9 [EE]   2257 proBNP(!): 3,526.0 [TR]   2310 Patient with symptomatic exertional dyspnea.  She does have a new large pleural effusion.  Plan to admit.    I discussed the case with Dr. Awad, hospitalist.  He agrees to admit. [EE]      ED Course User Index  [EE] Earnest Devine PA  [TR] Peewee Martines MD       AS OF 23:21 EST VITALS:    BP - (!) 119/107  HR - 77  TEMP - 97 °F (36.1 °C) (Tympanic)  O2 SATS - 95%        DIAGNOSIS  Final diagnoses:   Pleural effusion   Dyspnea, unspecified type         DISPOSITION  Admitted        Dictated utilizing Dragon dictation     Earnest Devine PA  02/24/25 6046

## 2025-02-25 NOTE — CONSULTS
Patient Identification:  Mayuri Early  89 y.o.  female  1935  [id number]          LOS 1    Requesting physician: Dr. Awad    Reason for Consult: Pleural effusion    History of Present Illness:     89-year-old female with history of A-fib, hypertension, hyperlipidemia, asthma, and GERD who came into the ER for shortness of breath.  She was recently admitted from 2/8-2/12 for benign paroxysmal positional vertigo which resolved..  She had follow-up with her PCP last Friday.  Since discharge, she has been complaining of some generalized weakness and mild shortness of breath.  Her PCP obtained a chest x-ray and then called her yesterday telling her that there were some pleural effusions and he recommended her to come to the ER.      Past Medical History:  Past Medical History:   Diagnosis Date    Fibrocystic breast     Well woman exam 10/20/2020       Past Surgical History:  Past Surgical History:   Procedure Laterality Date    BREAST CYST ASPIRATION      BREAST CYST EXCISION      HYSTERECTOMY          Home Meds:  Medications Prior to Admission   Medication Sig Dispense Refill Last Dose/Taking    acetaminophen (TYLENOL) 325 MG tablet Take 2 tablets by mouth Every 6 (Six) Hours As Needed for Mild Pain.   Past Month    albuterol sulfate  (90 Base) MCG/ACT inhaler Inhale 2 puffs Every 4 (Four) Hours As Needed for Wheezing.   2/24/2025    apixaban (ELIQUIS) 2.5 MG tablet tablet Take 1 tablet by mouth 2 (Two) Times a Day.   2/24/2025    atorvastatin (LIPITOR) 10 MG tablet Take 1 tablet by mouth Every Night.   2/24/2025    Cetirizine HCl 10 MG capsule Take 5 mg by mouth Daily As Needed (allergies).   Past Month    HYDROcodone-acetaminophen (NORCO) 5-325 MG per tablet Take 1 tablet by mouth Every 4 (Four) Hours As Needed for Moderate Pain. 15 tablet 0 Past Month    ipratropium-albuterol (DUO-NEB) 0.5-2.5 mg/3 ml nebulizer Take 3 mL by nebulization Every 4 (Four) Hours As Needed for Shortness of Air for up  "to 30 days. 360 mL 0 2/24/2025    losartan-hydrochlorothiazide (HYZAAR) 100-25 MG per tablet Take 1 tablet by mouth Daily.   2/24/2025    metoprolol succinate XL (TOPROL-XL) 100 MG 24 hr tablet Take 1 tablet by mouth 2 (Two) Times a Day.   2/24/2025    multivitamin with minerals (MULTIVITAMIN ADULT PO) Take 1 tablet by mouth Daily.   2/24/2025    Omega-3 Fatty Acids (fish oil) 1000 MG capsule capsule Take 1 capsule by mouth Daily With Breakfast.   2/24/2025    traZODone (DESYREL) 50 MG tablet Take 1 tablet by mouth Every Night.   2/24/2025    ondansetron (Zofran) 4 MG tablet Take 1 tablet by mouth Every 8 (Eight) Hours As Needed for Vomiting or Nausea. 12 tablet 0     pantoprazole (PROTONIX) 40 MG EC tablet Take 1 tablet by mouth Daily.            Allergies:  No Known Allergies    Social History:   Social History     Socioeconomic History    Marital status:    Tobacco Use    Smoking status: Former     Passive exposure: Past    Smokeless tobacco: Never   Vaping Use    Vaping status: Never Used   Substance and Sexual Activity    Alcohol use: Never    Drug use: Never    Sexual activity: Defer       Family History:  History reviewed. No pertinent family history.    Review of Systems:  Review of Systems   Constitutional:  Positive for fatigue.   Respiratory:  Positive for shortness of breath.    All other systems reviewed and are negative.       Objective:    PHYSICAL EXAM:    /59 (BP Location: Right arm, Patient Position: Lying)   Pulse 67   Temp 97.9 °F (36.6 °C) (Oral)   Resp 20   Ht 154.9 cm (61\")   Wt 59 kg (130 lb)   SpO2 97%   BMI 24.56 kg/m²  Body mass index is 24.56 kg/m². 97% 59 kg (130 lb)    Physical Exam  Constitutional:       Appearance: Normal appearance.   HENT:      Head: Normocephalic and atraumatic.      Nose: Nose normal.      Mouth/Throat:      Mouth: Mucous membranes are moist.      Pharynx: Oropharynx is clear.   Eyes:      Extraocular Movements: Extraocular movements intact.    "   Conjunctiva/sclera: Conjunctivae normal.   Cardiovascular:      Rate and Rhythm: Normal rate and regular rhythm.      Pulses: Normal pulses.      Heart sounds: Normal heart sounds. No murmur heard.  Pulmonary:      Effort: Pulmonary effort is normal. No respiratory distress.      Breath sounds: Normal breath sounds. No stridor. No wheezing or rales.   Abdominal:      General: Abdomen is flat. Bowel sounds are normal.      Palpations: Abdomen is soft.   Skin:     General: Skin is warm and dry.   Neurological:      General: No focal deficit present.      Mental Status: She is alert and oriented to person, place, and time. Mental status is at baseline.   Psychiatric:         Mood and Affect: Mood normal.         Behavior: Behavior normal.            Results Review:   I have personally reviewed the results and agree with these findings:  [x]  Laboratory accordion  [x]  Microbiology  [x]  Radiology  [x]  EKG/Telemetry   [x]  Cardiology/Vascular   [x]  Pathology  [x]  Old records  []  Other:       Medication Review:  I have reviewed the current MAR.  Antibiotics  This patient does not have an active medication from one of the medication groupers.     Scheduled Medications  apixaban, 2.5 mg, Oral, Q12H  atorvastatin, 10 mg, Oral, Nightly  cetirizine, 10 mg, Oral, Daily  furosemide, 40 mg, Intravenous, BID Diuretics  losartan, 100 mg, Oral, Q24H  metoprolol succinate XL, 100 mg, Oral, Q24H  pantoprazole, 40 mg, Oral, Q AM      ICU Drips     PRN Medications    acetaminophen    albuterol sulfate HFA    HYDROcodone-acetaminophen    ipratropium-albuterol    [COMPLETED] Insert Peripheral IV **AND** sodium chloride    Lines, Drains & Airways       Active LDAs       Name Placement date Placement time Site Days    Peripheral IV 02/24/25 2206 Anterior;Left;Upper Arm 02/24/25 2206  Arm  less than 1    External Urinary Catheter 02/24/25  2319  --  less than 1                    Diet Orders (active) (From admission, onward)        Start     Ordered    02/25/25 0744  Diet: Regular/House; Fluid Consistency: Thin (IDDSI 0)  Diet Effective Now         02/25/25 0743                    Assessment:  Bilateral pleural effusions  Paroxysmal A-fib  Hypertension  Hyperlipidemia  Asthma  GERD  Elevated BNP    Plan:  - CBC and CMP unremarkable, afebrile  - Given her elevated BNP and bilateral pleural effusions, I suspect that the etiology of these pleural effusions will be from CHF/volume overload  - Right-sided thoracentesis ordered, pending  - No indication for antibiotics at this time from my perspective      Terrence Samuels MD  [facility], [facility]  Pulmonary and Critical Care Medicine

## 2025-02-25 NOTE — CASE MANAGEMENT/SOCIAL WORK
Discharge Planning Assessment  [facility]     Patient Name: Mayuri Early  MRN: [id number]  Today's Date: 2/25/2025    Admit Date: 2/24/2025    Plan: Home   Discharge Needs Assessment       Row Name 02/25/25 1519       Living Environment    People in Home alone    Current Living Arrangements home    Potentially Unsafe Housing Conditions none    Primary Care Provided by self    Provides Primary Care For no one    Family Caregiver if Needed child(mathew), adult    Quality of Family Relationships helpful;involved;supportive    Able to Return to Prior Arrangements yes       Resource/Environmental Concerns    Resource/Environmental Concerns none    Transportation Concerns none       Transition Planning    Patient/Family Anticipates Transition to home    Patient/Family Anticipated Services at Transition none    Transportation Anticipated family or friend will provide       Discharge Needs Assessment    Readmission Within the Last 30 Days no previous admission in last 30 days    Equipment Currently Used at Home cane, straight    Concerns to be Addressed no discharge needs identified;denies needs/concerns at this time    Anticipated Changes Related to Illness none    Equipment Needed After Discharge none                   Discharge Plan       Row Name 02/25/25 1519       Plan    Plan Home    Plan Comments CCP met with patient at bedside. CCP role explained and discharge planning discussed. Face sheet verified and IMM noted. Patient's PCP is dR. Hidalgo. Patient lives alone, in a patio home. Patient uses a walker. Patient has not used home health but has been to [facility] for SNF. Patient reports she plans on returning home at d/c. Patient voiced feeling depressed. Patient denies any SI. CCP requested access consult. Patient states her son will transport home and can assist her as needed. CCP will follow for PT eval and assist as needed. Teresa ABBOTT                  Continued Care and Services - Admitted Since  2/24/2025    No active coordination exists for this encounter.       Selected Continued Care - Prior Encounters Includes continued care and service providers with selected services from prior encounters from 11/26/2024 to 2/25/2025      Discharged on 2/12/2025 Admission date: 2/8/2025 - Discharge disposition: Home or Self Care      Durable Medical Equipment       Service Provider Services Address Phone Fax Patient Preferred    [facility] [address] [address] [phone] --                             Demographic Summary       Row Name 02/25/25 1518       General Information    Admission Type inpatient    Arrived From emergency department    Required Notices Provided Important Message from Medicare    Referral Source emergency department    Reason for Consult discharge planning    Preferred Language English                   Functional Status       Row Name 02/25/25 1518       Functional Status    Usual Activity Tolerance good    Current Activity Tolerance good       Functional Status, IADL    Medications assistive equipment    Meal Preparation assistive equipment    Housekeeping assistive equipment    Laundry assistive equipment    Shopping assistive equipment       Mental Status    General Appearance WDL WDL       Mental Status Summary    Recent Changes in Mental Status/Cognitive Functioning no changes                   Psychosocial    No documentation.                  Abuse/Neglect    No documentation.                  Legal    No documentation.                  Substance Abuse    No documentation.                  Patient Forms    No documentation.                     MILENA Longoria

## 2025-02-25 NOTE — CONSULTS
"[facility] evaluated 89-year-old female for depression.  Patient is alert and oriented x 4.  Patient came into the hospital the second time in the month with pleural effusion and has been feeling weak.  Patient states she \"is not a depressed person\" and has never been depressed in her life.  Patient states she has been feeling down with negative thoughts the last couple of weeks.  Patient states her negative thoughts include \"nothing is going to get better\".  Patient denies any SI and denies any history of attempts.  Patient states she does have feelings of hopelessness at times but other times feels hope that she is will get better and back to her life.  Patient states she lives in a patio home and has several friends there that she usually drives to the grocery and gets together with.  Patient rates her current depressed mood as a 4 or 5/10 and states her anxiety is fairly low.  Patient describes the depressive feeling as a \"weird feeling\".  Patient was recently started on trazodone and has only taken it a couple of times so far.  Patient is hopeful that will help reduce her depressed mood and help with sleep.  Patient denies any alcohol, drugs or tobacco use.  Patient has no psychiatric history other than the recent prescription for trazodone.  Patient states she is usually a very outgoing person.    Patient lives alone in a patio home and has 4 adult sons.  Patient states one of her sons lives very close and he is retired and available whenever she needs them.  Patient gets support from all of her sons.  Patient's   6 years ago after 65 years of marriage.  Patient states she likes to cook as a hobby.  Patient is active in her [employer].    Patient was interested in outpatient counseling resources and [facility] provided her with those.  [facility] will follow.  "

## 2025-02-25 NOTE — ED PROVIDER NOTES
EMERGENCY DEPARTMENT MD ATTESTATION NOTE    Room Number:  05/05  PCP: Gunjan Hidalgo MD  Independent Historians: Patient, Family, and EMS    HPI:  A complete HPI/ROS/PMH/PSH/SH/FH are unobtainable due to: None    Chronic or social conditions impacting patient care (Social Determinants of Health): None      Context: Mayuri Early is a 89 y.o. female with a medical history of hypertension, chronic kidney disease who presents to the ED c/o acute shortness of breath.  The patient reports she was discharged from this hospital 2 weeks ago.  She reports she has had persistent shortness of breath since then.  She reports she has developed swelling of her legs.  He saw the primary care doctor on Friday and an x-ray was performed of her chest.  Primary care told family today that she had a pleural effusion and she needed to come to the hospital.        Review of prior external notes (non-ED) -and- Review of prior external test results outside of this encounter:  Discharge summary 2/12/2025 with paroxysmal A-fib and vertigo.  She had low potassium magnesium which were replaced.    Prescription drug monitoring program review:           PHYSICAL EXAM    I have reviewed the triage vital signs and nursing notes.    ED Triage Vitals   Temp Heart Rate Resp BP SpO2   02/24/25 1901 02/24/25 1901 02/24/25 1901 02/24/25 1901 02/24/25 1901   97 °F (36.1 °C) 96 16 (!) 170/110 100 %      Temp src Heart Rate Source Patient Position BP Location FiO2 (%)   02/24/25 1901 02/24/25 1901 02/24/25 1901 02/24/25 2122 --   Tympanic Monitor Lying Right arm        Physical Exam  GENERAL: Awake, alert, no acute distress  SKIN: Warm, dry  HENT: Normocephalic, atraumatic  EYES: no scleral icterus  CV: regular rhythm, regular rate  RESPIRATORY: normal effort, lungs clear  ABDOMEN: soft, nontender, nondistended  MUSCULOSKELETAL: no deformity, pitting edema to the bilateral lower extremities  NEURO: alert, moves all extremities, follows  commands            MEDICATIONS GIVEN IN ER  Medications   sodium chloride 0.9 % flush 10 mL (has no administration in time range)   furosemide (LASIX) injection 40 mg (40 mg Intravenous Given 2/24/25 2313)         ORDERS PLACED DURING THIS VISIT:  Orders Placed This Encounter   Procedures    Respiratory Panel PCR w/COVID-19(SARS-CoV-2) [facility]/RATNA/CHELA/PAD/COR/DAVIN In-House, NP Swab in UTM/VTM, 2 HR TAT - Swab, Nasopharynx    XR Chest 1 View    Comprehensive Metabolic Panel    Lactic Acid, Plasma    Procalcitonin    BNP    High Sensitivity Troponin T    CBC Auto Differential    High Sensitivity Troponin T 1Hr    LIPPS (on-call MD unless specified)    ECG 12 Lead Dyspnea    Insert Peripheral IV    Inpatient Admission    CBC & Differential         PROCEDURES  Procedures            PROGRESS, DATA ANALYSIS, CONSULTS, AND MEDICAL DECISION MAKING  All labs have been independently interpreted by me.  All radiology studies have been reviewed by me. All EKG's have been independently viewed and interpreted by me.  Discussion below represents my analysis of pertinent findings related to patient's condition, differential diagnosis, treatment plan and final disposition.    Differential diagnosis includes but is not limited to CHF, pneumonia, volume overload, acute coronary syndrome, acute aortic syndrome, .    Clinical Scores:                                     ED Course as of 02/25/25 0114   Mon Feb 24, 2025 2201 Patient presents with several day history of shortness of breath.  Differential diagnoses include but not limited to CHF, pneumonia, viral URI. [EE]   2223 EKG PROCEDURE    EKG time: 2219  Rhythm/Rate: Ventricular paced, rate 62    Independently Interpreted by me  Not significantly changed compared to prior 2/8/2025   [TR]   2227 WBC: 6.43 [EE]   2252 Chest x-ray independently interpreted myself shows right worse than left pleural effusions [EE]   2254 proBNP(!): 3,526.0 [EE]   2255 Lactate: 0.9 [EE]   2257 proBNP(!):  3,526.0 [TR]   2310 Patient with symptomatic exertional dyspnea.  She does have a new large pleural effusion.  Plan to admit.    I discussed the case with Dr. Awad, hospitalist.  He agrees to admit. [EE]   2333 XR Chest 1 View  My independent interpretation of the imaging study is no bilateral pleural effusions [TR]      ED Course User Index  [EE] Earnest Devine, PA  [TR] Peewee Martines MD       MDM: Plan to obtain laboratory evaluation, EKG and chest x-ray.  She appears volume overloaded on exam.  She is currently requiring oxygen which is not normal for her.  She will likely require admission for diuresis.      COMPLEXITY OF CARE  The patient requires admission.    Please note that portions of this document were completed with a voice recognition program.    Note Disclaimer: At [facility], we believe that sharing information builds trust and better relationships. You are receiving this note because you recently visited [facility]. It is possible you will see health information before a provider has talked with you about it. This kind of information can be easy to misunderstand. To help you fully understand what it means for your health, we urge you to discuss this note with your provider.         Peewee Martines MD  02/25/25 0114

## 2025-02-26 ENCOUNTER — APPOINTMENT (OUTPATIENT)
Dept: ULTRASOUND IMAGING | Facility: HOSPITAL | Age: OVER 89
End: 2025-02-26
Payer: MEDICARE

## 2025-02-26 ENCOUNTER — APPOINTMENT (OUTPATIENT)
Dept: CARDIOLOGY | Facility: HOSPITAL | Age: OVER 89
End: 2025-02-26
Payer: MEDICARE

## 2025-02-26 LAB
ALBUMIN FLD-MCNC: 2.2 G/DL
ALBUMIN SERPL-MCNC: 3.6 G/DL (ref 3.5–5.2)
ALBUMIN/GLOB SERPL: 1.2 G/DL
ALP SERPL-CCNC: 88 U/L (ref 39–117)
ALT SERPL W P-5'-P-CCNC: 11 U/L (ref 1–33)
ANION GAP SERPL CALCULATED.3IONS-SCNC: 12.4 MMOL/L (ref 5–15)
AORTIC DIMENSIONLESS INDEX: 0.73 (DI)
APPEARANCE FLD: ABNORMAL
AST SERPL-CCNC: 23 U/L (ref 1–32)
AV MEAN PRESS GRAD SYS DOP V1V2: 2.01 MMHG
AV VMAX SYS DOP: 91 CM/SEC
BASOPHILS # BLD AUTO: 0.03 10*3/MM3 (ref 0–0.2)
BASOPHILS NFR BLD AUTO: 0.4 % (ref 0–1.5)
BH CV ECHO MEAS - ACS: 1.64 CM
BH CV ECHO MEAS - AI P1/2T: 499.7 MSEC
BH CV ECHO MEAS - AO MAX PG: 3.3 MMHG
BH CV ECHO MEAS - AO ROOT AREA (BSA CORRECTED): 1.7 CM2
BH CV ECHO MEAS - AO ROOT DIAM: 2.7 CM
BH CV ECHO MEAS - AO V2 VTI: 20 CM
BH CV ECHO MEAS - AVA(I,D): 2.15 CM2
BH CV ECHO MEAS - EDV(CUBED): 71.9 ML
BH CV ECHO MEAS - EDV(MOD-SP2): 36 ML
BH CV ECHO MEAS - EDV(MOD-SP4): 45 ML
BH CV ECHO MEAS - EF(MOD-SP2): 63.9 %
BH CV ECHO MEAS - EF(MOD-SP4): 64.4 %
BH CV ECHO MEAS - ESV(CUBED): 20.5 ML
BH CV ECHO MEAS - ESV(MOD-SP2): 13 ML
BH CV ECHO MEAS - ESV(MOD-SP4): 16 ML
BH CV ECHO MEAS - FS: 34.2 %
BH CV ECHO MEAS - IVS/LVPW: 0.94 CM
BH CV ECHO MEAS - IVSD: 0.87 CM
BH CV ECHO MEAS - LAT PEAK E' VEL: 9.5 CM/SEC
BH CV ECHO MEAS - LV DIASTOLIC VOL/BSA (35-75): 28.2 CM2
BH CV ECHO MEAS - LV MASS(C)D: 115.9 GRAMS
BH CV ECHO MEAS - LV MAX PG: 1.98 MMHG
BH CV ECHO MEAS - LV MEAN PG: 0.98 MMHG
BH CV ECHO MEAS - LV SYSTOLIC VOL/BSA (12-30): 10 CM2
BH CV ECHO MEAS - LV V1 MAX: 70.4 CM/SEC
BH CV ECHO MEAS - LV V1 VTI: 14.8 CM
BH CV ECHO MEAS - LVIDD: 4.2 CM
BH CV ECHO MEAS - LVIDS: 2.7 CM
BH CV ECHO MEAS - LVOT AREA: 2.9 CM2
BH CV ECHO MEAS - LVOT DIAM: 1.92 CM
BH CV ECHO MEAS - LVPWD: 0.93 CM
BH CV ECHO MEAS - MED PEAK E' VEL: 7.6 CM/SEC
BH CV ECHO MEAS - MR MAX PG: 87.9 MMHG
BH CV ECHO MEAS - MR MAX VEL: 468.8 CM/SEC
BH CV ECHO MEAS - MV A DUR: 0.13 SEC
BH CV ECHO MEAS - MV A MAX VEL: 37.5 CM/SEC
BH CV ECHO MEAS - MV DEC SLOPE: 419.2 CM/SEC2
BH CV ECHO MEAS - MV DEC TIME: 0.18 SEC
BH CV ECHO MEAS - MV E MAX VEL: 113 CM/SEC
BH CV ECHO MEAS - MV E/A: 3
BH CV ECHO MEAS - MV MAX PG: 5.7 MMHG
BH CV ECHO MEAS - MV MEAN PG: 1.84 MMHG
BH CV ECHO MEAS - MV P1/2T: 83.3 MSEC
BH CV ECHO MEAS - MV V2 VTI: 28.3 CM
BH CV ECHO MEAS - MVA(P1/2T): 2.6 CM2
BH CV ECHO MEAS - MVA(VTI): 1.52 CM2
BH CV ECHO MEAS - PA ACC TIME: 0.09 SEC
BH CV ECHO MEAS - PA V2 MAX: 91.4 CM/SEC
BH CV ECHO MEAS - RAP SYSTOLE: 3 MMHG
BH CV ECHO MEAS - RV MAX PG: 1.48 MMHG
BH CV ECHO MEAS - RV V1 MAX: 60.8 CM/SEC
BH CV ECHO MEAS - RV V1 VTI: 13 CM
BH CV ECHO MEAS - RVSP: 34 MMHG
BH CV ECHO MEAS - SUP REN AO DIAM: 1.1 CM
BH CV ECHO MEAS - SV(LVOT): 42.9 ML
BH CV ECHO MEAS - SV(MOD-SP2): 23 ML
BH CV ECHO MEAS - SV(MOD-SP4): 29 ML
BH CV ECHO MEAS - SVI(LVOT): 26.9 ML/M2
BH CV ECHO MEAS - SVI(MOD-SP2): 14.4 ML/M2
BH CV ECHO MEAS - SVI(MOD-SP4): 18.2 ML/M2
BH CV ECHO MEAS - TAPSE (>1.6): 1.72 CM
BH CV ECHO MEAS - TR MAX PG: 30.5 MMHG
BH CV ECHO MEAS - TR MAX VEL: 276.2 CM/SEC
BH CV ECHO MEASUREMENTS AVERAGE E/E' RATIO: 13.22
BH CV XLRA - TDI S': 7.7 CM/SEC
BILIRUB SERPL-MCNC: 0.6 MG/DL (ref 0–1.2)
BUN SERPL-MCNC: 21 MG/DL (ref 8–23)
BUN/CREAT SERPL: 18.4 (ref 7–25)
CALCIUM SPEC-SCNC: 9.2 MG/DL (ref 8.6–10.5)
CHLORIDE SERPL-SCNC: 96 MMOL/L (ref 98–107)
CHOLEST SERPL-MCNC: 135 MG/DL (ref 0–200)
CHOLESTEROL FLUID: 36 MG/DL
CO2 SERPL-SCNC: 32.6 MMOL/L (ref 22–29)
COLOR FLD: YELLOW
CREAT SERPL-MCNC: 1.14 MG/DL (ref 0.57–1)
DEPRECATED RDW RBC AUTO: 42.7 FL (ref 37–54)
EGFRCR SERPLBLD CKD-EPI 2021: 46.1 ML/MIN/1.73
EOSINOPHIL # BLD AUTO: 0.2 10*3/MM3 (ref 0–0.4)
EOSINOPHIL NFR BLD AUTO: 2.7 % (ref 0.3–6.2)
EOSINOPHIL NFR FLD MANUAL: 1 %
ERYTHROCYTE [DISTWIDTH] IN BLOOD BY AUTOMATED COUNT: 11.9 % (ref 12.3–15.4)
GEN 5 1HR TROPONIN T REFLEX: 11 NG/L
GLOBULIN UR ELPH-MCNC: 3.1 GM/DL
GLUCOSE FLD-MCNC: 113 MG/DL
GLUCOSE SERPL-MCNC: 91 MG/DL (ref 65–99)
HBA1C MFR BLD: 5.8 % (ref 4.8–5.6)
HCT VFR BLD AUTO: 41.7 % (ref 34–46.6)
HDLC SERPL-MCNC: 54 MG/DL (ref 40–60)
HGB BLD-MCNC: 13.6 G/DL (ref 12–15.9)
IMM GRANULOCYTES # BLD AUTO: 0.02 10*3/MM3 (ref 0–0.05)
IMM GRANULOCYTES NFR BLD AUTO: 0.3 % (ref 0–0.5)
LDH FLD-CCNC: 78 U/L
LDLC SERPL CALC-MCNC: 68 MG/DL (ref 0–100)
LDLC/HDLC SERPL: 1.27 {RATIO}
LEFT ATRIUM VOLUME INDEX: 31.8 ML/M2
LV EF BIPLANE MOD: 64.3 %
LYMPHOCYTES # BLD AUTO: 1.1 10*3/MM3 (ref 0.7–3.1)
LYMPHOCYTES NFR BLD AUTO: 14.7 % (ref 19.6–45.3)
LYMPHOCYTES NFR FLD MANUAL: 80 %
MCH RBC QN AUTO: 32.1 PG (ref 26.6–33)
MCHC RBC AUTO-ENTMCNC: 32.6 G/DL (ref 31.5–35.7)
MCV RBC AUTO: 98.3 FL (ref 79–97)
METHOD: ABNORMAL
MONOCYTES # BLD AUTO: 0.85 10*3/MM3 (ref 0.1–0.9)
MONOCYTES NFR BLD AUTO: 11.4 % (ref 5–12)
MONOCYTES NFR FLD: 2 %
MONOS+MACROS NFR FLD: 9 %
NEUTROPHILS NFR BLD AUTO: 5.26 10*3/MM3 (ref 1.7–7)
NEUTROPHILS NFR BLD AUTO: 70.5 % (ref 42.7–76)
NEUTROPHILS NFR FLD MANUAL: 8 %
NRBC BLD AUTO-RTO: 0 /100 WBC (ref 0–0.2)
NUC CELL # FLD: 598 /MM3
PH FLD: 7.59 [PH]
PLATELET # BLD AUTO: 226 10*3/MM3 (ref 140–450)
PMV BLD AUTO: 8.8 FL (ref 6–12)
POTASSIUM SERPL-SCNC: 3.6 MMOL/L (ref 3.5–5.2)
PROCALCITONIN SERPL-MCNC: 0.04 NG/ML (ref 0–0.25)
PROT FLD-MCNC: 3.2 G/DL
PROT SERPL-MCNC: 6.7 G/DL (ref 6–8.5)
QT INTERVAL: 445 MS
QTC INTERVAL: 453 MS
RBC # BLD AUTO: 4.24 10*6/MM3 (ref 3.77–5.28)
RBC # FLD AUTO: 105 /MM3
SINUS: 2.5 CM
SODIUM SERPL-SCNC: 141 MMOL/L (ref 136–145)
TRIGL FLD-MCNC: 20 MG/DL
TRIGL SERPL-MCNC: 62 MG/DL (ref 0–150)
TROPONIN T NUMERIC DELTA: 1 NG/L
TROPONIN T SERPL HS-MCNC: 10 NG/L
TSH SERPL DL<=0.05 MIU/L-ACNC: 2.21 UIU/ML (ref 0.27–4.2)
VLDLC SERPL-MCNC: 13 MG/DL (ref 5–40)
WBC NRBC COR # BLD AUTO: 7.46 10*3/MM3 (ref 3.4–10.8)

## 2025-02-26 PROCEDURE — 84443 ASSAY THYROID STIM HORMONE: CPT | Performed by: HOSPITALIST

## 2025-02-26 PROCEDURE — 82042 OTHER SOURCE ALBUMIN QUAN EA: CPT | Performed by: STUDENT IN AN ORGANIZED HEALTH CARE EDUCATION/TRAINING PROGRAM

## 2025-02-26 PROCEDURE — 36415 COLL VENOUS BLD VENIPUNCTURE: CPT | Performed by: HOSPITALIST

## 2025-02-26 PROCEDURE — 80061 LIPID PANEL: CPT | Performed by: HOSPITALIST

## 2025-02-26 PROCEDURE — 25010000002 LIDOCAINE 1 % SOLUTION: Performed by: NURSE PRACTITIONER

## 2025-02-26 PROCEDURE — 84157 ASSAY OF PROTEIN OTHER: CPT | Performed by: STUDENT IN AN ORGANIZED HEALTH CARE EDUCATION/TRAINING PROGRAM

## 2025-02-26 PROCEDURE — 87116 MYCOBACTERIA CULTURE: CPT | Performed by: STUDENT IN AN ORGANIZED HEALTH CARE EDUCATION/TRAINING PROGRAM

## 2025-02-26 PROCEDURE — 84145 PROCALCITONIN (PCT): CPT | Performed by: HOSPITALIST

## 2025-02-26 PROCEDURE — 89051 BODY FLUID CELL COUNT: CPT | Performed by: STUDENT IN AN ORGANIZED HEALTH CARE EDUCATION/TRAINING PROGRAM

## 2025-02-26 PROCEDURE — 83615 LACTATE (LD) (LDH) ENZYME: CPT | Performed by: STUDENT IN AN ORGANIZED HEALTH CARE EDUCATION/TRAINING PROGRAM

## 2025-02-26 PROCEDURE — 88112 CYTOPATH CELL ENHANCE TECH: CPT | Performed by: STUDENT IN AN ORGANIZED HEALTH CARE EDUCATION/TRAINING PROGRAM

## 2025-02-26 PROCEDURE — 85025 COMPLETE CBC W/AUTO DIFF WBC: CPT | Performed by: HOSPITALIST

## 2025-02-26 PROCEDURE — 0W993ZZ DRAINAGE OF RIGHT PLEURAL CAVITY, PERCUTANEOUS APPROACH: ICD-10-PCS | Performed by: RADIOLOGY

## 2025-02-26 PROCEDURE — 83986 ASSAY PH BODY FLUID NOS: CPT | Performed by: STUDENT IN AN ORGANIZED HEALTH CARE EDUCATION/TRAINING PROGRAM

## 2025-02-26 PROCEDURE — 93306 TTE W/DOPPLER COMPLETE: CPT

## 2025-02-26 PROCEDURE — 82945 GLUCOSE OTHER FLUID: CPT | Performed by: STUDENT IN AN ORGANIZED HEALTH CARE EDUCATION/TRAINING PROGRAM

## 2025-02-26 PROCEDURE — 80053 COMPREHEN METABOLIC PANEL: CPT | Performed by: HOSPITALIST

## 2025-02-26 PROCEDURE — 88305 TISSUE EXAM BY PATHOLOGIST: CPT | Performed by: STUDENT IN AN ORGANIZED HEALTH CARE EDUCATION/TRAINING PROGRAM

## 2025-02-26 PROCEDURE — 87206 SMEAR FLUORESCENT/ACID STAI: CPT | Performed by: STUDENT IN AN ORGANIZED HEALTH CARE EDUCATION/TRAINING PROGRAM

## 2025-02-26 PROCEDURE — 83036 HEMOGLOBIN GLYCOSYLATED A1C: CPT | Performed by: HOSPITALIST

## 2025-02-26 PROCEDURE — 93306 TTE W/DOPPLER COMPLETE: CPT | Performed by: INTERNAL MEDICINE

## 2025-02-26 PROCEDURE — 87205 SMEAR GRAM STAIN: CPT | Performed by: STUDENT IN AN ORGANIZED HEALTH CARE EDUCATION/TRAINING PROGRAM

## 2025-02-26 PROCEDURE — 87070 CULTURE OTHR SPECIMN AEROBIC: CPT | Performed by: STUDENT IN AN ORGANIZED HEALTH CARE EDUCATION/TRAINING PROGRAM

## 2025-02-26 PROCEDURE — 87102 FUNGUS ISOLATION CULTURE: CPT | Performed by: STUDENT IN AN ORGANIZED HEALTH CARE EDUCATION/TRAINING PROGRAM

## 2025-02-26 PROCEDURE — 84478 ASSAY OF TRIGLYCERIDES: CPT | Performed by: STUDENT IN AN ORGANIZED HEALTH CARE EDUCATION/TRAINING PROGRAM

## 2025-02-26 PROCEDURE — 84484 ASSAY OF TROPONIN QUANT: CPT | Performed by: HOSPITALIST

## 2025-02-26 PROCEDURE — 25010000002 FUROSEMIDE PER 20 MG: Performed by: HOSPITALIST

## 2025-02-26 PROCEDURE — 87075 CULTR BACTERIA EXCEPT BLOOD: CPT | Performed by: STUDENT IN AN ORGANIZED HEALTH CARE EDUCATION/TRAINING PROGRAM

## 2025-02-26 PROCEDURE — 84311 SPECTROPHOTOMETRY: CPT | Performed by: STUDENT IN AN ORGANIZED HEALTH CARE EDUCATION/TRAINING PROGRAM

## 2025-02-26 PROCEDURE — 76942 ECHO GUIDE FOR BIOPSY: CPT

## 2025-02-26 RX ORDER — FUROSEMIDE 10 MG/ML
20 INJECTION INTRAMUSCULAR; INTRAVENOUS
Status: DISCONTINUED | OUTPATIENT
Start: 2025-02-26 | End: 2025-03-01

## 2025-02-26 RX ORDER — LIDOCAINE HYDROCHLORIDE 10 MG/ML
10 INJECTION, SOLUTION INFILTRATION; PERINEURAL ONCE
Status: COMPLETED | OUTPATIENT
Start: 2025-02-26 | End: 2025-02-26

## 2025-02-26 RX ADMIN — APIXABAN 2.5 MG: 5 TABLET, FILM COATED ORAL at 20:58

## 2025-02-26 RX ADMIN — LOSARTAN POTASSIUM 100 MG: 100 TABLET, FILM COATED ORAL at 09:22

## 2025-02-26 RX ADMIN — TRAZODONE HYDROCHLORIDE 50 MG: 50 TABLET ORAL at 00:35

## 2025-02-26 RX ADMIN — FUROSEMIDE 20 MG: 10 INJECTION, SOLUTION INTRAMUSCULAR; INTRAVENOUS at 18:49

## 2025-02-26 RX ADMIN — ATORVASTATIN CALCIUM 10 MG: 10 TABLET ORAL at 20:57

## 2025-02-26 RX ADMIN — LIDOCAINE HYDROCHLORIDE 3 ML: 10 INJECTION, SOLUTION INFILTRATION; PERINEURAL at 11:06

## 2025-02-26 RX ADMIN — FUROSEMIDE 40 MG: 10 INJECTION, SOLUTION INTRAMUSCULAR; INTRAVENOUS at 09:26

## 2025-02-26 RX ADMIN — PANTOPRAZOLE SODIUM 40 MG: 40 TABLET, DELAYED RELEASE ORAL at 09:21

## 2025-02-26 RX ADMIN — CETIRIZINE HYDROCHLORIDE 10 MG: 10 TABLET ORAL at 09:22

## 2025-02-26 RX ADMIN — APIXABAN 2.5 MG: 5 TABLET, FILM COATED ORAL at 09:21

## 2025-02-26 RX ADMIN — METOPROLOL SUCCINATE 100 MG: 25 TABLET, EXTENDED RELEASE ORAL at 09:21

## 2025-02-26 NOTE — PROGRESS NOTES
"Daily progress note    Primary care physician  Dr. Hidalgo    Subjective  Awake and alert and feeling better with no new complaints and agreeable for thoracentesis.    History of present illness  89-year-old white female with history of paroxysmal atrial fibrillation hypertension hyperlipidemia asthma and gastroesophageal reflux disease who is well-known to our service to the emergency room with shortness of breath for last several days.  Patient denies any chest pain palpitation abdominal pain nausea vomiting diarrhea.  Patient denies any fever cough congestion.  Patient evaluated in ER found to have pleural effusion admitted for management.  Patient fully alert oriented give me an good history and still wants to be a full code and lives by herself.     REVIEW OF SYSTEMS  All systems reviewed and negative except for those discussed in HPI.      PHYSICAL EXAM   Blood pressure 157/69, pulse 63, temperature 97.6 °F (36.4 °C), temperature source Oral, resp. rate 20, height 154.9 cm (61\"), weight 60.9 kg (134 lb 3.2 oz), SpO2 98%.    GENERAL: Awake and alert, oriented, elderly, not distressed  HENT: head atraumatic, no nuchal rigidity  EYES: no scleral icterus, EOMI  CV: regular rhythm, regular rate, no murmur  RESPIRATORY: normal effort, decreased breath sounds in the right base  ABDOMEN: soft, nontender  MUSCULOSKELETAL: no deformity, FROM, 2+ pedal edema  NEURO: alert, moves all extremities, follows commands  SKIN: warm, dry     LAB RESULTS  Lab Results (last 24 hours)       Procedure Component Value Units Date/Time    High Sensitivity Troponin T [[id number]]  (Normal) Collected: 02/26/25 0727    Specimen: Blood Updated: 02/26/25 0837     HS Troponin T 10 ng/L     Narrative:      High Sensitive Troponin T Reference Range:  <14.0 ng/L- Negative Female for AMI  <22.0 ng/L- Negative Male for AMI  >=14 - Abnormal Female indicating possible myocardial injury.  >=22 - Abnormal Male indicating possible myocardial injury. " "  Clinicians would have to utilize clinical acumen, EKG, Troponin, and serial changes to determine if it is an Acute Myocardial Infarction or myocardial injury due to an underlying chronic condition.         Procalcitonin [[id number]]  (Normal) Collected: 02/26/25 0727    Specimen: Blood Updated: 02/26/25 0837     Procalcitonin 0.04 ng/mL     Narrative:      As a Marker for Sepsis (Non-Neonates):    1. <0.5 ng/mL represents a low risk of severe sepsis and/or septic shock.  2. >2 ng/mL represents a high risk of severe sepsis and/or septic shock.    As a Marker for Lower Respiratory Tract Infections that require antibiotic therapy:    PCT on Admission    Antibiotic Therapy       6-12 Hrs later    >0.5                Strongly Recommended  >0.25 - <0.5        Recommended   0.1 - 0.25          Discouraged              Remeasure/reassess PCT  <0.1                Strongly Discouraged     Remeasure/reassess PCT    As 28 day mortality risk marker: \"Change in Procalcitonin Result\" (>80% or <=80%) if Day 0 (or Day 1) and Day 4 values are available. Refer to http://www."[employer]"s-pct-calculator.com    Change in PCT <=80%  A decrease of PCT levels below or equal to 80% defines a positive change in PCT test result representing a higher risk for 28-day all-cause mortality of patients diagnosed with severe sepsis for septic shock.    Change in PCT >80%  A decrease of PCT levels of more than 80% defines a negative change in PCT result representing a lower risk for 28-day all-cause mortality of patients diagnosed with severe sepsis or septic shock.       TSH [[id number]]  (Normal) Collected: 02/26/25 0727    Specimen: Blood Updated: 02/26/25 0837     TSH 2.210 uIU/mL     Comprehensive Metabolic Panel [[id number]]  (Abnormal) Collected: 02/26/25 0727    Specimen: Blood Updated: 02/26/25 0835     Glucose 91 mg/dL      BUN 21 mg/dL      Creatinine 1.14 mg/dL      Sodium 141 mmol/L      Potassium 3.6 mmol/L      Comment: Slight hemolysis " detected by analyzer. Result may be falsely elevated.        Chloride 96 mmol/L      CO2 32.6 mmol/L      Calcium 9.2 mg/dL      Total Protein 6.7 g/dL      Albumin 3.6 g/dL      ALT (SGPT) 11 U/L      AST (SGOT) 23 U/L      Alkaline Phosphatase 88 U/L      Total Bilirubin 0.6 mg/dL      Globulin 3.1 gm/dL      A/G Ratio 1.2 g/dL      BUN/Creatinine Ratio 18.4     Anion Gap 12.4 mmol/L      eGFR 46.1 mL/min/1.73     Narrative:      GFR Categories in Chronic Kidney Disease (CKD)      GFR Category          GFR (mL/min/1.73)    Interpretation  G1                     90 or greater         Normal or high (1)  G2                      60-89                Mild decrease (1)  G3a                   45-59                Mild to moderate decrease  G3b                   30-44                Moderate to severe decrease  G4                    15-29                Severe decrease  G5                    14 or less           Kidney failure          (1)In the absence of evidence of kidney disease, neither GFR category G1 or G2 fulfill the criteria for CKD.    eGFR calculation 2021 CKD-EPI creatinine equation, which does not include race as a factor    Lipid Panel [[id number]] Collected: 02/26/25 0727    Specimen: Blood Updated: 02/26/25 0831     Total Cholesterol 135 mg/dL      Triglycerides 62 mg/dL      HDL Cholesterol 54 mg/dL      LDL Cholesterol  68 mg/dL      VLDL Cholesterol 13 mg/dL      LDL/HDL Ratio 1.27    Narrative:      Cholesterol Reference Ranges  (U.S. Department of Health and Human Services ATP III Classifications)    Desirable          <200 mg/dL  Borderline High    200-239 mg/dL  High Risk          >240 mg/dL      Triglyceride Reference Ranges  (U.S. Department of Health and Human Services ATP III Classifications)    Normal           <150 mg/dL  Borderline High  150-199 mg/dL  High             200-499 mg/dL  Very High        >500 mg/dL    HDL Reference Ranges  (U.S. Department of Health and Human Services ATP III  Classifications)    Low     <40 mg/dl (major risk factor for CHD)  High    >60 mg/dl ('negative' risk factor for CHD)        LDL Reference Ranges  (U.S. Department of Health and Human Services ATP III Classifications)    Optimal          <100 mg/dL  Near Optimal     100-129 mg/dL  Borderline High  130-159 mg/dL  High             160-189 mg/dL  Very High        >189 mg/dL    LDL is calculated using the NIH LDL-C calculation.      Hemoglobin A1c [[id number]]  (Abnormal) Collected: 02/26/25 0727    Specimen: Blood Updated: 02/26/25 0824     Hemoglobin A1C 5.80 %     Narrative:      Hemoglobin A1C Ranges:    Increased Risk for Diabetes  5.7% to 6.4%  Diabetes                     >= 6.5%  Diabetic Goal                < 7.0%    CBC & Differential [[id number]]  (Abnormal) Collected: 02/26/25 0727    Specimen: Blood Updated: 02/26/25 0808    Narrative:      The following orders were created for panel order CBC & Differential.  Procedure                               Abnormality         Status                     ---------                               -----------         ------                     CBC Auto Differential[[id number]]        Abnormal            Final result                 Please view results for these tests on the individual orders.    CBC Auto Differential [[id number]]  (Abnormal) Collected: 02/26/25 0727    Specimen: Blood Updated: 02/26/25 0808     WBC 7.46 10*3/mm3      RBC 4.24 10*6/mm3      Hemoglobin 13.6 g/dL      Hematocrit 41.7 %      MCV 98.3 fL      MCH 32.1 pg      MCHC 32.6 g/dL      RDW 11.9 %      RDW-SD 42.7 fl      MPV 8.8 fL      Platelets 226 10*3/mm3      Neutrophil % 70.5 %      Lymphocyte % 14.7 %      Monocyte % 11.4 %      Eosinophil % 2.7 %      Basophil % 0.4 %      Immature Grans % 0.3 %      Neutrophils, Absolute 5.26 10*3/mm3      Lymphocytes, Absolute 1.10 10*3/mm3      Monocytes, Absolute 0.85 10*3/mm3      Eosinophils, Absolute 0.20 10*3/mm3      Basophils, Absolute 0.03 10*3/mm3       Immature Grans, Absolute 0.02 10*3/mm3      nRBC 0.0 /100 WBC           Imaging Results (Last 24 Hours)       ** No results found for the last 24 hours. **          Scan on 2/24/2025 2221 by [facility], [place]: ECG 12-LEAD         Author: -- Service: -- Author Type: --   Filed: Date of Service: Creation Time:   Status: (Other)   HEART RATE=62  bpm  RR Ifidkkyl=566  ms  AZ Interval=  ms  P Horizontal Axis=  deg  P Front Axis=  deg  QRSD Inxikjwb=485  ms  QT Dncetocz=654  ms  ZGdF=329  ms  QRS Axis=75  deg  T Wave Axis=-33  deg  - ABNORMAL ECG -  Afib/flut and V-paced complexes  No further rhythm analysis attempted due to paced rhythm  IVCD, consider RBBB          Current Facility-Administered Medications:     acetaminophen (TYLENOL) tablet 650 mg, 650 mg, Oral, Q6H PRN, Kike Awad MD    albuterol sulfate HFA (PROVENTIL HFA;VENTOLIN HFA;PROAIR HFA) inhaler 2 puff, 2 puff, Inhalation, Q4H PRN, Kike Awad MD    apixaban (ELIQUIS) tablet 2.5 mg, 2.5 mg, Oral, Q12H, Kike Awad MD, 2.5 mg at 02/25/25 2239    atorvastatin (LIPITOR) tablet 10 mg, 10 mg, Oral, Nightly, Kike Awad MD, 10 mg at 02/25/25 2239    cetirizine (zyrTEC) tablet 10 mg, 10 mg, Oral, Daily, Kike Awad MD, 10 mg at 02/25/25 1116    furosemide (LASIX) injection 40 mg, 40 mg, Intravenous, BID Diuretics, Kike Awad MD, 40 mg at 02/25/25 2239    HYDROcodone-acetaminophen (NORCO) 5-325 MG per tablet 1 tablet, 1 tablet, Oral, Q4H PRN, Kike Awad MD    ipratropium-albuterol (DUO-NEB) nebulizer solution 3 mL, 3 mL, Nebulization, Q4H PRN, Kike Awad MD    losartan (COZAAR) tablet 100 mg, 100 mg, Oral, Q24H, Kike Awad MD, 100 mg at 02/25/25 1116    metoprolol succinate XL (TOPROL-XL) 24 hr tablet 100 mg, 100 mg, Oral, Q24H, Kike Awad MD, 100 mg at 02/25/25 1028    pantoprazole (PROTONIX) EC tablet 40 mg, 40 mg, Oral, Q AM, Kike Awad MD    [COMPLETED] Insert Peripheral IV, , , Once **AND** sodium chloride 0.9 % flush 10 mL,  10 mL, Intravenous, PRN, He Awad MD    traZODone (DESYREL) tablet 50 mg, 50 mg, Oral, Nightly PRN, He Awad MD, 50 mg at 02/26/25 0035      ASSESSMENT  Large right pleural effusion  Paroxysmal atrial fibrillation  Hypertension  Hyperlipidemia  Gastroesophageal reflux disease  Status post permanent pacemaker placement    PLAN  CPM  Continue IV diuresis  2D echo pending  Thoracentesis today  Pulmonary consult appreciated  Adjust home medications  Stress ulcer DVT prophylaxis  Supportive care  PT OT  Discussed with nursing staff  Follow closely further recommendation current hospital course    HE AWAD MD    Copied text in this note has been reviewed and is accurate as of 02/26/25

## 2025-02-26 NOTE — PAYOR COMM NOTE
"Mary Early (89 y.o. Female)     ATTN: NURSE REVIEWER  RE: INITIAL INPT AUTH CLINICALS   REF# [id number]  PLS REPLY TO KEYA MAIER [phone] OR FAX# [phone]      Date of Birth   1935    Social Security Number       Address   [address] [address]    Home Phone   [phone]    MRN   [id number]       Confucianist   Pentecostal    Marital Status                               Admission Date   2/24/25    Admission Type   Emergency    Admitting Provider   Kike Awad MD    Attending Provider   Kike Awad MD    Department, Room/Bed   [facility] PHASE II SURGE UNIT, P23/1       Discharge Date       Discharge Disposition       Discharge Destination                                 Attending Provider: Kike Awad MD    Allergies: No Known Allergies    Isolation: None   Infection: None   Code Status: CPR    Ht: 154.9 cm (61\")   Wt: 60.8 kg (134 lb)    Admission Cmt: None   Principal Problem: Pleural effusion [J90]                   Active Insurance as of 2/24/2025       Primary Coverage       Payor Plan Insurance Group Employer/Plan Group    ANTHEM MEDICARE REPLACEMENT ANTHEM MEDICARE ADVANTAGE HMO [id number]       Payor Plan Address Payor Plan Phone Number Payor Plan Fax Number Effective Dates    [address] [phone]  1/1/2025 - None Entered    [facility] [address]         Subscriber Name Subscriber Birth Date Member ID       MARY EARLY 1935 [id number]               Secondary Coverage       Payor Plan Insurance Group Employer/Plan Group    JAMA [id number]        Payor Plan Address Payor Plan Phone Number Payor Plan Fax Number Effective Dates    [facility] COMMUNITY [facility] [phone]  4/28/2005 - None Entered    [address] [address]       [facility] [address]         Subscriber Name Subscriber Birth Date Member ID       MARY EARLY 1935 [id number]                     Emergency Contacts        (Rel.) Home Phone " Work Phone Mobile Phone    Torres Early (Son) [phone] -- --    LASHAY early (Son) -- -- [phone]                 History & Physical        Kike Awad MD at 02/25/25 0857          History and physical    Primary care physician  Dr. Hidalgo    Chief complaint  Shortness of breath    History of present illness  89-year-old white female with history of paroxysmal atrial fibrillation hypertension hyperlipidemia asthma and gastroesophageal reflux disease who is well-known to our service to the emergency room with shortness of breath for last several days.  Patient denies any chest pain palpitation abdominal pain nausea vomiting diarrhea.  Patient denies any fever cough congestion.  Patient evaluated in ER found to have pleural effusion admitted for management.  Patient fully alert oriented give me an good history and still wants to be a full code and lives by herself.    PAST MEDICAL HISTORY             Date Noted    Well woman exam 10/20/2020    Closed compression fracture of L1 vertebra, initial encounter 01/04/2024    Compression fracture of T12 vertebra 01/04/2024    PAF (paroxysmal atrial fibrillation) 01/04/2024    Presence of cardiac pacemaker 01/04/2024    HTN (hypertension) 01/04/2024    CKD (chronic kidney disease) 01/04/2024    Chronic anticoagulation 01/04/2024    Chronic back pain 01/04/2024    Anemia 01/04/2024    Asthma 01/04/2024    Hematoma of hip 01/08/2024    Acute blood loss anemia 01/08/2024    Postural dizziness 02/08/2025      PAST SURGICAL HISTORY              Procedure Laterality Date    BREAST CYST ASPIRATION        BREAST CYST EXCISION        HYSTERECTOMY             FAMILY HISTORY  No family history on file.     SOCIAL HISTORY                Socioeconomic History    Marital status:    Tobacco Use    Smoking status: Former    Smokeless tobacco: Never   Vaping Use    Vaping status: Never Used   Substance and Sexual Activity    Alcohol use: Never    Drug use: Never    Sexual  "activity: Defer         ALLERGIES  Patient has no known allergies.  Home medications reviewed     REVIEW OF SYSTEMS  All systems reviewed and negative except for those discussed in HPI.      PHYSICAL EXAM   Blood pressure 147/66, pulse 70, temperature 98 °F (36.7 °C), temperature source Oral, resp. rate 18, height 154.9 cm (61\"), weight 59 kg (130 lb), SpO2 96%.    GENERAL: Awake and alert, oriented, elderly, not distressed  HENT: head atraumatic, no nuchal rigidity  EYES: no scleral icterus, EOMI  CV: regular rhythm, regular rate, no murmur  RESPIRATORY: normal effort, decreased breath sounds in the right base  ABDOMEN: soft, nontender  MUSCULOSKELETAL: no deformity, FROM, 2+ pedal edema  NEURO: alert, moves all extremities, follows commands  SKIN: warm, dry     LAB RESULTS  Lab Results (last 24 hours)       Procedure Component Value Units Date/Time    Respiratory Panel PCR w/COVID-19(SARS-CoV-2) NEISHA/RATNA/CHELA/PAD/COR/DAVIN In-House, NP Swab in UTM/VTM, 2 HR TAT - Swab, Nasopharynx [[id number]]  (Normal) Collected: 02/24/25 2208    Specimen: Swab from Nasopharynx Updated: 02/25/25 0027     ADENOVIRUS, PCR Not Detected     Coronavirus 229E Not Detected     Coronavirus HKU1 Not Detected     Coronavirus NL63 Not Detected     Coronavirus OC43 Not Detected     COVID19 Not Detected     Human Metapneumovirus Not Detected     Human Rhinovirus/Enterovirus Not Detected     Influenza A PCR Not Detected     Influenza B PCR Not Detected     Parainfluenza Virus 1 Not Detected     Parainfluenza Virus 2 Not Detected     Parainfluenza Virus 3 Not Detected     Parainfluenza Virus 4 Not Detected     RSV, PCR Not Detected     Bordetella pertussis pcr Not Detected     Bordetella parapertussis PCR Not Detected     Chlamydophila pneumoniae PCR Not Detected     Mycoplasma pneumo by PCR Not Detected    Narrative:      In the setting of a positive respiratory panel with a viral infection PLUS a negative procalcitonin without other underlying " "concern for bacterial infection, consider observing off antibiotics or discontinuation of antibiotics and continue supportive care. If the respiratory panel is positive for atypical bacterial infection (Bordetella pertussis, Chlamydophila pneumoniae, or Mycoplasma pneumoniae), consider antibiotic de-escalation to target atypical bacterial infection.    High Sensitivity Troponin T 1Hr [[id number]]  (Normal) Collected: 02/24/25 2305    Specimen: Blood Updated: 02/24/25 2344     HS Troponin T 11 ng/L      Troponin T Numeric Delta 0 ng/L     Narrative:      High Sensitive Troponin T Reference Range:  <14.0 ng/L- Negative Female for AMI  <22.0 ng/L- Negative Male for AMI  >=14 - Abnormal Female indicating possible myocardial injury.  >=22 - Abnormal Male indicating possible myocardial injury.   Clinicians would have to utilize clinical acumen, EKG, Troponin, and serial changes to determine if it is an Acute Myocardial Infarction or myocardial injury due to an underlying chronic condition.         Procalcitonin [[id number]]  (Normal) Collected: 02/24/25 2205    Specimen: Blood Updated: 02/24/25 2250     Procalcitonin 0.05 ng/mL     Narrative:      As a Marker for Sepsis (Non-Neonates):    1. <0.5 ng/mL represents a low risk of severe sepsis and/or septic shock.  2. >2 ng/mL represents a high risk of severe sepsis and/or septic shock.    As a Marker for Lower Respiratory Tract Infections that require antibiotic therapy:    PCT on Admission    Antibiotic Therapy       6-12 Hrs later    >0.5                Strongly Recommended  >0.25 - <0.5        Recommended   0.1 - 0.25          Discouraged              Remeasure/reassess PCT  <0.1                Strongly Discouraged     Remeasure/reassess PCT    As 28 day mortality risk marker: \"Change in Procalcitonin Result\" (>80% or <=80%) if Day 0 (or Day 1) and Day 4 values are available. Refer to http://www.[employer]-pct-calculator.com    Change in PCT <=80%  A decrease of PCT levels " below or equal to 80% defines a positive change in PCT test result representing a higher risk for 28-day all-cause mortality of patients diagnosed with severe sepsis for septic shock.    Change in PCT >80%  A decrease of PCT levels of more than 80% defines a negative change in PCT result representing a lower risk for 28-day all-cause mortality of patients diagnosed with severe sepsis or septic shock.       BNP [[id number]]  (Abnormal) Collected: 02/24/25 2205    Specimen: Blood Updated: 02/24/25 2250     proBNP 3,526.0 pg/mL     Narrative:      This assay is used as an aid in the diagnosis of individuals suspected of having heart failure. It can be used as an aid in the diagnosis of acute decompensated heart failure (ADHF) in patients presenting with signs and symptoms of ADHF to the emergency department (ED). In addition, NT-proBNP of <300 pg/mL indicates ADHF is not likely.    Age Range Result Interpretation  NT-proBNP Concentration (pg/mL:      <50             Positive            >450                   Gray                 300-450                    Negative             <300    50-75           Positive            >900                  Gray                300-900                  Negative            <300      >75             Positive            >1800                  Gray                300-1800                  Negative            <300    High Sensitivity Troponin T [[id number]]  (Normal) Collected: 02/24/25 2205    Specimen: Blood Updated: 02/24/25 2250     HS Troponin T 11 ng/L     Narrative:      High Sensitive Troponin T Reference Range:  <14.0 ng/L- Negative Female for AMI  <22.0 ng/L- Negative Male for AMI  >=14 - Abnormal Female indicating possible myocardial injury.  >=22 - Abnormal Male indicating possible myocardial injury.   Clinicians would have to utilize clinical acumen, EKG, Troponin, and serial changes to determine if it is an Acute Myocardial Infarction or myocardial injury due to an underlying  chronic condition.         Comprehensive Metabolic Panel [[id number]]  (Abnormal) Collected: 02/24/25 2205    Specimen: Blood Updated: 02/24/25 2244     Glucose 99 mg/dL      BUN 18 mg/dL      Creatinine 0.94 mg/dL      Sodium 141 mmol/L      Potassium 3.9 mmol/L      Chloride 103 mmol/L      CO2 29.0 mmol/L      Calcium 9.5 mg/dL      Total Protein 7.2 g/dL      Albumin 4.0 g/dL      ALT (SGPT) 16 U/L      AST (SGOT) 22 U/L      Alkaline Phosphatase 92 U/L      Total Bilirubin 0.6 mg/dL      Globulin 3.2 gm/dL      A/G Ratio 1.3 g/dL      BUN/Creatinine Ratio 19.1     Anion Gap 9.0 mmol/L      eGFR 58.1 mL/min/1.73     Narrative:      GFR Categories in Chronic Kidney Disease (CKD)      GFR Category          GFR (mL/min/1.73)    Interpretation  G1                     90 or greater         Normal or high (1)  G2                      60-89                Mild decrease (1)  G3a                   45-59                Mild to moderate decrease  G3b                   30-44                Moderate to severe decrease  G4                    15-29                Severe decrease  G5                    14 or less           Kidney failure          (1)In the absence of evidence of kidney disease, neither GFR category G1 or G2 fulfill the criteria for CKD.    eGFR calculation 2021 CKD-EPI creatinine equation, which does not include race as a factor    Lactic Acid, Plasma [[id number]]  (Normal) Collected: 02/24/25 2205    Specimen: Blood Updated: 02/24/25 2237     Lactate 0.9 mmol/L     CBC & Differential [[id number]]  (Abnormal) Collected: 02/24/25 2205    Specimen: Blood Updated: 02/24/25 2225    Narrative:      The following orders were created for panel order CBC & Differential.  Procedure                               Abnormality         Status                     ---------                               -----------         ------                     CBC Auto Differential[[id number]]        Abnormal            Final result                  Please view results for these tests on the individual orders.    CBC Auto Differential [[id number]]  (Abnormal) Collected: 02/24/25 2205    Specimen: Blood Updated: 02/24/25 2225     WBC 6.43 10*3/mm3      RBC 4.24 10*6/mm3      Hemoglobin 13.2 g/dL      Hematocrit 40.5 %      MCV 95.5 fL      MCH 31.1 pg      MCHC 32.6 g/dL      RDW 11.6 %      RDW-SD 39.8 fl      MPV 8.7 fL      Platelets 242 10*3/mm3      Neutrophil % 62.9 %      Lymphocyte % 23.0 %      Monocyte % 10.3 %      Eosinophil % 3.3 %      Basophil % 0.3 %      Immature Grans % 0.2 %      Neutrophils, Absolute 4.05 10*3/mm3      Lymphocytes, Absolute 1.48 10*3/mm3      Monocytes, Absolute 0.66 10*3/mm3      Eosinophils, Absolute 0.21 10*3/mm3      Basophils, Absolute 0.02 10*3/mm3      Immature Grans, Absolute 0.01 10*3/mm3      nRBC 0.0 /100 WBC           Imaging Results (Last 24 Hours)       Procedure Component Value Units Date/Time    XR Chest 1 View [[id number]] Collected: 02/24/25 2252     Updated: 02/24/25 2258    Narrative:      CXR ONE VIEW      HISTORY: SOA     COMPARISON: None     TECHNIQUE: single portable AP       Impression:         Left subclavian approach dual lead pacemaker.     Allowing for submaximal inspiratory result, heart size appears within  normal limits.     There is a moderate to large right pleural effusion and a small to  moderate left pleural effusion.     There is right greater than left basilar atelectasis versus infiltrate,  but there is no pneumothorax.     This report was finalized on 2/24/2025 10:54 PM by Dr. Anthony Turcios M.D on Workstation: [id number]             Scan on 2/24/2025 2221 by [employer] Onbase, [place]: ECG 12-LEAD         Author: -- Service: -- Author Type: --   Filed: Date of Service: Creation Time:   Status: (Other)   HEART RATE=62  bpm  RR Pxcncdos=955  ms  MT Interval=  ms  P Horizontal Axis=  deg  P Front Axis=  deg  QRSD Hhgcmfrk=556  ms  QT Cnlbglqt=032  ms  SQzV=021  ms  QRS Axis=75  deg  T  Wave Axis=-33  deg  - ABNORMAL ECG -  Afib/flut and V-paced complexes  No further rhythm analysis attempted due to paced rhythm  IVCD, consider RBBB          Current Facility-Administered Medications:     acetaminophen (TYLENOL) tablet 650 mg, 650 mg, Oral, Q6H PRN, He Awad MD    albuterol sulfate HFA (PROVENTIL HFA;VENTOLIN HFA;PROAIR HFA) inhaler 2 puff, 2 puff, Inhalation, Q4H PRN, He Awad MD    apixaban (ELIQUIS) tablet 2.5 mg, 2.5 mg, Oral, Q12H, He Awad MD, 2.5 mg at 02/25/25 1236    atorvastatin (LIPITOR) tablet 10 mg, 10 mg, Oral, Nightly, He Awad MD, 10 mg at 02/25/25 1116    cetirizine (zyrTEC) tablet 10 mg, 10 mg, Oral, Daily, He Awad MD, 10 mg at 02/25/25 1116    furosemide (LASIX) injection 40 mg, 40 mg, Intravenous, BID Diuretics, He Awad MD    HYDROcodone-acetaminophen (NORCO) 5-325 MG per tablet 1 tablet, 1 tablet, Oral, Q4H PRN, He Awad MD    ipratropium-albuterol (DUO-NEB) nebulizer solution 3 mL, 3 mL, Nebulization, Q4H PRN, He Awad MD    losartan (COZAAR) tablet 100 mg, 100 mg, Oral, Q24H, He Awad MD, 100 mg at 02/25/25 1116    metoprolol succinate XL (TOPROL-XL) 24 hr tablet 100 mg, 100 mg, Oral, Q24H, He Awad MD, 100 mg at 02/25/25 1028    pantoprazole (PROTONIX) EC tablet 40 mg, 40 mg, Oral, Q AM, He Awad MD    [COMPLETED] Insert Peripheral IV, , , Once **AND** sodium chloride 0.9 % flush 10 mL, 10 mL, Intravenous, PRN, He Awad MD      ASSESSMENT  Large right pleural effusion  Paroxysmal atrial fibrillation  Hypertension  Hyperlipidemia  Gastroesophageal reflux disease  Status post permanent pacemaker placement    PLAN  Admit  IV diuresis  Check 2D echo  May need thoracentesis  Pulmonary consult  Adjust home medications  Stress ulcer DVT prophylaxis  Supportive care  PT OT  Patient is full code  Discussed with nursing staff  Follow closely further recommendation current hospital course    HE AWAD MD              Electronically signed by Kike Awad MD at 02/25/25 1306       Facility-Administered Medications as of 2/26/2025   Medication Dose Route Frequency Provider Last Rate Last Admin    acetaminophen (TYLENOL) tablet 650 mg  650 mg Oral Q6H PRN Kike Awad MD        albuterol sulfate HFA (PROVENTIL HFA;VENTOLIN HFA;PROAIR HFA) inhaler 2 puff  2 puff Inhalation Q4H PRN Kike Awad MD        apixaban (ELIQUIS) tablet 2.5 mg  2.5 mg Oral Q12H Kike Awad MD   2.5 mg at 02/26/25 0921    atorvastatin (LIPITOR) tablet 10 mg  10 mg Oral Nightly Kike Awad MD   10 mg at 02/25/25 2239    cetirizine (zyrTEC) tablet 10 mg  10 mg Oral Daily Kike Awad MD   10 mg at 02/26/25 0922    furosemide (LASIX) injection 20 mg  20 mg Intravenous BID Diuretics Kike Awad MD        [COMPLETED] furosemide (LASIX) injection 40 mg  40 mg Intravenous Once Earnest Devine PA   40 mg at 02/24/25 2313    HYDROcodone-acetaminophen (NORCO) 5-325 MG per tablet 1 tablet  1 tablet Oral Q4H PRN Kike Awad MD        ipratropium-albuterol (DUO-NEB) nebulizer solution 3 mL  3 mL Nebulization Q4H PRN Kike Awad MD        [COMPLETED] lidocaine (XYLOCAINE) 1 % injection 10 mL  10 mL Subcutaneous Once Niru Mo APRN   3 mL at 02/26/25 1106    losartan (COZAAR) tablet 100 mg  100 mg Oral Q24H Kike Awad MD   100 mg at 02/26/25 0922    metoprolol succinate XL (TOPROL-XL) 24 hr tablet 100 mg  100 mg Oral Q24H Kike Awad MD   100 mg at 02/26/25 0921    pantoprazole (PROTONIX) EC tablet 40 mg  40 mg Oral Q AM Kike Awad MD   40 mg at 02/26/25 0921    sodium chloride 0.9 % flush 10 mL  10 mL Intravenous PRN Kike Awad MD        traZODone (DESYREL) tablet 50 mg  50 mg Oral Nightly PRN Kike Awad MD   50 mg at 02/26/25 0035     Lab Results (last 24 hours)       Procedure Component Value Units Date/Time    Non-gynecologic Cytology [[id number]] Collected: 02/26/25 1108    Specimen: Pleural Fluid from Pleural Cavity Updated:  02/26/25 1349    Albumin, Fluid - Pleural Fluid, Pleural Cavity [[id number]] Collected: 02/26/25 1108    Specimen: Pleural Fluid from Pleural Cavity Updated: 02/26/25 1235     Albumin, Fluid 2.20 g/dL     Narrative:      No Reference Ranges Established.    A Serous fluid albumin gradient (serum albumin-fluid) <1.1 g/dL suggests the fluid is an exudate.  Cirrhosis usually results in an ascites fluid albumin gradient >1.1 g/dL.    This test was developed, its performance characteristics determined and judged suitable for clinical purposes by [facility].  It has not been cleared or approved by the FDA.  The laboratory is regulated under CLIA as qualified to perfom high-complexity testing.      Protein, Body Fluid - Pleural Fluid, Pleural Cavity [[id number]] Collected: 02/26/25 1108    Specimen: Pleural Fluid from Pleural Cavity Updated: 02/26/25 1235     Protein, Total, Fluid 3.2 g/dL     Narrative:      No Reference Ranges Established.    A serous fluid total fluid (TP) greater than 50 percent of the serum TP suggests the fluid is an exudate.      1. Pleural TP/Serum TP >0.5  2. Pleural LD/Serum LD >0.6  3. Pleural LD >2/3 of the upper limit of normal for serum LDH    This test was developed, it performance characteristics determined and judged suitable for clinical purposes by [facility].  It has not been cleared or approved by the FDA.  The laboratory is regulated under CLIA as qualified to perform high-complexity testing.     Lactate Dehydrogenase, Body Fluid - Pleural Fluid, Pleural Cavity [[id number]] Collected: 02/26/25 1108    Specimen: Pleural Fluid from Pleural Cavity Updated: 02/26/25 1235     Lactate Dehydrogenase (LD), Fluid 78 U/L     Narrative:      No Reference Ranges Established.    Serous fluid LDH greater than 60 percent of the serum LDH or serous fluid LDH two-thirds of the upper limit of normal for serum LDH suggests the fluid is an exudate.     1.  Pleural TP/Serum TP >0.5  2. Pleural LD/Serum LD >0.6  3. Pleural LD >2/3 of the upper limit of normal for serum LDH    This test was developed, it performance characteristics determined and judged suitable for clinical purposes by [facility].  It has not been cleared or approved by the FDA.  The laboratory is regulated under CLIA as qualified to perform high-complexity testing.     Glucose, Body Fluid - Pleural Fluid, Pleural Cavity [[id number]] Collected: 02/26/25 1108    Specimen: Pleural Fluid from Pleural Cavity Updated: 02/26/25 1235     Glucose, Fluid 113 mg/dL     Narrative:      No Reference Ranges Established.    Serous fluid glucose less than 60 mg/dL or less than 30 mg/dL below serum glucose suggests an infectious or malignant exudate.     This test was developed, it performance characteristics determined and judged suitable for clinical purposes by [facility].  It has not been cleared or approved by the FDA.  The laboratory is regulated under CLIA as qualified to perform high-complexity testing.     Triglycerides, Body Fluid - Pleural Fluid, Pleural Cavity [[id number]] Collected: 02/26/25 1108    Specimen: Pleural Fluid from Pleural Cavity Updated: 02/26/25 1235     Triglycerides, Fluid 20 mg/dL     Cholesterol, Body Fluid - Pleural Fluid, Pleural Cavity [[id number]] Collected: 02/26/25 1108    Specimen: Pleural Fluid from Pleural Cavity Updated: 02/26/25 1235     Cholesterol  36 mg/dL     Body Fluid Cell Count With Differential - Pleural Fluid, Pleural Cavity [[id number]]  (Abnormal) Collected: 02/26/25 1108    Specimen: Pleural Fluid from Pleural Cavity Updated: 02/26/25 1232    Narrative:      The following orders were created for panel order Body Fluid Cell Count With Differential - Pleural Fluid, Pleural Cavity.  Procedure                               Abnormality         Status                     ---------                               -----------          ------                     Body fluid cell count - ...[[id number]]  Abnormal            Final result               Body fluid differential ...[[id number]]                      Final result                 Please view results for these tests on the individual orders.    Body fluid differential - Pleural Fluid, Pleural Cavity [[id number]] Collected: 02/26/25 1108    Specimen: Pleural Fluid from Pleural Cavity Updated: 02/26/25 1232     Neutrophils, Fluid % 8 %      Lymphocytes, Fluid % 80 %      Monocytes, Fluid % 2 %      Eosinophils, Fluid % 1 %      Mononuclear, Fluid % 9 %     Body fluid cell count - Pleural Fluid, Pleural Cavity [[id number]]  (Abnormal) Collected: 02/26/25 1108    Specimen: Pleural Fluid from Pleural Cavity Updated: 02/26/25 1224     Color, Fluid Yellow     Appearance, Fluid Hazy     RBC, Fluid 105 /mm3      Nucleated Cells, Fluid 598 /mm3      Method: Hemacytometer Method    Narrative:      No reference range established. Physician to interpret results with clinical findings.    pH, Body Fluid - Pleural Fluid, Pleural Cavity [[id number]] Collected: 02/26/25 1108    Specimen: Pleural Fluid from Pleural Cavity Updated: 02/26/25 1143    Anaerobic Culture - Pleural Fluid, Pleural Cavity [[id number]] Collected: 02/26/25 1108    Specimen: Pleural Fluid from Pleural Cavity Updated: 02/26/25 1143    AFB Culture - Body Fluid, Pleural Cavity [[id number]] Collected: 02/26/25 1108    Specimen: Body Fluid from Pleural Cavity Updated: 02/26/25 1143    Body Fluid Culture - Body Fluid, Pleural Cavity [[id number]] Collected: 02/26/25 1108    Specimen: Body Fluid from Pleural Cavity Updated: 02/26/25 1143    Fungus Culture - Body Fluid, Pleural Cavity [[id number]] Collected: 02/26/25 1108    Specimen: Body Fluid from Pleural Cavity Updated: 02/26/25 1143    High Sensitivity Troponin T 1Hr [[id number]]  (Normal) Collected: 02/26/25 0957    Specimen: Blood Updated: 02/26/25 1047     HS Troponin T 11 ng/L      Troponin  "T Numeric Delta 1 ng/L     Narrative:      High Sensitive Troponin T Reference Range:  <14.0 ng/L- Negative Female for AMI  <22.0 ng/L- Negative Male for AMI  >=14 - Abnormal Female indicating possible myocardial injury.  >=22 - Abnormal Male indicating possible myocardial injury.   Clinicians would have to utilize clinical acumen, EKG, Troponin, and serial changes to determine if it is an Acute Myocardial Infarction or myocardial injury due to an underlying chronic condition.         High Sensitivity Troponin T [[id number]]  (Normal) Collected: 02/26/25 0727    Specimen: Blood Updated: 02/26/25 0837     HS Troponin T 10 ng/L     Narrative:      High Sensitive Troponin T Reference Range:  <14.0 ng/L- Negative Female for AMI  <22.0 ng/L- Negative Male for AMI  >=14 - Abnormal Female indicating possible myocardial injury.  >=22 - Abnormal Male indicating possible myocardial injury.   Clinicians would have to utilize clinical acumen, EKG, Troponin, and serial changes to determine if it is an Acute Myocardial Infarction or myocardial injury due to an underlying chronic condition.         Procalcitonin [[id number]]  (Normal) Collected: 02/26/25 0727    Specimen: Blood Updated: 02/26/25 0837     Procalcitonin 0.04 ng/mL     Narrative:      As a Marker for Sepsis (Non-Neonates):    1. <0.5 ng/mL represents a low risk of severe sepsis and/or septic shock.  2. >2 ng/mL represents a high risk of severe sepsis and/or septic shock.    As a Marker for Lower Respiratory Tract Infections that require antibiotic therapy:    PCT on Admission    Antibiotic Therapy       6-12 Hrs later    >0.5                Strongly Recommended  >0.25 - <0.5        Recommended   0.1 - 0.25          Discouraged              Remeasure/reassess PCT  <0.1                Strongly Discouraged     Remeasure/reassess PCT    As 28 day mortality risk marker: \"Change in Procalcitonin Result\" (>80% or <=80%) if Day 0 (or Day 1) and Day 4 values are available. " Refer to http://www.[facility]-pct-calculator.[email or web address]    Change in PCT <=80%  A decrease of PCT levels below or equal to 80% defines a positive change in PCT test result representing a higher risk for 28-day all-cause mortality of patients diagnosed with severe sepsis for septic shock.    Change in PCT >80%  A decrease of PCT levels of more than 80% defines a negative change in PCT result representing a lower risk for 28-day all-cause mortality of patients diagnosed with severe sepsis or septic shock.       TSH [[id number]]  (Normal) Collected: 02/26/25 0727    Specimen: Blood Updated: 02/26/25 0837     TSH 2.210 uIU/mL     Comprehensive Metabolic Panel [[id number]]  (Abnormal) Collected: 02/26/25 0727    Specimen: Blood Updated: 02/26/25 0835     Glucose 91 mg/dL      BUN 21 mg/dL      Creatinine 1.14 mg/dL      Sodium 141 mmol/L      Potassium 3.6 mmol/L      Comment: Slight hemolysis detected by analyzer. Result may be falsely elevated.        Chloride 96 mmol/L      CO2 32.6 mmol/L      Calcium 9.2 mg/dL      Total Protein 6.7 g/dL      Albumin 3.6 g/dL      ALT (SGPT) 11 U/L      AST (SGOT) 23 U/L      Alkaline Phosphatase 88 U/L      Total Bilirubin 0.6 mg/dL      Globulin 3.1 gm/dL      A/G Ratio 1.2 g/dL      BUN/Creatinine Ratio 18.4     Anion Gap 12.4 mmol/L      eGFR 46.1 mL/min/1.73     Narrative:      GFR Categories in Chronic Kidney Disease (CKD)      GFR Category          GFR (mL/min/1.73)    Interpretation  G1                     90 or greater         Normal or high (1)  G2                      60-89                Mild decrease (1)  G3a                   45-59                Mild to moderate decrease  G3b                   30-44                Moderate to severe decrease  G4                    15-29                Severe decrease  G5                    14 or less           Kidney failure          (1)In the absence of evidence of kidney disease, neither GFR category G1 or G2 fulfill the criteria for  CKD.    eGFR calculation 2021 CKD-EPI creatinine equation, which does not include race as a factor    Lipid Panel [[id number]] Collected: 02/26/25 0727    Specimen: Blood Updated: 02/26/25 0831     Total Cholesterol 135 mg/dL      Triglycerides 62 mg/dL      HDL Cholesterol 54 mg/dL      LDL Cholesterol  68 mg/dL      VLDL Cholesterol 13 mg/dL      LDL/HDL Ratio 1.27    Narrative:      Cholesterol Reference Ranges  (U.S. Department of Health and Human Services ATP III Classifications)    Desirable          <200 mg/dL  Borderline High    200-239 mg/dL  High Risk          >240 mg/dL      Triglyceride Reference Ranges  (U.S. Department of Health and Human Services ATP III Classifications)    Normal           <150 mg/dL  Borderline High  150-199 mg/dL  High             200-499 mg/dL  Very High        >500 mg/dL    HDL Reference Ranges  (U.S. Department of Health and Human Services ATP III Classifications)    Low     <40 mg/dl (major risk factor for CHD)  High    >60 mg/dl ('negative' risk factor for CHD)        LDL Reference Ranges  (U.S. Department of Health and Human Services ATP III Classifications)    Optimal          <100 mg/dL  Near Optimal     100-129 mg/dL  Borderline High  130-159 mg/dL  High             160-189 mg/dL  Very High        >189 mg/dL    LDL is calculated using the NIH LDL-C calculation.      Hemoglobin A1c [[id number]]  (Abnormal) Collected: 02/26/25 0727    Specimen: Blood Updated: 02/26/25 0824     Hemoglobin A1C 5.80 %     Narrative:      Hemoglobin A1C Ranges:    Increased Risk for Diabetes  5.7% to 6.4%  Diabetes                     >= 6.5%  Diabetic Goal                < 7.0%    CBC & Differential [[id number]]  (Abnormal) Collected: 02/26/25 0727    Specimen: Blood Updated: 02/26/25 0808    Narrative:      The following orders were created for panel order CBC & Differential.  Procedure                               Abnormality         Status                     ---------                                -----------         ------                     CBC Auto Differential[[id number]]        Abnormal            Final result                 Please view results for these tests on the individual orders.    CBC Auto Differential [[id number]]  (Abnormal) Collected: 02/26/25 0727    Specimen: Blood Updated: 02/26/25 0808     WBC 7.46 10*3/mm3      RBC 4.24 10*6/mm3      Hemoglobin 13.6 g/dL      Hematocrit 41.7 %      MCV 98.3 fL      MCH 32.1 pg      MCHC 32.6 g/dL      RDW 11.9 %      RDW-SD 42.7 fl      MPV 8.8 fL      Platelets 226 10*3/mm3      Neutrophil % 70.5 %      Lymphocyte % 14.7 %      Monocyte % 11.4 %      Eosinophil % 2.7 %      Basophil % 0.4 %      Immature Grans % 0.3 %      Neutrophils, Absolute 5.26 10*3/mm3      Lymphocytes, Absolute 1.10 10*3/mm3      Monocytes, Absolute 0.85 10*3/mm3      Eosinophils, Absolute 0.20 10*3/mm3      Basophils, Absolute 0.03 10*3/mm3      Immature Grans, Absolute 0.02 10*3/mm3      nRBC 0.0 /100 WBC           Imaging Results (Last 24 Hours)       Procedure Component Value Units Date/Time    US Thoracentesis [[id number]] Resulted: 02/26/25 1037    Specimen: Body Fluid Updated: 02/26/25 1123          Orders (last 24 hrs)        Start     Ordered    02/27/25 0600  CBC & Differential  Morning Draw         02/26/25 0900    02/27/25 0600  Basic Metabolic Panel  Morning Draw         02/26/25 0900    02/26/25 1800  furosemide (LASIX) injection 20 mg  2 Times Daily (Diuretics)         02/26/25 0900    02/26/25 1225  Body fluid differential - Pleural Fluid, Pleural Cavity  Once         02/26/25 1224    02/26/25 1131  Obtain Informed Consent  Once         02/26/25 1130    02/26/25 1130  lidocaine (XYLOCAINE) 1 % injection 10 mL  Once         02/26/25 1036    02/26/25 1037  Body Fluid Cell Count With Differential - Pleural Fluid, Pleural Cavity  STAT         02/26/25 1036    02/26/25 1037  pH, Body Fluid - Pleural Fluid, Pleural Cavity  STAT         02/26/25 1036    02/26/25 1037   Albumin, Fluid - Pleural Fluid, Pleural Cavity  STAT         02/26/25 1036    02/26/25 1037  Protein, Body Fluid - Pleural Fluid, Pleural Cavity  STAT         02/26/25 1036    02/26/25 1037  Lactate Dehydrogenase, Body Fluid - Pleural Fluid, Pleural Cavity  STAT         02/26/25 1036    02/26/25 1037  Glucose, Body Fluid - Pleural Fluid, Pleural Cavity  STAT         02/26/25 1036    02/26/25 1037  Triglycerides, Body Fluid - Pleural Fluid, Pleural Cavity  STAT         02/26/25 1036    02/26/25 1037  Cholesterol, Body Fluid - Pleural Fluid, Pleural Cavity  STAT         02/26/25 1036    02/26/25 1037  AFB Culture - Body Fluid, Pleural Cavity  STAT         02/26/25 1036    02/26/25 1037  Non-gynecologic Cytology  STAT         02/26/25 1036    02/26/25 1037  Flow Cytometry, Path Only  STAT         02/26/25 1036    02/26/25 1037  Body Fluid Culture - Body Fluid, Pleural Cavity  STAT         02/26/25 1036    02/26/25 1037  Anaerobic Culture - Pleural Fluid, Pleural Cavity  STAT         02/26/25 1036    02/26/25 1037  Fungus Culture - Body Fluid, Pleural Cavity  STAT         02/26/25 1036    02/26/25 1037  Body fluid cell count - Pleural Fluid, Pleural Cavity  PROCEDURE ONCE         02/26/25 1036    02/26/25 1024  Vital Signs (Specify Frequency)  Per Order Details         02/26/25 1024    02/26/25 1024  Bed Rest With Bathroom Privileges  Once         02/26/25 1024    02/26/25 1024  Discharge Criteria for Outpatient  Once        Comments: Discharge in 30 min unless receiving Albumin, then may D/C when completed.  If patient has an IV, remove prior to discharge.    02/26/25 1024    02/26/25 0931  Inpatient Advance Care Planning Consult  Once        Provider:  (Not yet assigned)    02/26/25 0931    02/26/25 0827  High Sensitivity Troponin T 1Hr  PROCEDURE ONCE         02/26/25 0837    02/26/25 0600  CBC & Differential  Morning Draw         02/25/25 0950    02/26/25 0600  Comprehensive Metabolic Panel  Morning Draw          02/25/25 0950    02/26/25 0600  TSH  Morning Draw         02/25/25 0950    02/26/25 0600  Lipid Panel  Morning Draw         02/25/25 0950    02/26/25 0600  Hemoglobin A1c  Morning Draw         02/25/25 0950    02/26/25 0600  High Sensitivity Troponin T  Morning Draw         02/25/25 1307    02/26/25 0600  Procalcitonin  Morning Draw         02/25/25 1307    02/26/25 0600  CBC Auto Differential  PROCEDURE ONCE         02/25/25 2201    02/26/25 0000  BNP  Once,   Status:  Canceled         02/25/25 0950    02/26/25 0000  Obtain Informed Consent         02/26/25 1024    02/25/25 2315  traZODone (DESYREL) tablet 50 mg  Nightly PRN         02/25/25 2315    02/25/25 1819  US Thoracentesis  1 Time Imaging         02/25/25 1819    02/25/25 1800  furosemide (LASIX) injection 40 mg  2 Times Daily (Diuretics),   Status:  Discontinued         02/25/25 1305    02/25/25 1531  Inpatient Access Center Consult  Once        Provider:  (Not yet assigned)    02/25/25 1530    02/25/25 1005  cetirizine (zyrTEC) tablet 10 mg  Daily         02/25/25 0949    02/25/25 0948  ipratropium-albuterol (DUO-NEB) nebulizer solution 3 mL  Every 4 Hours PRN         02/25/25 0949    02/25/25 0948  HYDROcodone-acetaminophen (NORCO) 5-325 MG per tablet 1 tablet  Every 4 Hours PRN         02/25/25 0949    02/25/25 0948  albuterol sulfate HFA (PROVENTIL HFA;VENTOLIN HFA;PROAIR HFA) inhaler 2 puff  Every 4 Hours PRN         02/25/25 0949    02/25/25 0948  acetaminophen (TYLENOL) tablet 650 mg  Every 6 Hours PRN         02/25/25 0949    02/25/25 0900  apixaban (ELIQUIS) tablet 2.5 mg  Every 12 Hours Scheduled         02/25/25 0742    02/25/25 0900  losartan (COZAAR) tablet 100 mg  Every 24 Hours Scheduled         02/25/25 0742    02/25/25 0900  metoprolol succinate XL (TOPROL-XL) 24 hr tablet 100 mg  Every 24 Hours Scheduled         02/25/25 0743    02/25/25 0759  pantoprazole (PROTONIX) EC tablet 40 mg  Every Early Morning         02/25/25 0743    02/25/25  "0758  atorvastatin (LIPITOR) tablet 10 mg  Nightly         02/25/25 0742    02/24/25 2148  sodium chloride 0.9 % flush 10 mL  As Needed        Placed in \"And\" Linked Group    02/24/25 2148    --  traZODone (DESYREL) 50 MG tablet  Nightly         02/25/25 1009    Signed and Held  Vital Signs (Specify Frequency)  Once         Signed and Held    Signed and Held  Pulse Oximetry  Once        Comments: Pulse ox on finger if Thoracentesis    Signed and Held    Signed and Held  Notify Provider  Until Discontinued         Signed and Held    Signed and Held  Protime-INR  STAT        Comments: Draw INR only if patient is on Warfarin or if risk factors for bleeding (liver disease, cancer treatment, etc.)      Signed and Held                  Operative/Procedure Notes (last 24 hours)  Notes from 02/25/25 1351 through 02/26/25 1351   No notes of this type exist for this encounter.          Physician Progress Notes (last 24 hours)        Terrence Samuels MD at 02/26/25 1158          Pulm Update Note  - Attempted to see patient this morning, but she was off the floor for the thoracentesis.   - Will see her tomorrow and follow up thoracentesis labs.   - No major changes to the plan from a pulmonary perspective today.  - Echo pending  - From a pulmonary perspective, if she is wanting to leave today, she could be discharged after the thoracentesis and I will follow up the labwork and call her with the results. She has been on room air and has no indication for ABX at this time. I suspect the pleural effusions are going to be secondary to CHF.      Electronically signed by Terrence Samuels MD at 02/26/25 1202       Kike Awad MD at 02/26/25 0862          Daily progress note    Primary care physician  Dr. Hidalgo    Subjective  Awake and alert and feeling better with no new complaints and agreeable for thoracentesis.    History of present illness  89-year-old white female with history of paroxysmal atrial fibrillation hypertension " "hyperlipidemia asthma and gastroesophageal reflux disease who is well-known to our service to the emergency room with shortness of breath for last several days.  Patient denies any chest pain palpitation abdominal pain nausea vomiting diarrhea.  Patient denies any fever cough congestion.  Patient evaluated in ER found to have pleural effusion admitted for management.  Patient fully alert oriented give me an good history and still wants to be a full code and lives by herself.     REVIEW OF SYSTEMS  All systems reviewed and negative except for those discussed in HPI.      PHYSICAL EXAM   Blood pressure 157/69, pulse 63, temperature 97.6 °F (36.4 °C), temperature source Oral, resp. rate 20, height 154.9 cm (61\"), weight 60.9 kg (134 lb 3.2 oz), SpO2 98%.    GENERAL: Awake and alert, oriented, elderly, not distressed  HENT: head atraumatic, no nuchal rigidity  EYES: no scleral icterus, EOMI  CV: regular rhythm, regular rate, no murmur  RESPIRATORY: normal effort, decreased breath sounds in the right base  ABDOMEN: soft, nontender  MUSCULOSKELETAL: no deformity, FROM, 2+ pedal edema  NEURO: alert, moves all extremities, follows commands  SKIN: warm, dry     LAB RESULTS  Lab Results (last 24 hours)       Procedure Component Value Units Date/Time    High Sensitivity Troponin T [[id number]]  (Normal) Collected: 02/26/25 0727    Specimen: Blood Updated: 02/26/25 0837     HS Troponin T 10 ng/L     Narrative:      High Sensitive Troponin T Reference Range:  <14.0 ng/L- Negative Female for AMI  <22.0 ng/L- Negative Male for AMI  >=14 - Abnormal Female indicating possible myocardial injury.  >=22 - Abnormal Male indicating possible myocardial injury.   Clinicians would have to utilize clinical acumen, EKG, Troponin, and serial changes to determine if it is an Acute Myocardial Infarction or myocardial injury due to an underlying chronic condition.         Procalcitonin [[id number]]  (Normal) Collected: 02/26/25 0727    Specimen: " "Blood Updated: 02/26/25 0837     Procalcitonin 0.04 ng/mL     Narrative:      As a Marker for Sepsis (Non-Neonates):    1. <0.5 ng/mL represents a low risk of severe sepsis and/or septic shock.  2. >2 ng/mL represents a high risk of severe sepsis and/or septic shock.    As a Marker for Lower Respiratory Tract Infections that require antibiotic therapy:    PCT on Admission    Antibiotic Therapy       6-12 Hrs later    >0.5                Strongly Recommended  >0.25 - <0.5        Recommended   0.1 - 0.25          Discouraged              Remeasure/reassess PCT  <0.1                Strongly Discouraged     Remeasure/reassess PCT    As 28 day mortality risk marker: \"Change in Procalcitonin Result\" (>80% or <=80%) if Day 0 (or Day 1) and Day 4 values are available. Refer to http://www.[employer]-calculator.com    Change in PCT <=80%  A decrease of PCT levels below or equal to 80% defines a positive change in PCT test result representing a higher risk for 28-day all-cause mortality of patients diagnosed with severe sepsis for septic shock.    Change in PCT >80%  A decrease of PCT levels of more than 80% defines a negative change in PCT result representing a lower risk for 28-day all-cause mortality of patients diagnosed with severe sepsis or septic shock.       TSH [[id number]]  (Normal) Collected: 02/26/25 0727    Specimen: Blood Updated: 02/26/25 0837     TSH 2.210 uIU/mL     Comprehensive Metabolic Panel [[id number]]  (Abnormal) Collected: 02/26/25 0727    Specimen: Blood Updated: 02/26/25 0835     Glucose 91 mg/dL      BUN 21 mg/dL      Creatinine 1.14 mg/dL      Sodium 141 mmol/L      Potassium 3.6 mmol/L      Comment: Slight hemolysis detected by analyzer. Result may be falsely elevated.        Chloride 96 mmol/L      CO2 32.6 mmol/L      Calcium 9.2 mg/dL      Total Protein 6.7 g/dL      Albumin 3.6 g/dL      ALT (SGPT) 11 U/L      AST (SGOT) 23 U/L      Alkaline Phosphatase 88 U/L      Total Bilirubin 0.6 mg/dL      " Globulin 3.1 gm/dL      A/G Ratio 1.2 g/dL      BUN/Creatinine Ratio 18.4     Anion Gap 12.4 mmol/L      eGFR 46.1 mL/min/1.73     Narrative:      GFR Categories in Chronic Kidney Disease (CKD)      GFR Category          GFR (mL/min/1.73)    Interpretation  G1                     90 or greater         Normal or high (1)  G2                      60-89                Mild decrease (1)  G3a                   45-59                Mild to moderate decrease  G3b                   30-44                Moderate to severe decrease  G4                    15-29                Severe decrease  G5                    14 or less           Kidney failure          (1)In the absence of evidence of kidney disease, neither GFR category G1 or G2 fulfill the criteria for CKD.    eGFR calculation 2021 CKD-EPI creatinine equation, which does not include race as a factor    Lipid Panel [[id number]] Collected: 02/26/25 0727    Specimen: Blood Updated: 02/26/25 0831     Total Cholesterol 135 mg/dL      Triglycerides 62 mg/dL      HDL Cholesterol 54 mg/dL      LDL Cholesterol  68 mg/dL      VLDL Cholesterol 13 mg/dL      LDL/HDL Ratio 1.27    Narrative:      Cholesterol Reference Ranges  (U.S. Department of Health and Human Services ATP III Classifications)    Desirable          <200 mg/dL  Borderline High    200-239 mg/dL  High Risk          >240 mg/dL      Triglyceride Reference Ranges  (U.S. Department of Health and Human Services ATP III Classifications)    Normal           <150 mg/dL  Borderline High  150-199 mg/dL  High             200-499 mg/dL  Very High        >500 mg/dL    HDL Reference Ranges  (U.S. Department of Health and Human Services ATP III Classifications)    Low     <40 mg/dl (major risk factor for CHD)  High    >60 mg/dl ('negative' risk factor for CHD)        LDL Reference Ranges  (U.S. Department of Health and Human Services ATP III Classifications)    Optimal          <100 mg/dL  Near Optimal     100-129 mg/dL  Borderline  High  130-159 mg/dL  High             160-189 mg/dL  Very High        >189 mg/dL    LDL is calculated using the NIH LDL-C calculation.      Hemoglobin A1c [[id number]]  (Abnormal) Collected: 02/26/25 0727    Specimen: Blood Updated: 02/26/25 0824     Hemoglobin A1C 5.80 %     Narrative:      Hemoglobin A1C Ranges:    Increased Risk for Diabetes  5.7% to 6.4%  Diabetes                     >= 6.5%  Diabetic Goal                < 7.0%    CBC & Differential [[id number]]  (Abnormal) Collected: 02/26/25 0727    Specimen: Blood Updated: 02/26/25 0808    Narrative:      The following orders were created for panel order CBC & Differential.  Procedure                               Abnormality         Status                     ---------                               -----------         ------                     CBC Auto Differential[[id number]]        Abnormal            Final result                 Please view results for these tests on the individual orders.    CBC Auto Differential [[id number]]  (Abnormal) Collected: 02/26/25 0727    Specimen: Blood Updated: 02/26/25 0808     WBC 7.46 10*3/mm3      RBC 4.24 10*6/mm3      Hemoglobin 13.6 g/dL      Hematocrit 41.7 %      MCV 98.3 fL      MCH 32.1 pg      MCHC 32.6 g/dL      RDW 11.9 %      RDW-SD 42.7 fl      MPV 8.8 fL      Platelets 226 10*3/mm3      Neutrophil % 70.5 %      Lymphocyte % 14.7 %      Monocyte % 11.4 %      Eosinophil % 2.7 %      Basophil % 0.4 %      Immature Grans % 0.3 %      Neutrophils, Absolute 5.26 10*3/mm3      Lymphocytes, Absolute 1.10 10*3/mm3      Monocytes, Absolute 0.85 10*3/mm3      Eosinophils, Absolute 0.20 10*3/mm3      Basophils, Absolute 0.03 10*3/mm3      Immature Grans, Absolute 0.02 10*3/mm3      nRBC 0.0 /100 WBC           Imaging Results (Last 24 Hours)       ** No results found for the last 24 hours. **          Scan on 2/24/2025 2221 by [employer], [place]: ECG 12-LEAD         Author: -- Service: -- Author Type: --   Filed: Date of  Service: Creation Time:   Status: (Other)   HEART RATE=62  bpm  RR Aisegsuu=721  ms  CO Interval=  ms  P Horizontal Axis=  deg  P Front Axis=  deg  QRSD Ftedutbf=196  ms  QT Iyputztp=615  ms  ERpZ=565  ms  QRS Axis=75  deg  T Wave Axis=-33  deg  - ABNORMAL ECG -  Afib/flut and V-paced complexes  No further rhythm analysis attempted due to paced rhythm  IVCD, consider RBBB          Current Facility-Administered Medications:     acetaminophen (TYLENOL) tablet 650 mg, 650 mg, Oral, Q6H PRN, Kike Awad MD    albuterol sulfate HFA (PROVENTIL HFA;VENTOLIN HFA;PROAIR HFA) inhaler 2 puff, 2 puff, Inhalation, Q4H PRN, Kike Aawd MD    apixaban (ELIQUIS) tablet 2.5 mg, 2.5 mg, Oral, Q12H, Kike Awad MD, 2.5 mg at 02/25/25 2239    atorvastatin (LIPITOR) tablet 10 mg, 10 mg, Oral, Nightly, Kike Awad MD, 10 mg at 02/25/25 2239    cetirizine (zyrTEC) tablet 10 mg, 10 mg, Oral, Daily, Kike Awad MD, 10 mg at 02/25/25 1116    furosemide (LASIX) injection 40 mg, 40 mg, Intravenous, BID Diuretics, Kike Awad MD, 40 mg at 02/25/25 2239    HYDROcodone-acetaminophen (NORCO) 5-325 MG per tablet 1 tablet, 1 tablet, Oral, Q4H PRN, Kike Awad MD    ipratropium-albuterol (DUO-NEB) nebulizer solution 3 mL, 3 mL, Nebulization, Q4H PRN, Kike Awad MD    losartan (COZAAR) tablet 100 mg, 100 mg, Oral, Q24H, Kike Awad MD, 100 mg at 02/25/25 1116    metoprolol succinate XL (TOPROL-XL) 24 hr tablet 100 mg, 100 mg, Oral, Q24H, Kike Awad MD, 100 mg at 02/25/25 1028    pantoprazole (PROTONIX) EC tablet 40 mg, 40 mg, Oral, Q AM, Kike Awad MD    [COMPLETED] Insert Peripheral IV, , , Once **AND** sodium chloride 0.9 % flush 10 mL, 10 mL, Intravenous, PRN, Kike Awad MD    traZODone (DESYREL) tablet 50 mg, 50 mg, Oral, Nightly PRN, Kike Awad MD, 50 mg at 02/26/25 0035      ASSESSMENT  Large right pleural effusion  Paroxysmal atrial fibrillation  Hypertension  Hyperlipidemia  Gastroesophageal reflux  disease  Status post permanent pacemaker placement    PLAN  CPM  Continue IV diuresis  2D echo pending  Thoracentesis today  Pulmonary consult appreciated  Adjust home medications  Stress ulcer DVT prophylaxis  Supportive care  PT OT  Discussed with nursing staff  Follow closely further recommendation current hospital course    HE OSWALD MD    Copied text in this note has been reviewed and is accurate as of 02/26/25               Electronically signed by He Oswald MD at 02/26/25 0874          Consult Notes (last 24 hours)        Terrence Samuels MD at 02/25/25 1813        Consult Orders    1. Inpatient Pulmonology Consult [[id number]] ordered by He Oswald MD at 02/25/25 1308                       Patient Identification:  Mayuri Early  89 y.o.  female  1935  [id number]          LOS 1    Requesting physician: Dr. Oswald    Reason for Consult: Pleural effusion    History of Present Illness:     89-year-old female with history of A-fib, hypertension, hyperlipidemia, asthma, and GERD who came into the ER for shortness of breath.  She was recently admitted from 2/8-2/12 for benign paroxysmal positional vertigo which resolved..  She had follow-up with her PCP last Friday.  Since discharge, she has been complaining of some generalized weakness and mild shortness of breath.  Her PCP obtained a chest x-ray and then called her yesterday telling her that there were some pleural effusions and he recommended her to come to the ER.      Past Medical History:  Past Medical History:   Diagnosis Date    Fibrocystic breast     Well woman exam 10/20/2020       Past Surgical History:  Past Surgical History:   Procedure Laterality Date    BREAST CYST ASPIRATION      BREAST CYST EXCISION      HYSTERECTOMY          Home Meds:  Medications Prior to Admission   Medication Sig Dispense Refill Last Dose/Taking    acetaminophen (TYLENOL) 325 MG tablet Take 2 tablets by mouth Every 6 (Six) Hours As Needed for Mild Pain.   Past  Month    albuterol sulfate  (90 Base) MCG/ACT inhaler Inhale 2 puffs Every 4 (Four) Hours As Needed for Wheezing.   2/24/2025    apixaban (ELIQUIS) 2.5 MG tablet tablet Take 1 tablet by mouth 2 (Two) Times a Day.   2/24/2025    atorvastatin (LIPITOR) 10 MG tablet Take 1 tablet by mouth Every Night.   2/24/2025    Cetirizine HCl 10 MG capsule Take 5 mg by mouth Daily As Needed (allergies).   Past Month    HYDROcodone-acetaminophen (NORCO) 5-325 MG per tablet Take 1 tablet by mouth Every 4 (Four) Hours As Needed for Moderate Pain. 15 tablet 0 Past Month    ipratropium-albuterol (DUO-NEB) 0.5-2.5 mg/3 ml nebulizer Take 3 mL by nebulization Every 4 (Four) Hours As Needed for Shortness of Air for up to 30 days. 360 mL 0 2/24/2025    losartan-hydrochlorothiazide (HYZAAR) 100-25 MG per tablet Take 1 tablet by mouth Daily.   2/24/2025    metoprolol succinate XL (TOPROL-XL) 100 MG 24 hr tablet Take 1 tablet by mouth 2 (Two) Times a Day.   2/24/2025    multivitamin with minerals (MULTIVITAMIN ADULT PO) Take 1 tablet by mouth Daily.   2/24/2025    Omega-3 Fatty Acids (fish oil) 1000 MG capsule capsule Take 1 capsule by mouth Daily With Breakfast.   2/24/2025    traZODone (DESYREL) 50 MG tablet Take 1 tablet by mouth Every Night.   2/24/2025    ondansetron (Zofran) 4 MG tablet Take 1 tablet by mouth Every 8 (Eight) Hours As Needed for Vomiting or Nausea. 12 tablet 0     pantoprazole (PROTONIX) 40 MG EC tablet Take 1 tablet by mouth Daily.            Allergies:  No Known Allergies    Social History:   Social History     Socioeconomic History    Marital status:    Tobacco Use    Smoking status: Former     Passive exposure: Past    Smokeless tobacco: Never   Vaping Use    Vaping status: Never Used   Substance and Sexual Activity    Alcohol use: Never    Drug use: Never    Sexual activity: Defer       Family History:  History reviewed. No pertinent family history.    Review of Systems:  Review of Systems  "  Constitutional:  Positive for fatigue.   Respiratory:  Positive for shortness of breath.    All other systems reviewed and are negative.       Objective:    PHYSICAL EXAM:    /59 (BP Location: Right arm, Patient Position: Lying)   Pulse 67   Temp 97.9 °F (36.6 °C) (Oral)   Resp 20   Ht 154.9 cm (61\")   Wt 59 kg (130 lb)   SpO2 97%   BMI 24.56 kg/m²  Body mass index is 24.56 kg/m². 97% 59 kg (130 lb)    Physical Exam  Constitutional:       Appearance: Normal appearance.   HENT:      Head: Normocephalic and atraumatic.      Nose: Nose normal.      Mouth/Throat:      Mouth: Mucous membranes are moist.      Pharynx: Oropharynx is clear.   Eyes:      Extraocular Movements: Extraocular movements intact.      Conjunctiva/sclera: Conjunctivae normal.   Cardiovascular:      Rate and Rhythm: Normal rate and regular rhythm.      Pulses: Normal pulses.      Heart sounds: Normal heart sounds. No murmur heard.  Pulmonary:      Effort: Pulmonary effort is normal. No respiratory distress.      Breath sounds: Normal breath sounds. No stridor. No wheezing or rales.   Abdominal:      General: Abdomen is flat. Bowel sounds are normal.      Palpations: Abdomen is soft.   Skin:     General: Skin is warm and dry.   Neurological:      General: No focal deficit present.      Mental Status: She is alert and oriented to person, place, and time. Mental status is at baseline.   Psychiatric:         Mood and Affect: Mood normal.         Behavior: Behavior normal.            Results Review:   I have personally reviewed the results and agree with these findings:  [x]  Laboratory accordion  [x]  Microbiology  [x]  Radiology  [x]  EKG/Telemetry   [x]  Cardiology/Vascular   [x]  Pathology  [x]  Old records  []  Other:       Medication Review:  I have reviewed the current MAR.  Antibiotics  This patient does not have an active medication from one of the medication groupers.     Scheduled Medications  apixaban, 2.5 mg, Oral, " "Q12H  atorvastatin, 10 mg, Oral, Nightly  cetirizine, 10 mg, Oral, Daily  furosemide, 40 mg, Intravenous, BID Diuretics  losartan, 100 mg, Oral, Q24H  metoprolol succinate XL, 100 mg, Oral, Q24H  pantoprazole, 40 mg, Oral, Q AM      ICU Drips     PRN Medications    acetaminophen    albuterol sulfate HFA    HYDROcodone-acetaminophen    ipratropium-albuterol    [COMPLETED] Insert Peripheral IV **AND** sodium chloride    Lines, Drains & Airways       Active LDAs       Name Placement date Placement time Site Days    Peripheral IV 02/24/25 2206 Anterior;Left;Upper Arm 02/24/25 2206  Arm  less than 1    External Urinary Catheter 02/24/25  2319  --  less than 1                    Diet Orders (active) (From admission, onward)       Start     Ordered    02/25/25 0744  Diet: Regular/House; Fluid Consistency: Thin (IDDSI 0)  Diet Effective Now         02/25/25 0743                    Assessment:  Bilateral pleural effusions  Paroxysmal A-fib  Hypertension  Hyperlipidemia  Asthma  GERD  Elevated BNP    Plan:  - CBC and CMP unremarkable, afebrile  - Given her elevated BNP and bilateral pleural effusions, I suspect that the etiology of these pleural effusions will be from CHF/volume overload  - Right-sided thoracentesis ordered, pending  - No indication for antibiotics at this time from my perspective      Terrence Samuels MD  [facility], [facility]  Pulmonary and Critical Care Medicine    Electronically signed by Terrence Samuels MD at 02/25/25 1822       Josef Boswell LCSW at 02/25/25 1710        Consult Orders    1. [facility] Consult [[id number]] ordered by Kike Awad MD at 02/25/25 1530                 [facility] evaluated 89-year-old female for depression.  Patient is alert and oriented x 4.  Patient came into the hospital the second time in the month with pleural effusion and has been feeling weak.  Patient states she \"is not a depressed person\" and has never been depressed in her " "life.  Patient states she has been feeling down with negative thoughts the last couple of weeks.  Patient states her negative thoughts include \"nothing is going to get better\".  Patient denies any SI and denies any history of attempts.  Patient states she does have feelings of hopelessness at times but other times feels hope that she is will get better and back to her life.  Patient states she lives in a patio home and has several friends there that she usually drives to the grocery and gets together with.  Patient rates her current depressed mood as a 4 or 5/10 and states her anxiety is fairly low.  Patient describes the depressive feeling as a \"weird feeling\".  Patient was recently started on trazodone and has only taken it a couple of times so far.  Patient is hopeful that will help reduce her depressed mood and help with sleep.  Patient denies any alcohol, drugs or tobacco use.  Patient has no psychiatric history other than the recent prescription for trazodone.  Patient states she is usually a very outgoing person.    Patient lives alone in a patio home and has 4 adult sons.  Patient states one of her sons lives very close and he is retired and available whenever she needs them.  Patient gets support from all of her sons.  Patient's   6 years ago after 65 years of marriage.  Patient states she likes to cook as a hobby.  Patient is active in her [employer].    Patient was interested in outpatient counseling resources and [facility] provided her with those.  [facility] will follow.    Electronically signed by Josef Boswell LCSW at [phone]       "

## 2025-02-26 NOTE — SIGNIFICANT NOTE
02/26/25 0853   OTHER   Discipline occupational therapist   Rehab Time/Intention   Session Not Performed other (see comments)  (Pt is up sitting EOB eating her breakfast, RN reports she is getting up independently. No OT needs identified.)

## 2025-02-26 NOTE — PROGRESS NOTES
[facility] did follow up with pt. Pt states she had fluid drawn off her lungs and an echo done to her heart. Pt states she was tired and resting but in good spirits.Pt is moving to another room. [facility] will follow as pt states she appreciates the visits.

## 2025-02-26 NOTE — SIGNIFICANT NOTE
02/26/25 4066   OTHER   Discipline physical therapist   Therapy Assessment/Plan (PT)   Criteria for Skilled Interventions Met (PT) no problems identified which require skilled intervention     Spoke with patient in room.  She reports no concerns for DC to home.  Encouraged her to call for [facility] staff to get up and ambulate.  Would recommend Purewick to be removed.

## 2025-02-26 NOTE — PROGRESS NOTES
Pulm Update Note  - Attempted to see patient this morning, but she was off the floor for the thoracentesis.   - Will see her tomorrow and follow up thoracentesis labs.   - No major changes to the plan from a pulmonary perspective today.  - Echo pending  - From a pulmonary perspective, if she is wanting to leave today, she could be discharged after the thoracentesis and I will follow up the labwork and call her with the results. She has been on room air and has no indication for ABX at this time. I suspect the pleural effusions are going to be secondary to CHF.

## 2025-02-27 LAB
ANION GAP SERPL CALCULATED.3IONS-SCNC: 11 MMOL/L (ref 5–15)
BASOPHILS # BLD AUTO: 0.02 10*3/MM3 (ref 0–0.2)
BASOPHILS NFR BLD AUTO: 0.3 % (ref 0–1.5)
BUN SERPL-MCNC: 30 MG/DL (ref 8–23)
BUN/CREAT SERPL: 21 (ref 7–25)
CALCIUM SPEC-SCNC: 8.6 MG/DL (ref 8.6–10.5)
CHLORIDE SERPL-SCNC: 96 MMOL/L (ref 98–107)
CO2 SERPL-SCNC: 32 MMOL/L (ref 22–29)
CREAT SERPL-MCNC: 1.43 MG/DL (ref 0.57–1)
CYTO UR: NORMAL
DEPRECATED RDW RBC AUTO: 43.1 FL (ref 37–54)
EGFRCR SERPLBLD CKD-EPI 2021: 35.1 ML/MIN/1.73
EOSINOPHIL # BLD AUTO: 0.2 10*3/MM3 (ref 0–0.4)
EOSINOPHIL NFR BLD AUTO: 2.7 % (ref 0.3–6.2)
ERYTHROCYTE [DISTWIDTH] IN BLOOD BY AUTOMATED COUNT: 11.9 % (ref 12.3–15.4)
GLUCOSE SERPL-MCNC: 141 MG/DL (ref 65–99)
HCT VFR BLD AUTO: 38.4 % (ref 34–46.6)
HGB BLD-MCNC: 12 G/DL (ref 12–15.9)
IMM GRANULOCYTES # BLD AUTO: 0.03 10*3/MM3 (ref 0–0.05)
IMM GRANULOCYTES NFR BLD AUTO: 0.4 % (ref 0–0.5)
LAB AP CASE REPORT: NORMAL
LAB AP FLOW CYTOMETRY SUMMARY: NORMAL
LDH SERPL-CCNC: 200 U/L (ref 135–214)
LYMPHOCYTES # BLD AUTO: 1.18 10*3/MM3 (ref 0.7–3.1)
LYMPHOCYTES NFR BLD AUTO: 16.2 % (ref 19.6–45.3)
MCH RBC QN AUTO: 30.6 PG (ref 26.6–33)
MCHC RBC AUTO-ENTMCNC: 31.3 G/DL (ref 31.5–35.7)
MCV RBC AUTO: 98 FL (ref 79–97)
MONOCYTES # BLD AUTO: 0.91 10*3/MM3 (ref 0.1–0.9)
MONOCYTES NFR BLD AUTO: 12.5 % (ref 5–12)
NEUTROPHILS NFR BLD AUTO: 4.96 10*3/MM3 (ref 1.7–7)
NEUTROPHILS NFR BLD AUTO: 67.9 % (ref 42.7–76)
NRBC BLD AUTO-RTO: 0 /100 WBC (ref 0–0.2)
PATH REPORT.FINAL DX SPEC: NORMAL
PATH REPORT.GROSS SPEC: NORMAL
PLATELET # BLD AUTO: 214 10*3/MM3 (ref 140–450)
PMV BLD AUTO: 9.3 FL (ref 6–12)
POTASSIUM SERPL-SCNC: 3.5 MMOL/L (ref 3.5–5.2)
RBC # BLD AUTO: 3.92 10*6/MM3 (ref 3.77–5.28)
SODIUM SERPL-SCNC: 139 MMOL/L (ref 136–145)
WBC NRBC COR # BLD AUTO: 7.3 10*3/MM3 (ref 3.4–10.8)

## 2025-02-27 PROCEDURE — 80048 BASIC METABOLIC PNL TOTAL CA: CPT | Performed by: HOSPITALIST

## 2025-02-27 PROCEDURE — 85025 COMPLETE CBC W/AUTO DIFF WBC: CPT | Performed by: HOSPITALIST

## 2025-02-27 PROCEDURE — 99221 1ST HOSP IP/OBS SF/LOW 40: CPT | Performed by: INTERNAL MEDICINE

## 2025-02-27 PROCEDURE — 83615 LACTATE (LD) (LDH) ENZYME: CPT | Performed by: STUDENT IN AN ORGANIZED HEALTH CARE EDUCATION/TRAINING PROGRAM

## 2025-02-27 RX ADMIN — APIXABAN 2.5 MG: 5 TABLET, FILM COATED ORAL at 20:15

## 2025-02-27 RX ADMIN — APIXABAN 2.5 MG: 5 TABLET, FILM COATED ORAL at 08:24

## 2025-02-27 RX ADMIN — PANTOPRAZOLE SODIUM 40 MG: 40 TABLET, DELAYED RELEASE ORAL at 05:27

## 2025-02-27 RX ADMIN — LOSARTAN POTASSIUM 100 MG: 100 TABLET, FILM COATED ORAL at 08:25

## 2025-02-27 RX ADMIN — ATORVASTATIN CALCIUM 10 MG: 10 TABLET ORAL at 20:15

## 2025-02-27 RX ADMIN — CETIRIZINE HYDROCHLORIDE 10 MG: 10 TABLET ORAL at 08:24

## 2025-02-27 RX ADMIN — METOPROLOL SUCCINATE 100 MG: 25 TABLET, EXTENDED RELEASE ORAL at 08:25

## 2025-02-27 NOTE — PLAN OF CARE
Goal Outcome Evaluation:           Progress: no change  Outcome Evaluation: pt is on room air with stable vital signs. alert and oriented. calm and cooperative with care. no significant changes overnight.

## 2025-02-27 NOTE — PROGRESS NOTES
[facility]  [phone]  Dr. Terrence Samuels    Subjective:  LOS: 3    She had thoracentesis yesterday, had 675 cc fluid removed.  She reports that she feels like her breathing is slightly improving.    Objective:  Vital Signs past 24hrs    Temp range: Temp (24hrs), Av.7 °F (36.5 °C), Min:97.5 °F (36.4 °C), Max:97.9 °F (36.6 °C)    BP range: BP: (100-137)/(55-63) 100/55  Pulse range: Heart Rate:  [65-74] 65  Resp rate range: Resp:  [18] 18  60.8 kg (134 lb); Body mass index is 25.32 kg/m².    Device (Oxygen Therapy): room air     Oxygen range:SpO2:  [93 %-98 %] 93 %            Intake/Output Summary (Last 24 hours) at 2025 1604  Last data filed at 2025 1400  Gross per 24 hour   Intake --   Output 1140 ml   Net -1140 ml       Physical Exam  Constitutional:       Appearance: Normal appearance.   HENT:      Head: Normocephalic and atraumatic.      Nose: Nose normal.      Mouth/Throat:      Mouth: Mucous membranes are moist.      Pharynx: Oropharynx is clear.   Eyes:      Extraocular Movements: Extraocular movements intact.      Conjunctiva/sclera: Conjunctivae normal.   Cardiovascular:      Rate and Rhythm: Normal rate and regular rhythm.      Pulses: Normal pulses.      Heart sounds: Normal heart sounds. No murmur heard.  Pulmonary:      Effort: Pulmonary effort is normal. No respiratory distress.      Breath sounds: Normal breath sounds. No stridor. No wheezing or rales.   Abdominal:      General: Abdomen is flat. Bowel sounds are normal.      Palpations: Abdomen is soft.   Skin:     General: Skin is warm and dry.   Neurological:      General: No focal deficit present.      Mental Status: She is alert and oriented to person, place, and time. Mental status is at baseline.   Psychiatric:         Mood and Affect: Mood normal.         Behavior: Behavior normal.            Result Review:  I have reviewed the results from last note by LPC physician and agree with these findings:  [x]   Laboratory accordion  [x]  Microbiology  [x]  Radiology  [x]  EKG/Telemetry   [x]  Cardiology/Vascular   [x]  Pathology  [x]  Old records  []  Other:    Medication Review:  I have reviewed the current MAR.  Antibiotics  This patient does not have an active medication from one of the medication groupers.     Scheduled Medications  apixaban, 2.5 mg, Oral, Q12H  atorvastatin, 10 mg, Oral, Nightly  cetirizine, 10 mg, Oral, Daily  [Held by provider] furosemide, 20 mg, Intravenous, BID Diuretics  losartan, 100 mg, Oral, Q24H  metoprolol succinate XL, 100 mg, Oral, Q24H  pantoprazole, 40 mg, Oral, Q AM      ICU Drips     PRN Medications    acetaminophen    albuterol sulfate HFA    HYDROcodone-acetaminophen    ipratropium-albuterol    [COMPLETED] Insert Peripheral IV **AND** sodium chloride    traZODone    Lines, Drains & Airways       Active LDAs       Name Placement date Placement time Site Days    Peripheral IV 02/24/25 2206 Anterior;Left;Upper Arm 02/24/25 2206  Arm  2    External Urinary Catheter 02/24/25  2319  --  2                    Diet Orders (active) (From admission, onward)       Start     Ordered    02/25/25 0744  Diet: Regular/House; Fluid Consistency: Thin (IDDSI 0)  Diet Effective Now         02/25/25 0743                      Assessment:  Bilateral pleural effusions  Paroxysmal A-fib  Hypertension  Hyperlipidemia  Asthma  GERD  Elevated BNP  Debility      Plan of Treatment  - s/p R thoracentesis 2/26/25, 1675 cc, transudative  - No indication for ABX at this time  - Pleural fluid consistent with transudate, suspect secondary to CHF.  - Echo with EF 65%, unable to determine diastolic dysfunction; severe TR  - Cytology pending  - Asthma stable, DuoNebs as needed    From a pulmonary perspective, okay for discharge to home or rehab.  Given her debility, it appears to me she may need rehab but will need to follow-up PT and OT recs.  Pulmonary will sign off at this time.  If there are questions or concerns,  please call us.  I will get her follow-up scheduled in our clinic for further care of her asthma and for repeat chest x-ray in a few weeks to make sure that the pleural effusion does not reaccumulate.      Terrence Samuels MD  [facility], [facility]  Pulmonary and Critical Care Medicine

## 2025-02-27 NOTE — CASE MANAGEMENT/SOCIAL WORK
Continued Stay Note  [facility]     Patient Name: Mayuri Early  MRN: [id number]  Today's Date: 2/27/2025    Admit Date: 2/24/2025    Plan: Home, family to transport. PT eval pending   Discharge Plan       Row Name 02/27/25 1048       Plan    Plan Home, family to transport. PT eval pending    Patient/Family in Agreement with Plan yes    Plan Comments CCP met with pt at the bedside to discuss DC. Pt confirms DC plan remains to return home, family to transport. Pt stated her son wants SNF at DC. CCP explained needs PT eval needs to be completed to determine level of care recommended at DC and pt will need pre-cert. Pt verbalized understanding. PT eval pending. Elvia REDDY/CCP                   Discharge Codes    No documentation.                 Expected Discharge Date and Time       Expected Discharge Date Expected Discharge Time    Feb 28, 2025               Ericka Decker RN

## 2025-02-27 NOTE — CONSULTS
[facility]  Cardiology Consult Note    Patient Identification:  Name: Mayuri Early  Age: 89 y.o.  Sex: female  :  1935  MRN: [id number]             Requesting Physician:  Dr. Awad    Reason for Consultation / Chief Complaint:  severe TV    89-year-old female normally followed by [facility] cardiology with a history of hypertension chronic kidney disease paroxysmal atrial fibrillation hyperlipidemia was admitted to the hospital with shortness of breath pleural effusions bilateral leg swelling has been found to have the severe tricuspid regurgitation    History of Present Illness:   cho at [facility] 2022      Past Medical History:  Past Medical History:   Diagnosis Date    Fibrocystic breast     Well woman exam 10/20/2020     Past Surgical History:        Allergies:  No Known Allergies  Home Meds:  Medications Prior to Admission   Medication Sig Dispense Refill Last Dose/Taking    acetaminophen (TYLENOL) 325 MG tablet Take 2 tablets by mouth Every 6 (Six) Hours As Needed for Mild Pain.   Past Month    albuterol sulfate  (90 Base) MCG/ACT inhaler Inhale 2 puffs Every 4 (Four) Hours As Needed for Wheezing.   2025    apixaban (ELIQUIS) 2.5 MG tablet tablet Take 1 tablet by mouth 2 (Two) Times a Day.   2025    atorvastatin (LIPITOR) 10 MG tablet Take 1 tablet by mouth Every Night.   2025    Cetirizine HCl 10 MG capsule Take 5 mg by mouth Daily As Needed (allergies).   Past Month    HYDROcodone-acetaminophen (NORCO) 5-325 MG per tablet Take 1 tablet by mouth Every 4 (Four) Hours As Needed for Moderate Pain. 15 tablet 0 Past Month    ipratropium-albuterol (DUO-NEB) 0.5-2.5 mg/3 ml nebulizer Take 3 mL by nebulization Every 4 (Four) Hours As Needed for Shortness of Air for up to 30 days. 360 mL 0 2025    losartan-hydrochlorothiazide (HYZAAR) 100-25 MG per tablet Take 1 tablet by mouth Daily.   2025    metoprolol succinate XL (TOPROL-XL) 100 MG 24 hr tablet  "Take 1 tablet by mouth 2 (Two) Times a Day.   2/24/2025    multivitamin with minerals (MULTIVITAMIN ADULT PO) Take 1 tablet by mouth Daily.   2/24/2025    Omega-3 Fatty Acids (fish oil) 1000 MG capsule capsule Take 1 capsule by mouth Daily With Breakfast.   2/24/2025    traZODone (DESYREL) 50 MG tablet Take 1 tablet by mouth Every Night.   2/24/2025    ondansetron (Zofran) 4 MG tablet Take 1 tablet by mouth Every 8 (Eight) Hours As Needed for Vomiting or Nausea. 12 tablet 0     pantoprazole (PROTONIX) 40 MG EC tablet Take 1 tablet by mouth Daily.        Current Meds:   [unfilled]  Social History:   Social History     Tobacco Use    Smoking status: Former     Passive exposure: Past    Smokeless tobacco: Never   Substance Use Topics    Alcohol use: Never      Family History:  History reviewed. No pertinent family history.     R  Review of Systems  Constitutional: No wt loss, fever   Gastrointestinal: No nausea , abdominal pain  Behavioral/Psych: No insomnia or anxiety   Cardiovascular ----positive for shortness of breath and leg swelling. All other systems reviewed and are negative    /55 (BP Location: Right arm, Patient Position: Lying)   Pulse 65   Temp 97.7 °F (36.5 °C) (Oral)   Resp 18   Ht 154.9 cm (61\")   Wt 60.8 kg (134 lb)   SpO2 93%   BMI 25.32 kg/m²   General appearance: No acute changes   Neck: Trachea midline; NECK, supple, no thyromegaly or lymphadenopathy   Lungs: Normal size and shape, normal breath sounds, equal distribution of air, no rales and rhonchi   CV: S1-S2 regular, no murmurs, no rub, no gallop   Abdomen: Soft, nontender; no masses , no abnormal abdominal sounds   Extremities: No deformity , normal color , 2+ peripheral edema   Skin: Normal temperature, turgor and texture; no rash, ulcers              Cardiographics  ECG:     Telemetry:    Echocardiogram:     Imaging  Chest X-ray:     Lab Review   Results from last 7 days   Lab Units 02/26/25  0957 02/26/25  0727 02/24/25  1890 "   HSTROP T ng/L 11 10 11         Results from last 7 days   Lab Units 02/27/25  0403   SODIUM mmol/L 139   POTASSIUM mmol/L 3.5   BUN mg/dL 30*   CREATININE mg/dL 1.43*   CALCIUM mg/dL 8.6     @[id number]@  Results from last 7 days   Lab Units 02/27/25  0403 02/26/25  0727 02/24/25  2205   WBC 10*3/mm3 7.30 7.46 6.43   HEMOGLOBIN g/dL 12.0 13.6 13.2   HEMATOCRIT % 38.4 41.7 40.5   PLATELETS 10*3/mm3 214 226 242             Assessment:  Severe tricuspid regurgitation  Pleural effusion  Bilateral leg swelling    Recommendations / Plan:   89-year-old female with a severe tricuspid regurgitation congestive heart failure secondary to that we treated conservatively, patient could be considered for the tricuspid valve repair percutaneous will defer to the primary cardiologist          Capri Perry MD  2/27/2025, 09:30 EST      EMR Dragon/Transcription:   Dictated utilizing Dragon dictation

## 2025-02-27 NOTE — PROGRESS NOTES
"[facility]  Cardiology Progress Note    Patient Identification:  Name: Mayuri Early  Age: 89 y.o.  Sex: female  :  1935  MRN: [id number]                   Mayuri Early is a 89-year-old female who follows Dr. Nelson with [facility] cardiology.  She has a history to include hypertension, RBBB, CKD, PAF, hyperlipidemia, [employer] pacemaker  (), who presented to [facility] shortness of breath which has been ongoing for the last 2 weeks now associated with BLE swelling dry cough.  She was noted to have a pleural effusion and was admitted for further management.  She underwent a thoracentesis which 675 mL was taken out.  She comes in consult due to echo showing severe TV.  On previous echo at [place] in 2022 it showed TV moderate to severe blood pressure stable on admission.  Labs revealed BNP 3526 (on ), troponin negative and EKG showed V paced, A-fib/flutter with heart rate controlled.  She states her shortness of breath and swelling have improved.  Further recommendations once echo has been reviewed.      )2025  CRISTAL Juares      EMR Geraldine/Transcription:   \"Dictated utilizing Dragon dictation\".     "

## 2025-02-27 NOTE — PROGRESS NOTES
[facility] follow up d/t depression; this writer reviewed chart and spoke with RN Aury. Per RN, patient is doing well, no behavioral health problems noted; she slept through the night.  Patient has outpatient counseling resources; she appreciates continued supportive visits per behavior health clinicians.  Discharge plan is home; CCP involved. No further needs/concerns noted at this time per RN and/or medical team; [facility] [place] to continue following.

## 2025-02-27 NOTE — CONSULTS
I was requested to see patient regarding an Advance Directive.  Patient wants to speak to her son first before completing.  I left her an AD form and explained it works.  She will let the staff know if she wants to complete it later.     She is also a member of the [employer].  Her jenni is extremely important to her. She shared her spiritual journey with me and we concluded with prayer.

## 2025-02-27 NOTE — PROGRESS NOTES
"Daily progress note    Primary care physician  Dr. Hidalgo    Subjective  Doing better after thoracentesis but still very weak and short of breath and would like to see her cardiologist.    History of present illness  89-year-old white female with history of paroxysmal atrial fibrillation hypertension hyperlipidemia asthma and gastroesophageal reflux disease who is well-known to our service to the emergency room with shortness of breath for last several days.  Patient denies any chest pain palpitation abdominal pain nausea vomiting diarrhea.  Patient denies any fever cough congestion.  Patient evaluated in ER found to have pleural effusion admitted for management.  Patient fully alert oriented give me an good history and still wants to be a full code and lives by herself.     REVIEW OF SYSTEMS  Unremarkable except weakness and shortness of breath     PHYSICAL EXAM   Blood pressure 137/63, pulse 70, temperature 97.5 °F (36.4 °C), temperature source Oral, resp. rate 18, height 154.9 cm (61\"), weight 60.8 kg (134 lb), SpO2 93%.    GENERAL: Awake and alert, oriented, elderly, not distressed  HENT: head atraumatic, no nuchal rigidity  EYES: no scleral icterus, EOMI  CV: regular rhythm, regular rate, no murmur  RESPIRATORY: normal effort, decreased breath sounds in the right base  ABDOMEN: soft, nontender  MUSCULOSKELETAL: no deformity, FROM, 2+ pedal edema  NEURO: alert, moves all extremities, follows commands  SKIN: warm, dry     LAB RESULTS  Lab Results (last 24 hours)       Procedure Component Value Units Date/Time    Body Fluid Culture - Body Fluid, Pleural Cavity [[id number]] Collected: 02/26/25 1108    Specimen: Body Fluid from Pleural Cavity Updated: 02/27/25 0733     Body Fluid Culture No growth     Gram Stain No WBCs seen      No organisms seen    Basic Metabolic Panel [[id number]]  (Abnormal) Collected: 02/27/25 0403    Specimen: Blood Updated: 02/27/25 0524     Glucose 141 mg/dL      BUN 30 mg/dL      Creatinine " 1.43 mg/dL      Sodium 139 mmol/L      Potassium 3.5 mmol/L      Chloride 96 mmol/L      CO2 32.0 mmol/L      Calcium 8.6 mg/dL      BUN/Creatinine Ratio 21.0     Anion Gap 11.0 mmol/L      eGFR 35.1 mL/min/1.73     Narrative:      GFR Categories in Chronic Kidney Disease (CKD)      GFR Category          GFR (mL/min/1.73)    Interpretation  G1                     90 or greater         Normal or high (1)  G2                      60-89                Mild decrease (1)  G3a                   45-59                Mild to moderate decrease  G3b                   30-44                Moderate to severe decrease  G4                    15-29                Severe decrease  G5                    14 or less           Kidney failure          (1)In the absence of evidence of kidney disease, neither GFR category G1 or G2 fulfill the criteria for CKD.    eGFR calculation 2021 CKD-EPI creatinine equation, which does not include race as a factor    CBC & Differential [[id number]]  (Abnormal) Collected: 02/27/25 0403    Specimen: Blood Updated: 02/27/25 0511    Narrative:      The following orders were created for panel order CBC & Differential.  Procedure                               Abnormality         Status                     ---------                               -----------         ------                     CBC Auto Differential[[id number]]        Abnormal            Final result                 Please view results for these tests on the individual orders.    CBC Auto Differential [[id number]]  (Abnormal) Collected: 02/27/25 0403    Specimen: Blood Updated: 02/27/25 0511     WBC 7.30 10*3/mm3      RBC 3.92 10*6/mm3      Hemoglobin 12.0 g/dL      Hematocrit 38.4 %      MCV 98.0 fL      MCH 30.6 pg      MCHC 31.3 g/dL      RDW 11.9 %      RDW-SD 43.1 fl      MPV 9.3 fL      Platelets 214 10*3/mm3      Neutrophil % 67.9 %      Lymphocyte % 16.2 %      Monocyte % 12.5 %      Eosinophil % 2.7 %      Basophil % 0.3 %      Immature  Grans % 0.4 %      Neutrophils, Absolute 4.96 10*3/mm3      Lymphocytes, Absolute 1.18 10*3/mm3      Monocytes, Absolute 0.91 10*3/mm3      Eosinophils, Absolute 0.20 10*3/mm3      Basophils, Absolute 0.02 10*3/mm3      Immature Grans, Absolute 0.03 10*3/mm3      nRBC 0.0 /100 WBC     pH, Body Fluid - Pleural Fluid, Pleural Cavity [[id number]] Collected: 02/26/25 1108    Specimen: Pleural Fluid from Pleural Cavity Updated: 02/26/25 1422     pH, Fluid 7.59    Narrative:      Reference Range:  Serous fluids 6.8-7.6     Pleural transudates: 7.4-7.5     Pleural exudates: 7.35-7.45     All other fluids: No defined reference ranges    This test was developed, its performance characteristics determined and judged suitable for clinical purposes by [facility]. It has not been cleared or approved by the FDA. The laboratory is regulated under CLIA as qualified to perform high-complexity testing.    Non-gynecologic Cytology [[id number]] Collected: 02/26/25 1108    Specimen: Pleural Fluid from Pleural Cavity Updated: 02/26/25 1349    Albumin, Fluid - Pleural Fluid, Pleural Cavity [[id number]] Collected: 02/26/25 1108    Specimen: Pleural Fluid from Pleural Cavity Updated: 02/26/25 1235     Albumin, Fluid 2.20 g/dL     Narrative:      No Reference Ranges Established.    A Serous fluid albumin gradient (serum albumin-fluid) <1.1 g/dL suggests the fluid is an exudate.  Cirrhosis usually results in an ascites fluid albumin gradient >1.1 g/dL.    This test was developed, its performance characteristics determined and judged suitable for clinical purposes by [facility].  It has not been cleared or approved by the FDA.  The laboratory is regulated under CLIA as qualified to perfom high-complexity testing.      Protein, Body Fluid - Pleural Fluid, Pleural Cavity [[id number]] Collected: 02/26/25 1108    Specimen: Pleural Fluid from Pleural Cavity Updated: 02/26/25 1235     Protein, Total,  Fluid 3.2 g/dL     Narrative:      No Reference Ranges Established.    A serous fluid total fluid (TP) greater than 50 percent of the serum TP suggests the fluid is an exudate.      1. Pleural TP/Serum TP >0.5  2. Pleural LD/Serum LD >0.6  3. Pleural LD >2/3 of the upper limit of normal for serum LDH    This test was developed, it performance characteristics determined and judged suitable for clinical purposes by [facility].  It has not been cleared or approved by the FDA.  The laboratory is regulated under CLIA as qualified to perform high-complexity testing.     Lactate Dehydrogenase, Body Fluid - Pleural Fluid, Pleural Cavity [[id number]] Collected: 02/26/25 1108    Specimen: Pleural Fluid from Pleural Cavity Updated: 02/26/25 1235     Lactate Dehydrogenase (LD), Fluid 78 U/L     Narrative:      No Reference Ranges Established.    Serous fluid LDH greater than 60 percent of the serum LDH or serous fluid LDH two-thirds of the upper limit of normal for serum LDH suggests the fluid is an exudate.     1. Pleural TP/Serum TP >0.5  2. Pleural LD/Serum LD >0.6  3. Pleural LD >2/3 of the upper limit of normal for serum LDH    This test was developed, it performance characteristics determined and judged suitable for clinical purposes by [facility].  It has not been cleared or approved by the FDA.  The laboratory is regulated under CLIA as qualified to perform high-complexity testing.     Glucose, Body Fluid - Pleural Fluid, Pleural Cavity [[id number]] Collected: 02/26/25 1108    Specimen: Pleural Fluid from Pleural Cavity Updated: 02/26/25 1235     Glucose, Fluid 113 mg/dL     Narrative:      No Reference Ranges Established.    Serous fluid glucose less than 60 mg/dL or less than 30 mg/dL below serum glucose suggests an infectious or malignant exudate.     This test was developed, it performance characteristics determined and judged suitable for clinical purposes by  [facility].  It has not been cleared or approved by the FDA.  The laboratory is regulated under CLIA as qualified to perform high-complexity testing.     Triglycerides, Body Fluid - Pleural Fluid, Pleural Cavity [[id number]] Collected: 02/26/25 1108    Specimen: Pleural Fluid from Pleural Cavity Updated: 02/26/25 1235     Triglycerides, Fluid 20 mg/dL     Cholesterol, Body Fluid - Pleural Fluid, Pleural Cavity [[id number]] Collected: 02/26/25 1108    Specimen: Pleural Fluid from Pleural Cavity Updated: 02/26/25 1235     Cholesterol  36 mg/dL     Body Fluid Cell Count With Differential - Pleural Fluid, Pleural Cavity [[id number]]  (Abnormal) Collected: 02/26/25 1108    Specimen: Pleural Fluid from Pleural Cavity Updated: 02/26/25 1232    Narrative:      The following orders were created for panel order Body Fluid Cell Count With Differential - Pleural Fluid, Pleural Cavity.  Procedure                               Abnormality         Status                     ---------                               -----------         ------                     Body fluid cell count - ...[[id number]]  Abnormal            Final result               Body fluid differential ...[[id number]]                      Final result                 Please view results for these tests on the individual orders.    Body fluid differential - Pleural Fluid, Pleural Cavity [[id number]] Collected: 02/26/25 1108    Specimen: Pleural Fluid from Pleural Cavity Updated: 02/26/25 1232     Neutrophils, Fluid % 8 %      Lymphocytes, Fluid % 80 %      Monocytes, Fluid % 2 %      Eosinophils, Fluid % 1 %      Mononuclear, Fluid % 9 %     Body fluid cell count - Pleural Fluid, Pleural Cavity [[id number]]  (Abnormal) Collected: 02/26/25 1108    Specimen: Pleural Fluid from Pleural Cavity Updated: 02/26/25 1224     Color, Fluid Yellow     Appearance, Fluid Hazy     RBC, Fluid 105 /mm3      Nucleated Cells, Fluid 598 /mm3      Method:  Hemacytometer Method    Narrative:      No reference range established. Physician to interpret results with clinical findings.    Anaerobic Culture - Pleural Fluid, Pleural Cavity [[id number]] Collected: 02/26/25 1108    Specimen: Pleural Fluid from Pleural Cavity Updated: 02/26/25 1143    AFB Culture - Body Fluid, Pleural Cavity [[id number]] Collected: 02/26/25 1108    Specimen: Body Fluid from Pleural Cavity Updated: 02/26/25 1143    Fungus Culture - Body Fluid, Pleural Cavity [[id number]] Collected: 02/26/25 1108    Specimen: Body Fluid from Pleural Cavity Updated: 02/26/25 1143    High Sensitivity Troponin T 1Hr [[id number]]  (Normal) Collected: 02/26/25 0957    Specimen: Blood Updated: 02/26/25 1047     HS Troponin T 11 ng/L      Troponin T Numeric Delta 1 ng/L     Narrative:      High Sensitive Troponin T Reference Range:  <14.0 ng/L- Negative Female for AMI  <22.0 ng/L- Negative Male for AMI  >=14 - Abnormal Female indicating possible myocardial injury.  >=22 - Abnormal Male indicating possible myocardial injury.   Clinicians would have to utilize clinical acumen, EKG, Troponin, and serial changes to determine if it is an Acute Myocardial Infarction or myocardial injury due to an underlying chronic condition.         High Sensitivity Troponin T [[id number]]  (Normal) Collected: 02/26/25 0727    Specimen: Blood Updated: 02/26/25 0837     HS Troponin T 10 ng/L     Narrative:      High Sensitive Troponin T Reference Range:  <14.0 ng/L- Negative Female for AMI  <22.0 ng/L- Negative Male for AMI  >=14 - Abnormal Female indicating possible myocardial injury.  >=22 - Abnormal Male indicating possible myocardial injury.   Clinicians would have to utilize clinical acumen, EKG, Troponin, and serial changes to determine if it is an Acute Myocardial Infarction or myocardial injury due to an underlying chronic condition.         Procalcitonin [[id number]]  (Normal) Collected: 02/26/25 0727    Specimen: Blood Updated: 02/26/25  "0837     Procalcitonin 0.04 ng/mL     Narrative:      As a Marker for Sepsis (Non-Neonates):    1. <0.5 ng/mL represents a low risk of severe sepsis and/or septic shock.  2. >2 ng/mL represents a high risk of severe sepsis and/or septic shock.    As a Marker for Lower Respiratory Tract Infections that require antibiotic therapy:    PCT on Admission    Antibiotic Therapy       6-12 Hrs later    >0.5                Strongly Recommended  >0.25 - <0.5        Recommended   0.1 - 0.25          Discouraged              Remeasure/reassess PCT  <0.1                Strongly Discouraged     Remeasure/reassess PCT    As 28 day mortality risk marker: \"Change in Procalcitonin Result\" (>80% or <=80%) if Day 0 (or Day 1) and Day 4 values are available. Refer to [email or web address]    Change in PCT <=80%  A decrease of PCT levels below or equal to 80% defines a positive change in PCT test result representing a higher risk for 28-day all-cause mortality of patients diagnosed with severe sepsis for septic shock.    Change in PCT >80%  A decrease of PCT levels of more than 80% defines a negative change in PCT result representing a lower risk for 28-day all-cause mortality of patients diagnosed with severe sepsis or septic shock.       TSH [[id number]]  (Normal) Collected: 02/26/25 0727    Specimen: Blood Updated: 02/26/25 0837     TSH 2.210 uIU/mL     Comprehensive Metabolic Panel [[id number]]  (Abnormal) Collected: 02/26/25 0727    Specimen: Blood Updated: 02/26/25 0835     Glucose 91 mg/dL      BUN 21 mg/dL      Creatinine 1.14 mg/dL      Sodium 141 mmol/L      Potassium 3.6 mmol/L      Comment: Slight hemolysis detected by analyzer. Result may be falsely elevated.        Chloride 96 mmol/L      CO2 32.6 mmol/L      Calcium 9.2 mg/dL      Total Protein 6.7 g/dL      Albumin 3.6 g/dL      ALT (SGPT) 11 U/L      AST (SGOT) 23 U/L      Alkaline Phosphatase 88 U/L      Total Bilirubin 0.6 mg/dL      Globulin 3.1 gm/dL      " A/G Ratio 1.2 g/dL      BUN/Creatinine Ratio 18.4     Anion Gap 12.4 mmol/L      eGFR 46.1 mL/min/1.73     Narrative:      GFR Categories in Chronic Kidney Disease (CKD)      GFR Category          GFR (mL/min/1.73)    Interpretation  G1                     90 or greater         Normal or high (1)  G2                      60-89                Mild decrease (1)  G3a                   45-59                Mild to moderate decrease  G3b                   30-44                Moderate to severe decrease  G4                    15-29                Severe decrease  G5                    14 or less           Kidney failure          (1)In the absence of evidence of kidney disease, neither GFR category G1 or G2 fulfill the criteria for CKD.    eGFR calculation 2021 CKD-EPI creatinine equation, which does not include race as a factor    Lipid Panel [[id number]] Collected: 02/26/25 0727    Specimen: Blood Updated: 02/26/25 0831     Total Cholesterol 135 mg/dL      Triglycerides 62 mg/dL      HDL Cholesterol 54 mg/dL      LDL Cholesterol  68 mg/dL      VLDL Cholesterol 13 mg/dL      LDL/HDL Ratio 1.27    Narrative:      Cholesterol Reference Ranges  (U.S. Department of Health and Human Services ATP III Classifications)    Desirable          <200 mg/dL  Borderline High    200-239 mg/dL  High Risk          >240 mg/dL      Triglyceride Reference Ranges  (U.S. Department of Health and Human Services ATP III Classifications)    Normal           <150 mg/dL  Borderline High  150-199 mg/dL  High             200-499 mg/dL  Very High        >500 mg/dL    HDL Reference Ranges  (U.S. Department of Health and Human Services ATP III Classifications)    Low     <40 mg/dl (major risk factor for CHD)  High    >60 mg/dl ('negative' risk factor for CHD)        LDL Reference Ranges  (U.S. Department of Health and Human Services ATP III Classifications)    Optimal          <100 mg/dL  Near Optimal     100-129 mg/dL  Borderline High  130-159  mg/dL  High             160-189 mg/dL  Very High        >189 mg/dL    LDL is calculated using the NIH LDL-C calculation.      Hemoglobin A1c [[id number]]  (Abnormal) Collected: 02/26/25 0727    Specimen: Blood Updated: 02/26/25 0824     Hemoglobin A1C 5.80 %     Narrative:      Hemoglobin A1C Ranges:    Increased Risk for Diabetes  5.7% to 6.4%  Diabetes                     >= 6.5%  Diabetic Goal                < 7.0%          Imaging Results (Last 24 Hours)       Procedure Component Value Units Date/Time    US Thoracentesis [[id number]] Collected: 02/26/25 1557    Specimen: Body Fluid Updated: 02/26/25 1606    Narrative:      ULTRASOUND-GUIDED RIGHT THORACENTESIS, 2/26/2025     HISTORY:   89-year-old female with right pleural effusion. Dyspnea     CONSENT:   The procedure, risks and alternatives were discussed in detail with the  patient, emphasizing risks of pneumothorax, chest tube placement and  bleeding. Questions were entertained, and written informed consent was  obtained. Timeout was observed in the procedure room for patient  identification and procedure site verification, and the procedure site  was marked by me. Permanent images were recorded.     PROCEDURE:   Using sterile technique, 1% lidocaine local anesthetic and real-time  ultrasound guidance, a 5 Pashto thoracentesis catheter was placed into  the largest portion of the pleural effusion from a low posterior  intercostal approach in a single pass without difficulty. 675 mL of  straw-colored effusion fluid was removed. The patient remained stable in  the department during and after the procedure. Fluid was sent for  requested laboratory studies.     Post procedure chest x-ray shows no pneumothorax.       Impression:      Technically successful ultrasound-guided right thoracentesis with  removal of 675 mL of straw-colored pleural effusion fluid.        Procedure performed by CRISTAL Lorenzana.  By electronically signing this report, I, the supervising  radiologist,  attest that I was not present for the procedure(s) . I reviewed, edited  and agree with the finalized report.           This report was finalized on 2/26/2025 4:03 PM by Dr. Jose Beckford M.D on Workstation: [id number]             Scan on 2/24/2025 2221 by [employer], [place]: ECG 12-LEAD         Author: -- Service: -- Author Type: --   Filed: Date of Service: Creation Time:   Status: (Other)   HEART RATE=62  bpm  RR Xzspqabn=847  ms  IA Interval=  ms  P Horizontal Axis=  deg  P Front Axis=  deg  QRSD Fbxjedag=320  ms  QT Hmhibhmk=599  ms  KUsW=092  ms  QRS Axis=75  deg  T Wave Axis=-33  deg  - ABNORMAL ECG -  Afib/flut and V-paced complexes  No further rhythm analysis attempted due to paced rhythm  IVCD, consider RBBB           Interpretation Summary    Left ventricular systolic function is normal. Calculated left ventricular EF = 64.3%    Left ventricular diastolic function was indeterminate in the setting of atrial fibrillation.    There is mild, bileaflet mitral valve thickening present.    Moderate mitral valve regurgitation is present.    Severe tricuspid valve regurgitation is present.    Estimated right ventricular systolic pressure from tricuspid regurgitation is normal (<35 mmHg) though this may be underestimated due to severe TR.    Moderate biatrial enlargement on manual quantitation, nor mall IVC size.    Pacemaker lead noted.    Current Facility-Administered Medications:     acetaminophen (TYLENOL) tablet 650 mg, 650 mg, Oral, Q6H PRN, Kike Awad MD    albuterol sulfate HFA (PROVENTIL HFA;VENTOLIN HFA;PROAIR HFA) inhaler 2 puff, 2 puff, Inhalation, Q4H PRN, Kike Awad MD    apixaban (ELIQUIS) tablet 2.5 mg, 2.5 mg, Oral, Q12H, Kike Awad MD, 2.5 mg at 02/26/25 2058    atorvastatin (LIPITOR) tablet 10 mg, 10 mg, Oral, Nightly, Kike Awad MD, 10 mg at 02/26/25 2057    cetirizine (zyrTEC) tablet 10 mg, 10 mg, Oral, Daily, Kike Awad MD, 10 mg at 02/26/25 0922     furosemide (LASIX) injection 20 mg, 20 mg, Intravenous, BID Diuretics, He Awad MD, 20 mg at 02/26/25 1849    HYDROcodone-acetaminophen (NORCO) 5-325 MG per tablet 1 tablet, 1 tablet, Oral, Q4H PRN, He Awad MD    ipratropium-albuterol (DUO-NEB) nebulizer solution 3 mL, 3 mL, Nebulization, Q4H PRN, He Awad MD    losartan (COZAAR) tablet 100 mg, 100 mg, Oral, Q24H, He Awad MD, 100 mg at 02/26/25 0922    metoprolol succinate XL (TOPROL-XL) 24 hr tablet 100 mg, 100 mg, Oral, Q24H, He Awad MD, 100 mg at 02/26/25 0921    pantoprazole (PROTONIX) EC tablet 40 mg, 40 mg, Oral, Q AM, He Awad MD, 40 mg at 02/27/25 0527    [COMPLETED] Insert Peripheral IV, , , Once **AND** sodium chloride 0.9 % flush 10 mL, 10 mL, Intravenous, PRN, He Awad MD    traZODone (DESYREL) tablet 50 mg, 50 mg, Oral, Nightly PRN, He Awad MD, 50 mg at 02/26/25 0035      ASSESSMENT  Acute diastolic congestive heart failure  Severe tricuspid regurgitation and moderate mitral regurgitation  Large right pleural effusion status postthoracentesis  Paroxysmal atrial fibrillation  Hypertension  Hyperlipidemia  Gastroesophageal reflux disease  Status post permanent pacemaker placement    PLAN  CPM  Hold diuretics secondary to elevated BUN and creatinine  Cardiology consult  Pulmonary to follow patient  Adjust home medications  Stress ulcer DVT prophylaxis  Supportive care  PT OT  Discussed with nursing staff  Follow closely further recommendation current hospital course    HE AWAD MD    Copied text in this note has been reviewed and is accurate as of 02/27/25

## 2025-02-28 LAB
ANION GAP SERPL CALCULATED.3IONS-SCNC: 11 MMOL/L (ref 5–15)
BASOPHILS # BLD AUTO: 0.02 10*3/MM3 (ref 0–0.2)
BASOPHILS NFR BLD AUTO: 0.4 % (ref 0–1.5)
BUN SERPL-MCNC: 33 MG/DL (ref 8–23)
BUN/CREAT SERPL: 23.2 (ref 7–25)
CALCIUM SPEC-SCNC: 8 MG/DL (ref 8.6–10.5)
CHLORIDE SERPL-SCNC: 99 MMOL/L (ref 98–107)
CO2 SERPL-SCNC: 31 MMOL/L (ref 22–29)
CREAT SERPL-MCNC: 1.42 MG/DL (ref 0.57–1)
CREAT UR-MCNC: 107.7 MG/DL
DEPRECATED RDW RBC AUTO: 41.4 FL (ref 37–54)
EGFRCR SERPLBLD CKD-EPI 2021: 35.4 ML/MIN/1.73
EOSINOPHIL # BLD AUTO: 0.22 10*3/MM3 (ref 0–0.4)
EOSINOPHIL NFR BLD AUTO: 4.4 % (ref 0.3–6.2)
ERYTHROCYTE [DISTWIDTH] IN BLOOD BY AUTOMATED COUNT: 11.8 % (ref 12.3–15.4)
GEN 5 1HR TROPONIN T REFLEX: 13 NG/L
GLUCOSE SERPL-MCNC: 118 MG/DL (ref 65–99)
HCT VFR BLD AUTO: 37.3 % (ref 34–46.6)
HGB BLD-MCNC: 12 G/DL (ref 12–15.9)
IMM GRANULOCYTES # BLD AUTO: 0.01 10*3/MM3 (ref 0–0.05)
IMM GRANULOCYTES NFR BLD AUTO: 0.2 % (ref 0–0.5)
INR PPP: 1.43 (ref 0.9–1.1)
LYMPHOCYTES # BLD AUTO: 1.37 10*3/MM3 (ref 0.7–3.1)
LYMPHOCYTES NFR BLD AUTO: 27.1 % (ref 19.6–45.3)
MCH RBC QN AUTO: 31.1 PG (ref 26.6–33)
MCHC RBC AUTO-ENTMCNC: 32.2 G/DL (ref 31.5–35.7)
MCV RBC AUTO: 96.6 FL (ref 79–97)
MONOCYTES # BLD AUTO: 0.76 10*3/MM3 (ref 0.1–0.9)
MONOCYTES NFR BLD AUTO: 15 % (ref 5–12)
NEUTROPHILS NFR BLD AUTO: 2.67 10*3/MM3 (ref 1.7–7)
NEUTROPHILS NFR BLD AUTO: 52.9 % (ref 42.7–76)
NRBC BLD AUTO-RTO: 0 /100 WBC (ref 0–0.2)
PLATELET # BLD AUTO: 202 10*3/MM3 (ref 140–450)
PMV BLD AUTO: 9.2 FL (ref 6–12)
POTASSIUM SERPL-SCNC: 3.4 MMOL/L (ref 3.5–5.2)
POTASSIUM SERPL-SCNC: 4.3 MMOL/L (ref 3.5–5.2)
PROT ?TM UR-MCNC: 27.5 MG/DL
PROT/CREAT UR: 255.3 MG/G CREA (ref 0–200)
PROTHROMBIN TIME: 17.4 SECONDS (ref 11.7–14.2)
QT INTERVAL: 440 MS
QTC INTERVAL: 473 MS
RBC # BLD AUTO: 3.86 10*6/MM3 (ref 3.77–5.28)
SODIUM SERPL-SCNC: 141 MMOL/L (ref 136–145)
SODIUM UR-SCNC: 77 MMOL/L
TROPONIN T NUMERIC DELTA: 0 NG/L
TROPONIN T SERPL HS-MCNC: 13 NG/L
WBC NRBC COR # BLD AUTO: 5.05 10*3/MM3 (ref 3.4–10.8)

## 2025-02-28 PROCEDURE — 82570 ASSAY OF URINE CREATININE: CPT

## 2025-02-28 PROCEDURE — 85025 COMPLETE CBC W/AUTO DIFF WBC: CPT | Performed by: HOSPITALIST

## 2025-02-28 PROCEDURE — 97530 THERAPEUTIC ACTIVITIES: CPT | Performed by: PHYSICAL THERAPIST

## 2025-02-28 PROCEDURE — 80048 BASIC METABOLIC PNL TOTAL CA: CPT | Performed by: HOSPITALIST

## 2025-02-28 PROCEDURE — 97162 PT EVAL MOD COMPLEX 30 MIN: CPT | Performed by: PHYSICAL THERAPIST

## 2025-02-28 PROCEDURE — 93005 ELECTROCARDIOGRAM TRACING: CPT | Performed by: NURSE PRACTITIONER

## 2025-02-28 PROCEDURE — 85610 PROTHROMBIN TIME: CPT | Performed by: NURSE PRACTITIONER

## 2025-02-28 PROCEDURE — 84484 ASSAY OF TROPONIN QUANT: CPT | Performed by: NURSE PRACTITIONER

## 2025-02-28 PROCEDURE — 84156 ASSAY OF PROTEIN URINE: CPT

## 2025-02-28 PROCEDURE — 84132 ASSAY OF SERUM POTASSIUM: CPT | Performed by: HOSPITALIST

## 2025-02-28 PROCEDURE — 84300 ASSAY OF URINE SODIUM: CPT

## 2025-02-28 PROCEDURE — 99232 SBSQ HOSP IP/OBS MODERATE 35: CPT

## 2025-02-28 RX ORDER — POTASSIUM CHLORIDE 750 MG/1
10 TABLET, FILM COATED, EXTENDED RELEASE ORAL DAILY
Status: DISCONTINUED | OUTPATIENT
Start: 2025-02-28 | End: 2025-02-28

## 2025-02-28 RX ORDER — POTASSIUM CHLORIDE 1500 MG/1
40 TABLET, EXTENDED RELEASE ORAL ONCE
Status: COMPLETED | OUTPATIENT
Start: 2025-02-28 | End: 2025-02-28

## 2025-02-28 RX ADMIN — APIXABAN 2.5 MG: 5 TABLET, FILM COATED ORAL at 08:32

## 2025-02-28 RX ADMIN — METOPROLOL SUCCINATE 100 MG: 25 TABLET, EXTENDED RELEASE ORAL at 08:32

## 2025-02-28 RX ADMIN — ATORVASTATIN CALCIUM 10 MG: 10 TABLET ORAL at 20:16

## 2025-02-28 RX ADMIN — LOSARTAN POTASSIUM 100 MG: 100 TABLET, FILM COATED ORAL at 08:32

## 2025-02-28 RX ADMIN — POTASSIUM CHLORIDE 40 MEQ: 1500 TABLET, EXTENDED RELEASE ORAL at 12:27

## 2025-02-28 RX ADMIN — PANTOPRAZOLE SODIUM 40 MG: 40 TABLET, DELAYED RELEASE ORAL at 06:56

## 2025-02-28 RX ADMIN — APIXABAN 2.5 MG: 5 TABLET, FILM COATED ORAL at 20:16

## 2025-02-28 RX ADMIN — CETIRIZINE HYDROCHLORIDE 10 MG: 10 TABLET ORAL at 08:32

## 2025-02-28 NOTE — PROGRESS NOTES
"Daily progress note    Primary care physician  Dr. Hidalgo    Subjective  Doing same with no new complaints    History of present illness  89-year-old white female with history of paroxysmal atrial fibrillation hypertension hyperlipidemia asthma and gastroesophageal reflux disease who is well-known to our service to the emergency room with shortness of breath for last several days.  Patient denies any chest pain palpitation abdominal pain nausea vomiting diarrhea.  Patient denies any fever cough congestion.  Patient evaluated in ER found to have pleural effusion admitted for management.  Patient fully alert oriented give me an good history and still wants to be a full code and lives by herself.     REVIEW OF SYSTEMS  Unremarkable except weakness and shortness of breath     PHYSICAL EXAM   Blood pressure 133/64, pulse 68, temperature 98.2 °F (36.8 °C), temperature source Oral, resp. rate 18, height 154.9 cm (61\"), weight 60.8 kg (134 lb), SpO2 98%.    GENERAL: Awake and alert, oriented, elderly, not distressed  HENT: head atraumatic, no nuchal rigidity  EYES: no scleral icterus, EOMI  CV: regular rhythm, regular rate, no murmur  RESPIRATORY: normal effort, decreased breath sounds in the right base  ABDOMEN: soft, nontender  MUSCULOSKELETAL: no deformity, FROM, 2+ pedal edema  NEURO: alert, moves all extremities, follows commands  SKIN: warm, dry     LAB RESULTS  Lab Results (last 24 hours)       Procedure Component Value Units Date/Time    Body Fluid Culture - Body Fluid, Pleural Cavity [[id number]] Collected: 02/26/25 1108    Specimen: Body Fluid from Pleural Cavity Updated: 02/28/25 0650     Body Fluid Culture No growth at 2 days     Gram Stain No WBCs seen      No organisms seen    High Sensitivity Troponin T 1Hr [[id number]]  (Normal) Collected: 02/28/25 0502    Specimen: Blood Updated: 02/28/25 0649     HS Troponin T 13 ng/L      Troponin T Numeric Delta 0 ng/L     Narrative:      High Sensitive Troponin T " Reference Range:  <14.0 ng/L- Negative Female for AMI  <22.0 ng/L- Negative Male for AMI  >=14 - Abnormal Female indicating possible myocardial injury.  >=22 - Abnormal Male indicating possible myocardial injury.   Clinicians would have to utilize clinical acumen, EKG, Troponin, and serial changes to determine if it is an Acute Myocardial Infarction or myocardial injury due to an underlying chronic condition.         Basic Metabolic Panel [[id number]]  (Abnormal) Collected: 02/28/25 0330    Specimen: Blood Updated: 02/28/25 0441     Glucose 118 mg/dL      BUN 33 mg/dL      Creatinine 1.42 mg/dL      Sodium 141 mmol/L      Potassium 3.4 mmol/L      Chloride 99 mmol/L      CO2 31.0 mmol/L      Calcium 8.0 mg/dL      BUN/Creatinine Ratio 23.2     Anion Gap 11.0 mmol/L      eGFR 35.4 mL/min/1.73     Narrative:      GFR Categories in Chronic Kidney Disease (CKD)      GFR Category          GFR (mL/min/1.73)    Interpretation  G1                     90 or greater         Normal or high (1)  G2                      60-89                Mild decrease (1)  G3a                   45-59                Mild to moderate decrease  G3b                   30-44                Moderate to severe decrease  G4                    15-29                Severe decrease  G5                    14 or less           Kidney failure          (1)In the absence of evidence of kidney disease, neither GFR category G1 or G2 fulfill the criteria for CKD.    eGFR calculation 2021 CKD-EPI creatinine equation, which does not include race as a factor    High Sensitivity Troponin T [[id number]]  (Normal) Collected: 02/28/25 0330    Specimen: Blood Updated: 02/28/25 0441     HS Troponin T 13 ng/L     Narrative:      High Sensitive Troponin T Reference Range:  <14.0 ng/L- Negative Female for AMI  <22.0 ng/L- Negative Male for AMI  >=14 - Abnormal Female indicating possible myocardial injury.  >=22 - Abnormal Male indicating possible myocardial injury.    Clinicians would have to utilize clinical acumen, EKG, Troponin, and serial changes to determine if it is an Acute Myocardial Infarction or myocardial injury due to an underlying chronic condition.         CBC & Differential [[id number]]  (Abnormal) Collected: 02/28/25 0330    Specimen: Blood Updated: 02/28/25 0420    Narrative:      The following orders were created for panel order CBC & Differential.  Procedure                               Abnormality         Status                     ---------                               -----------         ------                     CBC Auto Differential[[id number]]        Abnormal            Final result                 Please view results for these tests on the individual orders.    CBC Auto Differential [[id number]]  (Abnormal) Collected: 02/28/25 0330    Specimen: Blood Updated: 02/28/25 0420     WBC 5.05 10*3/mm3      RBC 3.86 10*6/mm3      Hemoglobin 12.0 g/dL      Hematocrit 37.3 %      MCV 96.6 fL      MCH 31.1 pg      MCHC 32.2 g/dL      RDW 11.8 %      RDW-SD 41.4 fl      MPV 9.2 fL      Platelets 202 10*3/mm3      Neutrophil % 52.9 %      Lymphocyte % 27.1 %      Monocyte % 15.0 %      Eosinophil % 4.4 %      Basophil % 0.4 %      Immature Grans % 0.2 %      Neutrophils, Absolute 2.67 10*3/mm3      Lymphocytes, Absolute 1.37 10*3/mm3      Monocytes, Absolute 0.76 10*3/mm3      Eosinophils, Absolute 0.22 10*3/mm3      Basophils, Absolute 0.02 10*3/mm3      Immature Grans, Absolute 0.01 10*3/mm3      nRBC 0.0 /100 WBC     Non-gynecologic Cytology [[id number]] Collected: 02/26/25 1108    Specimen: Pleural Fluid from Pleural Cavity Updated: 02/27/25 1635     Case Report --     Non-gynecologic Cytology                          Case: [id number]                                  Authorizing Provider:  Terrence Samuels MD       Collected:           02/26/2025 11:08 AM          Ordering Location:     [facility]  Received:            02/26/2025 01:49 PM                                  [facility] PHASE II SURGE UNIT                                                      Pathologist:           Cassandra Larios MD                                                    Specimen:    Pleural Cavity, Right Thoracentesis                                                         Final Diagnosis --     1. Pleural Fluid, Right, Thoracentesis:   A.  Negative for carcinoma.   B.  Reactive lymphocytic effusion with benign and reactive mesothelial cells, histiocytes and fibrin.    [facility]       Gross Description --     1. Pleural Cavity.  1 Vacutainer containing 700 ml of yellow fluid received. ThinPrep(1), cell block- in formalin @ 14:05 on 2/26/25. 50 ml of fluid remaining. Portion of fluid sent to [facility] lab for flow cytometry studies.           Microscopic Description --     Screened by JAY Gardner.       Flow Cytometry Summary --     [id number]: Immunophenotyping fails to reveal a monoclonal B-cell population or an abnormal T-cell phenotype.      Lactate Dehydrogenase [[id number]]  (Normal) Collected: 02/27/25 0403    Specimen: Blood Updated: 02/27/25 1346      U/L           Imaging Results (Last 24 Hours)       ** No results found for the last 24 hours. **          Scan on 2/24/2025 2221 by [facility], [place]: ECG 12-LEAD         Author: -- Service: -- Author Type: --   Filed: Date of Service: Creation Time:   Status: (Other)   HEART RATE=62  bpm  RR Mqociwkn=035  ms  PA Interval=  ms  P Horizontal Axis=  deg  P Front Axis=  deg  QRSD Ovbomkbb=982  ms  QT Dhisawko=944  ms  MEeR=176  ms  QRS Axis=75  deg  T Wave Axis=-33  deg  - ABNORMAL ECG -  Afib/flut and V-paced complexes  No further rhythm analysis attempted due to paced rhythm  IVCD, consider RBBB           Interpretation Summary    Left ventricular systolic function is normal. Calculated left ventricular EF = 64.3%    Left ventricular diastolic function was indeterminate in the setting of atrial fibrillation.    There is mild, bileaflet  mitral valve thickening present.    Moderate mitral valve regurgitation is present.    Severe tricuspid valve regurgitation is present.    Estimated right ventricular systolic pressure from tricuspid regurgitation is normal (<35 mmHg) though this may be underestimated due to severe TR.    Moderate biatrial enlargement on manual quantitation, nor mall IVC size.    Pacemaker lead noted.    Current Facility-Administered Medications:     acetaminophen (TYLENOL) tablet 650 mg, 650 mg, Oral, Q6H PRN, Kike Awad MD    albuterol sulfate HFA (PROVENTIL HFA;VENTOLIN HFA;PROAIR HFA) inhaler 2 puff, 2 puff, Inhalation, Q4H PRN, Kike Awad MD    apixaban (ELIQUIS) tablet 2.5 mg, 2.5 mg, Oral, Q12H, Kike Awad MD, 2.5 mg at 02/28/25 0832    atorvastatin (LIPITOR) tablet 10 mg, 10 mg, Oral, Nightly, Kike Awad MD, 10 mg at 02/27/25 2015    cetirizine (zyrTEC) tablet 10 mg, 10 mg, Oral, Daily, Kike Awad MD, 10 mg at 02/28/25 0832    [Held by provider] furosemide (LASIX) injection 20 mg, 20 mg, Intravenous, BID Diuretics, Kike Awad MD, 20 mg at 02/26/25 1849    HYDROcodone-acetaminophen (NORCO) 5-325 MG per tablet 1 tablet, 1 tablet, Oral, Q4H PRN, Kike Awad MD    ipratropium-albuterol (DUO-NEB) nebulizer solution 3 mL, 3 mL, Nebulization, Q4H PRN, Kike Awad MD    losartan (COZAAR) tablet 100 mg, 100 mg, Oral, Q24H, Kike Awad MD, 100 mg at 02/28/25 0832    metoprolol succinate XL (TOPROL-XL) 24 hr tablet 100 mg, 100 mg, Oral, Q24H, Kike Awad MD, 100 mg at 02/28/25 0832    pantoprazole (PROTONIX) EC tablet 40 mg, 40 mg, Oral, Q AM, Kike Awad MD, 40 mg at 02/28/25 0656    [COMPLETED] Insert Peripheral IV, , , Once **AND** sodium chloride 0.9 % flush 10 mL, 10 mL, Intravenous, PRN, Kike Awad MD    traZODone (DESYREL) tablet 50 mg, 50 mg, Oral, Nightly PRN, Kike Awad MD, 50 mg at 02/26/25 0035      ASSESSMENT  Acute diastolic congestive heart failure  Severe tricuspid regurgitation and  moderate mitral regurgitation  Large right pleural effusion status post thoracentesis  Acute kidney injury secondary to overdiuresis  Paroxysmal atrial fibrillation  Hypertension  Hyperlipidemia  Gastroesophageal reflux disease  Status post permanent pacemaker placement    PLAN  CPM  Continue to hold diuretics   Replace potassium  Nephrology consult  Pulmonary and cardiology to follow patient  Adjust home medications  Stress ulcer DVT prophylaxis  Supportive care  PT OT  Discussed with nursing staff  Follow closely further recommendation current hospital course    HE OSWALD MD    Copied text in this note has been reviewed and is accurate as of 02/28/25

## 2025-02-28 NOTE — CASE MANAGEMENT/SOCIAL WORK
Continued Stay Note  [facility]     Patient Name: Mayuri Early  MRN: [id number]  Today's Date: 2/28/2025    Admit Date: 2/24/2025    Plan: SNF referrals pending, pt will need pre-cert.   Discharge Plan       Row Name 02/28/25 1336       Plan    Plan SNF referrals pending, pt will need pre-cert.    Patient/Family in Agreement with Plan yes    Plan Comments A little while later pt notified CCP she would like additional referrals made to [facility], [facility], [facility] and [facility]. [facility] notified and no beds available at this time. [facility] notified. Elvia REDDY/CCP                   Discharge Codes    No documentation.                 Expected Discharge Date and Time       Expected Discharge Date Expected Discharge Time    Feb 28, 2025               Ericka Decker RN

## 2025-02-28 NOTE — PLAN OF CARE
Problem: Adult Inpatient Plan of Care  Goal: Absence of Hospital-Acquired Illness or Injury  Intervention: Identify and Manage Fall Risk  Recent Flowsheet Documentation  Taken 2/28/2025 1600 by Mira Whaley RN  Safety Promotion/Fall Prevention:   safety round/check completed   room organization consistent   assistive device/personal items within reach   activity supervised  Taken 2/28/2025 1437 by Mira Whaley RN  Safety Promotion/Fall Prevention:   safety round/check completed   room organization consistent   assistive device/personal items within reach   activity supervised   nonskid shoes/slippers when out of bed  Taken 2/28/2025 1234 by Mira Whaley RN  Safety Promotion/Fall Prevention:   safety round/check completed   room organization consistent   assistive device/personal items within reach   activity supervised  Taken 2/28/2025 0833 by Mira Whaley RN  Safety Promotion/Fall Prevention:   room organization consistent   safety round/check completed   activity supervised   assistive device/personal items within reach     Problem: Adult Inpatient Plan of Care  Goal: Absence of Hospital-Acquired Illness or Injury  Intervention: Prevent Skin Injury  Recent Flowsheet Documentation  Taken 2/28/2025 1600 by Mira Whaley RN  Body Position: dangle, side of bed  Taken 2/28/2025 1437 by Mira Whaley RN  Body Position:   lower extremity elevated   supine   turned  Taken 2/28/2025 1234 by Mira Whaley RN  Body Position: (up on the chair) other (see comments)  Taken 2/28/2025 0833 by Mira Whaley RN  Body Position: position changed independently     Problem: Adult Inpatient Plan of Care  Goal: Absence of Hospital-Acquired Illness or Injury  Intervention: Prevent Infection  Recent Flowsheet Documentation  Taken 2/28/2025 1600 by Mira Whaley RN  Infection Prevention: single patient room provided  Taken 2/28/2025 1437 by Mira Whaley RN  Infection Prevention: single patient room provided  Taken 2/28/2025 1234 by Jovana  Mira, RN  Infection Prevention: single patient room provided  Taken 2/28/2025 0833 by Mira Whaley RN  Infection Prevention: single patient room provided   Goal Outcome Evaluation:           Progress: improving  Outcome Evaluation: Patient AO x 4 VSS, afebril admitted due to pleura effusion, thoracentesis done 2/27 removed 675ml, today collected urine specimen send to lab orthostatic B/P done , good appetite, SNF approved today for [facility].

## 2025-02-28 NOTE — CASE MANAGEMENT/SOCIAL WORK
Post-Acute Authorization Submission      Post Acute Pre-Cert Documentation  Request Submitted by Facility - Type:: Hospital  Post-Acute Authorization Type Submitted:: SNF  Date Post Acute Pre-Cert Inititated per Facility: 02/28/25  Date Post Acute Pre-Cert Completed: 02/28/25  Accepting Facility: [facility] Discharge Date Requested: 03/01/25  All Clinicals Submitted?: Yes  Had Accepting Facility at Time of Submission: Yes  Response Received from Insurance?: Approval  Response Communicated to:: , Accepting Facility Liaison, Accepting Facility Auth Department  Authorization Number:: APPROVED [id number]  Post Acute Pre-Cert Initiated Comment: VALID TO ADMIT UPTO 3/7/25.              Chente Alvarado, PCT

## 2025-02-28 NOTE — DISCHARGE PLACEMENT REQUEST
"Mary Early (89 y.o. Female)       Date of Birth   1935    Social Security Number       Address   [address] [address]    Home Phone   [phone]    MRN   [id number]       [place]   Mosque    Marital Status                               Admission Date   2/24/25    Admission Type   Emergency    Admitting Provider   Kike Awad MD    Attending Provider   Kike Awad MD    Department, Room/Bed   [address], [address]/1       Discharge Date       Discharge Disposition       Discharge Destination                                 Attending Provider: Kike Awad MD    Allergies: No Known Allergies    Isolation: None   Infection: None   Code Status: CPR    Ht: 154.9 cm (61\")   Wt: 60.8 kg (134 lb)    Admission Cmt: None   Principal Problem: Pleural effusion [J90]                   Active Insurance as of 2/24/2025       Primary Coverage       Payor Plan Insurance Group Employer/Plan Group    ANTHEM MEDICARE REPLACEMENT ANTHEM MEDICARE ADVANTAGE HMO [id number]       Payor Plan Address Payor Plan Phone Number Payor Plan Fax Number Effective Dates    [address] [phone]  1/1/2025 - None Entered    [facility] [id number]         Subscriber Name Subscriber Birth Date Member ID       MARY EARLY 1935 [id number]               Secondary Coverage       Payor Plan Insurance Group Employer/Plan Group    JAMA         Payor Plan Address Payor Plan Phone Number Payor Plan Fax Number Effective Dates    [facility] OFFICE OF [facility] CARE [phone]  4/28/2005 - None Entered    [address] [address]       [facility] [address]         Subscriber Name Subscriber Birth Date Member ID       MARY EARLY 1935 [id number]                     Emergency Contacts        (Rel.) Home Phone Work Phone Mobile Phone    SharathTorres (Son) [phone] -- --    sharathLASHAY (Son) -- -- [phone]                "

## 2025-02-28 NOTE — THERAPY EVALUATION
Patient Name: Mayuri Early  : 1935    MRN: [id number]                              Today's Date: 2025       Admit Date: 2025    Visit Dx:     ICD-10-CM ICD-9-CM   1. Pleural effusion  J90 511.9   2. Dyspnea, unspecified type  R06.00 786.09     Patient Active Problem List   Diagnosis    Well woman exam    Closed compression fracture of L1 vertebra, initial encounter    Compression fracture of T12 vertebra    PAF (paroxysmal atrial fibrillation)    Presence of cardiac pacemaker    HTN (hypertension)    CKD (chronic kidney disease)    Chronic anticoagulation    Chronic back pain    Anemia    Asthma    Hematoma of hip    Acute blood loss anemia    Postural dizziness    Pleural effusion     Past Medical History:   Diagnosis Date    Fibrocystic breast     Well woman exam 10/20/2020     Past Surgical History:   Procedure Laterality Date    BREAST CYST ASPIRATION      BREAST CYST EXCISION      HYSTERECTOMY        General Information       Row Name 25 0939          Physical Therapy Time and Intention    Document Type evaluation  -     Mode of Treatment individual therapy;physical therapy  -       Row Name 25 09          General Information    Patient Profile Reviewed yes  -     Prior Level of Function independent:  Lives alone ambulates with no AD but does have a walker  -     Barriers to Rehab none identified  -       Row Name 25 09          Living Environment    People in Home alone  -       Row Name 25 0939          Home Main Entrance    Number of Stairs, Main Entrance none  -       Row Name 25          Cognition    Orientation Status (Cognition) oriented x 4  -       Row Name 25          Safety Issues/Impairments Affecting Functional Mobility    Impairments Affecting Function (Mobility) endurance/activity tolerance;balance;strength  -               User Key  (r) = Recorded By, (t) = Taken By, (c) = Cosigned By      Initials Name  Provider Type    Yareli Gr, PT Physical Therapist                   Mobility       Row Name 02/28/25 0940          Bed Mobility    Bed Mobility supine-sit;sit-supine  -     Supine-Sit [place] (Bed Mobility) minimum assist (75% patient effort)  -     Sit-Supine [place] (Bed Mobility) minimum assist (75% patient effort)  -       Row Name 02/28/25 0940          Sit-Stand Transfer    Sit-Stand [place] (Transfers) minimum assist (75% patient effort);contact guard  -     Comment, (Sit-Stand Transfer) Min a on first stand, CGA and second  -       Row Name 02/28/25 0940          Gait/Stairs (Locomotion)    [place] Level (Gait) contact guard;minimum assist (75% patient effort)  -     Assistive Device (Gait) walker, front-wheeled  -     Distance in Feet (Gait) --  20 feet, 15 feet with seated rest between  -     Comment, (Gait/Stairs) Slow unsteady gait with several small losses of balance, multiple brief standing breaks and once seated rest break required  -               User Key  (r) = Recorded By, (t) = Taken By, (c) = Cosigned By      Initials Name Provider Type    Yareli Gr, PT Physical Therapist                   Obj/Interventions       Row Name 02/28/25 0941          Range of Motion Comprehensive    Comment, General Range of Motion WFL  -       Row Name 02/28/25 0941          Strength Comprehensive (MMT)    Comment, General Manual Muscle Testing (MMT) Assessment Generalized weakness  -       Row Name 02/28/25 0941          Balance    Balance Assessment sitting dynamic balance;sitting static balance;standing static balance;standing dynamic balance  -     Static Sitting Balance independent  -     Dynamic Sitting Balance standby assist  -     Static Standing Balance contact guard  -     Dynamic Standing Balance contact guard;minimal assist  -     Position/Device Used, Standing Balance walker, rolling  -               User Key  (r) =  Recorded By, (t) = Taken By, (c) = Cosigned By      Initials Name Provider Type    Yareli Gr, PT Physical Therapist                   Goals/Plan       Row Name 02/28/25 0945          Bed Mobility Goal 1 (PT)    Activity/Assistive Device (Bed Mobility Goal 1, PT) bed mobility activities, all  -KH     [place] Level/Cues Needed (Bed Mobility Goal 1, PT) standby assist  -KH     Time Frame (Bed Mobility Goal 1, PT) 1 week  -       Row Name 02/28/25 0945          Transfer Goal 1 (PT)    Activity/Assistive Device (Transfer Goal 1, PT) sit-to-stand/stand-to-sit;bed-to-chair/chair-to-bed  -KH     [place] Level/Cues Needed (Transfer Goal 1, PT) standby assist  -KH     Time Frame (Transfer Goal 1, PT) 1 week  -       Row Name 02/28/25 0945          Gait Training Goal 1 (PT)    Activity/Assistive Device (Gait Training Goal 1, PT) gait (walking locomotion)  -KH     [place] Level (Gait Training Goal 1, PT) standby assist  -KH     Distance (Gait Training Goal 1, PT) 75 ft  -KH     Time Frame (Gait Training Goal 1, PT) 1 week  -       Row Name 02/28/25 0945          Patient Education Goal (PT)    Activity (Patient Education Goal, PT) HEP  -KH     [place]/Cues/Accuracy (Memory Goal 2, PT) demonstrates adequately  -KH     Time Frame (Patient Education Goal, PT) 1 week  -       Row Name 02/28/25 0945          Therapy Assessment/Plan (PT)    Planned Therapy Interventions (PT) balance training;bed mobility training;gait training;home exercise program;patient/family education;transfer training;strengthening;stair training  -               User Key  (r) = Recorded By, (t) = Taken By, (c) = Cosigned By      Initials Name Provider Type    Yareli Gr, PT Physical Therapist                   Clinical Impression       Row Name 02/28/25 0941          Pain    Pretreatment Pain Rating 2/10  -KH     Posttreatment Pain Rating 3/10  -KH     Pain Location foot  -     Pain  Side/Orientation bilateral  -     Pre/Posttreatment Pain Comment Pain on the bottom of her feet when standing  -       Row Name 02/28/25 0941          Plan of Care Review    Plan of Care Reviewed With patient  -     Outcome Evaluation Patient presented to the hospital with shortness of air, was found to have a pleural effusion and is now status post thoracentesis.  Patient reports she lives alone and is independently mobile without the use of assistive device.  She lives in a patio home with no stairs to enter.  Today patient was in bed and agreeable to therapy.  She required min assist for bed mobility, CGA to min assist to stand and to ambulate short distances with rolling walker.  Gait is slow and unsteady with several small losses of balance which required min assist to recover from.  Patient fatigues quickly with multiple short standing rest breaks required in 1 seated rest break.  Patient presents with generalized weakness impaired balance decreased endurance and functional mobility below baseline.  She would benefit from PT to address these impairments.  Recommend SNF placement to continue to address these impairments, improve independence and safety prior to returning home.  -       Row Name 02/28/25 0941          Therapy Assessment/Plan (PT)    Patient/Family Therapy Goals Statement (PT) Return to prior level of function  -     Rehab Potential (PT) good  -     Criteria for Skilled Interventions Met (PT) yes  -     Therapy Frequency (PT) 5 times/wk  -       Row Name 02/28/25 0941          Vital Signs    O2 Delivery Pre Treatment room air  -     O2 Delivery Intra Treatment room air  -     O2 Delivery Post Treatment room air  -       Row Name 02/28/25 0941          Positioning and Restraints    Pre-Treatment Position in bed  -     Post Treatment Position bed  -     In Bed fowlers;call light within reach;encouraged to call for assist;exit alarm on;notified [facility]  -               User  Key  (r) = Recorded By, (t) = Taken By, (c) = Cosigned By      Initials Name Provider Type    Yareli Gr, PT Physical Therapist                   Outcome Measures       Row Name 02/28/25 0945 02/28/25 0833       How much help from another person do you currently need...    Turning from your back to your side while in flat bed without using bedrails? 3  -KH 4  -MM    Moving from lying on back to sitting on the side of a flat bed without bedrails? 3  -KH 3  -MM    Moving to and from a bed to a chair (including a wheelchair)? 3  -KH 3  -MM    Standing up from a chair using your arms (e.g., wheelchair, bedside chair)? 3  -KH 3  -MM    Climbing 3-5 steps with a railing? 2  -KH 3  -MM    To walk in hospital room? 3  -KH 3  -MM    AM-PAC 6 Clicks Score (PT) 17  -KH 19  -MM    Highest Level of Mobility Goal 5 --> Static standing  -KH 6 --> Walk 10 steps or more  -MM      Row Name 02/28/25 0945          Functional Assessment    Outcome Measure Options AM-PAC 6 Clicks Basic Mobility (PT)  -KH               User Key  (r) = Recorded By, (t) = Taken By, (c) = Cosigned By      Initials Name Provider Type    Yareli Gr, PT Physical Therapist    Mira Lundy RN Registered Nurse                                 Physical Therapy Education       Title: PT OT SLP Therapies (Not Started)       Topic: Physical Therapy (Not Started)       Point: Mobility training (Not Started)       Learner Progress:  Not documented in this visit.              Point: Home exercise program (Not Started)       Learner Progress:  Not documented in this visit.              Point: Body mechanics (Not Started)       Learner Progress:  Not documented in this visit.              Point: Precautions (Not Started)       Learner Progress:  Not documented in this visit.                                  PT Recommendation and Plan  Planned Therapy Interventions (PT): balance training, bed mobility training, gait training, home exercise  program, patient/family education, transfer training, strengthening, stair training  Outcome Evaluation: Patient presented to the hospital with shortness of air, was found to have a pleural effusion and is now status post thoracentesis.  Patient reports she lives alone and is independently mobile without the use of assistive device.  She lives in a patio home with no stairs to enter.  Today patient was in bed and agreeable to therapy.  She required min assist for bed mobility, CGA to min assist to stand and to ambulate short distances with rolling walker.  Gait is slow and unsteady with several small losses of balance which required min assist to recover from.  Patient fatigues quickly with multiple short standing rest breaks required in 1 seated rest break.  Patient presents with generalized weakness impaired balance decreased endurance and functional mobility below baseline.  She would benefit from PT to address these impairments.  Recommend SNF placement to continue to address these impairments, improve independence and safety prior to returning home.     Time Calculation:         PT Charges       Row Name 02/28/25 0946             Time Calculation    Start Time 0848  -      Stop Time 0906  -      Time Calculation (min) 18 min  -      PT Received On 02/28/25  -      PT - Next Appointment 03/03/25  -      PT Goal Re-Cert Due Date 03/07/25  -         Time Calculation- PT    Total Timed Code Minutes- PT 8 minute(s)  -         Timed Charges    57423 - PT Therapeutic Activity Minutes 8  -KH         Total Minutes    Timed Charges Total Minutes 8  -KH       Total Minutes 8  -KH                User Key  (r) = Recorded By, (t) = Taken By, (c) = Cosigned By      Initials Name Provider Type    Yareli Gr, PT Physical Therapist                  Therapy Charges for Today       Code Description Service Date Service Provider Modifiers Qty    69242092641  PT THERAPEUTIC ACT EA 15 MIN 2/28/2025  Yareli Hogue, PT GP 1    [id number] HC PT EVAL MOD COMPLEXITY 2 2/28/2025 Yareli Hogue, PT GP 1            PT G-Codes  Outcome Measure Options: AM-PAC 6 Clicks Basic Mobility (PT)  AM-PAC 6 Clicks Score (PT): 17  PT Discharge Summary  Anticipated Discharge Disposition (PT): skilled nursing facility    Yareli Hogue, PT  2/28/2025

## 2025-02-28 NOTE — CASE MANAGEMENT/SOCIAL WORK
Continued Stay Note  [facility]     Patient Name: Mayuri Early  MRN: [id number]  Today's Date: 2/28/2025    Admit Date: 2/24/2025    Plan: [facility] SNF pending pre-cert. Pt will need WC Van transport.   Discharge Plan       Row Name 02/28/25 1537       Plan    Plan [facility] SNF pending pre-cert. Pt will need [facility] Van transport.    Patient/Family in Agreement with Plan yes    Plan Comments Sarai/Jess notified CCP bed available tomorrow at [facility] pending pre-cert. CCP met with pt and her son at the bedside to update. CCP explained referrals made and bed available at [facility] tomorrow pending pre-cert. Pt and son accept bed at [facility]. CCP discussed transport to [facility]; pt will need [facility] transport at [place]. Pt submitted to post acute authorizations to initiate pre-cert. [facility] packet in [facility], pharmacy updated to [facility] in Epic. Elvia REDDY/CCP      Row Name 02/28/25 6731       Plan    Plan SNF referrals pending, pt will need pre-cert.    Patient/Family in Agreement with Plan yes    Plan Comments A little while later pt notified CCP she would like additional referrals made to [facility], [facility], [facility] and [facility]. [facility] notified and no beds available at this time. Sarai/Jess notified. Elvia REDDY/CCP                   Discharge Codes    No documentation.                 Expected Discharge Date and Time       Expected Discharge Date Expected Discharge Time    Mar 2, 2025               Ericka Decker RN

## 2025-02-28 NOTE — CASE MANAGEMENT/SOCIAL WORK
Continued Stay Note  [facility]     Patient Name: Mayuri Early  MRN: [id number]  Today's Date: 2/28/2025    Admit Date: 2/24/2025    Plan: Awaiting pt/family decision regarding additional SNF referrals. Pt will need pre-cert.   Discharge Plan       Row Name 02/28/25 0940       Plan    Plan Awaiting pt/family decision regarding additional SNF referrals. Pt will need pre-cert.    Patient/Family in Agreement with Plan yes    Plan Comments PT notes reviewed. CCP met with pt at the bedside to discuss PT eval and recs for SNF. CCP explained PT recs for SNF at [place]; pt verbalized understanding and is agreeable to SNF referrals in the [facility] area. CCP explained referrals would be made and pt choice list for back up options if those buildings are unable to accept. Pt verbalized understanding. [facility] and [facility] notified of referral and both buildings are out of network with pts insurance. Pt updated at bedside and would discuss additional SNF referrals with her son and notify CCP of referrals that need to be made. Elvia RN/CCP                   Discharge Codes    No documentation.                 Expected Discharge Date and Time       Expected Discharge Date Expected Discharge Time    Feb 28, 2025               Ericka Decker RN

## 2025-02-28 NOTE — CONSULTS
[facility] Consult Note      Patient Name: Mayuri Early  : 1935  MRN: [id number]  Primary Care Physician:  Gunjan Hidalgo MD  Referring Physician: Kike Awad MD  Date of admission: 2025    Subjective     Reason for Consult: ALLYSON on CKD stage IIIa    HPI:   Mayuri Early is a 89 y.o. female with CKD stage III (baseline SCr 0.9-1.0, followed by Dr. Erasto Pradhan of our group), CHF, CAD, HTN, mitral valve regurg, paroxysmal A-fib, and history of hematuria, who presented to the hospital on  for progressive worsening SOA and BLE edema for the past few days; found to have large right pleural effusion and small left pleural effusion.  Underwent right thoracentesis on , removed 675 mL.    On admission, SCr 0.9, up to 1.4 today; patient is being diuresed with IV Lasix (stopped on ).  Home Cozaar was resumed.  BP labile, significantly elevated on  (183/96), then down to 100s/50s yesterday.     + Orthostatic dizziness at home and today  + N on  only, no V/D, appetite fine  + Orthopnea, MILLIGAN, and BLE edema all much improved  Does add salt to food; denies excessive oral liquid intake  Echo on  showed severe tricuspid regurg; conservative management for now.    Denies urinary symptoms or any regular NSAID use  Home antihypertensives were not listed correctly during last hospitalization    Review of Systems:   14 point review of systems is otherwise negative except for mentioned above on HPI    Personal History     Past Medical History:   Diagnosis Date    Fibrocystic breast     Well woman exam 10/20/2020       Past Surgical History:   Procedure Laterality Date    BREAST CYST ASPIRATION      BREAST CYST EXCISION      HYSTERECTOMY         Family History: family history is not on file.    Social History:  reports that she has quit smoking. She has been exposed to tobacco smoke. She has never used smokeless tobacco. She reports that she does not drink alcohol and  does not use drugs.    Home Medications:  Prior to Admission medications    Medication Sig Start Date End Date Taking? Authorizing Provider   acetaminophen (TYLENOL) 325 MG tablet Take 2 tablets by mouth Every 6 (Six) Hours As Needed for Mild Pain. 1/8/24  Yes Ministerio Rg MD   albuterol sulfate  (90 Base) MCG/ACT inhaler Inhale 2 puffs Every 4 (Four) Hours As Needed for Wheezing.   Yes Diane Erickson MD   apixaban (ELIQUIS) 2.5 MG tablet tablet Take 1 tablet by mouth 2 (Two) Times a Day. 1/8/24  Yes Ministerio Rg MD   atorvastatin (LIPITOR) 10 MG tablet Take 1 tablet by mouth Every Night.   Yes Diane Erickson MD   Cetirizine HCl 10 MG capsule Take 5 mg by mouth Daily As Needed (allergies). 1/8/24  Yes Ministerio Rg MD   HYDROcodone-acetaminophen (NORCO) 5-325 MG per tablet Take 1 tablet by mouth Every 4 (Four) Hours As Needed for Moderate Pain. 1/8/24  Yes Ministerio Rg MD   ipratropium-albuterol (DUO-NEB) 0.5-2.5 mg/3 ml nebulizer Take 3 mL by nebulization Every 4 (Four) Hours As Needed for Shortness of Air for up to 30 days. 2/12/25 3/14/25 Yes Kike Awad MD   losartan-hydrochlorothiazide (HYZAAR) 100-25 MG per tablet Take 1 tablet by mouth Daily.   Yes Diane Erickson MD   metoprolol succinate XL (TOPROL-XL) 100 MG 24 hr tablet Take 1 tablet by mouth 2 (Two) Times a Day.   Yes Diane Erickson MD   multivitamin with minerals (MULTIVITAMIN ADULT PO) Take 1 tablet by mouth Daily.   Yes Diane Erickson MD   Omega-3 Fatty Acids (fish oil) 1000 MG capsule capsule Take 1 capsule by mouth Daily With Breakfast.   Yes Diane Erickson MD   traZODone (DESYREL) 50 MG tablet Take 1 tablet by mouth Every Night.   Yes Diane Erickson MD   ondansetron (Zofran) 4 MG tablet Take 1 tablet by mouth Every 8 (Eight) Hours As Needed for Vomiting or Nausea. 1/8/24   Ministerio Rg MD   pantoprazole (PROTONIX) 40 MG EC tablet Take  1 tablet by mouth Daily. 1/8/24   Ministerio Rg MD       Allergies:  No Known Allergies    Objective     Vitals:   Temp:  [97.5 °F (36.4 °C)-98.2 °F (36.8 °C)] 97.7 °F (36.5 °C)  Heart Rate:  [68-80] 73  Resp:  [18] 18  BP: (114-154)/(52-85) 154/85    Intake/Output Summary (Last 24 hours) at 2/28/2025 1951  Last data filed at 2/28/2025 1711  Gross per 24 hour   Intake 600 ml   Output 750 ml   Net -150 ml       Physical Exam:   Constitutional: Awake, looks younger than actual age, no acute distress.  HEENT: Sclera anicteric, no conjunctival injection  Neck: Supple, no JVD  Respiratory: Absent BS at LLL; few crackles at R base, clear in the rest of the feels, nonlabored respiration on RA  Cardiovascular: Paced rhythm, no murmurs, no rubs, no carotid bruit  Gastrointestinal: BS +, abdomen is soft, nontender and nondistended  : No palpable bladder  Musculoskeletal: Trace BLE edema, no clubbing or cyanosis  Psychiatric: Appropriate affect, cooperative  Neurologic: No asterixis, moving all extremities, normal speech and mental status  Skin: Warm and dry       Scheduled Meds:     apixaban, 2.5 mg, Oral, Q12H  atorvastatin, 10 mg, Oral, Nightly  cetirizine, 10 mg, Oral, Daily  [Held by provider] furosemide, 20 mg, Intravenous, BID Diuretics  [Held by provider] losartan, 100 mg, Oral, Q24H  metoprolol succinate XL, 100 mg, Oral, Q24H  pantoprazole, 40 mg, Oral, Q AM      IV Meds:        Results Reviewed:   I have personally reviewed the results from the time of this admission to 2/28/2025 19:51 EST     Lab Results   Component Value Date    GLUCOSE 118 (H) 02/28/2025    CALCIUM 8.0 (L) 02/28/2025     02/28/2025    K 4.3 02/28/2025    CO2 31.0 (H) 02/28/2025    CL 99 02/28/2025    BUN 33 (H) 02/28/2025    CREATININE 1.42 (H) 02/28/2025    EGFRIFAFRI 53 (L) 08/09/2021    EGFRIFNONA 45 (L) 08/09/2021    BCR 23.2 02/28/2025    ANIONGAP 11.0 02/28/2025      Lab Results   Component Value Date    MG 2.7 (H)  02/09/2025    ALBUMIN 3.6 02/26/2025           Assessment / Plan       Pleural effusion      ASSESSMENT:  Nonoliguric ALLYSON on CKD stage IIIa, baseline SCr 0.9-1.0.  ALLYSON multifactorial:  labile BP (SBP 180s->110s), significant volume removal with IV diuretic and thoracentesis, and impaired renal autoregulation d/t ARB.  Volume excess much improved; electrolytes fine except mild hypokalemia (replaced)  Acute diastolic congestive heart failure, echo on 2/26 showed EF of 64.3% with moderate mitral valve and severe tricuspid regurg, followed by cardiology  Large right pleural effusion s/p thoracentesis 2/26, managed by primary team  Severe tricuspid regurgitation and moderate mitral regurgitation, conservative management for now; patient uncertain how aggressive to be going forward  Paroxysmal A-fib, not tachycardic, on AC  Hypertension with CKD, BP initially elevated with SBP 180s, overcorrected yesterday, better today.  On home Cozaar 100 daily    PLAN:  Hold Cozaar   Anticipate transitioning to oral torsemide tomorrow, but wish to see SCr first  Check UA and FENa  Orthostatic blood pressure each shift  Check bladder scan for completeness' sake  Surveillance labs    Thank you for involving us in the care of Mayuri Early.  Please feel free to call with any questions.    Willy Graf MD  02/28/25  19:51 [facility]    [facility]  [phone]      Please note that portions of this note were completed with a voice recognition program.

## 2025-02-28 NOTE — NURSING NOTE
"[facility] follow-up d/t depression.     Patient RIB. The patient reported \"I feel better\" and voiced her depression is \"better.\" The patient stated she has never been a depressed person until recently. The patient acknowledged receiving resources from [facility] and denied any questions. The patient voiced she would like continued [facility] follow-up visits. [facility] following.   "

## 2025-02-28 NOTE — PROGRESS NOTES
[facility]  Cardiology Progress Note    Patient Identification:  Name: Mayuri Early  Age: 89 y.o.  Sex: female  :  1935  MRN: [id number]                 Follow Up / Chief Complaint: follow up TVR    Interval History: resting in bed on room air, no chest pain or shortness of breath    Objective:    Past Medical History:  Past Medical History:   Diagnosis Date    Fibrocystic breast     Well woman exam 10/20/2020     Past Surgical History:  Past Surgical History:   Procedure Laterality Date    BREAST CYST ASPIRATION      BREAST CYST EXCISION      HYSTERECTOMY          Social History:   Social History     Tobacco Use    Smoking status: Former     Passive exposure: Past    Smokeless tobacco: Never   Substance Use Topics    Alcohol use: Never      Family History:  History reviewed. No pertinent family history.       Allergies:  No Known Allergies  Scheduled Meds:  apixaban, 2.5 mg, Q12H  atorvastatin, 10 mg, Nightly  cetirizine, 10 mg, Daily  [Held by provider] furosemide, 20 mg, BID Diuretics  losartan, 100 mg, Q24H  metoprolol succinate XL, 100 mg, Q24H  pantoprazole, 40 mg, Q AM            INTAKE AND OUTPUT:    Intake/Output Summary (Last 24 hours) at 2025 1442  Last data filed at 2025 1234  Gross per 24 hour   Intake 360 ml   Output 550 ml   Net -190 ml       Review of Systems:   GI: no n/v or abd pain  Cardiac: no chest pain or palpitations  Pulmonary: no shortness of breath or cough      Constitutional:  Temp:  [97.5 °F (36.4 °C)-98.2 °F (36.8 °C)] 97.7 °F (36.5 °C)  Heart Rate:  [68-76] 72  Resp:  [18] 18  BP: (106-140)/(52-64) 140/62    Physical Exam:  General:  Alert, cooperative, appears in no acute distress  Respiratory: Clear to auscultation.  Normal respiratory effort and rate.  Cardiovascular: S1S2 Regular rate and rhythm. No murmur, rub or gallop.   Gastrointestinal: soft, non tender. Bowel sounds present.   Extremities: DA SILVA x4. No pretibial pitting edema. Adequate  "musculoskeletal strength.   Neuro: AAO x3 CN II-XII grossly intact                Cardiographics  V paced, not significantly changed from previous         Lab Review   Results from last 7 days   Lab Units 02/28/25  0502 02/28/25  0330 02/26/25  0957   HSTROP T ng/L 13 13 11         Results from last 7 days   Lab Units 02/28/25  0330   SODIUM mmol/L 141   POTASSIUM mmol/L 3.4*   BUN mg/dL 33*   CREATININE mg/dL 1.42*   CALCIUM mg/dL 8.0*     @[id number]@  Results from last 7 days   Lab Units 02/28/25  0330 02/27/25  0403 02/26/25  0727   WBC 10*3/mm3 5.05 7.30 7.46   HEMOGLOBIN g/dL 12.0 12.0 13.6   HEMATOCRIT % 37.3 38.4 41.7   PLATELETS 10*3/mm3 202 214 226     Results from last 7 days   Lab Units 02/28/25  1309   INR  1.43*         Assessment:  Severe TR  Pleural effusion  Bilateral leg swelling  Paroxysmal atrial fibrillation      Plan:  S/p right thora with 675ml removed  Feeling better today, on room air, no shortness of breath, walked with therapy this morning and felt well with it. Volume stable on exam. Diuresis on hold  K 3.4-replacing  BP and HR stable  TVR to be treated conservatively at this point, will defer consideration of repair to primary cardiologist.     We will see as needed over the weekend, please call with any questions or concerns    )2/28/2025  CRISTAL Munoz/Transcription:   \"Dictated utilizing Dragon dictation\".     "

## 2025-03-01 LAB
ALBUMIN SERPL-MCNC: 3.4 G/DL (ref 3.5–5.2)
ANION GAP SERPL CALCULATED.3IONS-SCNC: 10.5 MMOL/L (ref 5–15)
BASOPHILS # BLD AUTO: 0.02 10*3/MM3 (ref 0–0.2)
BASOPHILS NFR BLD AUTO: 0.3 % (ref 0–1.5)
BUN SERPL-MCNC: 29 MG/DL (ref 8–23)
BUN/CREAT SERPL: 26.6 (ref 7–25)
CALCIUM SPEC-SCNC: 8.5 MG/DL (ref 8.6–10.5)
CHLORIDE SERPL-SCNC: 100 MMOL/L (ref 98–107)
CO2 SERPL-SCNC: 27.5 MMOL/L (ref 22–29)
CREAT SERPL-MCNC: 1.09 MG/DL (ref 0.57–1)
DEPRECATED RDW RBC AUTO: 41.3 FL (ref 37–54)
EGFRCR SERPLBLD CKD-EPI 2021: 48.7 ML/MIN/1.73
EOSINOPHIL # BLD AUTO: 0.24 10*3/MM3 (ref 0–0.4)
EOSINOPHIL NFR BLD AUTO: 4.2 % (ref 0.3–6.2)
ERYTHROCYTE [DISTWIDTH] IN BLOOD BY AUTOMATED COUNT: 11.8 % (ref 12.3–15.4)
GLUCOSE SERPL-MCNC: 132 MG/DL (ref 65–99)
HCT VFR BLD AUTO: 38.8 % (ref 34–46.6)
HGB BLD-MCNC: 12.8 G/DL (ref 12–15.9)
IMM GRANULOCYTES # BLD AUTO: 0.01 10*3/MM3 (ref 0–0.05)
IMM GRANULOCYTES NFR BLD AUTO: 0.2 % (ref 0–0.5)
LYMPHOCYTES # BLD AUTO: 1.14 10*3/MM3 (ref 0.7–3.1)
LYMPHOCYTES NFR BLD AUTO: 19.8 % (ref 19.6–45.3)
MAGNESIUM SERPL-MCNC: 1.5 MG/DL (ref 1.6–2.4)
MCH RBC QN AUTO: 31.8 PG (ref 26.6–33)
MCHC RBC AUTO-ENTMCNC: 33 G/DL (ref 31.5–35.7)
MCV RBC AUTO: 96.3 FL (ref 79–97)
MONOCYTES # BLD AUTO: 0.81 10*3/MM3 (ref 0.1–0.9)
MONOCYTES NFR BLD AUTO: 14.1 % (ref 5–12)
NEUTROPHILS NFR BLD AUTO: 3.53 10*3/MM3 (ref 1.7–7)
NEUTROPHILS NFR BLD AUTO: 61.4 % (ref 42.7–76)
NRBC BLD AUTO-RTO: 0 /100 WBC (ref 0–0.2)
PHOSPHATE SERPL-MCNC: 2 MG/DL (ref 2.5–4.5)
PLATELET # BLD AUTO: 196 10*3/MM3 (ref 140–450)
PMV BLD AUTO: 9.1 FL (ref 6–12)
POTASSIUM SERPL-SCNC: 4.2 MMOL/L (ref 3.5–5.2)
RBC # BLD AUTO: 4.03 10*6/MM3 (ref 3.77–5.28)
SODIUM SERPL-SCNC: 138 MMOL/L (ref 136–145)
WBC NRBC COR # BLD AUTO: 5.75 10*3/MM3 (ref 3.4–10.8)

## 2025-03-01 PROCEDURE — 83735 ASSAY OF MAGNESIUM: CPT | Performed by: HOSPITALIST

## 2025-03-01 PROCEDURE — 80069 RENAL FUNCTION PANEL: CPT

## 2025-03-01 PROCEDURE — 85025 COMPLETE CBC W/AUTO DIFF WBC: CPT

## 2025-03-01 PROCEDURE — 25010000002 MAGNESIUM SULFATE 2 GM/50ML SOLUTION: Performed by: INTERNAL MEDICINE

## 2025-03-01 RX ORDER — TORSEMIDE 20 MG/1
20 TABLET ORAL DAILY
Status: DISCONTINUED | OUTPATIENT
Start: 2025-03-01 | End: 2025-03-03

## 2025-03-01 RX ORDER — MAGNESIUM SULFATE HEPTAHYDRATE 40 MG/ML
2 INJECTION, SOLUTION INTRAVENOUS ONCE
Status: COMPLETED | OUTPATIENT
Start: 2025-03-01 | End: 2025-03-01

## 2025-03-01 RX ADMIN — MAGNESIUM SULFATE IN WATER FOR 2 G: 40 INJECTION INTRAVENOUS at 20:24

## 2025-03-01 RX ADMIN — APIXABAN 2.5 MG: 5 TABLET, FILM COATED ORAL at 08:30

## 2025-03-01 RX ADMIN — ATORVASTATIN CALCIUM 10 MG: 10 TABLET ORAL at 20:25

## 2025-03-01 RX ADMIN — TRAZODONE HYDROCHLORIDE 50 MG: 50 TABLET ORAL at 22:29

## 2025-03-01 RX ADMIN — CETIRIZINE HYDROCHLORIDE 10 MG: 10 TABLET ORAL at 08:30

## 2025-03-01 RX ADMIN — APIXABAN 2.5 MG: 5 TABLET, FILM COATED ORAL at 20:25

## 2025-03-01 RX ADMIN — ACETAMINOPHEN 650 MG: 325 TABLET, FILM COATED ORAL at 00:30

## 2025-03-01 RX ADMIN — TORSEMIDE 20 MG: 20 TABLET ORAL at 20:25

## 2025-03-01 RX ADMIN — PANTOPRAZOLE SODIUM 40 MG: 40 TABLET, DELAYED RELEASE ORAL at 06:17

## 2025-03-01 RX ADMIN — METOPROLOL SUCCINATE 100 MG: 25 TABLET, EXTENDED RELEASE ORAL at 08:29

## 2025-03-01 NOTE — NURSING NOTE
"Access and consulted d/t depression. Patient admitted with pleural effusion.     Patient awake and easy to engage. Appears alert and oriented times 4. Affect bright, smiling. Talked about her family including her 4 sons and how they all played musical instruments in the [employer] growing up. Patient states they are supportive and \"good boys\". States she felt like she was being \"smothered\" when admitted to the hospital. States \"they gave me lasix but then had to stick a needle in my side to get the fluid off\". Patient states she is feeling so much better and looking forward to rehab at [facility]. Patient previously received outpatient mental health referrals. Patient to be discharged today to rehab per case management.     Access will sign off. Please contact if further assist needed.   "

## 2025-03-01 NOTE — PROGRESS NOTES
"Daily progress note    Primary care physician  Dr. Hidalgo    Subjective  Doing better with no new complaints and agreeable to go to subacute rehab    History of present illness  89-year-old white female with history of paroxysmal atrial fibrillation hypertension hyperlipidemia asthma and gastroesophageal reflux disease who is well-known to our service to the emergency room with shortness of breath for last several days.  Patient denies any chest pain palpitation abdominal pain nausea vomiting diarrhea.  Patient denies any fever cough congestion.  Patient evaluated in ER found to have pleural effusion admitted for management.  Patient fully alert oriented give me an good history and still wants to be a full code and lives by herself.     REVIEW OF SYSTEMS  Unremarkable except weakness     PHYSICAL EXAM   Blood pressure 123/60, pulse 65, temperature 97.5 °F (36.4 °C), temperature source Oral, resp. rate 18, height 154.9 cm (61\"), weight 60.8 kg (134 lb), SpO2 98%.    GENERAL: Awake and alert, oriented, elderly, not distressed  HENT: head atraumatic, no nuchal rigidity  EYES: no scleral icterus, EOMI  CV: regular rhythm, regular rate, no murmur  RESPIRATORY: normal effort, decreased breath sounds in the right base  ABDOMEN: soft, nontender  MUSCULOSKELETAL: no deformity, FROM, 2+ pedal edema  NEURO: alert, moves all extremities, follows commands  SKIN: warm, dry     LAB RESULTS  Lab Results (last 24 hours)       Procedure Component Value Units Date/Time    Body Fluid Culture - Body Fluid, Pleural Cavity [[id number]] Collected: 02/26/25 1108    Specimen: Body Fluid from Pleural Cavity Updated: 03/01/25 0805     Body Fluid Culture No growth at 3 days     Gram Stain No WBCs seen      No organisms seen    Anaerobic Culture - Pleural Fluid, Pleural Cavity [[id number]]  (Normal) Collected: 02/26/25 1108    Specimen: Pleural Fluid from Pleural Cavity Updated: 03/01/25 0753     Anaerobic Culture No anaerobes isolated at 3 days "    Renal Function Panel [[id number]]  (Abnormal) Collected: 03/01/25 0348    Specimen: Blood Updated: 03/01/25 0448     Glucose 132 mg/dL      BUN 29 mg/dL      Creatinine 1.09 mg/dL      Sodium 138 mmol/L      Potassium 4.2 mmol/L      Chloride 100 mmol/L      CO2 27.5 mmol/L      Calcium 8.5 mg/dL      Albumin 3.4 g/dL      Phosphorus 2.0 mg/dL      Anion Gap 10.5 mmol/L      BUN/Creatinine Ratio 26.6     eGFR 48.7 mL/min/1.73     Narrative:      GFR Categories in Chronic Kidney Disease (CKD)      GFR Category          GFR (mL/min/1.73)    Interpretation  G1                     90 or greater         Normal or high (1)  G2                      60-89                Mild decrease (1)  G3a                   45-59                Mild to moderate decrease  G3b                   30-44                Moderate to severe decrease  G4                    15-29                Severe decrease  G5                    14 or less           Kidney failure          (1)In the absence of evidence of kidney disease, neither GFR category G1 or G2 fulfill the criteria for CKD.    eGFR calculation 2021 CKD-EPI creatinine equation, which does not include race as a factor    Magnesium [[id number]]  (Abnormal) Collected: 03/01/25 0348    Specimen: Blood Updated: 03/01/25 0448     Magnesium 1.5 mg/dL     CBC & Differential [[id number]]  (Abnormal) Collected: 03/01/25 0348    Specimen: Blood Updated: 03/01/25 0427    Narrative:      The following orders were created for panel order CBC & Differential.  Procedure                               Abnormality         Status                     ---------                               -----------         ------                     CBC Auto Differential[[id number]]        Abnormal            Final result                 Please view results for these tests on the individual orders.    CBC Auto Differential [[id number]]  (Abnormal) Collected: 03/01/25 0348    Specimen: Blood Updated: 03/01/25 0427     WBC 5.75  10*3/mm3      RBC 4.03 10*6/mm3      Hemoglobin 12.8 g/dL      Hematocrit 38.8 %      MCV 96.3 fL      MCH 31.8 pg      MCHC 33.0 g/dL      RDW 11.8 %      RDW-SD 41.3 fl      MPV 9.1 fL      Platelets 196 10*3/mm3      Neutrophil % 61.4 %      Lymphocyte % 19.8 %      Monocyte % 14.1 %      Eosinophil % 4.2 %      Basophil % 0.3 %      Immature Grans % 0.2 %      Neutrophils, Absolute 3.53 10*3/mm3      Lymphocytes, Absolute 1.14 10*3/mm3      Monocytes, Absolute 0.81 10*3/mm3      Eosinophils, Absolute 0.24 10*3/mm3      Basophils, Absolute 0.02 10*3/mm3      Immature Grans, Absolute 0.01 10*3/mm3      nRBC 0.0 /100 WBC     Protein / Creatinine Ratio, Urine - Urine, Clean Catch [[id number]]  (Abnormal) Collected: 02/28/25 1601    Specimen: Urine, Clean Catch Updated: 02/28/25 1655     Protein/Creatinine Ratio, Urine 255.3 mg/G Crea      Creatinine, Urine 107.7 mg/dL      Total Protein, Urine 27.5 mg/dL     Sodium, Urine, Random - Urine, Clean Catch [[id number]] Collected: 02/28/25 1601    Specimen: Urine, Clean Catch Updated: 02/28/25 1655     Sodium, Urine 77 mmol/L     Narrative:      Reference intervals for random urine have not been established.  Clinical usage is dependent upon physician's interpretation in combination with other laboratory tests.       Potassium [[id number]]  (Normal) Collected: 02/28/25 1628    Specimen: Blood Updated: 02/28/25 1652     Potassium 4.3 mmol/L           Imaging Results (Last 24 Hours)       ** No results found for the last 24 hours. **          Scan on 2/24/2025 2221 by [employer], [place]: ECG 12-LEAD         Author: -- Service: -- Author Type: --   Filed: Date of Service: Creation Time:   Status: (Other)   HEART RATE=62  bpm  RR Dstdrkxf=423  ms  SC Interval=  ms  P Horizontal Axis=  deg  P Front Axis=  deg  QRSD Uzewemga=913  ms  QT Ijhrhnbm=046  ms  ICeM=366  ms  QRS Axis=75  deg  T Wave Axis=-33  deg  - ABNORMAL ECG -  Afib/flut and V-paced complexes  No further rhythm  analysis attempted due to paced rhythm  IVCD, consider RBBB           Interpretation Summary    Left ventricular systolic function is normal. Calculated left ventricular EF = 64.3%    Left ventricular diastolic function was indeterminate in the setting of atrial fibrillation.    There is mild, bileaflet mitral valve thickening present.    Moderate mitral valve regurgitation is present.    Severe tricuspid valve regurgitation is present.    Estimated right ventricular systolic pressure from tricuspid regurgitation is normal (<35 mmHg) though this may be underestimated due to severe TR.    Moderate biatrial enlargement on manual quantitation, nor mall IVC size.    Pacemaker lead noted.    Current Facility-Administered Medications:     acetaminophen (TYLENOL) tablet 650 mg, 650 mg, Oral, Q6H PRN, Kike Awad MD, 650 mg at 03/01/25 0030    albuterol sulfate HFA (PROVENTIL HFA;VENTOLIN HFA;PROAIR HFA) inhaler 2 puff, 2 puff, Inhalation, Q4H PRN, Kike Awad MD    apixaban (ELIQUIS) tablet 2.5 mg, 2.5 mg, Oral, Q12H, Kike Awad MD, 2.5 mg at 03/01/25 0830    atorvastatin (LIPITOR) tablet 10 mg, 10 mg, Oral, Nightly, Kike Awad MD, 10 mg at 02/28/25 2016    cetirizine (zyrTEC) tablet 10 mg, 10 mg, Oral, Daily, Kike Awad MD, 10 mg at 03/01/25 0830    [Held by provider] furosemide (LASIX) injection 20 mg, 20 mg, Intravenous, BID Diuretics, Kike Awad MD, 20 mg at 02/26/25 1849    HYDROcodone-acetaminophen (NORCO) 5-325 MG per tablet 1 tablet, 1 tablet, Oral, Q4H PRN, Kike Awad MD    ipratropium-albuterol (DUO-NEB) nebulizer solution 3 mL, 3 mL, Nebulization, Q4H PRN, Kike Awad MD    [Held by provider] losartan (COZAAR) tablet 100 mg, 100 mg, Oral, Q24H, Kike Awad MD, 100 mg at 02/28/25 0832    metoprolol succinate XL (TOPROL-XL) 24 hr tablet 100 mg, 100 mg, Oral, Q24H, Kike Awad MD, 100 mg at 03/01/25 0829    pantoprazole (PROTONIX) EC tablet 40 mg, 40 mg, Oral, Q AM, Kike Awad MD, 40 mg  at 03/01/25 0617    [COMPLETED] Insert Peripheral IV, , , Once **AND** sodium chloride 0.9 % flush 10 mL, 10 mL, Intravenous, PRN, He Awad MD    traZODone (DESYREL) tablet 50 mg, 50 mg, Oral, Nightly PRN, He Awad MD, 50 mg at 02/26/25 0035      ASSESSMENT  Acute diastolic congestive heart failure  Severe tricuspid regurgitation and moderate mitral regurgitation  Large right pleural effusion status post thoracentesis  Acute kidney injury secondary to overdiuresis  Paroxysmal atrial fibrillation  Hypertension  Hyperlipidemia  Gastroesophageal reflux disease  Status post permanent pacemaker placement    PLAN  CPM  Continue to hold diuretics and ARB  Nephrology consult appreciated  Pulmonary and cardiology to follow patient  Adjust home medications  Stress ulcer DVT prophylaxis  Supportive care  PT OT  Discussed with nursing staff  Discharge planning    HE AAWD MD    Copied text in this note has been reviewed and is accurate as of 03/01/25

## 2025-03-02 LAB
ANION GAP SERPL CALCULATED.3IONS-SCNC: 9 MMOL/L (ref 5–15)
BUN SERPL-MCNC: 24 MG/DL (ref 8–23)
BUN/CREAT SERPL: 22.9 (ref 7–25)
CALCIUM SPEC-SCNC: 9.1 MG/DL (ref 8.6–10.5)
CHLORIDE SERPL-SCNC: 102 MMOL/L (ref 98–107)
CO2 SERPL-SCNC: 31 MMOL/L (ref 22–29)
CREAT SERPL-MCNC: 1.05 MG/DL (ref 0.57–1)
EGFRCR SERPLBLD CKD-EPI 2021: 50.9 ML/MIN/1.73
GLUCOSE SERPL-MCNC: 121 MG/DL (ref 65–99)
POTASSIUM SERPL-SCNC: 4.1 MMOL/L (ref 3.5–5.2)
SODIUM SERPL-SCNC: 142 MMOL/L (ref 136–145)

## 2025-03-02 PROCEDURE — 80048 BASIC METABOLIC PNL TOTAL CA: CPT | Performed by: HOSPITALIST

## 2025-03-02 RX ADMIN — ACETAMINOPHEN 650 MG: 325 TABLET, FILM COATED ORAL at 21:11

## 2025-03-02 RX ADMIN — APIXABAN 2.5 MG: 5 TABLET, FILM COATED ORAL at 21:12

## 2025-03-02 RX ADMIN — APIXABAN 2.5 MG: 5 TABLET, FILM COATED ORAL at 08:31

## 2025-03-02 RX ADMIN — TRAZODONE HYDROCHLORIDE 50 MG: 50 TABLET ORAL at 21:11

## 2025-03-02 RX ADMIN — METOPROLOL SUCCINATE 100 MG: 25 TABLET, EXTENDED RELEASE ORAL at 08:31

## 2025-03-02 RX ADMIN — TORSEMIDE 20 MG: 20 TABLET ORAL at 08:31

## 2025-03-02 RX ADMIN — CETIRIZINE HYDROCHLORIDE 10 MG: 10 TABLET ORAL at 08:31

## 2025-03-02 RX ADMIN — ACETAMINOPHEN 650 MG: 325 TABLET, FILM COATED ORAL at 02:16

## 2025-03-02 RX ADMIN — PANTOPRAZOLE SODIUM 40 MG: 40 TABLET, DELAYED RELEASE ORAL at 06:47

## 2025-03-02 RX ADMIN — ATORVASTATIN CALCIUM 10 MG: 10 TABLET ORAL at 21:11

## 2025-03-02 NOTE — PROGRESS NOTES
"Daily progress note    Primary care physician  Dr. Hidalgo    Subjective  Doing same with no new complaints    History of present illness  89-year-old white female with history of paroxysmal atrial fibrillation hypertension hyperlipidemia asthma and gastroesophageal reflux disease who is well-known to our service to the emergency room with shortness of breath for last several days.  Patient denies any chest pain palpitation abdominal pain nausea vomiting diarrhea.  Patient denies any fever cough congestion.  Patient evaluated in ER found to have pleural effusion admitted for management.  Patient fully alert oriented give me an good history and still wants to be a full code and lives by herself.     REVIEW OF SYSTEMS  Unremarkable except weakness     PHYSICAL EXAM   Blood pressure 139/53, pulse 66, temperature 97.5 °F (36.4 °C), temperature source Oral, resp. rate 20, height 154.9 cm (61\"), weight 60.8 kg (134 lb), SpO2 97%.    GENERAL: Awake and alert, oriented, elderly, not distressed  HENT: head atraumatic, no nuchal rigidity  EYES: no scleral icterus, EOMI  CV: regular rhythm, regular rate, no murmur  RESPIRATORY: normal effort, decreased breath sounds in the right base  ABDOMEN: soft, nontender  MUSCULOSKELETAL: no deformity, FROM, 2+ pedal edema  NEURO: alert, moves all extremities, follows commands  SKIN: warm, dry     LAB RESULTS  Lab Results (last 24 hours)       Procedure Component Value Units Date/Time    Body Fluid Culture - Body Fluid, Pleural Cavity [[id number]] Collected: 02/26/25 1108    Specimen: Body Fluid from Pleural Cavity Updated: 03/02/25 0742     Body Fluid Culture No growth at 4 days     Gram Stain No WBCs seen      No organisms seen    Basic Metabolic Panel [[id number]]  (Abnormal) Collected: 03/02/25 0354    Specimen: Blood Updated: 03/02/25 0439     Glucose 121 mg/dL      BUN 24 mg/dL      Creatinine 1.05 mg/dL      Sodium 142 mmol/L      Potassium 4.1 mmol/L      Chloride 102 mmol/L      " CO2 31.0 mmol/L      Calcium 9.1 mg/dL      BUN/Creatinine Ratio 22.9     Anion Gap 9.0 mmol/L      eGFR 50.9 mL/min/1.73     Narrative:      GFR Categories in Chronic Kidney Disease (CKD)      GFR Category          GFR (mL/min/1.73)    Interpretation  G1                     90 or greater         Normal or high (1)  G2                      60-89                Mild decrease (1)  G3a                   45-59                Mild to moderate decrease  G3b                   30-44                Moderate to severe decrease  G4                    15-29                Severe decrease  G5                    14 or less           Kidney failure          (1)In the absence of evidence of kidney disease, neither GFR category G1 or G2 fulfill the criteria for CKD.    eGFR calculation 2021 CKD-EPI creatinine equation, which does not include race as a factor          Imaging Results (Last 24 Hours)       ** No results found for the last 24 hours. **          Scan on 2/24/2025 2221 by [employer], [place]: ECG 12-LEAD         Author: -- Service: -- Author Type: --   Filed: Date of Service: Creation Time:   Status: (Other)   HEART RATE=62  bpm  RR Ccfqfwpy=919  ms  MS Interval=  ms  P Horizontal Axis=  deg  P Front Axis=  deg  QRSD Eracgohs=591  ms  QT Ymzvmzno=055  ms  PIiT=494  ms  QRS Axis=75  deg  T Wave Axis=-33  deg  - ABNORMAL ECG -  Afib/flut and V-paced complexes  No further rhythm analysis attempted due to paced rhythm  IVCD, consider RBBB           Interpretation Summary    Left ventricular systolic function is normal. Calculated left ventricular EF = 64.3%    Left ventricular diastolic function was indeterminate in the setting of atrial fibrillation.    There is mild, bileaflet mitral valve thickening present.    Moderate mitral valve regurgitation is present.    Severe tricuspid valve regurgitation is present.    Estimated right ventricular systolic pressure from tricuspid regurgitation is normal (<35 mmHg) though this may  be underestimated due to severe TR.    Moderate biatrial enlargement on manual quantitation, nor mall IVC size.    Pacemaker lead noted.    Current Facility-Administered Medications:     acetaminophen (TYLENOL) tablet 650 mg, 650 mg, Oral, Q6H PRN, Kike Awad MD, 650 mg at 03/02/25 0216    albuterol sulfate HFA (PROVENTIL HFA;VENTOLIN HFA;PROAIR HFA) inhaler 2 puff, 2 puff, Inhalation, Q4H PRN, Kike Awad MD    apixaban (ELIQUIS) tablet 2.5 mg, 2.5 mg, Oral, Q12H, Kike Awad MD, 2.5 mg at 03/02/25 0831    atorvastatin (LIPITOR) tablet 10 mg, 10 mg, Oral, Nightly, Kike Awad MD, 10 mg at 03/01/25 2025    cetirizine (zyrTEC) tablet 10 mg, 10 mg, Oral, Daily, Kike Awad MD, 10 mg at 03/02/25 0831    HYDROcodone-acetaminophen (NORCO) 5-325 MG per tablet 1 tablet, 1 tablet, Oral, Q4H PRN, Kike Awad MD    ipratropium-albuterol (DUO-NEB) nebulizer solution 3 mL, 3 mL, Nebulization, Q4H PRN, Kike Awad MD    metoprolol succinate XL (TOPROL-XL) 24 hr tablet 100 mg, 100 mg, Oral, Q24H, Kike Awad MD, 100 mg at 03/02/25 0831    pantoprazole (PROTONIX) EC tablet 40 mg, 40 mg, Oral, Q AM, Kike Awad MD, 40 mg at 03/02/25 0647    [COMPLETED] Insert Peripheral IV, , , Once **AND** sodium chloride 0.9 % flush 10 mL, 10 mL, Intravenous, PRN, Kike Awad MD    torsemide (DEMADEX) tablet 20 mg, 20 mg, Oral, Daily, Willy Graf MD, 20 mg at 03/02/25 0831    traZODone (DESYREL) tablet 50 mg, 50 mg, Oral, Nightly PRN, Kike Awad MD, 50 mg at 03/01/25 2223      ASSESSMENT  Acute diastolic congestive heart failure  Severe tricuspid regurgitation and moderate mitral regurgitation  Large right pleural effusion status post thoracentesis  Acute kidney injury secondary to overdiuresis  Paroxysmal atrial fibrillation  Hypertension  Hyperlipidemia  Gastroesophageal reflux disease  Status post permanent pacemaker placement    PLAN  CPM  Resume diuretics  Continue to hold ARB  Nephrology consult  appreciated  Pulmonary and cardiology to follow patient  Adjust home medications  Stress ulcer DVT prophylaxis  Supportive care  PT OT  Discussed with nursing staff  Discharge to skilled nursing facility once bed available    HE OSWALD MD    Copied text in this note has been reviewed and is accurate as of 03/02/25

## 2025-03-02 NOTE — PLAN OF CARE
Problem: Adult Inpatient Plan of Care  Goal: Plan of Care Review  Outcome: Progressing  Flowsheets (Taken 3/2/2025 1651)  Outcome Evaluation: Pt AxOx4, medications given as scheduled, room air, VSS, continue plan of care  Plan of Care Reviewed With: patient  Goal: Patient-Specific Goal (Individualized)  Outcome: Progressing  Goal: Absence of Hospital-Acquired Illness or Injury  Outcome: Progressing  Intervention: Identify and Manage Fall Risk  Recent Flowsheet Documentation  Taken 3/2/2025 1620 by Lissa Hill RN  Safety Promotion/Fall Prevention:   safety round/check completed   activity supervised   assistive device/personal items within reach   clutter free environment maintained   fall prevention program maintained   nonskid shoes/slippers when out of bed  Taken 3/2/2025 1427 by Lissa Hill RN  Safety Promotion/Fall Prevention:   activity supervised   assistive device/personal items within reach   clutter free environment maintained   fall prevention program maintained   nonskid shoes/slippers when out of bed   safety round/check completed  Taken 3/2/2025 1203 by Lissa Hill RN  Safety Promotion/Fall Prevention:   safety round/check completed   activity supervised   assistive device/personal items within reach   clutter free environment maintained   fall prevention program maintained   nonskid shoes/slippers when out of bed  Taken 3/2/2025 1017 by Lissa Hill RN  Safety Promotion/Fall Prevention: safety round/check completed  Taken 3/2/2025 0809 by Lissa Hill RN  Safety Promotion/Fall Prevention:   safety round/check completed   activity supervised   assistive device/personal items within reach   clutter free environment maintained   fall prevention program maintained   nonskid shoes/slippers when out of bed  Intervention: Prevent Skin Injury  Recent Flowsheet Documentation  Taken 3/2/2025 1620 by Lissa Hill RN  Body Position: dangle, side of bed  Taken 3/2/2025 1427 by Lissa Hill,  RN  Body Position: dangle, side of bed  Taken 3/2/2025 1203 by Lissa Hill RN  Body Position: dangle, side of bed  Taken 3/2/2025 1017 by Lissa Hill RN  Body Position: dangle, side of bed  Taken 3/2/2025 0809 by Lissa Hill RN  Body Position: weight shifting  Intervention: Prevent Infection  Recent Flowsheet Documentation  Taken 3/2/2025 1203 by Lissa Hill RN  Infection Prevention: single patient room provided  Goal: Optimal Comfort and Wellbeing  Outcome: Progressing  Intervention: Provide Person-Centered Care  Recent Flowsheet Documentation  Taken 3/2/2025 1427 by Lissa Hill RN  Trust Relationship/Rapport:   care explained   choices provided   emotional support provided   questions answered   empathic listening provided   reassurance provided   questions encouraged   thoughts/feelings acknowledged  Taken 3/2/2025 0809 by Lissa Hill RN  Trust Relationship/Rapport:   care explained   choices provided   emotional support provided   empathic listening provided   questions answered   questions encouraged   reassurance provided   thoughts/feelings acknowledged  Goal: Readiness for Transition of Care  Outcome: Progressing     Problem: Comorbidity Management  Goal: Blood Pressure in Desired Range  Outcome: Progressing  Intervention: Maintain Blood Pressure Management  Recent Flowsheet Documentation  Taken 3/2/2025 1620 by Lissa Hill RN  Medication Review/Management: medications reviewed  Taken 3/2/2025 1427 by Lissa Hill RN  Medication Review/Management: medications reviewed  Taken 3/2/2025 1203 by Lissa Hill RN  Medication Review/Management: medications reviewed  Taken 3/2/2025 1017 by Lissa Hill RN  Medication Review/Management: medications reviewed  Taken 3/2/2025 0809 by Lissa Hill RN  Medication Review/Management: medications reviewed     Problem: Fall Injury Risk  Goal: Absence of Fall and Fall-Related Injury  Outcome: Progressing  Intervention: Identify and  Manage Contributors  Recent Flowsheet Documentation  Taken 3/2/2025 1620 by Lissa Hill RN  Medication Review/Management: medications reviewed  Taken 3/2/2025 1427 by Lissa Hill RN  Medication Review/Management: medications reviewed  Taken 3/2/2025 1203 by Lissa Hill RN  Medication Review/Management: medications reviewed  Taken 3/2/2025 1017 by Lissa Hill RN  Medication Review/Management: medications reviewed  Taken 3/2/2025 0809 by Lissa Hill RN  Medication Review/Management: medications reviewed  Intervention: Promote Injury-Free Environment  Recent Flowsheet Documentation  Taken 3/2/2025 1620 by Lissa Hill RN  Safety Promotion/Fall Prevention:   safety round/check completed   activity supervised   assistive device/personal items within reach   clutter free environment maintained   fall prevention program maintained   nonskid shoes/slippers when out of bed  Taken 3/2/2025 1427 by Lissa Hill RN  Safety Promotion/Fall Prevention:   activity supervised   assistive device/personal items within reach   clutter free environment maintained   fall prevention program maintained   nonskid shoes/slippers when out of bed   safety round/check completed  Taken 3/2/2025 1203 by Lissa Hill RN  Safety Promotion/Fall Prevention:   safety round/check completed   activity supervised   assistive device/personal items within reach   clutter free environment maintained   fall prevention program maintained   nonskid shoes/slippers when out of bed  Taken 3/2/2025 1017 by Lissa Hill RN  Safety Promotion/Fall Prevention: safety round/check completed  Taken 3/2/2025 0809 by Lissa Hill RN  Safety Promotion/Fall Prevention:   safety round/check completed   activity supervised   assistive device/personal items within reach   clutter free environment maintained   fall prevention program maintained   nonskid shoes/slippers when out of bed   Goal Outcome Evaluation:  Plan of Care Reviewed With:  patient           Outcome Evaluation: Pt AxOx4, medications given as scheduled, room air, VSS, continue plan of care

## 2025-03-02 NOTE — PROGRESS NOTES
[facility] Progress Note      Patient Name: Mayuri Early  : 1935  MRN: [id number]  Primary Care Physician:  Gunjan Hidalgo MD  Date of admission: 2025    Subjective     Interval History:   Follow-up acute kidney injury on chronic kidney disease    Patient is feeling better, denies any chest pain or shortness of air, no orthopnea or PND, no nausea or vomiting, she is much less lightheaded when getting up.    Review of Systems:   As noted above    Objective     Vitals:   Temp:  [97.5 °F (36.4 °C)-97.7 °F (36.5 °C)] 97.5 °F (36.4 °C)  Heart Rate:  [62-84] 62  Resp:  [18-20] 20  BP: (122-156)/(60-72) 146/67    Intake/Output Summary (Last 24 hours) at 3/2/2025 1109  Last data filed at 3/2/2025 0646  Gross per 24 hour   Intake 120 ml   Output 2350 ml   Net -2230 ml       Physical Exam:    Constitutional: Awake, looks younger than actual age, no acute distress.  HEENT: Sclera anicteric, no conjunctival injection  Neck: Supple, no JVD  Respiratory: Crackles in bases, nonlabored respiration on RA  Cardiovascular: Paced rhythm, no rub  Gastrointestinal: BS +, abdomen is soft, nontender and nondistended  : No palpable bladder  Musculoskeletal: No edema, no clubbing or cyanosis  Psychiatric: Appropriate affect, cooperative  Neurologic: No asterixis, moving all extremities, normal speech and mental status  Skin: Warm and dry     Scheduled Meds:     apixaban, 2.5 mg, Oral, Q12H  atorvastatin, 10 mg, Oral, Nightly  cetirizine, 10 mg, Oral, Daily  metoprolol succinate XL, 100 mg, Oral, Q24H  pantoprazole, 40 mg, Oral, Q AM  torsemide, 20 mg, Oral, Daily      IV Meds:        Results Reviewed:   I have personally reviewed the results from the time of this admission to 3/2/2025 11:09 EST     Results from last 7 days   Lab Units 25  0354 25  0348 25  1628 25  0330 25  0403 25  0727 25  2205   SODIUM mmol/L 142 138  --  141   < > 141 141    POTASSIUM mmol/L 4.1 4.2 4.3 3.4*   < > 3.6 3.9   CHLORIDE mmol/L 102 100  --  99   < > 96* 103   CO2 mmol/L 31.0* 27.5  --  31.0*   < > 32.6* 29.0   BUN mg/dL 24* 29*  --  33*   < > 21 18   CREATININE mg/dL 1.05* 1.09*  --  1.42*   < > 1.14* 0.94   CALCIUM mg/dL 9.1 8.5*  --  8.0*   < > 9.2 9.5   BILIRUBIN mg/dL  --   --   --   --   --  0.6 0.6   ALK PHOS U/L  --   --   --   --   --  88 92   ALT (SGPT) U/L  --   --   --   --   --  11 16   AST (SGOT) U/L  --   --   --   --   --  23 22   GLUCOSE mg/dL 121* 132*  --  118*   < > 91 99    < > = values in this interval not displayed.       Estimated Creatinine Clearance: 30.4 mL/min (A) (by C-G formula based on SCr of 1.05 mg/dL (H)).    Results from last 7 days   Lab Units 03/01/25  0348   MAGNESIUM mg/dL 1.5*   PHOSPHORUS mg/dL 2.0*             Results from last 7 days   Lab Units 03/01/25  0348 02/28/25  0330 02/27/25  0403 02/26/25  0727 02/24/25  2205   WBC 10*3/mm3 5.75 5.05 7.30 7.46 6.43   HEMOGLOBIN g/dL 12.8 12.0 12.0 13.6 13.2   PLATELETS 10*3/mm3 196 202 214 226 242       Results from last 7 days   Lab Units 02/28/25  1309   INR  1.43*       Assessment / Plan     ASSESSMENT:  Nonoliguric ALLYSON on CKD stage IIIa, baseline SCr 0.9-1.0, with renal function back to baseline.  ALLYSON multifactorial:  labile BP (SBP 180s->110s), significant volume removal with IV diuretic and thoracentesis, and impaired renal autoregulation d/t ARB.  Creatinine today 1.05, very stable, electrolyte within acceptable range, appears to be euvolemic  Acute diastolic congestive heart failure, echo on 2/26 showed EF of 64.3% with moderate mitral valve and severe tricuspid regurg, followed by cardiology  Large right pleural effusion s/p thoracentesis 2/26, managed by primary team  Severe tricuspid regurgitation and moderate mitral regurgitation, conservative management for now; patient uncertain how aggressive to be going forward  Paroxysmal A-fib, not tachycardic, on AC  Hypertension with  CKD, BP initially elevated with SBP 180s, normotensive off Cozaar    PLAN:  1.  Continue the same treat  2.  Continue torsemide 20 mg daily  4.  Could be discharged from the renal standpoint.    I reviewed the chart and other providers notes, reviewed labs.  I discussed the case with the patient  Copied text in this note has been reviewed and is accurate as of 03/02/25.       Thank you for involving us in the care of Mayuri Early.  Please feel free to call with any questions.    Willie Boggs MD  03/02/25  11:09 [facility]    [facility]  [phone]    Please note that portions of this note were completed with a voice recognition program.

## 2025-03-02 NOTE — PLAN OF CARE
Goal Outcome Evaluation:  Plan of Care Reviewed With: patient        Progress: improving  Outcome Evaluation: vss. purewick in place. iv mag given. po torsemide started. poss dc pending precert

## 2025-03-02 NOTE — PROGRESS NOTES
[facility] Progress Note      Patient Name: Mayuri Early  : 1935  MRN: [id number]  Primary Care Physician:  Gunjan Hidalgo MD  Date of admission: 2025    Subjective     Interval History:   Breathing is comfortable but not quite back to baseline  Saturating well on room air  Complains of insomnia  UOP not fully quantified    Review of Systems:   As noted above    Objective     Vitals:   Temp:  [97.5 °F (36.4 °C)-98.2 °F (36.8 °C)] 97.5 °F (36.4 °C)  Heart Rate:  [65-71] 65  Resp:  [18] 18  BP: (123-142)/(54-69) 123/60    Intake/Output Summary (Last 24 hours) at 3/1/2025 1929  Last data filed at 3/1/2025 0403  Gross per 24 hour   Intake 240 ml   Output 300 ml   Net -60 ml       Physical Exam:    Constitutional: Awake, looks younger than actual age, no acute distress.  HEENT: Sclera anicteric, no conjunctival injection  Neck: Supple, no JVD  Respiratory: Crackles in bases, nonlabored respiration on RA  Cardiovascular: Paced rhythm, no rub  Gastrointestinal: BS +, abdomen is soft, nontender and nondistended  : No palpable bladder  Musculoskeletal: Trace BLE edema, no clubbing or cyanosis  Psychiatric: Appropriate affect, cooperative  Neurologic: No asterixis, moving all extremities, normal speech and mental status  Skin: Warm and dry     Scheduled Meds:     apixaban, 2.5 mg, Oral, Q12H  atorvastatin, 10 mg, Oral, Nightly  cetirizine, 10 mg, Oral, Daily  [Held by provider] furosemide, 20 mg, Intravenous, BID Diuretics  [Held by provider] losartan, 100 mg, Oral, Q24H  metoprolol succinate XL, 100 mg, Oral, Q24H  pantoprazole, 40 mg, Oral, Q AM      IV Meds:        Results Reviewed:   I have personally reviewed the results from the time of this admission to 3/1/2025 19:29 EST     Results from last 7 days   Lab Units 25  0348 25  1628 25  0330 25  0403 25  0727 25  2205   SODIUM mmol/L 138  --  141 139 141 141   POTASSIUM mmol/L 4.2 4.3  3.4* 3.5 3.6 3.9   CHLORIDE mmol/L 100  --  99 96* 96* 103   CO2 mmol/L 27.5  --  31.0* 32.0* 32.6* 29.0   BUN mg/dL 29*  --  33* 30* 21 18   CREATININE mg/dL 1.09*  --  1.42* 1.43* 1.14* 0.94   CALCIUM mg/dL 8.5*  --  8.0* 8.6 9.2 9.5   BILIRUBIN mg/dL  --   --   --   --  0.6 0.6   ALK PHOS U/L  --   --   --   --  88 92   ALT (SGPT) U/L  --   --   --   --  11 16   AST (SGOT) U/L  --   --   --   --  23 22   GLUCOSE mg/dL 132*  --  118* 141* 91 99       Estimated Creatinine Clearance: 29.3 mL/min (A) (by C-G formula based on SCr of 1.09 mg/dL (H)).    Results from last 7 days   Lab Units 03/01/25  0348   MAGNESIUM mg/dL 1.5*   PHOSPHORUS mg/dL 2.0*             Results from last 7 days   Lab Units 03/01/25  0348 02/28/25  0330 02/27/25  0403 02/26/25  0727 02/24/25  2205   WBC 10*3/mm3 5.75 5.05 7.30 7.46 6.43   HEMOGLOBIN g/dL 12.8 12.0 12.0 13.6 13.2   PLATELETS 10*3/mm3 196 202 214 226 242       Results from last 7 days   Lab Units 02/28/25  1309   INR  1.43*       Assessment / Plan     ASSESSMENT:  Nonoliguric ALLYSON on CKD stage IIIa, baseline SCr 0.9-1.0, with renal function back to baseline.  ALLYSON multifactorial:  labile BP (SBP 180s->110s), significant volume removal with IV diuretic and thoracentesis, and impaired renal autoregulation d/t ARB.  Volume excess better; electrolytes fine other than low magnesium.    Acute diastolic congestive heart failure, echo on 2/26 showed EF of 64.3% with moderate mitral valve and severe tricuspid regurg, followed by cardiology  Large right pleural effusion s/p thoracentesis 2/26, managed by primary team  Severe tricuspid regurgitation and moderate mitral regurgitation, conservative management for now; patient uncertain how aggressive to be going forward  Paroxysmal A-fib, not tachycardic, on AC  Hypertension with CKD, BP initially elevated with SBP 180s, normotensive off Cozaar    PLAN:  1.  Replace magnesium  2.  Would leave losartan off as normotensive without it  3.  Begin  torsemide 20 mg daily  4.  Discharge anytime from renal view    Thank you for involving us in the care of Mayuri Early.  Please feel free to call with any questions.    Willy Graf MD  03/01/25  19:29 [facility]    [facility]  [phone]    Please note that portions of this note were completed with a voice recognition program.

## 2025-03-03 LAB
ALBUMIN SERPL-MCNC: 3.1 G/DL (ref 3.5–5.2)
ANION GAP SERPL CALCULATED.3IONS-SCNC: 10 MMOL/L (ref 5–15)
BACTERIA FLD CULT: NORMAL
BACTERIA SPEC ANAEROBE CULT: NORMAL
BUN SERPL-MCNC: 29 MG/DL (ref 8–23)
BUN/CREAT SERPL: 19.5 (ref 7–25)
CALCIUM SPEC-SCNC: 8.3 MG/DL (ref 8.6–10.5)
CHLORIDE SERPL-SCNC: 100 MMOL/L (ref 98–107)
CO2 SERPL-SCNC: 31 MMOL/L (ref 22–29)
CREAT SERPL-MCNC: 1.49 MG/DL (ref 0.57–1)
EGFRCR SERPLBLD CKD-EPI 2021: 33.4 ML/MIN/1.73
GLUCOSE SERPL-MCNC: 90 MG/DL (ref 65–99)
GRAM STN SPEC: NORMAL
GRAM STN SPEC: NORMAL
MAGNESIUM SERPL-MCNC: 1.4 MG/DL (ref 1.6–2.4)
PHOSPHATE SERPL-MCNC: 3.4 MG/DL (ref 2.5–4.5)
POTASSIUM SERPL-SCNC: 4 MMOL/L (ref 3.5–5.2)
SODIUM SERPL-SCNC: 141 MMOL/L (ref 136–145)
URATE SERPL-MCNC: 7.1 MG/DL (ref 2.4–5.7)

## 2025-03-03 PROCEDURE — 97530 THERAPEUTIC ACTIVITIES: CPT

## 2025-03-03 PROCEDURE — 99232 SBSQ HOSP IP/OBS MODERATE 35: CPT

## 2025-03-03 PROCEDURE — 83735 ASSAY OF MAGNESIUM: CPT | Performed by: INTERNAL MEDICINE

## 2025-03-03 PROCEDURE — 80069 RENAL FUNCTION PANEL: CPT | Performed by: INTERNAL MEDICINE

## 2025-03-03 PROCEDURE — 84550 ASSAY OF BLOOD/URIC ACID: CPT | Performed by: INTERNAL MEDICINE

## 2025-03-03 RX ORDER — FAMOTIDINE 20 MG/1
20 TABLET, FILM COATED ORAL 2 TIMES DAILY
Status: DISCONTINUED | OUTPATIENT
Start: 2025-03-03 | End: 2025-03-04 | Stop reason: HOSPADM

## 2025-03-03 RX ADMIN — PANTOPRAZOLE SODIUM 40 MG: 40 TABLET, DELAYED RELEASE ORAL at 08:38

## 2025-03-03 RX ADMIN — APIXABAN 2.5 MG: 5 TABLET, FILM COATED ORAL at 08:34

## 2025-03-03 RX ADMIN — ACETAMINOPHEN 650 MG: 325 TABLET, FILM COATED ORAL at 23:14

## 2025-03-03 RX ADMIN — METOPROLOL SUCCINATE 100 MG: 25 TABLET, EXTENDED RELEASE ORAL at 08:34

## 2025-03-03 RX ADMIN — ATORVASTATIN CALCIUM 10 MG: 10 TABLET ORAL at 21:15

## 2025-03-03 RX ADMIN — CETIRIZINE HYDROCHLORIDE 10 MG: 10 TABLET ORAL at 08:34

## 2025-03-03 RX ADMIN — APIXABAN 2.5 MG: 5 TABLET, FILM COATED ORAL at 21:16

## 2025-03-03 RX ADMIN — TRAZODONE HYDROCHLORIDE 50 MG: 50 TABLET ORAL at 23:13

## 2025-03-03 NOTE — CASE MANAGEMENT/SOCIAL WORK
Continued Stay Note  [facility]     Patient Name: Mayuri Early  MRN: [id number]  Today's Date: 3/3/2025    Admit Date: 2/24/2025    Plan: [facility], precert approved and valid thru 3/7/25   Discharge Plan       Row Name 03/03/25 1217       Plan    Plan [facility], precert approved and valid thru 3/7/25    Patient/Family in Agreement with Plan yes    Plan Comments Per Chente PADILLA/CCP note pre-cert approved and valid to admit until 3/7/25. MD and RN updated; no anticipated DC today. CCP updated pt at the bedside who verbalizes understanding and is agreeable. Elvia RN/CCP                   Discharge Codes    No documentation.                 Expected Discharge Date and Time       Expected Discharge Date Expected Discharge Time    Mar 4, 2025               Ericka Decker, RN

## 2025-03-03 NOTE — THERAPY TREATMENT NOTE
Patient Name: Mayuri Early  : 1935    MRN: [id number]                              Today's Date: 3/3/2025       Admit Date: 2025    Visit Dx:     ICD-10-CM ICD-9-CM   1. Pleural effusion  J90 511.9   2. Dyspnea, unspecified type  R06.00 786.09     Patient Active Problem List   Diagnosis    Well woman exam    Closed compression fracture of L1 vertebra, initial encounter    Compression fracture of T12 vertebra    PAF (paroxysmal atrial fibrillation)    Presence of cardiac pacemaker    HTN (hypertension)    CKD (chronic kidney disease)    Chronic anticoagulation    Chronic back pain    Anemia    Asthma    Hematoma of hip    Acute blood loss anemia    Postural dizziness    Pleural effusion     Past Medical History:   Diagnosis Date    Fibrocystic breast     Well woman exam 10/20/2020     Past Surgical History:   Procedure Laterality Date    BREAST CYST ASPIRATION      BREAST CYST EXCISION      HYSTERECTOMY        General Information       Row Name 25 1531          Physical Therapy Time and Intention    Document Type therapy note (daily note)  -     Mode of Treatment physical therapy  -       Row Name 25 1531          General Information    Existing Precautions/Restrictions fall  -PH       Row Name 25 1531          Cognition    Orientation Status (Cognition) oriented x 4  -PH               User Key  (r) = Recorded By, (t) = Taken By, (c) = Cosigned By      Initials Name Provider Type    PH Taty Lord PTA Physical Therapist Assistant                   Mobility       Row Name 25 1533          Bed Mobility    Bed Mobility supine-sit  -PH     Supine-Sit [place] (Bed Mobility) independent  -PH     Sit-Supine [place] (Bed Mobility) not tested  -PH     Assistive Device (Bed Mobility) head of bed elevated  -PH     Comment, (Bed Mobility) gt belt aplied by PTA at EOB  -PH       Row Name 25 1533          Sit-Stand Transfer    Sit-Stand [place] (Transfers)  contact guard;minimum assist (75% patient effort);1 person assist;verbal cues  -PH     Assistive Device (Sit-Stand Transfers) walker, front-wheeled  -PH     Comment, (Sit-Stand Transfer) Min A x1, CGA w/ gt belt  -PH       Row Name 03/03/25 1533          Gait/Stairs (Locomotion)    [place] Level (Gait) contact guard;minimum assist (75% patient effort);1 person assist  -PH     Assistive Device (Gait) walker, front-wheeled  -PH     Patient was able to Ambulate yes  -PH     Distance in Feet (Gait) 40  -PH     Maintains Weight-bearing Status (Gait) able to maintain  -PH               User Key  (r) = Recorded By, (t) = Taken By, (c) = Cosigned By      Initials Name Provider Type    Taty Lezama PTA Physical Therapist Assistant                   Obj/Interventions       Row Name 03/03/25 1540          Motor Skills    Therapeutic Exercise hip;ankle;knee  -PH       Row Name 03/03/25 1540          Hip (Therapeutic Exercise)    Hip (Therapeutic Exercise) AROM (active range of motion)  -PH     Hip AROM (Therapeutic Exercise) bilateral;sitting;flexion;10 repetitions  -PH       Row Name 03/03/25 1540          Knee (Therapeutic Exercise)    Knee (Therapeutic Exercise) AROM (active range of motion)  -PH     Knee AROM (Therapeutic Exercise) bilateral;LAQ (long arc quad);standing;10 repetitions;2 sets  -       Row Name 03/03/25 1540          Ankle (Therapeutic Exercise)    Ankle (Therapeutic Exercise) AROM (active range of motion)  -PH     Ankle AROM (Therapeutic Exercise) bilateral;dorsiflexion;plantarflexion;10 repetitions;2 sets;sitting  -       Row Name 03/03/25 1540          Balance    Balance Assessment sitting dynamic balance;sitting static balance;standing static balance  -PH     Static Sitting Balance independent  -PH     Dynamic Sitting Balance independent;supervision;verbal cues;non-verbal cues (demo/gesture)  -PH     Position, Sitting Balance sitting edge of bed  -PH     Static Standing Balance  contact guard;1-person assist  -PH     Position/Device Used, Standing Balance walker, front-wheeled  -PH     Balance Interventions sitting;standing;sit to stand;static  -PH     Comment, Balance pt. was able to complete B LE ther ex while sitting. Pt was abled to sit while PTA donned nonslip socks and gait belt. Pt. was able to  fww w/ CGA and gt. belt.  -PH               User Key  (r) = Recorded By, (t) = Taken By, (c) = Cosigned By      Initials Name Provider Type     Taty Lord PTA Physical Therapist Assistant                   Goals/Plan    No documentation.                  Clinical Impression       Row Name 03/03/25 1548          Pain    Pretreatment Pain Rating 0/10 - no pain  -PH     Posttreatment Pain Rating 0/10 - no pain  -PH       Row Name 03/03/25 1548          Plan of Care Review    Plan of Care Reviewed With patient  -PH     Progress improving  -PH     Outcome Evaluation Pt. was able to complete tx today and ambulate 40ft. w/out rest break. Min assist x1 w/ STS. CGA when ambulating. Pt. was able to sit in chair after ambulation.  -PH       Row Name 03/03/25 1548          Positioning and Restraints    Pre-Treatment Position in bed  -PH     Post Treatment Position chair  -PH     In Chair reclined;notified nsg;call light within reach;encouraged to call for assist  notified staff who were looking for power cord for alarm box  -PH               User Key  (r) = Recorded By, (t) = Taken By, (c) = Cosigned By      Initials Name Provider Type     Taty Lord PTA Physical Therapist Assistant                   Outcome Measures       Row Name 03/03/25 6139          How much help from another person do you currently need...    Turning from your back to your side while in flat bed without using bedrails? 3  -PH     Moving from lying on back to sitting on the side of a flat bed without bedrails? 3  -PH     Moving to and from a bed to a chair (including a wheelchair)? 3  -PH      Standing up from a chair using your arms (e.g., wheelchair, bedside chair)? 3  -PH     Climbing 3-5 steps with a railing? 2  -PH     To walk in hospital room? 3  -PH     AM-PAC 6 Clicks Score (PT) 17  -PH     Highest Level of Mobility Goal 5 --> Static standing  -PH       Row Name 03/03/25 1555          Functional Assessment    Outcome Measure Options AM-PAC 6 Clicks Basic Mobility (PT)  -PH               User Key  (r) = Recorded By, (t) = Taken By, (c) = Cosigned By      Initials Name Provider Type    PH Taty Lord PTA Physical Therapist Assistant                                 Physical Therapy Education       Title: PT OT SLP Therapies (Done)       Topic: Physical Therapy (Done)       Point: Mobility training (Done)       Learning Progress Summary            Patient Acceptance, D,E,TB, VU by PH at 3/3/2025 1556    Acceptance, E, VU by LB at 3/2/2025 1651    Acceptance, E, VU by LB at 3/1/2025 1932                      Point: Home exercise program (Done)       Learning Progress Summary            Patient Acceptance, D,E,TB, VU by PH at 3/3/2025 1556    Acceptance, E, VU by LB at 3/2/2025 1651    Acceptance, E, VU by LB at 3/1/2025 1932                      Point: Body mechanics (Done)       Learning Progress Summary            Patient Acceptance, D,E,TB, VU by PH at 3/3/2025 1556    Acceptance, E, VU by LB at 3/2/2025 1651    Acceptance, E, VU by LB at 3/1/2025 1932                      Point: Precautions (Done)       Learning Progress Summary            Patient Acceptance, D,E,TB, VU by PH at 3/3/2025 1556    Acceptance, E, VU by LB at 3/2/2025 1651    Acceptance, E, VU by LB at 3/1/2025 1932                                      User Key       Initials Effective Dates Name Provider Type Discipline    PH 06/16/21 -  Taty Lord PTA Physical Therapist Assistant PT    LB 06/16/21 -  Lissa Hill RN Registered Nurse Nurse                  PT Recommendation and Plan     Progress:  improving  Outcome Evaluation: Pt. was able to complete tx today and ambulate 40ft. w/out rest break. Min assist x1 w/ STS. CGA when ambulating. Pt. was able to sit in chair after ambulation.     Time Calculation:         PT Charges       Row Name 03/03/25 1557             Time Calculation    Start Time 1513  -PH      Stop Time 1527  -PH      Time Calculation (min) 14 min  -PH         Timed Charges    05681 - PT Therapeutic Exercise Minutes 6  -PH      13708 - PT Therapeutic Activity Minutes 8  -PH         Total Minutes    Timed Charges Total Minutes 14  -PH       Total Minutes 14  -PH                User Key  (r) = Recorded By, (t) = Taken By, (c) = Cosigned By      Initials Name Provider Type    PH Taty Lord PTA Physical Therapist Assistant                  Therapy Charges for Today       Code Description Service Date Service Provider Modifiers Qty    99150560464 HC PT THERAPEUTIC ACT EA 15 MIN 3/3/2025 Taty Lord PTA GP 1            PT G-Codes  Outcome Measure Options: AM-PAC 6 Clicks Basic Mobility (PT)  AM-PAC 6 Clicks Score (PT): 17       Taty Lord PTA  3/3/2025

## 2025-03-03 NOTE — PLAN OF CARE
Problem: Adult Inpatient Plan of Care  Goal: Plan of Care Review  Outcome: Progressing  Flowsheets (Taken 3/3/2025 1730)  Progress: improving  Outcome Evaluation: Pt AxOx4, medications given as scheduled, room air, VSS, continue plan of care  Goal: Patient-Specific Goal (Individualized)  Outcome: Progressing  Goal: Absence of Hospital-Acquired Illness or Injury  Outcome: Progressing  Intervention: Identify and Manage Fall Risk  Recent Flowsheet Documentation  Taken 3/3/2025 1634 by Lissa Hill RN  Safety Promotion/Fall Prevention:   safety round/check completed   activity supervised   assistive device/personal items within reach   clutter free environment maintained   fall prevention program maintained   nonskid shoes/slippers when out of bed  Taken 3/3/2025 1430 by Lissa Hill RN  Safety Promotion/Fall Prevention:   safety round/check completed   activity supervised   assistive device/personal items within reach   clutter free environment maintained   fall prevention program maintained   nonskid shoes/slippers when out of bed  Taken 3/3/2025 1240 by Lissa Hill RN  Safety Promotion/Fall Prevention:   safety round/check completed   activity supervised   assistive device/personal items within reach   clutter free environment maintained   fall prevention program maintained   nonskid shoes/slippers when out of bed  Taken 3/3/2025 1015 by Lissa Hill RN  Safety Promotion/Fall Prevention:   safety round/check completed   activity supervised   assistive device/personal items within reach   clutter free environment maintained   nonskid shoes/slippers when out of bed   fall prevention program maintained  Taken 3/3/2025 0825 by Lissa Hill RN  Safety Promotion/Fall Prevention:   safety round/check completed   activity supervised   assistive device/personal items within reach   clutter free environment maintained   fall prevention program maintained   nonskid shoes/slippers when out of bed  Intervention:  Prevent Infection  Recent Flowsheet Documentation  Taken 3/3/2025 1430 by Lissa Hill RN  Infection Prevention: single patient room provided  Taken 3/3/2025 0825 by Lissa Hill RN  Infection Prevention: single patient room provided  Goal: Optimal Comfort and Wellbeing  Outcome: Progressing  Goal: Readiness for Transition of Care  Outcome: Progressing     Problem: Comorbidity Management  Goal: Blood Pressure in Desired Range  Outcome: Progressing  Intervention: Maintain Blood Pressure Management  Recent Flowsheet Documentation  Taken 3/3/2025 1634 by Lissa Hill RN  Medication Review/Management: medications reviewed  Taken 3/3/2025 1430 by Lissa Hill RN  Medication Review/Management: medications reviewed  Taken 3/3/2025 1015 by Lissa Hill RN  Medication Review/Management: medications reviewed  Taken 3/3/2025 0825 by Lissa Hill RN  Medication Review/Management: medications reviewed     Problem: Fall Injury Risk  Goal: Absence of Fall and Fall-Related Injury  Outcome: Progressing  Intervention: Identify and Manage Contributors  Recent Flowsheet Documentation  Taken 3/3/2025 1634 by Lissa Hill RN  Medication Review/Management: medications reviewed  Taken 3/3/2025 1430 by Lissa Hill RN  Medication Review/Management: medications reviewed  Taken 3/3/2025 1015 by Lissa Hill RN  Medication Review/Management: medications reviewed  Taken 3/3/2025 0825 by Lissa Hill RN  Medication Review/Management: medications reviewed  Intervention: Promote Injury-Free Environment  Recent Flowsheet Documentation  Taken 3/3/2025 1634 by Lissa Hill RN  Safety Promotion/Fall Prevention:   safety round/check completed   activity supervised   assistive device/personal items within reach   clutter free environment maintained   fall prevention program maintained   nonskid shoes/slippers when out of bed  Taken 3/3/2025 1430 by Lissa Hill RN  Safety Promotion/Fall Prevention:   safety  round/check completed   activity supervised   assistive device/personal items within reach   clutter free environment maintained   fall prevention program maintained   nonskid shoes/slippers when out of bed  Taken 3/3/2025 1240 by Lissa Hill RN  Safety Promotion/Fall Prevention:   safety round/check completed   activity supervised   assistive device/personal items within reach   clutter free environment maintained   fall prevention program maintained   nonskid shoes/slippers when out of bed  Taken 3/3/2025 1015 by Lissa Hill RN  Safety Promotion/Fall Prevention:   safety round/check completed   activity supervised   assistive device/personal items within reach   clutter free environment maintained   nonskid shoes/slippers when out of bed   fall prevention program maintained  Taken 3/3/2025 0825 by Lissa Hill RN  Safety Promotion/Fall Prevention:   safety round/check completed   activity supervised   assistive device/personal items within reach   clutter free environment maintained   fall prevention program maintained   nonskid shoes/slippers when out of bed   Goal Outcome Evaluation:  Plan of Care Reviewed With: patient        Progress: improving  Outcome Evaluation: Pt AxOx4, medications given as scheduled, room air, VSS, continue plan of care

## 2025-03-03 NOTE — PLAN OF CARE
Goal Outcome Evaluation:  Plan of Care Reviewed With: patient        Progress: improving  Outcome Evaluation: vss. up with assist. po torsemide. awaiting precert

## 2025-03-03 NOTE — PROGRESS NOTES
"Daily progress note    Primary care physician  Dr. Hidalgo    Subjective  Doing same with no new complaints    History of present illness  89-year-old white female with history of paroxysmal atrial fibrillation hypertension hyperlipidemia asthma and gastroesophageal reflux disease who is well-known to our service to the emergency room with shortness of breath for last several days.  Patient denies any chest pain palpitation abdominal pain nausea vomiting diarrhea.  Patient denies any fever cough congestion.  Patient evaluated in ER found to have pleural effusion admitted for management.  Patient fully alert oriented give me an good history and still wants to be a full code and lives by herself.     REVIEW OF SYSTEMS  Unremarkable except weakness     PHYSICAL EXAM   Blood pressure 112/51, pulse 70, temperature 97.7 °F (36.5 °C), temperature source Oral, resp. rate 18, height 154.9 cm (61\"), weight 60.8 kg (134 lb), SpO2 98%.    GENERAL: Awake and alert, oriented, elderly, not distressed  HENT: head atraumatic, no nuchal rigidity  EYES: no scleral icterus, EOMI  CV: regular rhythm, regular rate, no murmur  RESPIRATORY: normal effort, decreased breath sounds in the right base  ABDOMEN: soft, nontender  MUSCULOSKELETAL: no deformity, FROM, 2+ pedal edema  NEURO: alert, moves all extremities, follows commands  SKIN: warm, dry     LAB RESULTS  Lab Results (last 24 hours)       Procedure Component Value Units Date/Time    AFB Culture - Body Fluid, Pleural Cavity [[id number]] Collected: 02/26/25 1108    Specimen: Body Fluid from Pleural Cavity Updated: 03/03/25 1145     AFB Culture No AFB isolated at less than 1 week     AFB Stain No acid fast bacilli seen on direct smear    Fungus Culture - Body Fluid, Pleural Cavity [[id number]] Collected: 02/26/25 1108    Specimen: Body Fluid from Pleural Cavity Updated: 03/03/25 1145     Fungus Culture No fungus isolated at less than 1 week    Anaerobic Culture - Pleural Fluid, Pleural " Cavity [[id number]]  (Normal) Collected: 02/26/25 1108    Specimen: Pleural Fluid from Pleural Cavity Updated: 03/03/25 0901     Anaerobic Culture No anaerobes isolated at 5 days    Body Fluid Culture - Body Fluid, Pleural Cavity [[id number]] Collected: 02/26/25 1108    Specimen: Body Fluid from Pleural Cavity Updated: 03/03/25 0753     Body Fluid Culture No growth at 5 days     Gram Stain No WBCs seen      No organisms seen    Renal Function Panel [[id number]]  (Abnormal) Collected: 03/03/25 0254    Specimen: Blood Updated: 03/03/25 0415     Glucose 90 mg/dL      BUN 29 mg/dL      Creatinine 1.49 mg/dL      Sodium 141 mmol/L      Potassium 4.0 mmol/L      Chloride 100 mmol/L      CO2 31.0 mmol/L      Calcium 8.3 mg/dL      Albumin 3.1 g/dL      Phosphorus 3.4 mg/dL      Anion Gap 10.0 mmol/L      BUN/Creatinine Ratio 19.5     eGFR 33.4 mL/min/1.73     Narrative:      GFR Categories in Chronic Kidney Disease (CKD)      GFR Category          GFR (mL/min/1.73)    Interpretation  G1                     90 or greater         Normal or high (1)  G2                      60-89                Mild decrease (1)  G3a                   45-59                Mild to moderate decrease  G3b                   30-44                Moderate to severe decrease  G4                    15-29                Severe decrease  G5                    14 or less           Kidney failure          (1)In the absence of evidence of kidney disease, neither GFR category G1 or G2 fulfill the criteria for CKD.    eGFR calculation 2021 CKD-EPI creatinine equation, which does not include race as a factor    Uric Acid [[id number]]  (Abnormal) Collected: 03/03/25 0254    Specimen: Blood Updated: 03/03/25 0410     Uric Acid 7.1 mg/dL     Magnesium [[id number]]  (Abnormal) Collected: 03/03/25 0254    Specimen: Blood Updated: 03/03/25 0410     Magnesium 1.4 mg/dL           Imaging Results (Last 24 Hours)       ** No results found for the last 24 hours. **           Scan on 2/24/2025 2221 by [facility], [place]: ECG 12-LEAD         Author: -- Service: -- Author Type: --   Filed: Date of Service: Creation Time:   Status: (Other)   HEART RATE=62  bpm  RR Ysxsxamp=652  ms  WY Interval=  ms  P Horizontal Axis=  deg  P Front Axis=  deg  QRSD Takfdzfg=255  ms  QT Fdkppwug=052  ms  XQoB=849  ms  QRS Axis=75  deg  T Wave Axis=-33  deg  - ABNORMAL ECG -  Afib/flut and V-paced complexes  No further rhythm analysis attempted due to paced rhythm  IVCD, consider RBBB           Interpretation Summary    Left ventricular systolic function is normal. Calculated left ventricular EF = 64.3%    Left ventricular diastolic function was indeterminate in the setting of atrial fibrillation.    There is mild, bileaflet mitral valve thickening present.    Moderate mitral valve regurgitation is present.    Severe tricuspid valve regurgitation is present.    Estimated right ventricular systolic pressure from tricuspid regurgitation is normal (<35 mmHg) though this may be underestimated due to severe TR.    Moderate biatrial enlargement on manual quantitation, nor mall IVC size.    Pacemaker lead noted.    Current Facility-Administered Medications:     acetaminophen (TYLENOL) tablet 650 mg, 650 mg, Oral, Q6H PRN, Kike Awad MD, 650 mg at 03/02/25 2111    albuterol sulfate HFA (PROVENTIL HFA;VENTOLIN HFA;PROAIR HFA) inhaler 2 puff, 2 puff, Inhalation, Q4H PRN, Kike Awad MD    apixaban (ELIQUIS) tablet 2.5 mg, 2.5 mg, Oral, Q12H, Kike Awad MD, 2.5 mg at 03/03/25 0834    atorvastatin (LIPITOR) tablet 10 mg, 10 mg, Oral, Nightly, Kike Awad MD, 10 mg at 03/02/25 2111    cetirizine (zyrTEC) tablet 10 mg, 10 mg, Oral, Daily, Kike Awad MD, 10 mg at 03/03/25 0834    HYDROcodone-acetaminophen (NORCO) 5-325 MG per tablet 1 tablet, 1 tablet, Oral, Q4H PRN, Kike Awad MD    ipratropium-albuterol (DUO-NEB) nebulizer solution 3 mL, 3 mL, Nebulization, Q4H PRN, Kike Awad MD    metoprolol  succinate XL (TOPROL-XL) 24 hr tablet 100 mg, 100 mg, Oral, Q24H, He Awad MD, 100 mg at 03/03/25 0834    pantoprazole (PROTONIX) EC tablet 40 mg, 40 mg, Oral, Q AM, He Awad MD, 40 mg at 03/03/25 0838    [COMPLETED] Insert Peripheral IV, , , Once **AND** sodium chloride 0.9 % flush 10 mL, 10 mL, Intravenous, PRN, He Awad MD    traZODone (DESYREL) tablet 50 mg, 50 mg, Oral, Nightly PRN, He Awad MD, 50 mg at 03/02/25 2111      ASSESSMENT  Acute diastolic congestive heart failure  Severe tricuspid regurgitation and moderate mitral regurgitation  Large right pleural effusion status post thoracentesis  Acute kidney injury secondary to overdiuresis  Paroxysmal atrial fibrillation  Hypertension  Hyperlipidemia  Gastroesophageal reflux disease  Status post permanent pacemaker placement    PLAN  CPM  Hold diuretics per nephrology  Continue to hold ARB  Nephrology consult appreciated  Pulmonary and cardiology to follow patient  Adjust home medications  Stress ulcer DVT prophylaxis  Supportive care  PT OT  Discussed with nursing staff  Hold discharge today    HE AWAD MD    Copied text in this note has been reviewed and is accurate as of 03/03/25

## 2025-03-03 NOTE — PROGRESS NOTES
[facility] Progress Note      Patient Name: Mayuri Early  : 1935  MRN: [id number]  Primary Care Physician:  Gunjan Hidalgo MD  Date of admission: 2025    Subjective     Interval History:   Follow-up acute kidney injury on chronic kidney disease    Patient is feeling better, denies any chest pain or shortness of air, no orthopnea or PND, no nausea or vomiting, she is much less lightheaded when getting up.    Review of Systems:   As noted above    Objective     Vitals:   Temp:  [97.5 °F (36.4 °C)-98.8 °F (37.1 °C)] 98.8 °F (37.1 °C)  Heart Rate:  [65-77] 77  Resp:  [18-20] 18  BP: (101-139)/(47-59) 130/58    Intake/Output Summary (Last 24 hours) at 3/3/2025 0945  Last data filed at 3/2/2025 2105  Gross per 24 hour   Intake 240 ml   Output 1400 ml   Net -1160 ml       Physical Exam:    Constitutional: Awake, looks younger than actual age, no acute distress.  HEENT: Sclera anicteric, no conjunctival injection  Neck: Supple, no JVD  Respiratory: Crackles in bases, nonlabored respiration on RA  Cardiovascular: Paced rhythm, no rub  Gastrointestinal: BS +, abdomen is soft, nontender and nondistended  : No palpable bladder  Musculoskeletal: No edema, no clubbing or cyanosis  Psychiatric: Appropriate affect, cooperative  Neurologic: No asterixis, moving all extremities, normal speech and mental status  Skin: Warm and dry     Scheduled Meds:     apixaban, 2.5 mg, Oral, Q12H  atorvastatin, 10 mg, Oral, Nightly  cetirizine, 10 mg, Oral, Daily  metoprolol succinate XL, 100 mg, Oral, Q24H  pantoprazole, 40 mg, Oral, Q AM      IV Meds:        Results Reviewed:   I have personally reviewed the results from the time of this admission to 3/3/2025 09:45 EST     Results from last 7 days   Lab Units 25  0254 25  0354 25  0348 25  0403 25  0727 25  2205   SODIUM mmol/L 141 142 138   < > 141 141   POTASSIUM mmol/L 4.0 4.1 4.2   < > 3.6 3.9   CHLORIDE  mmol/L 100 102 100   < > 96* 103   CO2 mmol/L 31.0* 31.0* 27.5   < > 32.6* 29.0   BUN mg/dL 29* 24* 29*   < > 21 18   CREATININE mg/dL 1.49* 1.05* 1.09*   < > 1.14* 0.94   CALCIUM mg/dL 8.3* 9.1 8.5*   < > 9.2 9.5   BILIRUBIN mg/dL  --   --   --   --  0.6 0.6   ALK PHOS U/L  --   --   --   --  88 92   ALT (SGPT) U/L  --   --   --   --  11 16   AST (SGOT) U/L  --   --   --   --  23 22   GLUCOSE mg/dL 90 121* 132*   < > 91 99    < > = values in this interval not displayed.       Estimated Creatinine Clearance: 21.4 mL/min (A) (by C-G formula based on SCr of 1.49 mg/dL (H)).    Results from last 7 days   Lab Units 03/03/25  0254 03/01/25  0348   MAGNESIUM mg/dL 1.4* 1.5*   PHOSPHORUS mg/dL 3.4 2.0*       Results from last 7 days   Lab Units 03/03/25  0254   URIC ACID mg/dL 7.1*       Results from last 7 days   Lab Units 03/01/25  0348 02/28/25  0330 02/27/25  0403 02/26/25  0727 02/24/25  2205   WBC 10*3/mm3 5.75 5.05 7.30 7.46 6.43   HEMOGLOBIN g/dL 12.8 12.0 12.0 13.6 13.2   PLATELETS 10*3/mm3 196 202 214 226 242       Results from last 7 days   Lab Units 02/28/25  1309   INR  1.43*       Assessment / Plan     ASSESSMENT:  Nonoliguric ALLYSON on CKD stage IIIa, baseline SCr 0.9-1.0, with renal function back to baseline.  ALLYSON multifactorial:  labile BP (SBP 180s->110s), significant volume removal with IV diuretic and thoracentesis, and impaired renal autoregulation d/t ARB.  Creatinine today up to 1.49, with increased the total CO2 to 31 uric acid 7.1 this is probably related to diuretic  Acute diastolic congestive heart failure, echo on 2/26 showed EF of 64.3% with moderate mitral valve and severe tricuspid regurg, followed by cardiology  Large right pleural effusion s/p thoracentesis 2/26, managed by primary team  Severe tricuspid regurgitation and moderate mitral regurgitation, conservative management for now; patient uncertain how aggressive to be going forward  Paroxysmal A-fib, not tachycardic, on AC  Hypertension  with CKD, BP initially elevated with SBP 180s, normotensive off Cozaar    PLAN:  1.  Continue the same treat  2.  Discontinue diuretics today and reevaluate in the next 24 hours  3.  Not ready for discharge from the renal standpoint.  4.  Surveillance labs      I reviewed the chart and other providers notes, reviewed labs.  I discussed the case with the patient  Copied text in this note has been reviewed and is accurate as of 03/03/25.       Thank you for involving us in the care of Mayuri Early.  Please feel free to call with any questions.    Willie Boggs MD  03/03/25  09:45 [facility]    [facility]  [phone]    Please note that portions of this note were completed with a voice recognition program.

## 2025-03-03 NOTE — CONSULTS
Chap visited pt and answered questions about advance directive.  Pt wished to wait to fill it out until sons were with her.  Chap offered supportive presence, assistance with advance directive and prayer for healing and strength.  Chap remains available.

## 2025-03-03 NOTE — PLAN OF CARE
Goal Outcome Evaluation:  Plan of Care Reviewed With: patient        Progress: improving  Outcome Evaluation: Pt. was able to complete tx today and ambulate 40ft. w/out rest break. Min assist x1 w/ STS. CGA when ambulating. Pt. was able to sit in chair after ambulation.

## 2025-03-03 NOTE — PROGRESS NOTES
[facility]  Cardiology Progress Note    Patient Identification:  Name: Mayuri Early  Age: 89 y.o.  Sex: female  :  1935  MRN: [id number]                 Follow Up / Chief Complaint: follow up TVR    Interval History: resting in bed, no chest pain or shortness of breath.     Objective:    Past Medical History:  Past Medical History:   Diagnosis Date    Fibrocystic breast     Well woman exam 10/20/2020     Past Surgical History:  Past Surgical History:   Procedure Laterality Date    BREAST CYST ASPIRATION      BREAST CYST EXCISION      HYSTERECTOMY          Social History:   Social History     Tobacco Use    Smoking status: Former     Passive exposure: Past    Smokeless tobacco: Never   Substance Use Topics    Alcohol use: Never      Family History:  History reviewed. No pertinent family history.       Allergies:  No Known Allergies  Scheduled Meds:  apixaban, 2.5 mg, Q12H  atorvastatin, 10 mg, Nightly  cetirizine, 10 mg, Daily  metoprolol succinate XL, 100 mg, Q24H  pantoprazole, 40 mg, Q AM            INTAKE AND OUTPUT:    Intake/Output Summary (Last 24 hours) at 3/3/2025 1058  Last data filed at 3/2/2025 2105  Gross per 24 hour   Intake 240 ml   Output 1400 ml   Net -1160 ml       Review of Systems:   GI: no n/v or abd pain  Cardiac: no chest pain or palpitations  Pulmonary: no shortness of breath or cough        Constitutional:  Temp:  [97.5 °F (36.4 °C)-98.8 °F (37.1 °C)] 98.8 °F (37.1 °C)  Heart Rate:  [65-77] 77  Resp:  [18-20] 18  BP: (101-139)/(47-59) 130/58    Physical Exam:  General:  Alert, cooperative, appears in no acute distress  Respiratory: Clear to auscultation.  Normal respiratory effort and rate.  Cardiovascular: S1S2 Regular rate and rhythm. No murmur, rub or gallop.   Gastrointestinal: soft, non tender. Bowel sounds present.   Extremities: DA SILVA x4. No pretibial pitting edema. Adequate musculoskeletal strength.   Neuro: AAO x3 CN II-XII grossly  "intact                Cardiographics  Lab Review   Results from last 7 days   Lab Units 02/28/25  0502 02/28/25  0330 02/26/25  0957   HSTROP T ng/L 13 13 11     Results from last 7 days   Lab Units 03/03/25  0254   MAGNESIUM mg/dL 1.4*     Results from last 7 days   Lab Units 03/03/25  0254   SODIUM mmol/L 141   POTASSIUM mmol/L 4.0   BUN mg/dL 29*   CREATININE mg/dL 1.49*   CALCIUM mg/dL 8.3*     @LABRCNTIPbnp@  Results from last 7 days   Lab Units 03/01/25  0348 02/28/25  0330 02/27/25  0403   WBC 10*3/mm3 5.75 5.05 7.30   HEMOGLOBIN g/dL 12.8 12.0 12.0   HEMATOCRIT % 38.8 37.3 38.4   PLATELETS 10*3/mm3 196 202 214     Results from last 7 days   Lab Units 02/28/25  1309   INR  1.43*         Assessment:  Severe TR  Pleural effusion-s/p right thora 675ml removed  Bilateral leg swelling  Paroxysmal atrial fibrillation      Plan:  BP stable  SR, continue metoprolol. AC on eliquis  Diuresing per nephrology, held today, volume stable on exam  TVR to be treated conservatively at this point, will defer consideration of repair to primary cardiologist.       )3/3/2025  CRISTAL Munoz/Transcription:   \"Dictated utilizing Dragon dictation\".     "

## 2025-03-04 VITALS
BODY MASS INDEX: 25.3 KG/M2 | OXYGEN SATURATION: 97 % | RESPIRATION RATE: 18 BRPM | HEART RATE: 65 BPM | WEIGHT: 134 LBS | TEMPERATURE: 97.9 F | HEIGHT: 61 IN | SYSTOLIC BLOOD PRESSURE: 130 MMHG | DIASTOLIC BLOOD PRESSURE: 57 MMHG

## 2025-03-04 LAB
ALBUMIN SERPL-MCNC: 2.8 G/DL (ref 3.5–5.2)
ANION GAP SERPL CALCULATED.3IONS-SCNC: 8.7 MMOL/L (ref 5–15)
BUN SERPL-MCNC: 34 MG/DL (ref 8–23)
BUN/CREAT SERPL: 27 (ref 7–25)
CALCIUM SPEC-SCNC: 8.9 MG/DL (ref 8.6–10.5)
CHLORIDE SERPL-SCNC: 102 MMOL/L (ref 98–107)
CO2 SERPL-SCNC: 29.3 MMOL/L (ref 22–29)
CREAT SERPL-MCNC: 1.26 MG/DL (ref 0.57–1)
EGFRCR SERPLBLD CKD-EPI 2021: 40.9 ML/MIN/1.73
GLUCOSE SERPL-MCNC: 109 MG/DL (ref 65–99)
MAGNESIUM SERPL-MCNC: 1.4 MG/DL (ref 1.6–2.4)
PHOSPHATE SERPL-MCNC: 3.4 MG/DL (ref 2.5–4.5)
POTASSIUM SERPL-SCNC: 4.3 MMOL/L (ref 3.5–5.2)
SODIUM SERPL-SCNC: 140 MMOL/L (ref 136–145)
URATE SERPL-MCNC: 6.9 MG/DL (ref 2.4–5.7)

## 2025-03-04 PROCEDURE — 99232 SBSQ HOSP IP/OBS MODERATE 35: CPT | Performed by: INTERNAL MEDICINE

## 2025-03-04 PROCEDURE — 25010000002 MAGNESIUM SULFATE IN D5W 1G/100ML (PREMIX) 1-5 GM/100ML-% SOLUTION: Performed by: INTERNAL MEDICINE

## 2025-03-04 PROCEDURE — 84550 ASSAY OF BLOOD/URIC ACID: CPT | Performed by: INTERNAL MEDICINE

## 2025-03-04 PROCEDURE — 80069 RENAL FUNCTION PANEL: CPT | Performed by: INTERNAL MEDICINE

## 2025-03-04 PROCEDURE — 83735 ASSAY OF MAGNESIUM: CPT | Performed by: INTERNAL MEDICINE

## 2025-03-04 RX ORDER — FAMOTIDINE 20 MG/1
20 TABLET, FILM COATED ORAL 2 TIMES DAILY
Qty: 60 TABLET | Refills: 0 | Status: SHIPPED | OUTPATIENT
Start: 2025-03-04 | End: 2025-04-03

## 2025-03-04 RX ORDER — TORSEMIDE 20 MG/1
20 TABLET ORAL DAILY
Qty: 30 TABLET | Refills: 0 | Status: SHIPPED | OUTPATIENT
Start: 2025-03-05 | End: 2025-04-04

## 2025-03-04 RX ORDER — TORSEMIDE 20 MG/1
20 TABLET ORAL DAILY
Status: DISCONTINUED | OUTPATIENT
Start: 2025-03-04 | End: 2025-03-04 | Stop reason: HOSPADM

## 2025-03-04 RX ORDER — METOPROLOL SUCCINATE 100 MG/1
100 TABLET, EXTENDED RELEASE ORAL
Qty: 30 TABLET | Refills: 0 | Status: SHIPPED | OUTPATIENT
Start: 2025-03-05 | End: 2025-04-04

## 2025-03-04 RX ORDER — MAGNESIUM SULFATE 1 G/100ML
1 INJECTION INTRAVENOUS
Status: COMPLETED | OUTPATIENT
Start: 2025-03-04 | End: 2025-03-04

## 2025-03-04 RX ORDER — HYDROCODONE BITARTRATE AND ACETAMINOPHEN 5; 325 MG/1; MG/1
1 TABLET ORAL EVERY 4 HOURS PRN
Qty: 10 TABLET | Refills: 0 | Status: SHIPPED | OUTPATIENT
Start: 2025-03-04 | End: 2025-03-07

## 2025-03-04 RX ADMIN — TORSEMIDE 20 MG: 20 TABLET ORAL at 08:59

## 2025-03-04 RX ADMIN — FAMOTIDINE 20 MG: 20 TABLET, FILM COATED ORAL at 08:59

## 2025-03-04 RX ADMIN — APIXABAN 2.5 MG: 5 TABLET, FILM COATED ORAL at 08:59

## 2025-03-04 RX ADMIN — METOPROLOL SUCCINATE 100 MG: 25 TABLET, EXTENDED RELEASE ORAL at 08:59

## 2025-03-04 RX ADMIN — MAGNESIUM SULFATE HEPTAHYDRATE 1 G: 1 INJECTION, SOLUTION INTRAVENOUS at 08:59

## 2025-03-04 RX ADMIN — MAGNESIUM SULFATE HEPTAHYDRATE 1 G: 1 INJECTION, SOLUTION INTRAVENOUS at 10:02

## 2025-03-04 RX ADMIN — CETIRIZINE HYDROCHLORIDE 10 MG: 10 TABLET ORAL at 08:59

## 2025-03-04 RX ADMIN — MAGNESIUM SULFATE HEPTAHYDRATE 1 G: 1 INJECTION, SOLUTION INTRAVENOUS at 10:53

## 2025-03-04 NOTE — CASE MANAGEMENT/SOCIAL WORK
Case Management Discharge Note      Final Note: [facility] via Josie  Van at 3pm. No additional CCP needs.         Selected Continued Care - Admitted Since 2/24/2025       Destination Coordination complete.      Service Provider Services Address Phone Fax Patient Preferred    [facility] [address] [facility], [facility] [id number] [phone] --              Durable Medical Equipment    No services have been selected for the patient.                Dialysis/Infusion    No services have been selected for the patient.                Home Medical Care    No services have been selected for the patient.                Therapy    No services have been selected for the patient.                Community Resources    No services have been selected for the patient.                Community & DME    No services have been selected for the patient.                    Selected Continued Care - Prior Encounters Includes continued care and service providers with selected services from prior encounters from 11/26/2024 to 3/4/2025      Discharged on 2/12/2025 Admission date: 2/8/2025 - Discharge disposition: Home or Self Care      Durable Medical Equipment       Service Provider Services Address Phone Fax Patient Preferred    [facility] [address], [facility] [address] [phone] --                               Final Discharge Disposition Code: 03 - skilled nursing facility (SNF)

## 2025-03-04 NOTE — PLAN OF CARE
No new acute issues this shift, pt AAO x4, pt denies any complaints of shortness of breath, chest pain, or difficulty breathing.  Pt resting well in bed, no further acute issues, will continue to monitor and observe.     Goal Outcome Evaluation:  Plan of Care Reviewed With: patient        Progress: improving      Problem: Adult Inpatient Plan of Care  Goal: Plan of Care Review  Flowsheets (Taken 3/4/2025 1308)  Progress: improving  Plan of Care Reviewed With: patient     Problem: Comorbidity Management  Goal: Blood Pressure in Desired Range  Outcome: Progressing     Problem: Fall Injury Risk  Goal: Absence of Fall and Fall-Related Injury  Outcome: Progressing

## 2025-03-04 NOTE — DISCHARGE PLACEMENT REQUEST
"Mary Early (89 y.o. Female)       Date of Birth   1935    Social Security Number       Address   [address] [address]    Home Phone   [phone]    MRN   [id number]       [employer]    Marital Status                               Admission Date   2/24/25    Admission Type   Emergency    Admitting Provider   Kike Awad MD    Attending Provider   Kike Awad MD    Department, Room/Bed   [address], [address]/1       Discharge Date       Discharge Disposition   Skilled Nursing Facility (DC - External)    Discharge Destination                                 Attending Provider: Kike Awad MD    Allergies: No Known Allergies    Isolation: None   Infection: None   Code Status: CPR    Ht: 154.9 cm (61\")   Wt: 60.8 kg (134 lb)    Admission Cmt: None   Principal Problem: Pleural effusion [J90]                   Active Insurance as of 2/24/2025       Primary Coverage       Payor Plan Insurance Group Employer/Plan Group    ANTHEM MEDICARE REPLACEMENT ANTHEM MEDICARE ADVANTAGE HMO [id number]       Payor Plan Address Payor Plan Phone Number Payor Plan Fax Number Effective Dates    [address] [phone]  1/1/2025 - None Entered    [facility] [address]         Subscriber Name Subscriber Birth Date Member ID       MARY EARLY 1935 [id number]               Secondary Coverage       Payor Plan Insurance Group Employer/Plan Group    JAMA [id number]        Payor Plan Address Payor Plan Phone Number Payor Plan Fax Number Effective Dates    [facility] OFFICE OF [facility] CARE [phone]  4/28/2005 - None Entered    [address] [address]       [facility] [address]         Subscriber Name Subscriber Birth Date Member ID       MARY EARLY 1935 [id number]                     Emergency Contacts        (Rel.) Home Phone Work Phone Mobile Phone    Torres Early (Son) [phone] -- --    LASHAY early (Son) -- -- [phone]      "            Discharge Summary        Kike Awad MD at 03/04/25 1229          Discharge summary    Date of admission 2/24/2025  Date of discharge 3/4/2025    Final diagnosis  Acute diastolic congestive heart failure  Severe tricuspid regurgitation and moderate mitral regurgitation  Large right pleural effusion status post thoracentesis  Acute kidney injury resolved  Paroxysmal atrial fibrillation  Hypertension  Hyperlipidemia  Chronic kidney disease stage IIIa  Gastroesophageal reflux disease    Discharge medications    Current Facility-Administered Medications:     acetaminophen (TYLENOL) tablet 650 mg, 650 mg, Oral, Q6H PRN, Kike Awad MD, 650 mg at 03/03/25 2314    albuterol sulfate HFA (PROVENTIL HFA;VENTOLIN HFA;PROAIR HFA) inhaler 2 puff, 2 puff, Inhalation, Q4H PRN, Kike Awad MD    apixaban (ELIQUIS) tablet 2.5 mg, 2.5 mg, Oral, Q12H, Kike Awad MD, 2.5 mg at 03/04/25 0859    atorvastatin (LIPITOR) tablet 10 mg, 10 mg, Oral, Nightly, Kike Awad MD, 10 mg at 03/03/25 2115    cetirizine (zyrTEC) tablet 10 mg, 10 mg, Oral, Daily, Kike Awad MD, 10 mg at 03/04/25 0859    famotidine (PEPCID) tablet 20 mg, 20 mg, Oral, BID, Kike Awad MD, 20 mg at 03/04/25 0859    HYDROcodone-acetaminophen (NORCO) 5-325 MG per tablet 1 tablet, 1 tablet, Oral, Q4H PRN, Kike Awad MD    ipratropium-albuterol (DUO-NEB) nebulizer solution 3 mL, 3 mL, Nebulization, Q4H PRN, Kike Awad MD    metoprolol succinate XL (TOPROL-XL) 24 hr tablet 100 mg, 100 mg, Oral, Q24H, Kike Awad MD, 100 mg at 03/04/25 0859    [COMPLETED] Insert Peripheral IV, , , Once **AND** sodium chloride 0.9 % flush 10 mL, 10 mL, Intravenous, PRN, Kike Awad MD    torsemide (DEMADEX) tablet 20 mg, 20 mg, Oral, Daily, Willie Boggs MD, 20 mg at 03/04/25 0859    traZODone (DESYREL) tablet 50 mg, 50 mg, Oral, Nightly PRN, Kike Awad MD, 50 mg at 03/03/25 9607     Consults  obtained  Pulmonary  Cardiology  Nephrology    Procedures  Right thoracentesis    Hospital course  89-year-old white female with history of atrial fibrillation congestive heart failure hypertension hyperlipidemia and chronic kidney disease stage III yenikole admitted to emergency room with increased shortness of breath for several days.  Patient workup in ER revealed bilateral pleural effusion with decompensated CHF admitted for management.  Patient admitted treated with IV diuretics and also underwent right thoracentesis.  Patient followed by pulmonary and cardiology and her repeated 2D echo showed severe tricuspid regurgitation and moderate mitral regurgitation.  Patient cardiology recommend no surgical intervention and continue conservative treatment with diuretics.  During hospitalization patient over diuresed and developed acute kidney injury and her diuretics were held and restarted.  Patient is feeling better and lives by herself and still very weak and agreeable to go to subacute rehab for PT OT nursing care.  Patient also followed by nephrology during this hospitalization.    Discharge diet regular    Activity per PT OT    Medication as above    Further care per accepting physician at nursing home and follow-up with cardiology pulmonary and nephrology per the instructions and take medication as directed.    HE OSWALD MD        Electronically signed by He Oswald MD at 03/04/25 9588

## 2025-03-04 NOTE — PROGRESS NOTES
"[facility]  Cardiology Progress Note    Patient Identification:  Name: Mayuri Early  Age: 89 y.o.  Sex: female  :  1935  MRN: [id number]                 Follow Up / Chief Complaint: Severe tricuspid regurgitation    Interval History:  Shortness of breath    Better     Subjective:  No chest pains or tightness in the chest    Objective:    Past Medical History:  Past Medical History:   Diagnosis Date    Fibrocystic breast     Well woman exam 10/20/2020     Past Surgical History:  Past Surgical History:   Procedure Laterality Date    BREAST CYST ASPIRATION      BREAST CYST EXCISION      HYSTERECTOMY          Social History:   Social History     Tobacco Use    Smoking status: Former     Passive exposure: Past    Smokeless tobacco: Never   Substance Use Topics    Alcohol use: Never      Family History:  History reviewed. No pertinent family history.       Allergies:  No Known Allergies  Scheduled Meds:  apixaban, 2.5 mg, Q12H  atorvastatin, 10 mg, Nightly  cetirizine, 10 mg, Daily  famotidine, 20 mg, BID  metoprolol succinate XL, 100 mg, Q24H  torsemide, 20 mg, Daily            INTAKE AND OUTPUT:    Intake/Output Summary (Last 24 hours) at 3/4/2025 1224  Last data filed at 3/4/2025 1041  Gross per 24 hour   Intake 490 ml   Output 800 ml   Net -310 ml     ROS  Constitutional: Awake and alert, no fever. No nosebleeds  Abdomen           no abdominal pain   Cardiac              no chest pain  Pulmonary          no shortness of breath      /57 (BP Location: Right arm, Patient Position: Lying)   Pulse 65   Temp 97.9 °F (36.6 °C) (Oral)   Resp 18   Ht 154.9 cm (61\")   Wt 60.8 kg (134 lb)   SpO2 97%   BMI 25.32 kg/m²   General appearance: No acute changes   Neck: Trachea midline; NECK, supple, no thyromegaly or lymphadenopathy   Lungs: Normal size and shape, normal breath sounds, equal distribution of air, no rales or rhonchi   CV: S1-S2 regular, no murmurs, no rub, no gallop   Abdomen: " "Soft, nontender; no masses , no abnormal abdominal sounds   Extremities: No deformity , normal color , no peripheral edema   Skin: Normal temperature, turgor and texture; no rash, ulcers          Cardiographics  Telemetry:     ECG:     Echocardiogram:     Lab Review   Results from last 7 days   Lab Units 02/28/25  0502 02/28/25  0330 02/26/25  0957   HSTROP T ng/L 13 13 11     Results from last 7 days   Lab Units 03/04/25  0333   MAGNESIUM mg/dL 1.4*     Results from last 7 days   Lab Units 03/04/25  0333   SODIUM mmol/L 140   POTASSIUM mmol/L 4.3   BUN mg/dL 34*   CREATININE mg/dL 1.26*   CALCIUM mg/dL 8.9     @LABRCNTIPbnp@  Results from last 7 days   Lab Units 03/01/25  0348 02/28/25  0330 02/27/25  0403   WBC 10*3/mm3 5.75 5.05 7.30   HEMOGLOBIN g/dL 12.8 12.0 12.0   HEMATOCRIT % 38.8 37.3 38.4   PLATELETS 10*3/mm3 196 202 214     Results from last 7 days   Lab Units 02/28/25  1309   INR  1.43*         Assessment:  Severe tricuspid regurgitation  Pleural effusion  Bilateral leg swelling  Paroxysmal atrial fibrillation      Plan:    Vital signs stable  Clinically no congestive heart failure  Tricuspid regurgitation being treated conservatively      )3/4/2025  MD RAUL Frausto/Transcription:   \"Dictated utilizing Dragon dictation\".     "

## 2025-03-04 NOTE — PROGRESS NOTES
"Daily progress note    Primary care physician  Dr. Hidalgo    Subjective  Doing same with no new complaints    History of present illness  89-year-old white female with history of paroxysmal atrial fibrillation hypertension hyperlipidemia asthma and gastroesophageal reflux disease who is well-known to our service to the emergency room with shortness of breath for last several days.  Patient denies any chest pain palpitation abdominal pain nausea vomiting diarrhea.  Patient denies any fever cough congestion.  Patient evaluated in ER found to have pleural effusion admitted for management.  Patient fully alert oriented give me an good history and still wants to be a full code and lives by herself.     REVIEW OF SYSTEMS  Unremarkable except weakness     PHYSICAL EXAM   Blood pressure 130/57, pulse 65, temperature 97.9 °F (36.6 °C), temperature source Oral, resp. rate 18, height 154.9 cm (61\"), weight 60.8 kg (134 lb), SpO2 97%.    GENERAL: Awake and alert, oriented, elderly, not distressed  HENT: head atraumatic, no nuchal rigidity  EYES: no scleral icterus, EOMI  CV: regular rhythm, regular rate, no murmur  RESPIRATORY: normal effort, decreased breath sounds in the right base  ABDOMEN: soft, nontender  MUSCULOSKELETAL: no deformity, FROM, 2+ pedal edema  NEURO: alert, moves all extremities, follows commands  SKIN: warm, dry     LAB RESULTS  Lab Results (last 24 hours)       Procedure Component Value Units Date/Time    Renal Function Panel [[id number]]  (Abnormal) Collected: 03/04/25 0333    Specimen: Blood Updated: 03/04/25 0428     Glucose 109 mg/dL      BUN 34 mg/dL      Creatinine 1.26 mg/dL      Sodium 140 mmol/L      Potassium 4.3 mmol/L      Chloride 102 mmol/L      CO2 29.3 mmol/L      Calcium 8.9 mg/dL      Albumin 2.8 g/dL      Phosphorus 3.4 mg/dL      Anion Gap 8.7 mmol/L      BUN/Creatinine Ratio 27.0     eGFR 40.9 mL/min/1.73     Narrative:      GFR Categories in Chronic Kidney Disease (CKD)      GFR " Discharge Planner PT   Patient plan for discharge: Pt receptive to TCU  Current status: Pt needs cues throughout for sternal precautions with transfers. Pt tachy at rest and increased HR with ambulation up to 160's. Pt symptomatic with fatigue and SOB. Pt linda ambulation x4 minutes in halls with FWW and SBA, rates activity as 8/10, see vitals flow sheet for vitals.  Barriers to return to prior living situation: Decreased activity tolerance, current level of assist  Recommendations for discharge: TCU  Rationale for recommendations: Recommend continued inpatient PT to further progress independence, safety, and activity tolerance prior to return home. Pt lives alone and currently needs assistance with mobility tasks and fatigues very quickly.       Entered by: Ana Laura Adler 02/07/2020 9:33 AM        Addendum: [facility] has denied insurance coverage for TCU despite pt demonstrating decreased activity tolerance affecting ability to complete functional mobility and ADLs. Recommend OT evaluation for assessment and education on completing ADLs without assistance. At this time, if pt is to be discharged to home will need assist with all mobility tasks and home PT to maximize functional independence and safety in home environment.   Category          GFR (mL/min/1.73)    Interpretation  G1                     90 or greater         Normal or high (1)  G2                      60-89                Mild decrease (1)  G3a                   45-59                Mild to moderate decrease  G3b                   30-44                Moderate to severe decrease  G4                    15-29                Severe decrease  G5                    14 or less           Kidney failure          (1)In the absence of evidence of kidney disease, neither GFR category G1 or G2 fulfill the criteria for CKD.    eGFR calculation 2021 CKD-EPI creatinine equation, which does not include race as a factor    Uric Acid [[id number]]  (Abnormal) Collected: 03/04/25 0333    Specimen: Blood Updated: 03/04/25 0428     Uric Acid 6.9 mg/dL     Magnesium [[id number]]  (Abnormal) Collected: 03/04/25 0333    Specimen: Blood Updated: 03/04/25 0428     Magnesium 1.4 mg/dL           Imaging Results (Last 24 Hours)       ** No results found for the last 24 hours. **          Scan on 2/24/2025 2221 by [facility], [place]: ECG 12-LEAD         Author: -- Service: -- Author Type: --   Filed: Date of Service: Creation Time:   Status: (Other)   HEART RATE=62  bpm  RR Dmgzgpnv=804  ms  AR Interval=  ms  P Horizontal Axis=  deg  P Front Axis=  deg  QRSD Ephdqzvz=754  ms  QT Sqslezep=780  ms  TZzA=703  ms  QRS Axis=75  deg  T Wave Axis=-33  deg  - ABNORMAL ECG -  Afib/flut and V-paced complexes  No further rhythm analysis attempted due to paced rhythm  IVCD, consider RBBB           Interpretation Summary    Left ventricular systolic function is normal. Calculated left ventricular EF = 64.3%    Left ventricular diastolic function was indeterminate in the setting of atrial fibrillation.    There is mild, bileaflet mitral valve thickening present.    Moderate mitral valve regurgitation is present.    Severe tricuspid valve regurgitation is present.    Estimated right ventricular systolic pressure from  tricuspid regurgitation is normal (<35 mmHg) though this may be underestimated due to severe TR.    Moderate biatrial enlargement on manual quantitation, nor mall IVC size.    Pacemaker lead noted.    Current Facility-Administered Medications:     acetaminophen (TYLENOL) tablet 650 mg, 650 mg, Oral, Q6H PRN, Kike Awad MD, 650 mg at 03/03/25 2314    albuterol sulfate HFA (PROVENTIL HFA;VENTOLIN HFA;PROAIR HFA) inhaler 2 puff, 2 puff, Inhalation, Q4H PRN, Kike Awad MD    apixaban (ELIQUIS) tablet 2.5 mg, 2.5 mg, Oral, Q12H, Kike Awad MD, 2.5 mg at 03/04/25 0859    atorvastatin (LIPITOR) tablet 10 mg, 10 mg, Oral, Nightly, Kike Awad MD, 10 mg at 03/03/25 2115    cetirizine (zyrTEC) tablet 10 mg, 10 mg, Oral, Daily, Kike Awad MD, 10 mg at 03/04/25 0859    famotidine (PEPCID) tablet 20 mg, 20 mg, Oral, BID, Kike Awad MD, 20 mg at 03/04/25 0859    HYDROcodone-acetaminophen (NORCO) 5-325 MG per tablet 1 tablet, 1 tablet, Oral, Q4H PRN, Kike Awad MD    ipratropium-albuterol (DUO-NEB) nebulizer solution 3 mL, 3 mL, Nebulization, Q4H PRN, Kike Awad MD    metoprolol succinate XL (TOPROL-XL) 24 hr tablet 100 mg, 100 mg, Oral, Q24H, Kike Awad MD, 100 mg at 03/04/25 0859    [COMPLETED] Insert Peripheral IV, , , Once **AND** sodium chloride 0.9 % flush 10 mL, 10 mL, Intravenous, PRN, Kike Awad MD    torsemide (DEMADEX) tablet 20 mg, 20 mg, Oral, Daily, FlakitaWillie MD, 20 mg at 03/04/25 0859    traZODone (DESYREL) tablet 50 mg, 50 mg, Oral, Nightly PRN, Kike Awad MD, 50 mg at 03/03/25 6789      ASSESSMENT  Acute diastolic congestive heart failure  Severe tricuspid regurgitation and moderate mitral regurgitation  Large right pleural effusion status post thoracentesis  Acute kidney injury resolved  Paroxysmal atrial fibrillation  Hypertension  Hyperlipidemia  Chronic kidney disease stage IIIa  Gastroesophageal reflux disease    PLAN  Discharge to skilled nursing  facility  Discharge summary dictated    HE OSWALD MD    Copied text in this note has been reviewed and is accurate as of 03/04/25

## 2025-03-04 NOTE — CASE MANAGEMENT/SOCIAL WORK
Continued Stay Note  [facility]     Patient Name: Mayuri Early  MRN: [id number]  Today's Date: 3/4/2025    Admit Date: 2/24/2025    Plan: [facility] at 3pm   Discharge Plan       Row Name 03/04/25 1325       Plan    Plan [facility] at 3pm    Patient/Family in Agreement with Plan yes    Plan Comments DC orders noted. [facility] transport arranged for 3pm, confirmation #[id number] and cost $100. MD, RN and Sarai/Jess notified of transport time. Packet given to RN. Elvia REDDY/CCP    Final Discharge Disposition Code 03 - skilled nursing facility (SNF)    Final Note [facility] at 3pm. No additional CCP needs.                   Discharge Codes    No documentation.                 Expected Discharge Date and Time       Expected Discharge Date Expected Discharge Time    Mar 4, 2025               Ericka Decker, RN

## 2025-03-04 NOTE — PLAN OF CARE
Problem: Adult Inpatient Plan of Care  Goal: Absence of Hospital-Acquired Illness or Injury  Intervention: Identify and Manage Fall Risk  Recent Flowsheet Documentation  Taken 3/4/2025 1000 by Mira Whaley RN  Safety Promotion/Fall Prevention:   room organization consistent   safety round/check completed   activity supervised   assistive device/personal items within reach  Taken 3/4/2025 0905 by Mira Whaley RN  Safety Promotion/Fall Prevention:   room organization consistent   safety round/check completed   activity supervised   assistive device/personal items within reach     Problem: Adult Inpatient Plan of Care  Goal: Absence of Hospital-Acquired Illness or Injury  Intervention: Prevent Skin Injury  Recent Flowsheet Documentation  Taken 3/4/2025 1000 by Mira Whaley RN  Body Position:   lower extremity elevated   position changed independently  Taken 3/4/2025 0905 by Mira Whaley RN  Body Position:   position changed independently   right   turned   Goal Outcome Evaluation:           Progress: improving  Outcome Evaluation: Patient AO x 4 VSS, hace Pacemaker V pace in room air , admitted due to pleura effusion given today magnessium IV x3 tolerated well, regular diet take medication whole, [facility], educate patient.

## 2025-03-04 NOTE — DISCHARGE SUMMARY
Discharge summary    Date of admission 2/24/2025  Date of discharge 3/4/2025    Final diagnosis  Acute diastolic congestive heart failure  Severe tricuspid regurgitation and moderate mitral regurgitation  Large right pleural effusion status post thoracentesis  Acute kidney injury resolved  Paroxysmal atrial fibrillation  Hypertension  Hyperlipidemia  Chronic kidney disease stage IIIa  Gastroesophageal reflux disease    Discharge medications    Current Facility-Administered Medications:     acetaminophen (TYLENOL) tablet 650 mg, 650 mg, Oral, Q6H PRN, Kike Awad MD, 650 mg at 03/03/25 2314    albuterol sulfate HFA (PROVENTIL HFA;VENTOLIN HFA;PROAIR HFA) inhaler 2 puff, 2 puff, Inhalation, Q4H PRN, Kike Awad MD    apixaban (ELIQUIS) tablet 2.5 mg, 2.5 mg, Oral, Q12H, Kike Awad MD, 2.5 mg at 03/04/25 0859    atorvastatin (LIPITOR) tablet 10 mg, 10 mg, Oral, Nightly, Kiek Awad MD, 10 mg at 03/03/25 2115    cetirizine (zyrTEC) tablet 10 mg, 10 mg, Oral, Daily, Kike Awad MD, 10 mg at 03/04/25 0859    famotidine (PEPCID) tablet 20 mg, 20 mg, Oral, BID, Kike Awad MD, 20 mg at 03/04/25 0859    HYDROcodone-acetaminophen (NORCO) 5-325 MG per tablet 1 tablet, 1 tablet, Oral, Q4H PRN, Kike Awad MD    ipratropium-albuterol (DUO-NEB) nebulizer solution 3 mL, 3 mL, Nebulization, Q4H PRN, Kike Awad MD    metoprolol succinate XL (TOPROL-XL) 24 hr tablet 100 mg, 100 mg, Oral, Q24H, Kike Awad MD, 100 mg at 03/04/25 0859    [COMPLETED] Insert Peripheral IV, , , Once **AND** sodium chloride 0.9 % flush 10 mL, 10 mL, Intravenous, PRN, Kike Awad MD    torsemide (DEMADEX) tablet 20 mg, 20 mg, Oral, Daily, Willie Boggs MD, 20 mg at 03/04/25 0843    traZODone (DESYREL) tablet 50 mg, 50 mg, Oral, Nightly PRN, Kike Awad MD, 50 mg at 03/03/25 2454     Consults obtained  Pulmonary  Cardiology  Nephrology    Procedures  Right thoracentesis    Hospital course  89-year-old white female with history  of atrial fibrillation congestive heart failure hypertension hyperlipidemia and chronic kidney disease stage III isaak admitted to emergency room with increased shortness of breath for several days.  Patient workup in ER revealed bilateral pleural effusion with decompensated CHF admitted for management.  Patient admitted treated with IV diuretics and also underwent right thoracentesis.  Patient followed by pulmonary and cardiology and her repeated 2D echo showed severe tricuspid regurgitation and moderate mitral regurgitation.  Patient cardiology recommend no surgical intervention and continue conservative treatment with diuretics.  During hospitalization patient over diuresed and developed acute kidney injury and her diuretics were held and restarted.  Patient is feeling better and lives by herself and still very weak and agreeable to go to subacute rehab for PT OT nursing care.  Patient also followed by nephrology during this hospitalization.    Discharge diet regular    Activity per PT OT    Medication as above    Further care per accepting physician at nursing home and follow-up with cardiology pulmonary and nephrology per the instructions and take medication as directed.    HE OSWALD MD

## 2025-03-04 NOTE — PLAN OF CARE
Goal Outcome Evaluation:           Progress: improving  Outcome Evaluation: Patient AO x 4 VSS, hace Pacemaker V pace in room air , admitted due to pleura effusion given today magnessium IV x3 tolerated well, regular diet take medication whole, [facility], educate patient.

## 2025-03-04 NOTE — PROGRESS NOTES
[facility] Progress Note      Patient Name: Mayuri Early  : 1935  MRN: [id number]  Primary Care Physician:  Gunjan Hidalgo MD  Date of admission: 2025    Subjective     Interval History:   Follow-up acute kidney injury on chronic kidney disease    The patient had an episode of orthopnea and PND last night today feeling much better, no chest pain, no nausea or vomiting, no lightheadedness.    Review of Systems:   As noted above    Objective     Vitals:   Temp:  [97.5 °F (36.4 °C)-98.8 °F (37.1 °C)] 98.1 °F (36.7 °C)  Heart Rate:  [69-77] 74  Resp:  [18-20] 20  BP: (112-132)/(51-75) 132/75    Intake/Output Summary (Last 24 hours) at 3/4/2025 0715  Last data filed at 3/3/2025 193  Gross per 24 hour   Intake --   Output 500 ml   Net -500 ml       Physical Exam:    Constitutional: Awake, looks younger than actual age, no acute distress.  HEENT: Sclera anicteric, no conjunctival injection  Neck: Supple, no JVD  Respiratory: Crackles in bases, nonlabored respiration on RA  Cardiovascular: Paced rhythm, no rub  Gastrointestinal: BS +, abdomen is soft, nontender and nondistended  : No palpable bladder  Musculoskeletal: No edema, no clubbing or cyanosis  Psychiatric: Appropriate affect, cooperative  Neurologic: No asterixis, moving all extremities, normal speech and mental status  Skin: Warm and dry     Scheduled Meds:     apixaban, 2.5 mg, Oral, Q12H  atorvastatin, 10 mg, Oral, Nightly  cetirizine, 10 mg, Oral, Daily  famotidine, 20 mg, Oral, BID  metoprolol succinate XL, 100 mg, Oral, Q24H      IV Meds:        Results Reviewed:   I have personally reviewed the results from the time of this admission to 3/4/2025 07:15 EST     Results from last 7 days   Lab Units 25  0333 25  0254 25  0354 25  0403 25  0727   SODIUM mmol/L 140 141 142   < > 141   POTASSIUM mmol/L 4.3 4.0 4.1   < > 3.6   CHLORIDE mmol/L 102 100 102   < > 96*   CO2 mmol/L 29.3*  31.0* 31.0*   < > 32.6*   BUN mg/dL 34* 29* 24*   < > 21   CREATININE mg/dL 1.26* 1.49* 1.05*   < > 1.14*   CALCIUM mg/dL 8.9 8.3* 9.1   < > 9.2   BILIRUBIN mg/dL  --   --   --   --  0.6   ALK PHOS U/L  --   --   --   --  88   ALT (SGPT) U/L  --   --   --   --  11   AST (SGOT) U/L  --   --   --   --  23   GLUCOSE mg/dL 109* 90 121*   < > 91    < > = values in this interval not displayed.       Estimated Creatinine Clearance: 25.3 mL/min (A) (by C-G formula based on SCr of 1.26 mg/dL (H)).    Results from last 7 days   Lab Units 03/04/25  0333 03/03/25  0254 03/01/25  0348   MAGNESIUM mg/dL 1.4* 1.4* 1.5*   PHOSPHORUS mg/dL 3.4 3.4 2.0*       Results from last 7 days   Lab Units 03/04/25  0333 03/03/25  0254   URIC ACID mg/dL 6.9* 7.1*       Results from last 7 days   Lab Units 03/01/25  0348 02/28/25  0330 02/27/25  0403 02/26/25  0727   WBC 10*3/mm3 5.75 5.05 7.30 7.46   HEMOGLOBIN g/dL 12.8 12.0 12.0 13.6   PLATELETS 10*3/mm3 196 202 214 226       Results from last 7 days   Lab Units 02/28/25  1309   INR  1.43*       Assessment / Plan     ASSESSMENT:  Nonoliguric ALLYSON on CKD stage IIIa, baseline SCr 0.9-1.0, with renal function back to baseline.  ALLYSON multifactorial:  labile BP (SBP 180s->110s), significant volume removal with IV diuretic and thoracentesis, and impaired renal autoregulation d/t ARB.  Creatinine today is 1.26 improved since yesterday, total CO2 down to 29.3.  Potassium is 4.3.  Magnesium 1.4.  Uric acid down to 6.9  Acute diastolic congestive heart failure, echo on 2/26 showed EF of 64.3% with moderate mitral valve and severe tricuspid regurg, followed by cardiology  Large right pleural effusion s/p thoracentesis 2/26, managed by primary team  Severe tricuspid regurgitation and moderate mitral regurgitation, conservative management for now; patient uncertain how aggressive to be going forward  Paroxysmal A-fib, not tachycardic, on AC  Hypertension with CKD, reasonably controlled.    PLAN:  1.   Continue the same treat  2.  Resume oral diuretics  3.  Pleat magnesium  4.  Monitor for diuretic toxicity  5.  Surveillance labs      I reviewed the chart and other providers notes, reviewed labs.  I discussed the case with the patient  Copied text in this note has been reviewed and is accurate as of 03/04/25.       Thank you for involving us in the care of Mayuri Early.  Please feel free to call with any questions.    Willie Boggs MD  03/04/25  07:15 [facility]    [facility]  [phone]    Please note that portions of this note were completed with a voice recognition program.

## 2025-03-05 LAB
FUNGUS WND CULT: NORMAL
MYCOBACTERIUM SPEC CULT: NORMAL
NIGHT BLUE STAIN TISS: NORMAL

## 2025-03-12 LAB
FUNGUS WND CULT: NORMAL
MYCOBACTERIUM SPEC CULT: NORMAL
NIGHT BLUE STAIN TISS: NORMAL

## 2025-03-19 LAB
FUNGUS WND CULT: NORMAL
MYCOBACTERIUM SPEC CULT: NORMAL
NIGHT BLUE STAIN TISS: NORMAL

## 2025-03-24 LAB
QT INTERVAL: 440 MS
QTC INTERVAL: 473 MS

## 2025-03-26 LAB
FUNGUS WND CULT: NORMAL
MYCOBACTERIUM SPEC CULT: NORMAL
NIGHT BLUE STAIN TISS: NORMAL

## 2025-04-02 LAB
MYCOBACTERIUM SPEC CULT: NORMAL
NIGHT BLUE STAIN TISS: NORMAL

## 2025-04-09 LAB
MYCOBACTERIUM SPEC CULT: NORMAL
NIGHT BLUE STAIN TISS: NORMAL

## 2025-05-07 ENCOUNTER — HOSPITAL ENCOUNTER (INPATIENT)
Facility: HOSPITAL | Age: OVER 89
LOS: 1 days | Discharge: HOME | DRG: 291 | End: 2025-05-12
Attending: EMERGENCY MEDICINE | Admitting: HOSPITALIST
Payer: MEDICARE

## 2025-05-07 ENCOUNTER — APPOINTMENT (OUTPATIENT)
Dept: GENERAL RADIOLOGY | Facility: HOSPITAL | Age: OVER 89
DRG: 291 | End: 2025-05-07
Payer: MEDICARE

## 2025-05-07 DIAGNOSIS — I50.9 ACUTE ON CHRONIC CONGESTIVE HEART FAILURE, UNSPECIFIED HEART FAILURE TYPE: Primary | ICD-10-CM

## 2025-05-07 LAB
ALBUMIN SERPL-MCNC: 4.3 G/DL (ref 3.5–5.2)
ALBUMIN/GLOB SERPL: 1.3 G/DL
ALP SERPL-CCNC: 97 U/L (ref 39–117)
ALT SERPL W P-5'-P-CCNC: 17 U/L (ref 1–33)
ANION GAP SERPL CALCULATED.3IONS-SCNC: 7 MMOL/L (ref 5–15)
AST SERPL-CCNC: 27 U/L (ref 1–32)
BASOPHILS # BLD AUTO: 0.02 10*3/MM3 (ref 0–0.2)
BASOPHILS NFR BLD AUTO: 0.4 % (ref 0–1.5)
BILIRUB SERPL-MCNC: 0.4 MG/DL (ref 0–1.2)
BUN SERPL-MCNC: 24 MG/DL (ref 8–23)
BUN/CREAT SERPL: 16.1 (ref 7–25)
CALCIUM SPEC-SCNC: 9.4 MG/DL (ref 8.2–9.6)
CHLORIDE SERPL-SCNC: 102 MMOL/L (ref 98–107)
CO2 SERPL-SCNC: 30 MMOL/L (ref 22–29)
CREAT SERPL-MCNC: 1.49 MG/DL (ref 0.57–1)
DEPRECATED RDW RBC AUTO: 39.5 FL (ref 37–54)
EGFRCR SERPLBLD CKD-EPI 2021: 33.2 ML/MIN/1.73
EOSINOPHIL # BLD AUTO: 0.17 10*3/MM3 (ref 0–0.4)
EOSINOPHIL NFR BLD AUTO: 3.1 % (ref 0.3–6.2)
ERYTHROCYTE [DISTWIDTH] IN BLOOD BY AUTOMATED COUNT: 11.7 % (ref 12.3–15.4)
GEN 5 1HR TROPONIN T REFLEX: 12 NG/L
GLOBULIN UR ELPH-MCNC: 3.4 GM/DL
GLUCOSE SERPL-MCNC: 94 MG/DL (ref 65–99)
HCT VFR BLD AUTO: 37.3 % (ref 34–46.6)
HGB BLD-MCNC: 12.2 G/DL (ref 12–15.9)
IMM GRANULOCYTES # BLD AUTO: 0.01 10*3/MM3 (ref 0–0.05)
IMM GRANULOCYTES NFR BLD AUTO: 0.2 % (ref 0–0.5)
LYMPHOCYTES # BLD AUTO: 1.03 10*3/MM3 (ref 0.7–3.1)
LYMPHOCYTES NFR BLD AUTO: 18.5 % (ref 19.6–45.3)
MCH RBC QN AUTO: 31.1 PG (ref 26.6–33)
MCHC RBC AUTO-ENTMCNC: 32.7 G/DL (ref 31.5–35.7)
MCV RBC AUTO: 95.2 FL (ref 79–97)
MONOCYTES # BLD AUTO: 0.57 10*3/MM3 (ref 0.1–0.9)
MONOCYTES NFR BLD AUTO: 10.2 % (ref 5–12)
NEUTROPHILS NFR BLD AUTO: 3.77 10*3/MM3 (ref 1.7–7)
NEUTROPHILS NFR BLD AUTO: 67.6 % (ref 42.7–76)
NRBC BLD AUTO-RTO: 0 /100 WBC (ref 0–0.2)
NT-PROBNP SERPL-MCNC: 2786 PG/ML (ref 0–1800)
PLATELET # BLD AUTO: 177 10*3/MM3 (ref 140–450)
PMV BLD AUTO: 8.7 FL (ref 6–12)
POTASSIUM SERPL-SCNC: 4.5 MMOL/L (ref 3.5–5.2)
PROT SERPL-MCNC: 7.7 G/DL (ref 6–8.5)
QT INTERVAL: 466 MS
QTC INTERVAL: 484 MS
RBC # BLD AUTO: 3.92 10*6/MM3 (ref 3.77–5.28)
SODIUM SERPL-SCNC: 139 MMOL/L (ref 136–145)
TROPONIN T NUMERIC DELTA: 0 NG/L
TROPONIN T SERPL HS-MCNC: 12 NG/L
WBC NRBC COR # BLD AUTO: 5.57 10*3/MM3 (ref 3.4–10.8)

## 2025-05-07 PROCEDURE — 36415 COLL VENOUS BLD VENIPUNCTURE: CPT

## 2025-05-07 PROCEDURE — 93005 ELECTROCARDIOGRAM TRACING: CPT | Performed by: EMERGENCY MEDICINE

## 2025-05-07 PROCEDURE — 71045 X-RAY EXAM CHEST 1 VIEW: CPT

## 2025-05-07 PROCEDURE — 93010 ELECTROCARDIOGRAM REPORT: CPT | Performed by: INTERNAL MEDICINE

## 2025-05-07 PROCEDURE — G0378 HOSPITAL OBSERVATION PER HR: HCPCS

## 2025-05-07 PROCEDURE — 25010000002 FUROSEMIDE PER 20 MG: Performed by: EMERGENCY MEDICINE

## 2025-05-07 PROCEDURE — 84484 ASSAY OF TROPONIN QUANT: CPT | Performed by: EMERGENCY MEDICINE

## 2025-05-07 PROCEDURE — 99285 EMERGENCY DEPT VISIT HI MDM: CPT

## 2025-05-07 PROCEDURE — 85025 COMPLETE CBC W/AUTO DIFF WBC: CPT | Performed by: EMERGENCY MEDICINE

## 2025-05-07 PROCEDURE — 80053 COMPREHEN METABOLIC PANEL: CPT | Performed by: EMERGENCY MEDICINE

## 2025-05-07 PROCEDURE — 83880 ASSAY OF NATRIURETIC PEPTIDE: CPT | Performed by: EMERGENCY MEDICINE

## 2025-05-07 RX ORDER — PANTOPRAZOLE SODIUM 40 MG/1
40 TABLET, DELAYED RELEASE ORAL
Status: DISCONTINUED | OUTPATIENT
Start: 2025-05-08 | End: 2025-05-12 | Stop reason: HOSPADM

## 2025-05-07 RX ORDER — FUROSEMIDE 10 MG/ML
40 INJECTION INTRAMUSCULAR; INTRAVENOUS
Status: DISPENSED | OUTPATIENT
Start: 2025-05-08 | End: 2025-05-09

## 2025-05-07 RX ORDER — SODIUM CHLORIDE 0.9 % (FLUSH) 0.9 %
10 SYRINGE (ML) INJECTION AS NEEDED
Status: DISCONTINUED | OUTPATIENT
Start: 2025-05-07 | End: 2025-05-12 | Stop reason: HOSPADM

## 2025-05-07 RX ORDER — FUROSEMIDE 10 MG/ML
80 INJECTION INTRAMUSCULAR; INTRAVENOUS ONCE
Status: COMPLETED | OUTPATIENT
Start: 2025-05-07 | End: 2025-05-07

## 2025-05-07 RX ORDER — METOPROLOL SUCCINATE 25 MG/1
25 TABLET, EXTENDED RELEASE ORAL
Status: DISCONTINUED | OUTPATIENT
Start: 2025-05-08 | End: 2025-05-12 | Stop reason: HOSPADM

## 2025-05-07 RX ORDER — LIDOCAINE 50 MG/G
1 PATCH TOPICAL EVERY 24 HOURS
COMMUNITY
End: 2025-05-12 | Stop reason: HOSPADM

## 2025-05-07 RX ADMIN — APIXABAN 2.5 MG: 2.5 TABLET, FILM COATED ORAL at 22:43

## 2025-05-07 RX ADMIN — FUROSEMIDE 80 MG: 10 INJECTION, SOLUTION INTRAMUSCULAR; INTRAVENOUS at 19:07

## 2025-05-07 NOTE — PROGRESS NOTES
Clinical Pharmacy Services: Medication History    Mayuri Early is a 89 y.o. female presenting to [facility] for   Chief Complaint   Patient presents with    Shortness of Breath       She  has a past medical history of Fibrocystic breast and Well woman exam (10/20/2020).    Allergies as of 05/07/2025 - Reviewed 05/07/2025   Allergen Reaction Noted    Cortisone Provider Review Needed 12/10/2012    Ezetimibe Provider Review Needed 03/26/2013    Simvastatin Provider Review Needed 03/26/2013    Oxycodone-acetaminophen Itching 08/09/2021    Azithromycin Provider Review Needed 10/18/2023    Hydrocodone-acetaminophen Itching 03/02/2015       Medication information was obtained from: [facility]   Pharmacy and Phone Number:     Prior to Admission Medications       Prescriptions Last Dose Informant Patient Reported? Taking?    acetaminophen (TYLENOL) 325 MG tablet  Other No Yes    Take 2 tablets by mouth Every 6 (Six) Hours As Needed for Mild Pain.    albuterol sulfate  (90 Base) MCG/ACT inhaler  Self, Pharmacy Yes Yes    Inhale 2 puffs Every 4 (Four) Hours As Needed for Wheezing.    apixaban (ELIQUIS) 2.5 MG tablet tablet  Pharmacy No Yes    Take 1 tablet by mouth 2 (Two) Times a Day.    atorvastatin (LIPITOR) 10 MG tablet  Self, Pharmacy Yes Yes    Take 1 tablet by mouth Every Night.    Cetirizine HCl 10 MG capsule  Self No Yes    Take 5 mg by mouth Daily As Needed (allergies).    lidocaine (LIDODERM) 5 %  Pharmacy Yes Yes    Place 1 patch on the skin as directed by provider Daily. Remove & Discard patch within 12 hours or as directed by MD    magnesium oxide (MAG-OX) 400 tablet tablet  Other Yes Yes    Take 1 tablet by mouth Daily.    metoprolol succinate XL (TOPROL-XL) 100 MG 24 hr tablet  Pharmacy No Yes    Take 1 tablet by mouth Daily for 30 days.    multivitamin with minerals (MULTIVITAMIN ADULT PO)  Self Yes Yes    Take 1 tablet by mouth Daily.    Omega-3 Fatty Acids (fish oil) 1000 MG capsule  capsule  Self Yes Yes    Take 1 capsule by mouth Daily With Breakfast.    torsemide (DEMADEX) 20 MG tablet  Pharmacy No Yes    Take 1 tablet by mouth Daily for 30 days.    traZODone (DESYREL) 50 MG tablet  Pharmacy Yes Yes    Take 1 tablet by mouth Every Night.              Medication notes:     This medication list is complete to the best of my knowledge as of 5/7/2025    Please call if questions.    Milena Wilson  Medication History Technician   [phone]    5/7/2025 19:18 EDT

## 2025-05-07 NOTE — ED NOTES
Pt arrives via EMS. States that she went to her PCP office today for increased SOA with exertion. Reports increased swelling with a hx of fluid build-up.

## 2025-05-07 NOTE — ED NOTES
.Nursing report ED to floor  Mayuri Early  89 y.o.  female    HPI :  HPI  Stated Reason for Visit: SOA with exertion,    Chief Complaint  Chief Complaint   Patient presents with    Shortness of Breath       Admitting doctor:   Kike Awad MD    Admitting diagnosis:   The encounter diagnosis was Acute on chronic congestive heart failure, unspecified heart failure type.    Code status:   Current Code Status       Date Active Code Status Order ID Comments User Context       Prior            Allergies:   Cortisone, Ezetimibe, Simvastatin, Oxycodone-acetaminophen, Azithromycin, and Hydrocodone-acetaminophen    Isolation:   No active isolations    Intake and Output  No intake or output data in the 24 hours ending 05/07/25 1907    Weight:   There were no vitals filed for this visit.    Most recent vitals:   Vitals:    05/07/25 1755 05/07/25 1830 05/07/25 1901   BP: 178/83 169/83 (!) 183/90   Patient Position: Lying     Pulse: 80 78 66   Resp: 20 18    Temp: 97 °F (36.1 °C) 97.8 °F (36.6 °C)    TempSrc: Oral Tympanic    SpO2: 100% 97% 97%       Active LDAs/IV Access:   Lines, Drains & Airways       Active LDAs       Name Placement date Placement time Site Days    Peripheral IV 05/07/25 1812 20 G Right Antecubital 05/07/25  1812  Antecubital  less than 1                    Labs (abnormal labs have a star):   Labs Reviewed   COMPREHENSIVE METABOLIC PANEL - Abnormal; Notable for the following components:       Result Value    BUN 24 (*)     Creatinine 1.49 (*)     CO2 30.0 (*)     eGFR 33.2 (*)     All other components within normal limits    Narrative:     GFR Categories in Chronic Kidney Disease (CKD)              GFR Category          GFR (mL/min/1.73)    Interpretation  G1                    90 or greater        Normal or high (1)  G2                    60-89                Mild decrease (1)  G3a                   45-59                Mild to moderate decrease  G3b                   30-44                Moderate to  severe decrease  G4                    15-29                Severe decrease  G5                    14 or less           Kidney failure    (1)In the absence of evidence of kidney disease, neither GFR category G1 or G2 fulfill the criteria for CKD.    eGFR calculation 2021 CKD-EPI creatinine equation, which does not include race as a factor   BNP (IN-HOUSE) - Abnormal; Notable for the following components:    proBNP 2,786.0 (*)     All other components within normal limits    Narrative:     This assay is used as an aid in the diagnosis of individuals suspected of having heart failure. It can be used as an aid in the diagnosis of acute decompensated heart failure (ADHF) in patients presenting with signs and symptoms of ADHF to the emergency department (ED). In addition, NT-proBNP of <300 pg/mL indicates ADHF is not likely.    Age Range Result Interpretation  NT-proBNP Concentration (pg/mL:      <50             Positive            >450                   Gray                 300-450                    Negative             <300    50-75           Positive            >900                  Gray                300-900                  Negative            <300      >75             Positive            >1800                  Gray                300-1800                  Negative            <300   CBC WITH AUTO DIFFERENTIAL - Abnormal; Notable for the following components:    RDW 11.7 (*)     Lymphocyte % 18.5 (*)     All other components within normal limits   TROPONIN - Normal    Narrative:     High Sensitive Troponin T Reference Range:  <14.0 ng/L- Negative Female for AMI  <22.0 ng/L- Negative Male for AMI  >=14 - Abnormal Female indicating possible myocardial injury.  >=22 - Abnormal Male indicating possible myocardial injury.   Clinicians would have to utilize clinical acumen, EKG, Troponin, and serial changes to determine if it is an Acute Myocardial Infarction or myocardial injury due to an underlying chronic  condition.        HIGH SENSITIVITIY TROPONIN T 1HR   CBC AND DIFFERENTIAL    Narrative:     The following orders were created for panel order CBC & Differential.  Procedure                               Abnormality         Status                     ---------                               -----------         ------                     CBC Auto Differential[[id number]]        Abnormal            Final result                 Please view results for these tests on the individual orders.       EKG:   ECG 12 Lead Dyspnea   Preliminary Result   HEART RATE=65  bpm   RR Ioaoguje=828  ms   CO Interval=  ms   P Horizontal Axis=225  deg   P Front Axis=  deg   QRSD Amujgnae=478  ms   QT Wklaeooq=556  ms   XXcS=524  ms   QRS Axis=-74  deg   T Wave Axis=90  deg   - ABNORMAL ECG -   Afib/flutter and ventricular-paced rhythm   No further analysis attempted due to paced rhythm   Date and Time of Study:2025-05-07 18:20:17          Meds given in ED:   Medications   sodium chloride 0.9 % flush 10 mL (has no administration in time range)   furosemide (LASIX) injection 80 mg (80 mg Intravenous Given 5/7/25 1907)       Imaging results:  No radiology results for the last day    Ambulatory status:   - assist x1    Social issues:   Social History     Socioeconomic History    Marital status:    Tobacco Use    Smoking status: Former     Passive exposure: Past    Smokeless tobacco: Never   Vaping Use    Vaping status: Never Used   Substance and Sexual Activity    Alcohol use: Never    Drug use: Never    Sexual activity: Defer       Peripheral Neurovascular  Peripheral Neurovascular (Adult)  Peripheral Neurovascular WDL: .WDL except  Additional Documentation: Edema (Group)  Edema  Edema: leg, left, leg, right  Leg, Left Edema: 2+ (Mild)  Leg, Right Edema: 2+ (Mild)    Neuro Cognitive  Neuro Cognitive (Adult)  Cognitive/Neuro/Behavioral WDL: WDL, orientation  Orientation: oriented x 4    Learning  Learning Assessment  Learning Readiness and  Ability: no barriers identified  Education Provided  Person Taught: patient, family member/friend    Respiratory  Respiratory WDL  Respiratory WDL: .WDL except, all  Rhythm/Pattern, Respiratory: shortness of breath, depth regular, pattern regular, unlabored    Abdominal Pain       Pain Assessments  Pain (Adult)  (0-10) Pain Rating: Rest: 0  (0-10) Pain Rating: Activity: 0    NIH Stroke Scale       Ina Valenzuela RN  05/07/25 19:07 EDT

## 2025-05-07 NOTE — ED PROVIDER NOTES
EMERGENCY DEPARTMENT ENCOUNTER  Room Number:  S423/1  PCP: Gunjan Hidalgo MD  Independent Historians: Patient and EMS      HPI:  Chief Complaint: had concerns including Shortness of Breath.     A complete HPI/ROS/PMH/PSH/SH/FH are unobtainable due to: None    Chronic or social conditions impacting patient care (Social Determinants of Health): None      Context: Mayuri Early is a 89 y.o. female with a medical history of paroxysmal A-fib, chronic kidney disease, anemia, pleural effusion who presents to the ED c/o acute shortness of breath.  The patient reports she saw her primary care doctor and they directed her here for further evaluation.  She reports she has been swelling in her lower extremities for the last few weeks.  She reports development of shortness of breath over the last few weeks.  She does report a cough which is nonproductive.  She denies fevers.  She denies chest pain.  She reports she has developed swelling of her legs.  She denies syncope.  She states she did not take her diuretics today because she had a primary care doctor appointment.  Placed on 2 L nasal cannula oxygen.  She is not typically on oxygen.      Review of prior external notes (non-ED) -and- Review of prior external test results outside of this encounter:  Cardiology note 4/22/2025 notes she was admitted to [facility] and had sick sinus syndrome in 2014.  She had diastolic heart failure with admission in March 2025.  She had tricuspid insufficiency.  She had a large right-sided pleural effusion that required thoracentesis.  She is maintained on Eliquis.    Prescription drug monitoring program review:         PAST MEDICAL HISTORY  Active Ambulatory Problems     Diagnosis Date Noted    Well woman exam 10/20/2020    Closed compression fracture of L1 vertebra, initial encounter 01/04/2024    Compression fracture of T12 vertebra 01/04/2024    PAF (paroxysmal atrial fibrillation) 01/04/2024    Presence of cardiac pacemaker  01/04/2024    HTN (hypertension) 01/04/2024    CKD (chronic kidney disease) 01/04/2024    Chronic anticoagulation 01/04/2024    Chronic back pain 01/04/2024    Anemia 01/04/2024    Asthma 01/04/2024    Hematoma of hip 01/08/2024    Acute blood loss anemia 01/08/2024    Postural dizziness 02/08/2025    Pleural effusion 02/24/2025     Resolved Ambulatory Problems     Diagnosis Date Noted    No Resolved Ambulatory Problems     Past Medical History:   Diagnosis Date    Fibrocystic breast          PAST SURGICAL HISTORY  Past Surgical History:   Procedure Laterality Date    BREAST CYST ASPIRATION      BREAST CYST EXCISION      HYSTERECTOMY           FAMILY HISTORY  No family history on file.      SOCIAL HISTORY  Social History     Socioeconomic History    Marital status:    Tobacco Use    Smoking status: Former     Passive exposure: Past    Smokeless tobacco: Never   Vaping Use    Vaping status: Never Used   Substance and Sexual Activity    Alcohol use: Never    Drug use: Never    Sexual activity: Defer         ALLERGIES  Cortisone, Ezetimibe, Simvastatin, Oxycodone-acetaminophen, Azithromycin, and Hydrocodone-acetaminophen      REVIEW OF SYSTEMS  Review of Systems  Included in HPI  All systems reviewed and negative except for those discussed in HPI.      PHYSICAL EXAM    I have reviewed the triage vital signs and nursing notes.    ED Triage Vitals [05/07/25 1755]   Temp Heart Rate Resp BP SpO2   97 °F (36.1 °C) 80 20 178/83 100 %      Temp src Heart Rate Source Patient Position BP Location FiO2 (%)   Oral Monitor Lying -- --       Physical Exam  GENERAL: Awake, alert, no acute distress  SKIN: Warm, dry  HENT: Normocephalic, atraumatic  EYES: no scleral icterus  CV: Irregular rhythm, irregular rate  RESPIRATORY: normal effort, lungs clear  ABDOMEN: soft, nontender, nondistended  MUSCULOSKELETAL: no deformity, pitting edema to the bilateral lower extremities  NEURO: alert, moves all extremities, follows  commands            LAB RESULTS  Recent Results (from the past 24 hours)   Comprehensive Metabolic Panel    Collection Time: 05/07/25  6:12 PM    Specimen: Blood   Result Value Ref Range    Glucose 94 65 - 99 mg/dL    BUN 24 (H) 8 - 23 mg/dL    Creatinine 1.49 (H) 0.57 - 1.00 mg/dL    Sodium 139 136 - 145 mmol/L    Potassium 4.5 3.5 - 5.2 mmol/L    Chloride 102 98 - 107 mmol/L    CO2 30.0 (H) 22.0 - 29.0 mmol/L    Calcium 9.4 8.2 - 9.6 mg/dL    Total Protein 7.7 6.0 - 8.5 g/dL    Albumin 4.3 3.5 - 5.2 g/dL    ALT (SGPT) 17 1 - 33 U/L    AST (SGOT) 27 1 - 32 U/L    Alkaline Phosphatase 97 39 - 117 U/L    Total Bilirubin 0.4 0.0 - 1.2 mg/dL    Globulin 3.4 gm/dL    A/G Ratio 1.3 g/dL    BUN/Creatinine Ratio 16.1 7.0 - 25.0    Anion Gap 7.0 5.0 - 15.0 mmol/L    eGFR 33.2 (L) >60.0 mL/min/1.73   BNP    Collection Time: 05/07/25  6:12 PM    Specimen: Blood   Result Value Ref Range    proBNP 2,786.0 (H) 0.0 - 1,800.0 pg/mL   High Sensitivity Troponin T    Collection Time: 05/07/25  6:12 PM    Specimen: Blood   Result Value Ref Range    HS Troponin T 12 <14 ng/L   CBC Auto Differential    Collection Time: 05/07/25  6:12 PM    Specimen: Blood   Result Value Ref Range    WBC 5.57 3.40 - 10.80 10*3/mm3    RBC 3.92 3.77 - 5.28 10*6/mm3    Hemoglobin 12.2 12.0 - 15.9 g/dL    Hematocrit 37.3 34.0 - 46.6 %    MCV 95.2 79.0 - 97.0 fL    MCH 31.1 26.6 - 33.0 pg    MCHC 32.7 31.5 - 35.7 g/dL    RDW 11.7 (L) 12.3 - 15.4 %    RDW-SD 39.5 37.0 - 54.0 fl    MPV 8.7 6.0 - 12.0 fL    Platelets 177 140 - 450 10*3/mm3    Neutrophil % 67.6 42.7 - 76.0 %    Lymphocyte % 18.5 (L) 19.6 - 45.3 %    Monocyte % 10.2 5.0 - 12.0 %    Eosinophil % 3.1 0.3 - 6.2 %    Basophil % 0.4 0.0 - 1.5 %    Immature Grans % 0.2 0.0 - 0.5 %    Neutrophils, Absolute 3.77 1.70 - 7.00 10*3/mm3    Lymphocytes, Absolute 1.03 0.70 - 3.10 10*3/mm3    Monocytes, Absolute 0.57 0.10 - 0.90 10*3/mm3    Eosinophils, Absolute 0.17 0.00 - 0.40 10*3/mm3    Basophils, Absolute  0.02 0.00 - 0.20 10*3/mm3    Immature Grans, Absolute 0.01 0.00 - 0.05 10*3/mm3    nRBC 0.0 0.0 - 0.2 /100 WBC   ECG 12 Lead Dyspnea    Collection Time: 05/07/25  6:20 PM   Result Value Ref Range    QT Interval 466 ms    QTC Interval 484 ms   High Sensitivity Troponin T 1Hr    Collection Time: 05/07/25  7:17 PM    Specimen: Blood   Result Value Ref Range    HS Troponin T 12 <14 ng/L    Troponin T Numeric Delta 0 Abnormal if >/=3 ng/L         RADIOLOGY  XR Chest 1 View  Result Date: 5/7/2025  CXR ONE VIEW  HISTORY: soa; I50.9-Heart failure, unspecified  COMPARISON: 2/24/2025  TECHNIQUE: single portable AP       Dual-lead left subclavian transvenous pacemaker in place.  Redemonstrated cardiomegaly.  The left lung is normally aerated.  Blunting of the right lateral costophrenic angle, probable small effusion, unchanged. Right lung is otherwise normally aerated.  This report was finalized on 5/7/2025 7:36 PM by Dr. Anthony Turcios M.D on Workstation: [id number]          MEDICATIONS GIVEN IN ER  Medications   sodium chloride 0.9 % flush 10 mL (has no administration in time range)   furosemide (LASIX) injection 80 mg (80 mg Intravenous Given 5/7/25 1907)         ORDERS PLACED DURING THIS VISIT:  Orders Placed This Encounter   Procedures    XR Chest 1 View    Comprehensive Metabolic Panel    BNP    High Sensitivity Troponin T    CBC Auto Differential    High Sensitivity Troponin T 1Hr    ECG 12 Lead Dyspnea    Insert Peripheral IV    Initiate Observation Status    CBC & Differential         OUTPATIENT MEDICATION MANAGEMENT:  Current Facility-Administered Medications Ordered in Epic   Medication Dose Route Frequency Provider Last Rate Last Admin    sodium chloride 0.9 % flush 10 mL  10 mL Intravenous PRN Peewee Martines MD         No current [facility]-ordered outpatient medications on file.         PROCEDURES  Procedures            PROGRESS, DATA ANALYSIS, CONSULTS, AND MEDICAL DECISION MAKING  All labs have been  independently interpreted by me.  All radiology studies have been reviewed by me. All EKG's have been independently viewed and interpreted by me.  Discussion below represents my analysis of pertinent findings related to patient's condition, differential diagnosis, treatment plan and final disposition.    Differential diagnosis includes but is not limited to CHF, pneumonia, pleural effusion, A-fib, acute coronary syndrome, acute aortic syndrome.    Clinical Scores:                                       ED Course as of 05/07/25 2120   Wed May 07, 2025   1826 EKG PROCEDURE    EKG time: 1820  Rhythm/Rate: Atrial flutter, rate 65 with some paced complexes  P waves and ME: Flutter waves  QRS, axis: Not identified due to paced complexes  ST and T waves: Not identified due to paced complexes    Independently Interpreted by me  Not significantly changed compared to prior 2/28/2025   [TR]   1827 Hemoglobin: 12.2 [TR]   1855 proBNP(!): 2,786.0 [TR]   1855 HS Troponin T: 12 [TR]   1855 Creatinine(!): 1.49 [TR]   1855 XR Chest 1 View  My independent interpretation of the imaging study is a small right pleural effusion [TR]   1902 Discussing with Dr. Awad with [facility].  He agrees to admit.  The patient appears to have a CHF exacerbation with edema to the legs, hypoxia, elevated BNP.  I have initiated diuresis. [TR]   1906 I reviewed the workup and findings with the patient and family.  They are agreeable to admission. [TR]      ED Course User Index  [TR] Peewee Martines MD             AS OF 21:20 EDT VITALS:    BP - 166/76  HR - 65  TEMP - 97.8 °F (36.6 °C) (Tympanic)  O2 SATS - 95%    COMPLEXITY OF CARE  The patient requires admission.      DIAGNOSIS  Final diagnoses:   Acute on chronic congestive heart failure, unspecified heart failure type         DISPOSITION  ED Disposition       ED Disposition   Decision to Admit    Condition   --    Comment   Level of Care: Telemetry [5]   Diagnosis: Acute exacerbation of CHF (congestive  heart failure) [402651]   Admitting Physician: HE OSWALD [[id number]]   Attending Physician: HE OSWALD [[id number]]   Is patient appropriate for Inpatient Observation Unit?: No [0]                  Please note that portions of this document were completed with a voice recognition program.    Note Disclaimer: At [facility], we believe that sharing information builds trust and better relationships. You are receiving this note because you recently visited [facility]. It is possible you will see health information before a provider has talked with you about it. This kind of information can be easy to misunderstand. To help you fully understand what it means for your health, we urge you to discuss this note with your provider.         Peewee Martines MD  05/07/25 1800       Peewee Martines MD  05/07/25 7997

## 2025-05-08 LAB
ANION GAP SERPL CALCULATED.3IONS-SCNC: 10 MMOL/L (ref 5–15)
BASOPHILS # BLD AUTO: 0.02 10*3/MM3 (ref 0–0.2)
BASOPHILS NFR BLD AUTO: 0.3 % (ref 0–1.5)
BUN SERPL-MCNC: 26 MG/DL (ref 8–23)
BUN/CREAT SERPL: 22.8 (ref 7–25)
CALCIUM SPEC-SCNC: 8.9 MG/DL (ref 8.2–9.6)
CHLORIDE SERPL-SCNC: 101 MMOL/L (ref 98–107)
CO2 SERPL-SCNC: 28 MMOL/L (ref 22–29)
CREAT SERPL-MCNC: 1.14 MG/DL (ref 0.57–1)
DEPRECATED RDW RBC AUTO: 42.7 FL (ref 37–54)
EGFRCR SERPLBLD CKD-EPI 2021: 45.8 ML/MIN/1.73
EOSINOPHIL # BLD AUTO: 0.18 10*3/MM3 (ref 0–0.4)
EOSINOPHIL NFR BLD AUTO: 3.1 % (ref 0.3–6.2)
ERYTHROCYTE [DISTWIDTH] IN BLOOD BY AUTOMATED COUNT: 12.1 % (ref 12.3–15.4)
GLUCOSE SERPL-MCNC: 72 MG/DL (ref 65–99)
HCT VFR BLD AUTO: 37.3 % (ref 34–46.6)
HGB BLD-MCNC: 12.1 G/DL (ref 12–15.9)
IMM GRANULOCYTES # BLD AUTO: 0.01 10*3/MM3 (ref 0–0.05)
IMM GRANULOCYTES NFR BLD AUTO: 0.2 % (ref 0–0.5)
LYMPHOCYTES # BLD AUTO: 1.2 10*3/MM3 (ref 0.7–3.1)
LYMPHOCYTES NFR BLD AUTO: 20.9 % (ref 19.6–45.3)
MCH RBC QN AUTO: 31.3 PG (ref 26.6–33)
MCHC RBC AUTO-ENTMCNC: 32.4 G/DL (ref 31.5–35.7)
MCV RBC AUTO: 96.6 FL (ref 79–97)
MONOCYTES # BLD AUTO: 0.74 10*3/MM3 (ref 0.1–0.9)
MONOCYTES NFR BLD AUTO: 12.9 % (ref 5–12)
NEUTROPHILS NFR BLD AUTO: 3.58 10*3/MM3 (ref 1.7–7)
NEUTROPHILS NFR BLD AUTO: 62.6 % (ref 42.7–76)
NRBC BLD AUTO-RTO: 0 /100 WBC (ref 0–0.2)
PLATELET # BLD AUTO: 171 10*3/MM3 (ref 140–450)
PMV BLD AUTO: 9.3 FL (ref 6–12)
POTASSIUM SERPL-SCNC: 3.8 MMOL/L (ref 3.5–5.2)
RBC # BLD AUTO: 3.86 10*6/MM3 (ref 3.77–5.28)
SODIUM SERPL-SCNC: 139 MMOL/L (ref 136–145)
WBC NRBC COR # BLD AUTO: 5.73 10*3/MM3 (ref 3.4–10.8)

## 2025-05-08 PROCEDURE — 80048 BASIC METABOLIC PNL TOTAL CA: CPT | Performed by: HOSPITALIST

## 2025-05-08 PROCEDURE — 25010000002 FUROSEMIDE PER 20 MG: Performed by: HOSPITALIST

## 2025-05-08 PROCEDURE — 97161 PT EVAL LOW COMPLEX 20 MIN: CPT

## 2025-05-08 PROCEDURE — 85025 COMPLETE CBC W/AUTO DIFF WBC: CPT | Performed by: HOSPITALIST

## 2025-05-08 PROCEDURE — G0378 HOSPITAL OBSERVATION PER HR: HCPCS

## 2025-05-08 PROCEDURE — 25010000002 ONDANSETRON PER 1 MG: Performed by: HOSPITALIST

## 2025-05-08 PROCEDURE — 99214 OFFICE O/P EST MOD 30 MIN: CPT | Performed by: INTERNAL MEDICINE

## 2025-05-08 PROCEDURE — 97530 THERAPEUTIC ACTIVITIES: CPT

## 2025-05-08 RX ORDER — ONDANSETRON 2 MG/ML
4 INJECTION INTRAMUSCULAR; INTRAVENOUS EVERY 4 HOURS PRN
Status: DISCONTINUED | OUTPATIENT
Start: 2025-05-08 | End: 2025-05-12 | Stop reason: HOSPADM

## 2025-05-08 RX ORDER — ATORVASTATIN CALCIUM 10 MG/1
10 TABLET, FILM COATED ORAL NIGHTLY
Status: DISCONTINUED | OUTPATIENT
Start: 2025-05-08 | End: 2025-05-12 | Stop reason: HOSPADM

## 2025-05-08 RX ORDER — ALBUTEROL SULFATE 0.83 MG/ML
2.5 SOLUTION RESPIRATORY (INHALATION) EVERY 6 HOURS PRN
Status: DISCONTINUED | OUTPATIENT
Start: 2025-05-08 | End: 2025-05-12 | Stop reason: HOSPADM

## 2025-05-08 RX ORDER — ACETAMINOPHEN 325 MG/1
650 TABLET ORAL EVERY 6 HOURS PRN
Status: DISCONTINUED | OUTPATIENT
Start: 2025-05-08 | End: 2025-05-12 | Stop reason: HOSPADM

## 2025-05-08 RX ORDER — TRAZODONE HYDROCHLORIDE 50 MG/1
50 TABLET ORAL NIGHTLY
Status: DISCONTINUED | OUTPATIENT
Start: 2025-05-08 | End: 2025-05-12 | Stop reason: HOSPADM

## 2025-05-08 RX ORDER — MULTIPLE VITAMINS W/ MINERALS TAB 9MG-400MCG
1 TAB ORAL DAILY
Status: DISCONTINUED | OUTPATIENT
Start: 2025-05-08 | End: 2025-05-12 | Stop reason: HOSPADM

## 2025-05-08 RX ORDER — CETIRIZINE HYDROCHLORIDE 10 MG/1
5 TABLET ORAL DAILY
Status: DISCONTINUED | OUTPATIENT
Start: 2025-05-08 | End: 2025-05-12 | Stop reason: HOSPADM

## 2025-05-08 RX ADMIN — MAGNESIUM OXIDE TAB 400 MG (240 MG ELEMENTAL MG) 400 MG: 400 (240 MG) TAB at 11:29

## 2025-05-08 RX ADMIN — ATORVASTATIN CALCIUM 10 MG: 10 TABLET ORAL at 21:25

## 2025-05-08 RX ADMIN — ONDANSETRON 4 MG: 2 INJECTION, SOLUTION INTRAMUSCULAR; INTRAVENOUS at 12:55

## 2025-05-08 RX ADMIN — Medication 1 TABLET: at 11:29

## 2025-05-08 RX ADMIN — FUROSEMIDE 40 MG: 10 INJECTION, SOLUTION INTRAMUSCULAR; INTRAVENOUS at 18:14

## 2025-05-08 RX ADMIN — TRAZODONE HYDROCHLORIDE 50 MG: 50 TABLET ORAL at 21:25

## 2025-05-08 RX ADMIN — CETIRIZINE HYDROCHLORIDE 5 MG: 10 TABLET, FILM COATED ORAL at 11:29

## 2025-05-08 RX ADMIN — FUROSEMIDE 40 MG: 10 INJECTION, SOLUTION INTRAMUSCULAR; INTRAVENOUS at 06:30

## 2025-05-08 RX ADMIN — PANTOPRAZOLE SODIUM 40 MG: 40 TABLET, DELAYED RELEASE ORAL at 06:30

## 2025-05-08 RX ADMIN — METOPROLOL SUCCINATE 25 MG: 25 TABLET, EXTENDED RELEASE ORAL at 09:06

## 2025-05-08 RX ADMIN — APIXABAN 2.5 MG: 2.5 TABLET, FILM COATED ORAL at 21:25

## 2025-05-08 RX ADMIN — APIXABAN 2.5 MG: 2.5 TABLET, FILM COATED ORAL at 09:06

## 2025-05-08 NOTE — CONSULTS
[facility]  Cardiology Consult Note    Patient Identification:  Name: Mayuri Early  Age: 89 y.o.  Sex: female  :  1935  MRN: [id number]             Requesting Physician: Dr Awad    Reason for Consultation / Chief Complaint: CHF    History of Present Illness:     This is a 89 year old female who is known with our service with persistent atrial fibrillation, SSS s/p pacemaker, hypertension, hyperlipidemia, CKD, who follows with [facility] cardiology Dr Nelson. She was recently admitted with CHF and diuresed. She had large right pleural effusion that required thoracentesis. She followed up with Dr Nelson on 25 with plans for conservative management.     She presented to [facility] ER with shortness of breath, LE swelling after presenting to her PCP and advised to go to ER. Work up in ER with CXR with small right pleural effusion. HS troponin 12 x2. BNP 2786. Cr 1.49. ECG afib, vpaced, unchanged from previous. She was treated with IV lasix and admitted for further management.     Echo 25 EF 64%, indeterminate LV diastolic function. Moderate MR, severe TR, moderate biatrial enlargement.       Comorbid cardiac risk factors:     Past Medical History:  Past Medical History:   Diagnosis Date    Fibrocystic breast     Well woman exam 10/20/2020     Past Surgical History:  Past Surgical History:   Procedure Laterality Date    BREAST CYST ASPIRATION      BREAST CYST EXCISION      HYSTERECTOMY        Allergies:  Allergies   Allergen Reactions    Cortisone Provider Review Needed     Elevates BP    Ezetimibe Provider Review Needed    Simvastatin Provider Review Needed    Oxycodone-Acetaminophen Itching    Azithromycin Provider Review Needed    Hydrocodone-Acetaminophen Itching     Home Meds:  Medications Prior to Admission   Medication Sig Dispense Refill Last Dose/Taking    acetaminophen (TYLENOL) 325 MG tablet Take 2 tablets by mouth Every 6 (Six) Hours As Needed for Mild Pain.   Taking As Needed     albuterol sulfate  (90 Base) MCG/ACT inhaler Inhale 2 puffs Every 4 (Four) Hours As Needed for Wheezing.   Taking As Needed    apixaban (ELIQUIS) 2.5 MG tablet tablet Take 1 tablet by mouth 2 (Two) Times a Day.   Taking    atorvastatin (LIPITOR) 10 MG tablet Take 1 tablet by mouth Every Night.   Taking    Cetirizine HCl 10 MG capsule Take 5 mg by mouth Daily As Needed (allergies).   Taking As Needed    lidocaine (LIDODERM) 5 % Place 1 patch on the skin as directed by provider Daily. Remove & Discard patch within 12 hours or as directed by MD   Taking    magnesium oxide (MAG-OX) 400 tablet tablet Take 1 tablet by mouth Daily.   Taking    metoprolol succinate XL (TOPROL-XL) 100 MG 24 hr tablet Take 1 tablet by mouth Daily for 30 days. 30 tablet 0 Taking    multivitamin with minerals (MULTIVITAMIN ADULT PO) Take 1 tablet by mouth Daily.   Taking    Omega-3 Fatty Acids (fish oil) 1000 MG capsule capsule Take 1 capsule by mouth Daily With Breakfast.   Taking    torsemide (DEMADEX) 20 MG tablet Take 1 tablet by mouth Daily for 30 days. 30 tablet 0 Taking    traZODone (DESYREL) 50 MG tablet Take 1 tablet by mouth Every Night.   Taking     Current Meds:   [unfilled]  Social History:   Social History     Tobacco Use    Smoking status: Former     Passive exposure: Past    Smokeless tobacco: Never   Substance Use Topics    Alcohol use: Never      Family History:  History reviewed. No pertinent family history.     Review of Systems  Constitutional: No wt loss, fever   Gastrointestinal: No nausea , abdominal pain  Behavioral/Psych: No insomnia or anxiety   Cardiovascular ----positive for shortness of breath. All other systems reviewed and are negative                                    Constitutional:  Temp:  [97 °F (36.1 °C)-97.8 °F (36.6 °C)] 97.7 °F (36.5 °C)  Heart Rate:  [62-80] 68  Resp:  [18-20] 18  BP: (138-183)/(71-90) 150/73    /89 (BP Location: Left arm, Patient Position: Lying)   Pulse 67   Temp 97.4 °F  (36.3 °C) (Oral)   Resp 18   Wt 63.5 kg (140 lb)   SpO2 (!) 88%   BMI 26.45 kg/m²   General appearance: No acute changes   Neck: Trachea midline; NECK, supple, no thyromegaly or lymphadenopathy   Lungs: Normal size and shape, normal breath sounds, equal distribution of air, no rales and rhonchi   CV: S1-S2 irregular, sys murmurs, no rub, no gallop   Abdomen: Soft, nontender; no masses , no abnormal abdominal sounds   Extremities: No deformity , normal color , no peripheral edema   Skin: Normal temperature, turgor and texture; no rash, ulcers          Cardiographics  ECG:     Telemetry:    Echocardiogram:     Interpretation Summary         Left ventricular systolic function is normal. Calculated left ventricular EF = 64.3%    Left ventricular diastolic function was indeterminate in the setting of atrial fibrillation.    There is mild, bileaflet mitral valve thickening present.    Moderate mitral valve regurgitation is present.    Severe tricuspid valve regurgitation is present.    Estimated right ventricular systolic pressure from tricuspid regurgitation is normal (<35 mmHg) though this may be underestimated due to severe TR.    Moderate biatrial enlargement on manual quantitation, nor mall IVC size.    Pacemaker lead noted.     Imaging  Chest X-ray:     Lab Review   Results from last 7 days   Lab Units 05/07/25  1917 05/07/25  1812   HSTROP T ng/L 12 12         Results from last 7 days   Lab Units 05/08/25  0320   SODIUM mmol/L 139   POTASSIUM mmol/L 3.8   BUN mg/dL 26*   CREATININE mg/dL 1.14*   CALCIUM mg/dL 8.9     @[id number]@  Results from last 7 days   Lab Units 05/08/25  0320 05/07/25  1812   WBC 10*3/mm3 5.73 5.57   HEMOGLOBIN g/dL 12.1 12.2   HEMATOCRIT % 37.3 37.3   PLATELETS 10*3/mm3 171 177             Assessment:  Shortness of breath  Congestive heart failure  Valvular heart disease    Recommendations / Plan:   89-year-old very well-known to us sick sinus syndrome atrial fibrillation pacemaker  hypertension has been readmitted with worsening of shortness of breath secondary to valvular heart disease    Labs/tests ordered for jeffry Perry MD  5/8/2025, 12:55 EDT      EMR Dragon/Transcription:   Dictated utilizing Dragon dictation

## 2025-05-08 NOTE — PLAN OF CARE
Goal Outcome Evaluation:  Plan of Care Reviewed With: patient         Pt admit due to onset of BLE edema with soa during ambulation. She was dx with acute CHF exacerbation. Pt reported indep amb community distances  without AD. She has her husbands old rwx at home but it may be to tall. She drives. Pt has hx of compression fx 2024 L1, PM, afib, CKD, anemia and chronic back pain. Hip hematoma 2024, dizziness. Pt lives alone and has one YA. Pt moved to eob with sba. STS with cga but required min/cga after several steps due to unsteadiness noted. Rwx place and pt was able to amb 150' with cga. Improved balance with rwx but mild soa shuffle pattern. Uic following PT. Pt goal is indep amb with least AD in order to transfer to home at d/c. Recommend HHPT if balance issues remain. Pt jacquie ankle DF to neutral only . Pt educated on need for improved DF , balance activities. Pt will likely benefit from cont skilled PT to address general weakness, impaired balance with transfers and amb. She may need a 6 minute walk test due to soa noted.        Anticipated Discharge Disposition (PT): home, home with home health

## 2025-05-08 NOTE — PLAN OF CARE
Goal Outcome Evaluation:  Plan of Care Reviewed With: patient   Pt is being treated for acute exacerbation of CHF. Briefly in and out of Afib and Aflutter on the monitor. Complained of nausea. PRN Zofran ordered and given x1. A/Ox4. On room air. Responding well to IV lasix. Cardiology has been consulted. Care plan ongoing.      Progress: no change

## 2025-05-08 NOTE — THERAPY EVALUATION
Patient Name: Mayuri Early  : 1935    MRN: [id number]                              Today's Date: 2025       Admit Date: 2025    Visit Dx:     ICD-10-CM ICD-9-CM   1. Acute on chronic congestive heart failure, unspecified heart failure type  I50.9 428.0     Patient Active Problem List   Diagnosis    Well woman exam    Closed compression fracture of L1 vertebra, initial encounter    Compression fracture of T12 vertebra    PAF (paroxysmal atrial fibrillation)    Presence of cardiac pacemaker    HTN (hypertension)    CKD (chronic kidney disease)    Chronic anticoagulation    Chronic back pain    Anemia    Asthma    Hematoma of hip    Acute blood loss anemia    Postural dizziness    Pleural effusion    Acute exacerbation of CHF (congestive heart failure)     Past Medical History:   Diagnosis Date    Fibrocystic breast     Well woman exam 10/20/2020     Past Surgical History:   Procedure Laterality Date    BREAST CYST ASPIRATION      BREAST CYST EXCISION      HYSTERECTOMY        General Information       Row Name 25 0840          Physical Therapy Time and Intention    Document Type evaluation  -SV     Mode of Treatment individual therapy;physical therapy  -SV       Row Name 25 0840          General Information    Patient Profile Reviewed yes  -SV     Prior Level of Function independent:;all household mobility  -SV     Existing Precautions/Restrictions fall;oxygen therapy device and L/min  -SV       Row Name 25 0840          Living Environment    Current Living Arrangements home  -SV     People in Home alone  -SV       Row Name 25 0840          Home Main Entrance    Number of Stairs, Main Entrance one  -SV       Row Name 25 0840          Cognition    Orientation Status (Cognition) oriented x 3  -SV       Row Name 25 0840          Safety Issues/Impairments Affecting Functional Mobility    Impairments Affecting Function (Mobility) balance;shortness of breath;range of  motion (ROM)  -               User Key  (r) = Recorded By, (t) = Taken By, (c) = Cosigned By      Initials Name Provider Type    SV Kym Canseco, PT Physical Therapist                   Mobility       Row Name 05/08/25 1154          Bed Mobility    Bed Mobility supine-sit  -SV     Supine-Sit [place] (Bed Mobility) standby assist  -     Assistive Device (Bed Mobility) bed rails;head of bed elevated  -       Row Name 05/08/25 1154          Sit-Stand Transfer    Sit-Stand [place] (Transfers) contact guard  -[facility] Name 05/08/25 1154          Gait/Stairs (Locomotion)    [place] Level (Gait) contact guard;minimum assist (75% patient effort)  -     Assistive Device (Gait) walker, front-wheeled  -     Distance in Feet (Gait) 150  Pt unsteady without AD today, cga/min HHA then cga with rwx  -               User Key  (r) = Recorded By, (t) = Taken By, (c) = Cosigned By      Initials Name Provider Type    SV Kym Canseco, PT Physical Therapist                   Obj/Interventions       Row Name 05/08/25 1155          Range of Motion Comprehensive    General Range of Motion no range of motion deficits identified  -     Comment, General Range of Motion except jacquie ankle DF to neutral only  -       Row Name 05/08/25 1155          Strength Comprehensive (MMT)    Comment, General Manual Muscle Testing (MMT) Assessment BUE/BLE > 3/5 except jacquie ankle DF 3-/5  -[facility] Name 05/08/25 1155          Motor Skills    Therapeutic Exercise --  Pt encouraged to perform PF stretching supine and laq with df in sitting for hamstring and Achilles stretching  -       Row Name 05/08/25 1155          Balance    Balance Assessment sitting static balance;standing static balance  -SV     Static Sitting Balance standby assist;supervision  -SV     Static Standing Balance contact guard  -       Row Name 05/08/25 1155          Sensory Assessment (Somatosensory)    Sensory Assessment (Somatosensory) not  tested  -SV               User Key  (r) = Recorded By, (t) = Taken By, (c) = Cosigned By      Initials Name Provider Type    SV Kym Canseco, PT Physical Therapist                   Goals/Plan       Row Name 05/08/25 1158          Transfer Goal 1 (PT)    Activity/Assistive Device (Transfer Goal 1, PT) sit-to-stand/stand-to-sit  -SV     [place] Level/Cues Needed (Transfer Goal 1, PT) independent  -SV     Time Frame (Transfer Goal 1, PT) 10 days  -SV     Strategies/Barriers (Transfers Goal 1, PT) least vs no AD  -SV       Row Name 05/08/25 1158          Gait Training Goal 1 (PT)    Activity/Assistive Device (Gait Training Goal 1, PT) gait (walking locomotion)  -SV     [place] Level (Gait Training Goal 1, PT) independent  -SV     Distance (Gait Training Goal 1, PT) 200' with least vs no AD  -SV     Time Frame (Gait Training Goal 1, PT) 10 days  -SV       Row Name 05/08/25 1158          ROM Goal 1 (PT)    ROM Goal 1 (PT) jacquie ankle DF > neutral  -SV     Time Frame (ROM Goal 1, PT) 10 days  -SV       [facility] Name 05/08/25 1158          Balance Goal 1 (PT)    Activity/Assistive Device (Balance Goal) standing dynamic balance  -SV     [place] Level/Cues Needed (Balance Goal 1, PT) independent  -SV     Time Frame (Balance Goal 1, PT) 2 weeks  -SV       Row Name 05/08/25 1158          Therapy Assessment/Plan (PT)    Planned Therapy Interventions (PT) balance training;gait training;home exercise program;patient/family education;stretching;strengthening;stair training;ROM (range of motion);transfer training  -SV               User Key  (r) = Recorded By, (t) = Taken By, (c) = Cosigned By      Initials Name Provider Type    SV Kym Casneco, PT Physical Therapist                   Clinical Impression       Row Name 05/08/25 1156          Pain    Pretreatment Pain Rating 0/10 - no pain  -SV     Posttreatment Pain Rating 0/10 - no pain  -SV       Row Name 05/08/25 1156          Plan of Care Review    Plan of Care  Reviewed With patient  -SV       Row Name 05/08/25 1156          Therapy Assessment/Plan (PT)    Patient/Family Therapy Goals Statement (PT) indep amb with least vs no AD  -SV     Rehab Potential (PT) good  -SV     Criteria for Skilled Interventions Met (PT) yes  -SV     Therapy Frequency (PT) 6 times/wk  -SV     Predicted Duration of Therapy Intervention (PT) 1-2 weeks  -SV       Row Name 05/08/25 1156          Vital Signs    Pre SpO2 (%) 98  -SV     O2 Delivery Pre Treatment room air  -SV     O2 Delivery Intra Treatment room air  -SV     Post SpO2 (%) 93  -SV     O2 Delivery Post Treatment room air  -SV     Pre Patient Position Supine  -SV     Intra Patient Position Standing  -SV     Post Patient Position Sitting  -SV       Row Name 05/08/25 1156          Positioning and Restraints    Pre-Treatment Position in bed  -SV     Post Treatment Position chair  -SV     In Chair reclined;call light within reach;encouraged to call for assist  nsg to find alarm box  -SV               User Key  (r) = Recorded By, (t) = Taken By, (c) = Cosigned By      Initials Name Provider Type    Kym [place]vlale, PT Physical Therapist                   Outcome Measures       Row Name 05/08/25 1159          How much help from another person do you currently need...    Turning from your back to your side while in flat bed without using bedrails? 4  -SV     Moving from lying on back to sitting on the side of a flat bed without bedrails? 4  -SV     Moving to and from a bed to a chair (including a wheelchair)? 3  -SV     Standing up from a chair using your arms (e.g., wheelchair, bedside chair)? 3  -SV     Climbing 3-5 steps with a railing? 3  -SV     To walk in hospital room? 3  -SV     AM-PAC 6 Clicks Score (PT) 20  -SV               User Key  (r) = Recorded By, (t) = Taken By, (c) = Cosigned By      Initials Name Provider Type    Kym Ovalle, PT Physical Therapist                                 Physical Therapy Education        Title: PT OT SLP Therapies (Done)       Topic: Physical Therapy (Done)       Point: Mobility training (Done)       Learning Progress Summary            Patient Eager, E, VU,NR by  at 5/8/2025 1159                      Point: Home exercise program (Done)       Learning Progress Summary            Patient Eager, E, VU,NR by  at 5/8/2025 1159                                      User Key       Initials Effective Dates Name Provider Type Discipline     07/11/23 -  Kym Canseco, PT Physical Therapist PT                  PT Recommendation and Plan  Planned Therapy Interventions (PT): balance training, gait training, home exercise program, patient/family education, stretching, strengthening, stair training, ROM (range of motion), transfer training        Time Calculation:         PT Charges       Row Name 05/08/25 1206             Time Calculation    Start Time 0843  -      Stop Time 0906  -      Time Calculation (min) 23 min  -      PT Received On 05/08/25  -      PT - Next Appointment 05/09/25  -      PT Goal Re-Cert Due Date 05/18/25  -                User Key  (r) = Recorded By, (t) = Taken By, (c) = Cosigned By      Initials Name Provider Type     Kym Canseco, PT Physical Therapist                  Therapy Charges for Today       Code Description Service Date Service Provider Modifiers Qty    82374550437 HC PT EVAL LOW COMPLEXITY 2 5/8/2025 Kym Canseco, PT GP 1    53860816066 HC PT THERAPEUTIC ACT EA 15 MIN 5/8/2025 Kym Canseco, PT GP 1            PT G-Codes  AM-PAC 6 Clicks Score (PT): 20  PT Discharge Summary  Anticipated Discharge Disposition (PT): home, home with home health    Kym Canseco PT  5/8/2025

## 2025-05-08 NOTE — H&P
History and physical    Primary care physician  Dr. Hidalgo    Chief complaint  Shortness of breath    History of present illness  89-year-old white female with history of congestive heart failure valvular heart disease atrial fibrillation hypertension hyperlipidemia and chronic kidney disease stage IIIa who is well-known to our service presented to [facility] emergency room with increased shortness of breath for last few days which is getting worse.  Patient also noticed increased leg swelling.  Patient denies any chest pain palpitation abdominal pain nausea vomiting diarrhea.  Patient does have nonproductive cough but no fever congestion night sweats.  Patient evaluated in ER found to have acute on chronic diastolic congestive heart failure admitted for management.    PAST MEDICAL HISTORY            Diagnosis Date Noted    Well woman exam 10/20/2020    Closed compression fracture of L1 vertebra, initial encounter 01/04/2024    Compression fracture of T12 vertebra 01/04/2024    PAF (paroxysmal atrial fibrillation) 01/04/2024    Presence of cardiac pacemaker 01/04/2024    HTN (hypertension) 01/04/2024    CKD (chronic kidney disease) 01/04/2024    Chronic anticoagulation 01/04/2024    Chronic back pain 01/04/2024    Anemia 01/04/2024    Asthma 01/04/2024    Hematoma of hip 01/08/2024    Acute blood loss anemia 01/08/2024    Postural dizziness 02/08/2025    Pleural effusion 02/24/2025     PAST SURGICAL HISTORY              Procedure Laterality Date    BREAST CYST ASPIRATION        BREAST CYST EXCISION        HYSTERECTOMY             FAMILY HISTORY  No family history on file.     SOCIAL HISTORY                 Socioeconomic History    Marital status:    Tobacco Use    Smoking status: Former       Passive exposure: Past    Smokeless tobacco: Never   Vaping Use    Vaping status: Never Used   Substance and Sexual Activity    Alcohol use: Never    Drug use: Never    Sexual activity: Defer          ALLERGIES  Cortisone, Ezetimibe, Simvastatin, Oxycodone-acetaminophen, Azithromycin, and Hydrocodone-acetaminophen  Home medications reviewed     REVIEW OF SYSTEMS  All systems reviewed and negative except for those discussed in HPI.     PHYSICAL EXAM   Blood pressure 150/73, pulse 68, temperature 97.7 °F (36.5 °C), temperature source Oral, resp. rate 18, SpO2 98%.    GENERAL: Awake, alert, no acute distress  SKIN: Warm, dry  HENT: Normocephalic, atraumatic  EYES: no scleral icterus  CV: Irregular rhythm, irregular rate  RESPIRATORY: normal effort, decreased breath sound at bases  ABDOMEN: soft, nontender, nondistended bowel sounds positive  MUSCULOSKELETAL: no deformity, pitting edema to the bilateral lower extremities  NEURO: alert, moves all extremities, follows commands     LAB RESULTS  Lab Results (last 24 hours)       Procedure Component Value Units Date/Time    Basic Metabolic Panel [[id number]]  (Abnormal) Collected: 05/08/25 0320    Specimen: Blood Updated: 05/08/25 0436     Glucose 72 mg/dL      BUN 26 mg/dL      Creatinine 1.14 mg/dL      Sodium 139 mmol/L      Potassium 3.8 mmol/L      Chloride 101 mmol/L      CO2 28.0 mmol/L      Calcium 8.9 mg/dL      BUN/Creatinine Ratio 22.8     Anion Gap 10.0 mmol/L      eGFR 45.8 mL/min/1.73     Narrative:      GFR Categories in Chronic Kidney Disease (CKD)              GFR Category          GFR (mL/min/1.73)    Interpretation  G1                    90 or greater        Normal or high (1)  G2                    60-89                Mild decrease (1)  G3a                   45-59                Mild to moderate decrease  G3b                   30-44                Moderate to severe decrease  G4                    15-29                Severe decrease  G5                    14 or less           Kidney failure    (1)In the absence of evidence of kidney disease, neither GFR category G1 or G2 fulfill the criteria for CKD.    eGFR calculation 2021 CKD-EPI creatinine  equation, which does not include race as a factor    CBC & Differential [[id number]]  (Abnormal) Collected: 05/08/25 0320    Specimen: Blood Updated: 05/08/25 0416    Narrative:      The following orders were created for panel order CBC & Differential.  Procedure                               Abnormality         Status                     ---------                               -----------         ------                     CBC Auto Differential[[id number]]        Abnormal            Final result                 Please view results for these tests on the individual orders.    CBC Auto Differential [[id number]]  (Abnormal) Collected: 05/08/25 0320    Specimen: Blood Updated: 05/08/25 0416     WBC 5.73 10*3/mm3      RBC 3.86 10*6/mm3      Hemoglobin 12.1 g/dL      Hematocrit 37.3 %      MCV 96.6 fL      MCH 31.3 pg      MCHC 32.4 g/dL      RDW 12.1 %      RDW-SD 42.7 fl      MPV 9.3 fL      Platelets 171 10*3/mm3      Neutrophil % 62.6 %      Lymphocyte % 20.9 %      Monocyte % 12.9 %      Eosinophil % 3.1 %      Basophil % 0.3 %      Immature Grans % 0.2 %      Neutrophils, Absolute 3.58 10*3/mm3      Lymphocytes, Absolute 1.20 10*3/mm3      Monocytes, Absolute 0.74 10*3/mm3      Eosinophils, Absolute 0.18 10*3/mm3      Basophils, Absolute 0.02 10*3/mm3      Immature Grans, Absolute 0.01 10*3/mm3      nRBC 0.0 /100 WBC     High Sensitivity Troponin T 1Hr [[id number]]  (Normal) Collected: 05/07/25 1917    Specimen: Blood Updated: 05/07/25 1947     HS Troponin T 12 ng/L      Troponin T Numeric Delta 0 ng/L     Narrative:      High Sensitive Troponin T Reference Range:  <14.0 ng/L- Negative Female for AMI  <22.0 ng/L- Negative Male for AMI  >=14 - Abnormal Female indicating possible myocardial injury.  >=22 - Abnormal Male indicating possible myocardial injury.   Clinicians would have to utilize clinical acumen, EKG, Troponin, and serial changes to determine if it is an Acute Myocardial Infarction or myocardial injury due  to an underlying chronic condition.         Comprehensive Metabolic Panel [[id number]]  (Abnormal) Collected: 05/07/25 1812    Specimen: Blood Updated: 05/07/25 1843     Glucose 94 mg/dL      BUN 24 mg/dL      Creatinine 1.49 mg/dL      Sodium 139 mmol/L      Potassium 4.5 mmol/L      Chloride 102 mmol/L      CO2 30.0 mmol/L      Calcium 9.4 mg/dL      Total Protein 7.7 g/dL      Albumin 4.3 g/dL      ALT (SGPT) 17 U/L      AST (SGOT) 27 U/L      Alkaline Phosphatase 97 U/L      Total Bilirubin 0.4 mg/dL      Globulin 3.4 gm/dL      A/G Ratio 1.3 g/dL      BUN/Creatinine Ratio 16.1     Anion Gap 7.0 mmol/L      eGFR 33.2 mL/min/1.73     Narrative:      GFR Categories in Chronic Kidney Disease (CKD)              GFR Category          GFR (mL/min/1.73)    Interpretation  G1                    90 or greater        Normal or high (1)  G2                    60-89                Mild decrease (1)  G3a                   45-59                Mild to moderate decrease  G3b                   30-44                Moderate to severe decrease  G4                    15-29                Severe decrease  G5                    14 or less           Kidney failure    (1)In the absence of evidence of kidney disease, neither GFR category G1 or G2 fulfill the criteria for CKD.    eGFR calculation 2021 CKD-EPI creatinine equation, which does not include race as a factor    BNP [[id number]]  (Abnormal) Collected: 05/07/25 1812    Specimen: Blood Updated: 05/07/25 1843     proBNP 2,786.0 pg/mL     Narrative:      This assay is used as an aid in the diagnosis of individuals suspected of having heart failure. It can be used as an aid in the diagnosis of acute decompensated heart failure (ADHF) in patients presenting with signs and symptoms of ADHF to the emergency department (ED). In addition, NT-proBNP of <300 pg/mL indicates ADHF is not likely.    Age Range Result Interpretation  NT-proBNP Concentration (pg/mL:      <50             Positive             >450                   Gray                 300-450                    Negative             <300    50-75           Positive            >900                  Gray                300-900                  Negative            <300      >75             Positive            >1800                  Gray                300-1800                  Negative            <300    High Sensitivity Troponin T [[id number]]  (Normal) Collected: 05/07/25 1812    Specimen: Blood Updated: 05/07/25 1843     HS Troponin T 12 ng/L     Narrative:      High Sensitive Troponin T Reference Range:  <14.0 ng/L- Negative Female for AMI  <22.0 ng/L- Negative Male for AMI  >=14 - Abnormal Female indicating possible myocardial injury.  >=22 - Abnormal Male indicating possible myocardial injury.   Clinicians would have to utilize clinical acumen, EKG, Troponin, and serial changes to determine if it is an Acute Myocardial Infarction or myocardial injury due to an underlying chronic condition.         CBC & Differential [[id number]]  (Abnormal) Collected: 05/07/25 1812    Specimen: Blood Updated: 05/07/25 1822    Narrative:      The following orders were created for panel order CBC & Differential.  Procedure                               Abnormality         Status                     ---------                               -----------         ------                     CBC Auto Differential[[id number]]        Abnormal            Final result                 Please view results for these tests on the individual orders.    CBC Auto Differential [[id number]]  (Abnormal) Collected: 05/07/25 1812    Specimen: Blood Updated: 05/07/25 1822     WBC 5.57 10*3/mm3      RBC 3.92 10*6/mm3      Hemoglobin 12.2 g/dL      Hematocrit 37.3 %      MCV 95.2 fL      MCH 31.1 pg      MCHC 32.7 g/dL      RDW 11.7 %      RDW-SD 39.5 fl      MPV 8.7 fL      Platelets 177 10*3/mm3      Neutrophil % 67.6 %      Lymphocyte % 18.5 %      Monocyte % 10.2 %      Eosinophil % 3.1  %      Basophil % 0.4 %      Immature Grans % 0.2 %      Neutrophils, Absolute 3.77 10*3/mm3      Lymphocytes, Absolute 1.03 10*3/mm3      Monocytes, Absolute 0.57 10*3/mm3      Eosinophils, Absolute 0.17 10*3/mm3      Basophils, Absolute 0.02 10*3/mm3      Immature Grans, Absolute 0.01 10*3/mm3      nRBC 0.0 /100 WBC           Imaging Results (Last 24 Hours)       Procedure Component Value Units Date/Time    XR Chest 1 View [[id number]] Collected: 05/1935     Updated: 05/07/25 1939    Narrative:      CXR ONE VIEW      HISTORY: soa; I50.9-Heart failure, unspecified     COMPARISON: 2/24/2025     TECHNIQUE: single portable AP       Impression:         Dual-lead left subclavian transvenous pacemaker in place.     Redemonstrated cardiomegaly.     The left lung is normally aerated.     Blunting of the right lateral costophrenic angle, probable small  effusion, unchanged. Right lung is otherwise normally aerated.     This report was finalized on 5/7/2025 7:36 PM by Dr. Anthony Turcios M.D  on Workstation: [id number]             Scan on 5/7/2025 1821 by [facility] [employer], [place]: ECG 12-LEAD         Author: -- Service: -- Author Type: --   Filed: Date of Service: Creation Time:   Status: (Other)   HEART RATE=65  bpm  RR Lcadpttf=475  ms  CO Interval=  ms  P Horizontal Axis=225  deg  P Front Axis=  deg  QRSD Yrnbapzx=617  ms  QT Tamsdnjb=408  ms  DVuY=603  ms  QRS Axis=-74  deg  T Wave Axis=90  deg  - ABNORMAL ECG -  Afib/flutter and ventricular-paced rhythm  No further analysis attempted due to paced rhythm  No change from prior tracing          Current Facility-Administered Medications:     acetaminophen (TYLENOL) tablet 650 mg, 650 mg, Oral, Q6H PRN, Kike Awad MD    albuterol (PROVENTIL) nebulizer solution 0.083% 2.5 mg/3mL, 2.5 mg, Nebulization, Q6H PRN, Kike Awad MD    apixaban (ELIQUIS) tablet 2.5 mg, 2.5 mg, Oral, Q12H, Kike Awad MD, 2.5 mg at 05/08/25 0906    atorvastatin (LIPITOR) tablet 10 mg, 10 mg,  Oral, Nightly, He Awad MD    cetirizine (zyrTEC) tablet 5 mg, 5 mg, Oral, Daily, He Awad MD, 5 mg at 05/08/25 1129    furosemide (LASIX) injection 40 mg, 40 mg, Intravenous, BID Diuretics, He Awad MD, 40 mg at 05/08/25 0630    magnesium oxide (MAG-OX) tablet 400 mg, 400 mg, Oral, Daily, He Awad MD, 400 mg at 05/08/25 1129    metoprolol succinate XL (TOPROL-XL) 24 hr tablet 25 mg, 25 mg, Oral, Q24H, He Awad MD, 25 mg at 05/08/25 0906    multivitamin with minerals 1 tablet, 1 tablet, Oral, Daily, He Awad MD, 1 tablet at 05/08/25 1129    pantoprazole (PROTONIX) EC tablet 40 mg, 40 mg, Oral, Q AM, He Awad MD, 40 mg at 05/08/25 0630    [COMPLETED] Insert Peripheral IV, , , Once **AND** sodium chloride 0.9 % flush 10 mL, 10 mL, Intravenous, PRN, He Awad MD    traZODone (DESYREL) tablet 50 mg, 50 mg, Oral, Nightly, He Awad MD     ASSESSMENT  Acute on chronic diastolic congestive heart failure  Valvular heart disease  Paroxysmal atrial fibrillation  Hypertension  Hyperlipidemia  Chronic kidney disease stage IIIa    PLAN  Admit   IV diuresis  Strict I's and O's and daily weight  Serial cardiac exam and EKG  Cardiology consult  Continue home medications  Stress ulcer DVT prophylaxis  Supportive care  PT OT  Patient is full code  Discussed with nursing staff  Follow closely further recommendation current hospital course    HE AWAD MD

## 2025-05-08 NOTE — CASE MANAGEMENT/SOCIAL WORK
Discharge Planning Assessment  [facility]     Patient Name: Mayuri Early  MRN: [id number]  Today's Date: 5/8/2025    Admit Date: 5/7/2025    Plan: Home, family to transport.   Discharge Needs Assessment       Row Name 05/08/25 1337       Living Environment    People in Home alone    Current Living Arrangements condominium    Family Caregiver if Needed child(mathew), adult    Quality of Family Relationships helpful;involved;supportive    Able to Return to Prior Arrangements yes       Transition Planning    Patient/Family Anticipates Transition to home with family    Patient/Family Anticipated Services at Transition     Transportation Anticipated family or friend will provide;car, drives self       Discharge Needs Assessment    Readmission Within the Last 30 Days no previous admission in last 30 days    Equipment Currently Used at Home cane, quad tip;walker, rolling    Concerns to be Addressed discharge planning    Equipment Needed After Discharge none                   Discharge Plan       Row Name 05/08/25 1337       Plan    Plan Home, family to transport.    Patient/Family in Agreement with Plan yes    Plan Comments CCP met with pt at the bedside, introduced self and role of CCP. Face sheet information and pharmacy verified. Pt lives alone in a single-story patio home. Pt still drives, IADL's and has a walker and cane at home but doesn't use them. Pt denies having a living will. Pt is enrolled in meds to beds and denies trouble affording or managing her medications. Pt has used HH in the past and denies SNF history. DC plan is to return home, family to transport. Elvia RN/CCP                  Selected Continued Care - Prior Encounters Includes continued care and service providers with selected services from prior encounters from 2/6/2025 to 5/8/2025      Discharged on 3/4/2025 Admission date: 2/24/2025 - Discharge disposition: Skilled Nursing Facility (DC - External)      Destination       Service  Provider Services Address Phone Fax Patient Preferred    [facility] [address] , [place] [id number] [phone] --                      Discharged on 2/12/2025 Admission date: 2/8/2025 - Discharge disposition: Home or Self Care      Durable Medical Equipment       Service Provider Services Address Phone Fax Patient Preferred    [facility] [id number], [place] [address] [phone] --                          Expected Discharge Date and Time       Expected Discharge Date Expected Discharge Time    May 10, 2025            Demographic Summary       Row Name 05/08/25 1336       General Information    Admission Type inpatient    Arrived From home    Required Notices Provided Important Message from Medicare    Referral Source admission list;case finding    Reason for Consult discharge planning    Preferred Language English       Contact Information    Permission Granted to Share Info With                    Functional Status       Row Name 05/08/25 1336       Functional Status    Usual Activity Tolerance excellent    Current Activity Tolerance good       Assessment of Health Literacy    How often do you have someone help you read hospital materials? Never    How often do you have problems learning about your medical condition because of difficulty understanding written information? Never    How often do you have a problem understanding what is told to you about your medical condition? Never    How confident are you filling out medical forms by yourself? Extremely    Health Literacy Excellent       Functional Status, IADL    Medications independent    Meal Preparation independent    Housekeeping independent    Laundry independent    Shopping independent                               Ericka Decker RN

## 2025-05-09 LAB
ALBUMIN SERPL-MCNC: 3.6 G/DL (ref 3.5–5.2)
ALBUMIN/GLOB SERPL: 1.2 G/DL
ALP SERPL-CCNC: 91 U/L (ref 39–117)
ALT SERPL W P-5'-P-CCNC: 10 U/L (ref 1–33)
ANION GAP SERPL CALCULATED.3IONS-SCNC: 7.2 MMOL/L (ref 5–15)
AST SERPL-CCNC: 18 U/L (ref 1–32)
BASOPHILS # BLD AUTO: 0.02 10*3/MM3 (ref 0–0.2)
BASOPHILS NFR BLD AUTO: 0.3 % (ref 0–1.5)
BILIRUB SERPL-MCNC: 0.7 MG/DL (ref 0–1.2)
BUN SERPL-MCNC: 29 MG/DL (ref 8–23)
BUN/CREAT SERPL: 21 (ref 7–25)
CALCIUM SPEC-SCNC: 9.4 MG/DL (ref 8.2–9.6)
CHLORIDE SERPL-SCNC: 98 MMOL/L (ref 98–107)
CHOLEST SERPL-MCNC: 129 MG/DL (ref 0–200)
CO2 SERPL-SCNC: 33.8 MMOL/L (ref 22–29)
CREAT SERPL-MCNC: 1.38 MG/DL (ref 0.57–1)
DEPRECATED RDW RBC AUTO: 40.5 FL (ref 37–54)
EGFRCR SERPLBLD CKD-EPI 2021: 36.4 ML/MIN/1.73
EOSINOPHIL # BLD AUTO: 0.19 10*3/MM3 (ref 0–0.4)
EOSINOPHIL NFR BLD AUTO: 3.1 % (ref 0.3–6.2)
ERYTHROCYTE [DISTWIDTH] IN BLOOD BY AUTOMATED COUNT: 11.8 % (ref 12.3–15.4)
GEN 5 1HR TROPONIN T REFLEX: 14 NG/L
GLOBULIN UR ELPH-MCNC: 3.1 GM/DL
GLUCOSE SERPL-MCNC: 96 MG/DL (ref 65–99)
HBA1C MFR BLD: 5.4 % (ref 4.8–5.6)
HCT VFR BLD AUTO: 36.8 % (ref 34–46.6)
HDLC SERPL-MCNC: 62 MG/DL (ref 40–60)
HGB BLD-MCNC: 12.1 G/DL (ref 12–15.9)
IMM GRANULOCYTES # BLD AUTO: 0.01 10*3/MM3 (ref 0–0.05)
IMM GRANULOCYTES NFR BLD AUTO: 0.2 % (ref 0–0.5)
LDLC SERPL CALC-MCNC: 55 MG/DL (ref 0–100)
LDLC/HDLC SERPL: 0.91 {RATIO}
LYMPHOCYTES # BLD AUTO: 1.28 10*3/MM3 (ref 0.7–3.1)
LYMPHOCYTES NFR BLD AUTO: 20.6 % (ref 19.6–45.3)
MCH RBC QN AUTO: 31.5 PG (ref 26.6–33)
MCHC RBC AUTO-ENTMCNC: 32.9 G/DL (ref 31.5–35.7)
MCV RBC AUTO: 95.8 FL (ref 79–97)
MONOCYTES # BLD AUTO: 0.81 10*3/MM3 (ref 0.1–0.9)
MONOCYTES NFR BLD AUTO: 13 % (ref 5–12)
NEUTROPHILS NFR BLD AUTO: 3.91 10*3/MM3 (ref 1.7–7)
NEUTROPHILS NFR BLD AUTO: 62.8 % (ref 42.7–76)
NRBC BLD AUTO-RTO: 0 /100 WBC (ref 0–0.2)
NT-PROBNP SERPL-MCNC: 2299 PG/ML (ref 0–1800)
PLATELET # BLD AUTO: 173 10*3/MM3 (ref 140–450)
PMV BLD AUTO: 8.8 FL (ref 6–12)
POTASSIUM SERPL-SCNC: 4.5 MMOL/L (ref 3.5–5.2)
PROT SERPL-MCNC: 6.7 G/DL (ref 6–8.5)
RBC # BLD AUTO: 3.84 10*6/MM3 (ref 3.77–5.28)
SODIUM SERPL-SCNC: 139 MMOL/L (ref 136–145)
TRIGL SERPL-MCNC: 52 MG/DL (ref 0–150)
TROPONIN T % DELTA: 0
TROPONIN T NUMERIC DELTA: 0 NG/L
TROPONIN T SERPL HS-MCNC: 14 NG/L
TSH SERPL DL<=0.05 MIU/L-ACNC: 2.59 UIU/ML (ref 0.27–4.2)
VLDLC SERPL-MCNC: 12 MG/DL (ref 5–40)
WBC NRBC COR # BLD AUTO: 6.22 10*3/MM3 (ref 3.4–10.8)

## 2025-05-09 PROCEDURE — 80053 COMPREHEN METABOLIC PANEL: CPT | Performed by: HOSPITALIST

## 2025-05-09 PROCEDURE — 84484 ASSAY OF TROPONIN QUANT: CPT | Performed by: HOSPITALIST

## 2025-05-09 PROCEDURE — 80061 LIPID PANEL: CPT | Performed by: HOSPITALIST

## 2025-05-09 PROCEDURE — 83880 ASSAY OF NATRIURETIC PEPTIDE: CPT | Performed by: HOSPITALIST

## 2025-05-09 PROCEDURE — 83036 HEMOGLOBIN GLYCOSYLATED A1C: CPT | Performed by: HOSPITALIST

## 2025-05-09 PROCEDURE — 25010000002 FUROSEMIDE PER 20 MG

## 2025-05-09 PROCEDURE — 97530 THERAPEUTIC ACTIVITIES: CPT

## 2025-05-09 PROCEDURE — 85025 COMPLETE CBC W/AUTO DIFF WBC: CPT | Performed by: HOSPITALIST

## 2025-05-09 PROCEDURE — G0378 HOSPITAL OBSERVATION PER HR: HCPCS

## 2025-05-09 PROCEDURE — 84443 ASSAY THYROID STIM HORMONE: CPT | Performed by: HOSPITALIST

## 2025-05-09 PROCEDURE — 99214 OFFICE O/P EST MOD 30 MIN: CPT

## 2025-05-09 PROCEDURE — 97535 SELF CARE MNGMENT TRAINING: CPT

## 2025-05-09 PROCEDURE — 97165 OT EVAL LOW COMPLEX 30 MIN: CPT

## 2025-05-09 RX ORDER — TORSEMIDE 20 MG/1
40 TABLET ORAL DAILY
Status: DISCONTINUED | OUTPATIENT
Start: 2025-05-10 | End: 2025-05-12 | Stop reason: HOSPADM

## 2025-05-09 RX ADMIN — CETIRIZINE HYDROCHLORIDE 5 MG: 10 TABLET, FILM COATED ORAL at 08:19

## 2025-05-09 RX ADMIN — APIXABAN 2.5 MG: 2.5 TABLET, FILM COATED ORAL at 08:18

## 2025-05-09 RX ADMIN — Medication 1 TABLET: at 08:19

## 2025-05-09 RX ADMIN — FUROSEMIDE 40 MG: 10 INJECTION, SOLUTION INTRAMUSCULAR; INTRAVENOUS at 17:41

## 2025-05-09 RX ADMIN — ATORVASTATIN CALCIUM 10 MG: 10 TABLET ORAL at 20:42

## 2025-05-09 RX ADMIN — MAGNESIUM OXIDE TAB 400 MG (240 MG ELEMENTAL MG) 400 MG: 400 (240 MG) TAB at 08:19

## 2025-05-09 RX ADMIN — METOPROLOL SUCCINATE 25 MG: 25 TABLET, EXTENDED RELEASE ORAL at 08:19

## 2025-05-09 RX ADMIN — PANTOPRAZOLE SODIUM 40 MG: 40 TABLET, DELAYED RELEASE ORAL at 06:50

## 2025-05-09 RX ADMIN — APIXABAN 2.5 MG: 2.5 TABLET, FILM COATED ORAL at 20:42

## 2025-05-09 RX ADMIN — TRAZODONE HYDROCHLORIDE 50 MG: 50 TABLET ORAL at 20:42

## 2025-05-09 NOTE — PLAN OF CARE
Goal Outcome Evaluation:  Plan of Care Reviewed With: patient        Progress: improving  Outcome Evaluation: Pt is alert and oriented x4, on room air, assist x1, A-fib or V- paced. Pt lower extremities are +2 swollen bilaterally but improving. Pt on Eliquis and furosemide. No acute changes over night. Pt slept good over night.

## 2025-05-09 NOTE — PLAN OF CARE
Goal Outcome Evaluation:  Plan of Care Reviewed With: patient   Pt's vital signs are stable. No complaints of nausea during this shift. Worked with therapy. Restarted IV lasix. Care plan ongoing.      Progress: improving

## 2025-05-09 NOTE — H&P
Daily progress note    Primary care physician  Dr. Hidalgo    Subjective  Awake and alert and feeling much better than yesterday with no new complaints.    History of present illness  89-year-old white female with history of congestive heart failure valvular heart disease atrial fibrillation hypertension hyperlipidemia and chronic kidney disease stage IIIa who is well-known to our service presented to [facility] emergency room with increased shortness of breath for last few days which is getting worse.  Patient also noticed increased leg swelling.  Patient denies any chest pain palpitation abdominal pain nausea vomiting diarrhea.  Patient does have nonproductive cough but no fever congestion night sweats.  Patient evaluated in ER found to have acute on chronic diastolic congestive heart failure admitted for management.     REVIEW OF SYSTEMS  All systems reviewed and negative except for those discussed in HPI.     PHYSICAL EXAM   Blood pressure 132/60, pulse 83, temperature 98.2 °F (36.8 °C), temperature source Oral, resp. rate 18, weight 63.5 kg (140 lb), SpO2 98%.    GENERAL: Awake, alert, no acute distress  SKIN: Warm, dry  HENT: Normocephalic, atraumatic  EYES: no scleral icterus  CV: Irregular rhythm, irregular rate  RESPIRATORY: normal effort, decreased breath sound at bases  ABDOMEN: soft, nontender, nondistended bowel sounds positive  MUSCULOSKELETAL: no deformity, pitting edema to the bilateral lower extremities  NEURO: alert, moves all extremities, follows commands     LAB RESULTS  Lab Results (last 24 hours)       Procedure Component Value Units Date/Time    High Sensitivity Troponin T 1Hr [[id number]]  (Abnormal) Collected: 05/09/25 0853    Specimen: Blood Updated: 05/09/25 0936     HS Troponin T 14 ng/L      Troponin T Numeric Delta 0 ng/L      Troponin T % Delta 0    Narrative:      High Sensitive Troponin T Reference Range:  <14.0 ng/L- Negative Female for AMI  <22.0 ng/L- Negative Male for  AMI  >=14 - Abnormal Female indicating possible myocardial injury.  >=22 - Abnormal Male indicating possible myocardial injury.   Clinicians would have to utilize clinical acumen, EKG, Troponin, and serial changes to determine if it is an Acute Myocardial Infarction or myocardial injury due to an underlying chronic condition.         BNP [[id number]]  (Abnormal) Collected: 05/09/25 0416    Specimen: Blood Updated: 05/09/25 0506     proBNP 2,299.0 pg/mL     Narrative:      This assay is used as an aid in the diagnosis of individuals suspected of having heart failure. It can be used as an aid in the diagnosis of acute decompensated heart failure (ADHF) in patients presenting with signs and symptoms of ADHF to the emergency department (ED). In addition, NT-proBNP of <300 pg/mL indicates ADHF is not likely.    Age Range Result Interpretation  NT-proBNP Concentration (pg/mL:      <50             Positive            >450                   Gray                 300-450                    Negative             <300    50-75           Positive            >900                  Gray                300-900                  Negative            <300      >75             Positive            >1800                  Gray                300-1800                  Negative            <300    TSH [[id number]]  (Normal) Collected: 05/09/25 0416    Specimen: Blood Updated: 05/09/25 0506     TSH 2.590 uIU/mL     High Sensitivity Troponin T [[id number]]  (Abnormal) Collected: 05/09/25 0416    Specimen: Blood Updated: 05/09/25 0506     HS Troponin T 14 ng/L     Narrative:      High Sensitive Troponin T Reference Range:  <14.0 ng/L- Negative Female for AMI  <22.0 ng/L- Negative Male for AMI  >=14 - Abnormal Female indicating possible myocardial injury.  >=22 - Abnormal Male indicating possible myocardial injury.   Clinicians would have to utilize clinical acumen, EKG, Troponin, and serial changes to determine if it is an Acute Myocardial Infarction  or myocardial injury due to an underlying chronic condition.         Comprehensive Metabolic Panel [[id number]]  (Abnormal) Collected: 05/09/25 0416    Specimen: Blood Updated: 05/09/25 0502     Glucose 96 mg/dL      BUN 29 mg/dL      Creatinine 1.38 mg/dL      Sodium 139 mmol/L      Potassium 4.5 mmol/L      Chloride 98 mmol/L      CO2 33.8 mmol/L      Calcium 9.4 mg/dL      Total Protein 6.7 g/dL      Albumin 3.6 g/dL      ALT (SGPT) 10 U/L      AST (SGOT) 18 U/L      Alkaline Phosphatase 91 U/L      Total Bilirubin 0.7 mg/dL      Globulin 3.1 gm/dL      A/G Ratio 1.2 g/dL      BUN/Creatinine Ratio 21.0     Anion Gap 7.2 mmol/L      eGFR 36.4 mL/min/1.73     Narrative:      GFR Categories in Chronic Kidney Disease (CKD)              GFR Category          GFR (mL/min/1.73)    Interpretation  G1                    90 or greater        Normal or high (1)  G2                    60-89                Mild decrease (1)  G3a                   45-59                Mild to moderate decrease  G3b                   30-44                Moderate to severe decrease  G4                    15-29                Severe decrease  G5                    14 or less           Kidney failure    (1)In the absence of evidence of kidney disease, neither GFR category G1 or G2 fulfill the criteria for CKD.    eGFR calculation 2021 CKD-EPI creatinine equation, which does not include race as a factor    Lipid Panel [[id number]]  (Abnormal) Collected: 05/09/25 0416    Specimen: Blood Updated: 05/09/25 0501     Total Cholesterol 129 mg/dL      Triglycerides 52 mg/dL      HDL Cholesterol 62 mg/dL      LDL Cholesterol  55 mg/dL      VLDL Cholesterol 12 mg/dL      LDL/HDL Ratio 0.91    Narrative:      Cholesterol Reference Ranges  (U.S. Department of Health and Human Services ATP III Classifications)    Desirable          <200 mg/dL  Borderline High    200-239 mg/dL  High Risk          >240 mg/dL      Triglyceride Reference Ranges  (U.S. Department of  Health and Human Services ATP III Classifications)    Normal           <150 mg/dL  Borderline High  150-199 mg/dL  High             200-499 mg/dL  Very High        >500 mg/dL    HDL Reference Ranges  (U.S. Department of Health and Human Services ATP III Classifications)    Low     <40 mg/dl (major risk factor for CHD)  High    >60 mg/dl ('negative' risk factor for CHD)        LDL Reference Ranges  (U.S. Department of Health and Human Services ATP III Classifications)    Optimal          <100 mg/dL  Near Optimal     100-129 mg/dL  Borderline High  130-159 mg/dL  High             160-189 mg/dL  Very High        >189 mg/dL    LDL is calculated using the NIH LDL-C calculation.      Hemoglobin A1c [[id number]]  (Normal) Collected: 05/09/25 0416    Specimen: Blood Updated: 05/09/25 0452     Hemoglobin A1C 5.40 %     Narrative:      Hemoglobin A1C Ranges:    Increased Risk for Diabetes  5.7% to 6.4%  Diabetes                     >= 6.5%  Diabetic Goal                < 7.0%    CBC & Differential [[id number]]  (Abnormal) Collected: 05/09/25 0416    Specimen: Blood Updated: 05/09/25 0438    Narrative:      The following orders were created for panel order CBC & Differential.  Procedure                               Abnormality         Status                     ---------                               -----------         ------                     CBC Auto Differential[[id number]]        Abnormal            Final result                 Please view results for these tests on the individual orders.    CBC Auto Differential [[id number]]  (Abnormal) Collected: 05/09/25 0416    Specimen: Blood Updated: 05/09/25 0438     WBC 6.22 10*3/mm3      RBC 3.84 10*6/mm3      Hemoglobin 12.1 g/dL      Hematocrit 36.8 %      MCV 95.8 fL      MCH 31.5 pg      MCHC 32.9 g/dL      RDW 11.8 %      RDW-SD 40.5 fl      MPV 8.8 fL      Platelets 173 10*3/mm3      Neutrophil % 62.8 %      Lymphocyte % 20.6 %      Monocyte % 13.0 %      Eosinophil % 3.1 %       Basophil % 0.3 %      Immature Grans % 0.2 %      Neutrophils, Absolute 3.91 10*3/mm3      Lymphocytes, Absolute 1.28 10*3/mm3      Monocytes, Absolute 0.81 10*3/mm3      Eosinophils, Absolute 0.19 10*3/mm3      Basophils, Absolute 0.02 10*3/mm3      Immature Grans, Absolute 0.01 10*3/mm3      nRBC 0.0 /100 WBC           Imaging Results (Last 24 Hours)       ** No results found for the last 24 hours. **          Scan on 5/7/2025 1821 by [facility] [employer], [place]: ECG 12-LEAD         Author: -- Service: -- Author Type: --   Filed: Date of Service: Creation Time:   Status: (Other)   HEART RATE=65  bpm  RR Fvguwqxv=994  ms  OH Interval=  ms  P Horizontal Axis=225  deg  P Front Axis=  deg  QRSD Deptzckn=578  ms  QT Xojpffuq=979  ms  GUkN=411  ms  QRS Axis=-74  deg  T Wave Axis=90  deg  - ABNORMAL ECG -  Afib/flutter and ventricular-paced rhythm  No further analysis attempted due to paced rhythm  No change from prior tracing          Current Facility-Administered Medications:     acetaminophen (TYLENOL) tablet 650 mg, 650 mg, Oral, Q6H PRN, Kike Awad MD    albuterol (PROVENTIL) nebulizer solution 0.083% 2.5 mg/3mL, 2.5 mg, Nebulization, Q6H PRN, Kike Awad MD    apixaban (ELIQUIS) tablet 2.5 mg, 2.5 mg, Oral, Q12H, Kike Awad MD, 2.5 mg at 05/09/25 0818    atorvastatin (LIPITOR) tablet 10 mg, 10 mg, Oral, Nightly, Kike Awad MD, 10 mg at 05/08/25 2125    cetirizine (zyrTEC) tablet 5 mg, 5 mg, Oral, Daily, Kike Awad MD, 5 mg at 05/09/25 0819    [Held by provider] furosemide (LASIX) injection 40 mg, 40 mg, Intravenous, BID Diuretics, Kike Awad MD, 40 mg at 05/08/25 1814    magnesium oxide (MAG-OX) tablet 400 mg, 400 mg, Oral, Daily, Kike Awad MD, 400 mg at 05/09/25 0819    metoprolol succinate XL (TOPROL-XL) 24 hr tablet 25 mg, 25 mg, Oral, Q24H, Kike Awad MD, 25 mg at 05/09/25 0819    multivitamin with minerals 1 tablet, 1 tablet, Oral, Daily, Kike Awad MD, 1 tablet at 05/09/25 0819     ondansetron (ZOFRAN) injection 4 mg, 4 mg, Intravenous, Q4H PRN, He Awad MD, 4 mg at 05/08/25 1255    pantoprazole (PROTONIX) EC tablet 40 mg, 40 mg, Oral, Q AM, He Awad MD, 40 mg at 05/09/25 0650    [COMPLETED] Insert Peripheral IV, , , Once **AND** sodium chloride 0.9 % flush 10 mL, 10 mL, Intravenous, PRN, He Awad MD    traZODone (DESYREL) tablet 50 mg, 50 mg, Oral, Nightly, He Awad MD, 50 mg at 05/08/25 2125     ASSESSMENT  Acute on chronic diastolic congestive heart failure  Valvular heart disease  Paroxysmal atrial fibrillation  Hypertension  Hyperlipidemia  Chronic kidney disease stage IIIa    PLAN  CPM  Continue IV diuresis  Strict I's and O's and daily weight  Serial cardiac exam and EKG  Cardiology consult appreciated  Continue home medications  Stress ulcer DVT prophylaxis  Supportive care  PT OT  Discussed with nursing staff  Follow closely further recommendation current hospital course    HE AWAD MD    Copied text in this note has been reviewed and is accurate as of 05/09/25

## 2025-05-09 NOTE — PROGRESS NOTES
[facility]  Cardiology Progress Note    Patient Identification:  Name: Mayuri Early  Age: 89 y.o.  Sex: female  :  1935  MRN: [id number]                 Follow Up / Chief Complaint: Follow up for chf    Interval History: resting in bed, no chest pain or shortness of breath. Feels much better.      Objective:    Past Medical History:  Past Medical History:   Diagnosis Date    Fibrocystic breast     Well woman exam 10/20/2020     Past Surgical History:  Past Surgical History:   Procedure Laterality Date    BREAST CYST ASPIRATION      BREAST CYST EXCISION      HYSTERECTOMY          Social History:   Social History     Tobacco Use    Smoking status: Former     Passive exposure: Past    Smokeless tobacco: Never   Substance Use Topics    Alcohol use: Never      Family History:  History reviewed. No pertinent family history.       Allergies:  Allergies   Allergen Reactions    Cortisone Provider Review Needed     Elevates BP    Ezetimibe Provider Review Needed    Simvastatin Provider Review Needed    Oxycodone-Acetaminophen Itching    Azithromycin Provider Review Needed    Hydrocodone-Acetaminophen Itching     Scheduled Meds:  apixaban, 2.5 mg, Q12H  atorvastatin, 10 mg, Nightly  cetirizine, 5 mg, Daily  furosemide, 40 mg, BID Diuretics  magnesium oxide, 400 mg, Daily  metoprolol succinate XL, 25 mg, Q24H  multivitamin with minerals, 1 tablet, Daily  pantoprazole, 40 mg, Q AM  [START ON 5/10/2025] torsemide, 40 mg, Daily  traZODone, 50 mg, Nightly            INTAKE AND OUTPUT:    Intake/Output Summary (Last 24 hours) at 2025 1442  Last data filed at 2025 2125  Gross per 24 hour   Intake 220 ml   Output 550 ml   Net -330 ml     Review of Systems:   GI: no n/v or abd pain  Cardiac: no chest pain or palpitations  Pulmonary: no shortness of breath or cough      Constitutional:  Temp:  [97.7 °F (36.5 °C)-98.2 °F (36.8 °C)] 98.2 °F (36.8 °C)  Heart Rate:  [69-83] 83  Resp:  [18] 18  BP:  "(125-151)/(52-73) 132/60    Physical Exam:  General:  Alert, cooperative, appears in no acute distress  Respiratory: Clear to auscultation.  Normal respiratory effort and rate.  Cardiovascular: S1S2 Regular rate and rhythm. No murmur, rub or gallop.   Gastrointestinal: soft, non tender. Bowel sounds present.   Extremities: DA SILVA x4. No pretibial pitting edema. Adequate musculoskeletal strength.   Neuro: AAO x3 CN II-XII grossly intact        Cardiographics    Lab Review   Results from last 7 days   Lab Units 05/09/25  0853 05/09/25  0416 05/07/25  1917   HSTROP T ng/L 14* 14* 12         Results from last 7 days   Lab Units 05/09/25  0416   SODIUM mmol/L 139   POTASSIUM mmol/L 4.5   BUN mg/dL 29*   CREATININE mg/dL 1.38*   CALCIUM mg/dL 9.4     @LABRCNTIPbnp@  Results from last 7 days   Lab Units 05/09/25  0416 05/08/25  0320 05/07/25  1812   WBC 10*3/mm3 6.22 5.73 5.57   HEMOGLOBIN g/dL 12.1 12.1 12.2   HEMATOCRIT % 36.8 37.3 37.3   PLATELETS 10*3/mm3 173 171 177             Assessment:  Shortness of breath  Congestive heart failure  Valvular heart disease      Plan:  BP and hr stable  Shortness of breath improved.  IV lasix today, switch to torsemide 40mg po daily tomorrow  No chest pain  Conservative management    )5/9/2025  CRISTAL Munoz/Transcription:   \"Dictated utilizing Dragon dictation\".     "

## 2025-05-09 NOTE — PLAN OF CARE
Goal Outcome Evaluation:              Outcome Evaluation: Pt is a 80yof admitted 2/2 acute CHF exacerbation. PMH inlcudes PAF, CKD, anemia, and pleural effusion. Pt reports she lives alone and is IND at baseline with I/ADLs and drives, no AD. Pt in SSH with 1STE. Pt currently able to complete functional mobility in room and ADLs with SBA-CGA. Pt with good social support. Pt at this time is near functional IND for ADLs, may benefit from home O2. Pt at this time anticipated to be able to return home with HHOT and help from family.    Anticipated Discharge Disposition (OT): home with home health, home with assist

## 2025-05-09 NOTE — THERAPY EVALUATION
Patient Name: Mayuri Early  : 1935    MRN: [id number]                              Today's Date: 2025       Admit Date: 2025    Visit Dx:     ICD-10-CM ICD-9-CM   1. Acute on chronic congestive heart failure, unspecified heart failure type  I50.9 428.0     Patient Active Problem List   Diagnosis    Well woman exam    Closed compression fracture of L1 vertebra, initial encounter    Compression fracture of T12 vertebra    PAF (paroxysmal atrial fibrillation)    Presence of cardiac pacemaker    HTN (hypertension)    CKD (chronic kidney disease)    Chronic anticoagulation    Chronic back pain    Anemia    Asthma    Hematoma of hip    Acute blood loss anemia    Postural dizziness    Pleural effusion    Acute exacerbation of CHF (congestive heart failure)     Past Medical History:   Diagnosis Date    Fibrocystic breast     Well woman exam 10/20/2020     Past Surgical History:   Procedure Laterality Date    BREAST CYST ASPIRATION      BREAST CYST EXCISION      HYSTERECTOMY        General Information       Row Name 25 1532          OT Time and Intention    Subjective Information no complaints  -KR     Document Type evaluation  -KR     Mode of Treatment individual therapy;occupational therapy  -KR     Patient Effort good  -KR       Row Name 25 1532          General Information    Patient Profile Reviewed yes  -KR     Prior Level of Function independent:;using stairs;shopping;driving;ADL's;all household mobility  -KR     Existing Precautions/Restrictions fall  -KR       Row Name 25 1532          Occupational Profile    Environmental Supports and Barriers (Occupational Profile) DME: pt owns [employer] old walker but not fitted for pt; pt reprots her 4 sons are able to help her  -KR       Row Name 25 1532          Living Environment    Current Living Arrangements condominium  -KR     People in Home alone  -KR       Row Name 25 1532          Home Main Entrance    Number of Stairs, Main  Entrance one  -KR       Row Name 05/09/25 1532          Cognition    Orientation Status (Cognition) oriented x 3  -KR       Row Name 05/09/25 1532          Safety Issues/Impairments Affecting Functional Mobility    Impairments Affecting Function (Mobility) balance;shortness of breath  -KR     Comment, Safety Issues/Impairments (Mobility) Gait belt and non-skid socks donned  -KR               User Key  (r) = Recorded By, (t) = Taken By, (c) = Cosigned By      Initials Name Provider Type    Curtis Gray OT Occupational Therapist                     Mobility/ADL's       Row Name 05/09/25 1533          Transfers    Transfers toilet transfer;stand-sit transfer;sit-stand transfer  -KR       Row Name 05/09/25 1533          Sit-Stand Transfer    Sit-Stand [place] (Transfers) contact guard  -KR     Assistive Device (Sit-Stand Transfers) walker, front-wheeled  -KR       Row Name 05/09/25 1533          Stand-Sit Transfer    Stand-Sit [place] (Transfers) contact guard  -KR     Assistive Device (Stand-Sit Transfers) walker, front-wheeled  -KR       Row Name 05/09/25 1533          Toilet Transfer    [place] Level (Toilet Transfer) contact guard;verbal cues  -KR     Assistive Device (Toilet Transfer) walker, front-wheeled  -KR       Row Name 05/09/25 1533          Functional Mobility    Functional Mobility- Ind. Level supervision required;contact guard assist;verbal cues required  -KR     Functional Mobility- Device walker, front-wheeled  -KR     Functional Mobility- Comment Pt was able to complete functional mobility in room with CGA and progressed to SBA. Pt was able to complete toilet t/f with SPV and cues for safety  -KR       Row Name 05/09/25 1533          Activities of Daily Living    BADL Assessment/Intervention toileting;grooming  -KR       Row Name 05/09/25 1533          Hygiene Care    Oral Care teeth brushed - regular toothbrush  -       Row Name 05/09/25 1533          Toileting  Assessment/Training    [place] Level (Toileting) perform perineal hygiene;adjust/manage clothing;toileting skills;contact guard assist;standby assist  -KR     Assistive Devices (Toileting) grab bar/safety frame  -KR     Comment, (Toileting) pt with CGA for controlled descent  -KR       Row Name 05/09/25 1533          Grooming Assessment/Training    [place] Level (Grooming) grooming skills;hair care, combing/brushing;oral care regimen;standby assist  -KR     Position (Grooming) sink side  -KR               User Key  (r) = Recorded By, (t) = Taken By, (c) = Cosigned By      Initials Name Provider Type    Curtis Gray OT Occupational Therapist                   Obj/Interventions       Row Name 05/09/25 1536          Sensory Assessment (Somatosensory)    Sensory Assessment (Somatosensory) sensation intact  -KR       Row Name 05/09/25 1536          Vision Assessment/Intervention    Visual Impairment/Limitations WNL  -KR       Row Name 05/09/25 1536          Range of Motion Comprehensive    General Range of Motion no range of motion deficits identified  -KR       Row Name 05/09/25 1536          Strength Comprehensive (MMT)    General Manual Muscle Testing (MMT) Assessment no strength deficits identified  -KR       Row Name 05/09/25 1536          Balance    Balance Assessment sitting static balance;sitting dynamic balance;standing static balance;standing dynamic balance  -KR     Static Sitting Balance standby assist  -KR     Dynamic Sitting Balance standby assist;contact guard  -KR     Static Standing Balance contact guard;standby assist  -KR     Dynamic Standing Balance contact guard  -KR               User Key  (r) = Recorded By, (t) = Taken By, (c) = Cosigned By      Initials Name Provider Type    Curtis Gray OT Occupational Therapist                   Goals/Plan    No documentation.                  Clinical Impression       Row Name 05/09/25 1537          Pain Assessment    Pretreatment Pain  Rating 0/10 - no pain  -KR     Posttreatment Pain Rating 0/10 - no pain  -KR       Row Name 05/09/25 1537          Plan of Care Review    Outcome Evaluation Pt is a 80yof admitted 2/2 acute CHF exacerbation. PMH inlcudes PAF, CKD, anemia, and pleural effusion. Pt reports she lives alone and is IND at baseline with I/ADLs and drives, no AD. Pt in [facility] with 1STE. Pt currently able to complete functional mobility in room and ADLs with SBA-CGA. Pt with good social support. Pt at this time is near functional IND for ADLs, may benefit from home O2. Pt at this time anticipated to be able to return home with HHOT and help from family.  -KR       Row Name 05/09/25 1537          Therapy Assessment/Plan (OT)    Therapy Frequency (OT) evaluation only  -KR       Row Name 05/09/25 1537          Therapy Plan Review/Discharge Plan (OT)    Anticipated Discharge Disposition (OT) home with home health;home with assist  -KR       Row Name 05/09/25 1537          Positioning and Restraints    Pre-Treatment Position sitting in chair/recliner  -KR     Post Treatment Position chair  -KR     In Chair notified nsg;reclined;sitting;call light within reach;encouraged to call for assist;legs elevated  -KR               User Key  (r) = Recorded By, (t) = Taken By, (c) = Cosigned By      Initials Name Provider Type    Curtis Gray, OT Occupational Therapist                   Outcome Measures       Row Name 05/09/25 1539          How much help from another is currently needed...    Putting on and taking off regular lower body clothing? 3  -KR     Bathing (including washing, rinsing, and drying) 3  -KR     Toileting (which includes using toilet bed pan or urinal) 3  -KR     Putting on and taking off regular upper body clothing 4  -KR     Taking care of personal grooming (such as brushing teeth) 3  -KR     Eating meals 4  -KR     AM-PAC 6 Clicks Score (OT) 20  -KR       Row Name 05/09/25 4155          How much help from another person do you  currently need...    Turning from your back to your side while in flat bed without using bedrails? 4  -SV     Moving from lying on back to sitting on the side of a flat bed without bedrails? 4  -SV     Moving to and from a bed to a chair (including a wheelchair)? 3  -SV     Standing up from a chair using your arms (e.g., wheelchair, bedside chair)? 3  -SV     Climbing 3-5 steps with a railing? 3  -SV     To walk in hospital room? 3  -SV     AM-PAC 6 Clicks Score (PT) 20  -SV       Row Name 05/09/25 1539          Functional Assessment    Outcome Measure Options AM-PAC 6 Clicks Daily Activity (OT)  -KR               User Key  (r) = Recorded By, (t) = Taken By, (c) = Cosigned By      Initials Name Provider Type    SV Kym Canseco, PT Physical Therapist    Curtis Gray OT Occupational Therapist                    Occupational Therapy Education       Title: PT OT SLP Therapies (In Progress)       Topic: Occupational Therapy (In Progress)       Point: ADL training (Done)       Learning Progress Summary            Patient Acceptance, E,TB, VU by ANDRE at 5/9/2025 1539    Comment: role of OT, home safety                      Point: Precautions (Done)       Learning Progress Summary            Patient Acceptance, E,TB, VU by ANDRE at 5/9/2025 1539    Comment: role of OT, home safety                                      User Key       Initials Effective Dates Name Provider Type [facility] 04/01/25 -  Curtis Smith OT Occupational Therapist OT                  OT Recommendation and Plan  Therapy Frequency (OT): evaluation only  Plan of Care Review  Outcome Evaluation: Pt is a 80yof admitted 2/2 acute CHF exacerbation. PMH inlcudes PAF, CKD, anemia, and pleural effusion. Pt reports she lives alone and is IND at baseline with I/ADLs and drives, no AD. Pt in [facility] with 1STE. Pt currently able to complete functional mobility in room and ADLs with SBA-CGA. Pt with good social support. Pt at this time is near functional IND  for ADLs, may benefit from home O2. Pt at this time anticipated to be able to return home with HHOT and help from family.     Time Calculation:   Evaluation Complexity (OT)  Review Occupational Profile/Medical/Therapy History Complexity: brief/low complexity  Assessment, Occupational Performance/Identification of Deficit Complexity: 1-3 performance deficits  Clinical Decision Making Complexity (OT): problem focused assessment/low complexity  Overall Complexity of Evaluation (OT): low complexity     Time Calculation- OT       Row Name 05/09/25 1540             Time Calculation- OT    OT Start Time 1025  -KR      OT Stop Time 1048  -KR      OT Time Calculation (min) 23 min  -KR      Total Timed Code Minutes- OT 12 minute(s)  -KR         Timed Charges    96800 - OT Self Care/Mgmt Minutes 12  -KR         Total Minutes    Timed Charges Total Minutes 12  -KR       Total Minutes 12  -KR                User Key  (r) = Recorded By, (t) = Taken By, (c) = Cosigned By      Initials Name Provider Type    KR Curtis Smith OT Occupational Therapist                  Therapy Charges for Today       Code Description Service Date Service Provider Modifiers Qty    53665389330  OT SELF CARE/MGMT/TRAIN EA 15 MIN 5/9/2025 Curtis Smith OT GO 1    01519038139 HC OT EVAL LOW COMPLEXITY 2 5/9/2025 Curtis Smith OT GO 1                 Curtis Smith OT  5/9/2025

## 2025-05-09 NOTE — PLAN OF CARE
Goal Outcome Evaluation:  Plan of Care Reviewed With: patient      Pt alert and agreeable and reported sleeping well last night. Sup to sit with sba/sup HOB elevated. Pt requested rwx today. STS to rwx with cga. She amb 150' with rwx with cga: slow guarded gait with shuffle pattern and flexed posture. At 150' pt requested increased distance buy only made it 25' before she reported fatigue. O2 sat upon return to recliner 85%. She returned to >90% after 1 -2 minutes. Anticipate need for asst at home at d/c recommend HHPT and HHOT. She would benefit from a rollator due to need for seated rest periods. Recommend 6 minute walk test. Pt was driving and amb indep community distances no AD and no O2.           Anticipated Discharge Disposition (PT): home, home with home health

## 2025-05-09 NOTE — THERAPY TREATMENT NOTE
Patient Name: Mayuri Early  : 1935    MRN: [id number]                              Today's Date: 2025       Admit Date: 2025    Visit Dx:     ICD-10-CM ICD-9-CM   1. Acute on chronic congestive heart failure, unspecified heart failure type  I50.9 428.0     Patient Active Problem List   Diagnosis    Well woman exam    Closed compression fracture of L1 vertebra, initial encounter    Compression fracture of T12 vertebra    PAF (paroxysmal atrial fibrillation)    Presence of cardiac pacemaker    HTN (hypertension)    CKD (chronic kidney disease)    Chronic anticoagulation    Chronic back pain    Anemia    Asthma    Hematoma of hip    Acute blood loss anemia    Postural dizziness    Pleural effusion    Acute exacerbation of CHF (congestive heart failure)     Past Medical History:   Diagnosis Date    Fibrocystic breast     Well woman exam 10/20/2020     Past Surgical History:   Procedure Laterality Date    BREAST CYST ASPIRATION      BREAST CYST EXCISION      HYSTERECTOMY        General Information       Row Name 25          Physical Therapy Time and Intention    Document Type therapy note (daily note)  -     Mode of Treatment individual therapy;physical therapy  -       Row Name 25          General Information    Existing Precautions/Restrictions fall  -SV               User Key  (r) = Recorded By, (t) = Taken By, (c) = Cosigned By      Initials Name Provider Type    SV Kym Canseco, PT Physical Therapist                   Mobility       Row Name 25          Bed Mobility    Supine-Sit [place] (Bed Mobility) standby assist;supervision  -     Assistive Device (Bed Mobility) head of bed elevated  -       Row Name 25          Sit-Stand Transfer    Sit-Stand [place] (Transfers) contact guard  -     Assistive Device (Sit-Stand Transfers) walker, front-wheeled  -       Row Name 25 08          Gait/Stairs (Locomotion)    [place]  "Level (Gait) contact guard  -SV     Distance in Feet (Gait) 200  -SV     Deviations/Abnormal Patterns (Gait) festinating/shuffling;gait speed decreased;stride length decreased  -SV     Bilateral Gait Deviations forward flexed posture;heel strike decreased  -SV               User Key  (r) = Recorded By, (t) = Taken By, (c) = Cosigned By      Initials Name Provider Type    SV Kym Canseco, PT Physical Therapist                   Obj/Interventions       Row Name 05/09/25 0930          Motor Skills    Therapeutic Exercise --  Pt reported \" sabina horse \" during ankle ex today  -SV               User Key  (r) = Recorded By, (t) = Taken By, (c) = Cosigned By      Initials Name Provider Type    SV Kym Canseco, PT Physical Therapist                   Goals/Plan    No documentation.                  Clinical Impression       Row Name 05/09/25 0930          Pain    Pretreatment Pain Rating 0/10 - no pain  -SV     Posttreatment Pain Rating 0/10 - no pain  -SV       Row Name 05/09/25 0930          Vital Signs    Pre SpO2 (%) 95  -SV     O2 Delivery Pre Treatment room air  -SV     Intra SpO2 (%) 85  -SV     O2 Delivery Intra Treatment room air  -SV     Post SpO2 (%) 94  -SV     O2 Delivery Post Treatment room air  -SV     Pre Patient Position Supine  -SV     Intra Patient Position Standing  -SV     Post Patient Position Sitting  -SV       Row Name 05/09/25 0930          Positioning and Restraints    Pre-Treatment Position in bed  -SV     Post Treatment Position chair  -SV               User Key  (r) = Recorded By, (t) = Taken By, (c) = Cosigned By      Initials Name Provider Type    SV Kym Canseco, PT Physical Therapist                   Outcome Measures       Row Name 05/09/25 0931          How much help from another person do you currently need...    Turning from your back to your side while in flat bed without using bedrails? 4  -SV     Moving from lying on back to sitting on the side of a flat bed without " bedrails? 4  -SV     Moving to and from a bed to a chair (including a wheelchair)? 3  -SV     Standing up from a chair using your arms (e.g., wheelchair, bedside chair)? 3  -SV     Climbing 3-5 steps with a railing? 3  -SV     To walk in hospital room? 3  -SV     AM-PAC 6 Clicks Score (PT) 20  -SV               User Key  (r) = Recorded By, (t) = Taken By, (c) = Cosigned By      Initials Name Provider Type    SV Kym Canseco, PT Physical Therapist                                 Physical Therapy Education       Title: PT OT SLP Therapies (Done)       Topic: Physical Therapy (Done)       Point: Mobility training (Done)       Learning Progress Summary            Patient Acceptance, E, VU,NR by  at 5/9/2025 0931    Eager, E, VU,NR by SV at 5/8/2025 1159                      Point: Home exercise program (Done)       Learning Progress Summary            Patient Acceptance, E, VU,NR by SV at 5/9/2025 0931    Eager, E, VU,NR by SV at 5/8/2025 1159                                      User Key       Initials Effective Dates Name Provider Type Discipline    SV 07/11/23 -  Kym Canseco, PT Physical Therapist PT                  PT Recommendation and Plan  Planned Therapy Interventions (PT): balance training, gait training, home exercise program, patient/family education, stretching, strengthening, stair training, ROM (range of motion), transfer training        Time Calculation:         PT Charges       Row Name 05/09/25 0937             Time Calculation    Start Time 0826  -SV      Stop Time 0853  -SV      Time Calculation (min) 27 min  -SV      PT Received On 05/09/25  -SV      PT - Next Appointment 05/10/25  -SV                User Key  (r) = Recorded By, (t) = Taken By, (c) = Cosigned By      Initials Name Provider Type    SV Kym Canseco, PT Physical Therapist                  Therapy Charges for Today       Code Description Service Date Service Provider Modifiers Qty    14821326373 HC PT EVAL LOW COMPLEXITY  2 5/8/2025 Kym Canseco, PT GP 1    79064333794 HC PT THERAPEUTIC ACT EA 15 MIN 5/8/2025 Kym Canseco, PT GP 1    72276304286 HC PT THERAPEUTIC ACT EA 15 MIN 5/9/2025 Kym Canseco, PT GP 2            PT G-Codes  AM-PAC 6 Clicks Score (PT): 20  PT Discharge Summary  Anticipated Discharge Disposition (PT): home, home with home health    Kym Canseco, PT  5/9/2025

## 2025-05-10 LAB
ANION GAP SERPL CALCULATED.3IONS-SCNC: 8 MMOL/L (ref 5–15)
BASOPHILS # BLD AUTO: 0.02 10*3/MM3 (ref 0–0.2)
BASOPHILS NFR BLD AUTO: 0.3 % (ref 0–1.5)
BUN SERPL-MCNC: 25 MG/DL (ref 8–23)
BUN/CREAT SERPL: 20.5 (ref 7–25)
CALCIUM SPEC-SCNC: 9 MG/DL (ref 8.2–9.6)
CHLORIDE SERPL-SCNC: 97 MMOL/L (ref 98–107)
CO2 SERPL-SCNC: 33 MMOL/L (ref 22–29)
CREAT SERPL-MCNC: 1.22 MG/DL (ref 0.57–1)
DEPRECATED RDW RBC AUTO: 40.3 FL (ref 37–54)
EGFRCR SERPLBLD CKD-EPI 2021: 42.2 ML/MIN/1.73
EOSINOPHIL # BLD AUTO: 0.21 10*3/MM3 (ref 0–0.4)
EOSINOPHIL NFR BLD AUTO: 3.6 % (ref 0.3–6.2)
ERYTHROCYTE [DISTWIDTH] IN BLOOD BY AUTOMATED COUNT: 11.7 % (ref 12.3–15.4)
GLUCOSE SERPL-MCNC: 97 MG/DL (ref 65–99)
HCT VFR BLD AUTO: 37.1 % (ref 34–46.6)
HGB BLD-MCNC: 12.2 G/DL (ref 12–15.9)
IMM GRANULOCYTES # BLD AUTO: 0.03 10*3/MM3 (ref 0–0.05)
IMM GRANULOCYTES NFR BLD AUTO: 0.5 % (ref 0–0.5)
LYMPHOCYTES # BLD AUTO: 1.16 10*3/MM3 (ref 0.7–3.1)
LYMPHOCYTES NFR BLD AUTO: 19.7 % (ref 19.6–45.3)
MCH RBC QN AUTO: 31 PG (ref 26.6–33)
MCHC RBC AUTO-ENTMCNC: 32.9 G/DL (ref 31.5–35.7)
MCV RBC AUTO: 94.2 FL (ref 79–97)
MONOCYTES # BLD AUTO: 0.78 10*3/MM3 (ref 0.1–0.9)
MONOCYTES NFR BLD AUTO: 13.2 % (ref 5–12)
NEUTROPHILS NFR BLD AUTO: 3.7 10*3/MM3 (ref 1.7–7)
NEUTROPHILS NFR BLD AUTO: 62.7 % (ref 42.7–76)
NRBC BLD AUTO-RTO: 0 /100 WBC (ref 0–0.2)
PLATELET # BLD AUTO: 164 10*3/MM3 (ref 140–450)
PMV BLD AUTO: 8.8 FL (ref 6–12)
POTASSIUM SERPL-SCNC: 4.2 MMOL/L (ref 3.5–5.2)
RBC # BLD AUTO: 3.94 10*6/MM3 (ref 3.77–5.28)
SODIUM SERPL-SCNC: 138 MMOL/L (ref 136–145)
WBC NRBC COR # BLD AUTO: 5.9 10*3/MM3 (ref 3.4–10.8)

## 2025-05-10 PROCEDURE — 85025 COMPLETE CBC W/AUTO DIFF WBC: CPT | Performed by: HOSPITALIST

## 2025-05-10 PROCEDURE — 80048 BASIC METABOLIC PNL TOTAL CA: CPT | Performed by: HOSPITALIST

## 2025-05-10 PROCEDURE — 99214 OFFICE O/P EST MOD 30 MIN: CPT

## 2025-05-10 PROCEDURE — G0378 HOSPITAL OBSERVATION PER HR: HCPCS

## 2025-05-10 RX ADMIN — MAGNESIUM OXIDE TAB 400 MG (240 MG ELEMENTAL MG) 400 MG: 400 (240 MG) TAB at 10:07

## 2025-05-10 RX ADMIN — TORSEMIDE 40 MG: 20 TABLET ORAL at 10:07

## 2025-05-10 RX ADMIN — APIXABAN 2.5 MG: 2.5 TABLET, FILM COATED ORAL at 20:44

## 2025-05-10 RX ADMIN — Medication 1 TABLET: at 10:07

## 2025-05-10 RX ADMIN — METOPROLOL SUCCINATE 25 MG: 25 TABLET, EXTENDED RELEASE ORAL at 10:07

## 2025-05-10 RX ADMIN — ATORVASTATIN CALCIUM 10 MG: 10 TABLET ORAL at 20:44

## 2025-05-10 RX ADMIN — CETIRIZINE HYDROCHLORIDE 5 MG: 10 TABLET, FILM COATED ORAL at 10:07

## 2025-05-10 RX ADMIN — APIXABAN 2.5 MG: 2.5 TABLET, FILM COATED ORAL at 10:13

## 2025-05-10 RX ADMIN — PANTOPRAZOLE SODIUM 40 MG: 40 TABLET, DELAYED RELEASE ORAL at 06:14

## 2025-05-10 RX ADMIN — TRAZODONE HYDROCHLORIDE 50 MG: 50 TABLET ORAL at 20:44

## 2025-05-10 NOTE — PROGRESS NOTES
Daily progress note    Primary care physician  Dr. Hidalgo    Subjective  Doing better with no new complaint    History of present illness  89-year-old white female with history of congestive heart failure valvular heart disease atrial fibrillation hypertension hyperlipidemia and chronic kidney disease stage IIIa who is well-known to our service presented to [facility] emergency room with increased shortness of breath for last few days which is getting worse.  Patient also noticed increased leg swelling.  Patient denies any chest pain palpitation abdominal pain nausea vomiting diarrhea.  Patient does have nonproductive cough but no fever congestion night sweats.  Patient evaluated in ER found to have acute on chronic diastolic congestive heart failure admitted for management.     REVIEW OF SYSTEMS  Unremarkable except weakness     PHYSICAL EXAM   Blood pressure 149/69, pulse 69, temperature 98.8 °F (37.1 °C), temperature source Oral, resp. rate 18, weight 63.5 kg (140 lb), SpO2 94%.    GENERAL: Awake, alert, no acute distress  SKIN: Warm, dry  HENT: Normocephalic, atraumatic  EYES: no scleral icterus  CV: Irregular rhythm, irregular rate  RESPIRATORY: normal effort, decreased breath sound at bases  ABDOMEN: soft, nontender, nondistended bowel sounds positive  MUSCULOSKELETAL: no deformity, pitting edema to the bilateral lower extremities  NEURO: alert, moves all extremities, follows commands     LAB RESULTS  Lab Results (last 24 hours)       Procedure Component Value Units Date/Time    Basic Metabolic Panel [[id number]]  (Abnormal) Collected: 05/10/25 0419    Specimen: Blood Updated: 05/10/25 0454     Glucose 97 mg/dL      BUN 25 mg/dL      Creatinine 1.22 mg/dL      Sodium 138 mmol/L      Potassium 4.2 mmol/L      Chloride 97 mmol/L      CO2 33.0 mmol/L      Calcium 9.0 mg/dL      BUN/Creatinine Ratio 20.5     Anion Gap 8.0 mmol/L      eGFR 42.2 mL/min/1.73     Narrative:      GFR Categories in Chronic  Kidney Disease (CKD)              GFR Category          GFR (mL/min/1.73)    Interpretation  G1                    90 or greater        Normal or high (1)  G2                    60-89                Mild decrease (1)  G3a                   45-59                Mild to moderate decrease  G3b                   30-44                Moderate to severe decrease  G4                    15-29                Severe decrease  G5                    14 or less           Kidney failure    (1)In the absence of evidence of kidney disease, neither GFR category G1 or G2 fulfill the criteria for CKD.    eGFR calculation 2021 CKD-EPI creatinine equation, which does not include race as a factor    CBC & Differential [[id number]]  (Abnormal) Collected: 05/10/25 0419    Specimen: Blood Updated: 05/10/25 0434    Narrative:      The following orders were created for panel order CBC & Differential.  Procedure                               Abnormality         Status                     ---------                               -----------         ------                     CBC Auto Differential[[id number]]        Abnormal            Final result                 Please view results for these tests on the individual orders.    CBC Auto Differential [[id number]]  (Abnormal) Collected: 05/10/25 0419    Specimen: Blood Updated: 05/10/25 0434     WBC 5.90 10*3/mm3      RBC 3.94 10*6/mm3      Hemoglobin 12.2 g/dL      Hematocrit 37.1 %      MCV 94.2 fL      MCH 31.0 pg      MCHC 32.9 g/dL      RDW 11.7 %      RDW-SD 40.3 fl      MPV 8.8 fL      Platelets 164 10*3/mm3      Neutrophil % 62.7 %      Lymphocyte % 19.7 %      Monocyte % 13.2 %      Eosinophil % 3.6 %      Basophil % 0.3 %      Immature Grans % 0.5 %      Neutrophils, Absolute 3.70 10*3/mm3      Lymphocytes, Absolute 1.16 10*3/mm3      Monocytes, Absolute 0.78 10*3/mm3      Eosinophils, Absolute 0.21 10*3/mm3      Basophils, Absolute 0.02 10*3/mm3      Immature Grans, Absolute 0.03 10*3/mm3       nRBC 0.0 /100 WBC           Imaging Results (Last 24 Hours)       ** No results found for the last 24 hours. **          Scan on 5/7/2025 1821 by [facility] [employer], [place]: ECG 12-LEAD         Author: -- Service: -- Author Type: --   Filed: Date of Service: Creation Time:   Status: (Other)   HEART RATE=65  bpm  RR Rchjklsc=654  ms  WA Interval=  ms  P Horizontal Axis=225  deg  P Front Axis=  deg  QRSD Tyrexmjl=823  ms  QT Pnlhzaey=350  ms  SYcM=273  ms  QRS Axis=-74  deg  T Wave Axis=90  deg  - ABNORMAL ECG -  Afib/flutter and ventricular-paced rhythm  No further analysis attempted due to paced rhythm  No change from prior tracing          Current Facility-Administered Medications:     acetaminophen (TYLENOL) tablet 650 mg, 650 mg, Oral, Q6H PRN, Kike Awad MD    albuterol (PROVENTIL) nebulizer solution 0.083% 2.5 mg/3mL, 2.5 mg, Nebulization, Q6H PRN, Kike Awad MD    apixaban (ELIQUIS) tablet 2.5 mg, 2.5 mg, Oral, Q12H, Kike Awad MD, 2.5 mg at 05/10/25 1013    atorvastatin (LIPITOR) tablet 10 mg, 10 mg, Oral, Nightly, Kike Awad MD, 10 mg at 05/09/25 2042    cetirizine (zyrTEC) tablet 5 mg, 5 mg, Oral, Daily, Kike Awad MD, 5 mg at 05/10/25 1007    magnesium oxide (MAG-OX) tablet 400 mg, 400 mg, Oral, Daily, Kike Awad MD, 400 mg at 05/10/25 1007    metoprolol succinate XL (TOPROL-XL) 24 hr tablet 25 mg, 25 mg, Oral, Q24H, Kike Awad MD, 25 mg at 05/10/25 1007    multivitamin with minerals 1 tablet, 1 tablet, Oral, Daily, Kike Awad MD, 1 tablet at 05/10/25 1007    ondansetron (ZOFRAN) injection 4 mg, 4 mg, Intravenous, Q4H PRN, Kike Awad MD, 4 mg at 05/08/25 1255    pantoprazole (PROTONIX) EC tablet 40 mg, 40 mg, Oral, Q AM, Kike Awad MD, 40 mg at 05/10/25 0614    [COMPLETED] Insert Peripheral IV, , , Once **AND** sodium chloride 0.9 % flush 10 mL, 10 mL, Intravenous, PRN, Kike Awad MD    torsemide (DEMADEX) tablet 40 mg, 40 mg, Oral, Daily, Awilda Valdez, CRISTAL, 40 mg at  05/10/25 1007    traZODone (DESYREL) tablet 50 mg, 50 mg, Oral, Nightly, He Awad MD, 50 mg at 05/09/25 2042     ASSESSMENT  Acute on chronic diastolic congestive heart failure  Valvular heart disease  Paroxysmal atrial fibrillation  Hypertension  Hyperlipidemia  Chronic kidney disease stage IIIa    PLAN  CPM  Continue diuresis and change medication to p.o.  Strict I's and O's and daily weight  Cardiology consult appreciated  Continue home medications  Stress ulcer DVT prophylaxis  Supportive care  PT OT  Discussed with nursing staff  Discharge planning    HE AWAD MD    Copied text in this note has been reviewed and is accurate as of 05/10/25

## 2025-05-10 NOTE — PROGRESS NOTES
[facility]  Cardiology Progress Note    Patient Identification:  Name: Mayuri Early  Age: 89 y.o.  Sex: female  :  1935  MRN: [id number]                 Follow Up / Chief Complaint: Follow up for chf    Interval History: resting in bed, no chest pain or shortness of breath      Objective:    Past Medical History:  Past Medical History:   Diagnosis Date    Fibrocystic breast     Well woman exam 10/20/2020     Past Surgical History:  Past Surgical History:   Procedure Laterality Date    BREAST CYST ASPIRATION      BREAST CYST EXCISION      HYSTERECTOMY          Social History:   Social History     Tobacco Use    Smoking status: Former     Passive exposure: Past    Smokeless tobacco: Never   Substance Use Topics    Alcohol use: Never      Family History:  History reviewed. No pertinent family history.       Allergies:  Allergies   Allergen Reactions    Cortisone Provider Review Needed     Elevates BP    Ezetimibe Provider Review Needed    Simvastatin Provider Review Needed    Oxycodone-Acetaminophen Itching    Azithromycin Provider Review Needed    Hydrocodone-Acetaminophen Itching     Scheduled Meds:  apixaban, 2.5 mg, Q12H  atorvastatin, 10 mg, Nightly  cetirizine, 5 mg, Daily  magnesium oxide, 400 mg, Daily  metoprolol succinate XL, 25 mg, Q24H  multivitamin with minerals, 1 tablet, Daily  pantoprazole, 40 mg, Q AM  torsemide, 40 mg, Daily  traZODone, 50 mg, Nightly            INTAKE AND OUTPUT:    Intake/Output Summary (Last 24 hours) at 5/10/2025 1442  Last data filed at 5/10/2025 1400  Gross per 24 hour   Intake 220 ml   Output 1000 ml   Net -780 ml     Review of Systems:   GI: no n/v or abd pain  Cardiac: no chest pain or palpitations  Pulmonary: no shortness of breath or cough        Constitutional:  Temp:  [97.3 °F (36.3 °C)-98.8 °F (37.1 °C)] 98.8 °F (37.1 °C)  Heart Rate:  [69-85] 85  Resp:  [18] 18  BP: (122-149)/(51-89) 122/64    Physical Exam:  General:  Alert, cooperative,  "appears in no acute distress  Respiratory: Clear to auscultation.  Normal respiratory effort and rate.  Cardiovascular: S1S2 Regular rate and rhythm. No murmur, rub or gallop.   Gastrointestinal: soft, non tender. Bowel sounds present.   Extremities: DA SILVA x4. No pretibial pitting edema. Adequate musculoskeletal strength.   Neuro: AAO x3 CN II-XII grossly intact              Cardiographics    Lab Review   Results from last 7 days   Lab Units 05/09/25  0853 05/09/25  0416 05/07/25  1917   HSTROP T ng/L 14* 14* 12         Results from last 7 days   Lab Units 05/10/25  0419   SODIUM mmol/L 138   POTASSIUM mmol/L 4.2   BUN mg/dL 25*   CREATININE mg/dL 1.22*   CALCIUM mg/dL 9.0     @LABRCNTIPbnp@  Results from last 7 days   Lab Units 05/10/25  0419 05/09/25  0416 05/08/25  0320   WBC 10*3/mm3 5.90 6.22 5.73   HEMOGLOBIN g/dL 12.2 12.1 12.1   HEMATOCRIT % 37.1 36.8 37.3   PLATELETS 10*3/mm3 164 173 171             Assessment:  Shortness of breath  Congestive heart failure  Valvular heart disease      Plan:  BP and HR stable  No chest pain  Doing well on torsemide 40mg daily, on room air  No shortness of breath  Conservative management.       )5/10/2025  CRISTAL Munoz/Transcription:   \"Dictated utilizing Dragon dictation\".     "

## 2025-05-10 NOTE — PLAN OF CARE
Goal Outcome Evaluation:  Plan of Care Reviewed With: patient        Progress: improving  Outcome Evaluation: Pt is A/OX4, on room air, stable v/s, iv lasix switched to Po torsemide starting today, no SOB or chest pain reported, stable BP, bilateral lower extremities swelling is improving. No acute changes over night.

## 2025-05-11 LAB
ANION GAP SERPL CALCULATED.3IONS-SCNC: 10.4 MMOL/L (ref 5–15)
BUN SERPL-MCNC: 30 MG/DL (ref 8–23)
BUN/CREAT SERPL: 22.1 (ref 7–25)
CALCIUM SPEC-SCNC: 8.8 MG/DL (ref 8.2–9.6)
CHLORIDE SERPL-SCNC: 97 MMOL/L (ref 98–107)
CO2 SERPL-SCNC: 32.6 MMOL/L (ref 22–29)
CREAT SERPL-MCNC: 1.36 MG/DL (ref 0.57–1)
EGFRCR SERPLBLD CKD-EPI 2021: 37.1 ML/MIN/1.73
GLUCOSE SERPL-MCNC: 103 MG/DL (ref 65–99)
POTASSIUM SERPL-SCNC: 4 MMOL/L (ref 3.5–5.2)
SODIUM SERPL-SCNC: 140 MMOL/L (ref 136–145)

## 2025-05-11 PROCEDURE — 99232 SBSQ HOSP IP/OBS MODERATE 35: CPT

## 2025-05-11 PROCEDURE — 80048 BASIC METABOLIC PNL TOTAL CA: CPT | Performed by: HOSPITALIST

## 2025-05-11 PROCEDURE — 97530 THERAPEUTIC ACTIVITIES: CPT

## 2025-05-11 RX ADMIN — ACETAMINOPHEN 650 MG: 325 TABLET, FILM COATED ORAL at 15:19

## 2025-05-11 RX ADMIN — TORSEMIDE 40 MG: 20 TABLET ORAL at 08:58

## 2025-05-11 RX ADMIN — METOPROLOL SUCCINATE 25 MG: 25 TABLET, EXTENDED RELEASE ORAL at 08:57

## 2025-05-11 RX ADMIN — TRAZODONE HYDROCHLORIDE 50 MG: 50 TABLET ORAL at 20:26

## 2025-05-11 RX ADMIN — ATORVASTATIN CALCIUM 10 MG: 10 TABLET ORAL at 20:26

## 2025-05-11 RX ADMIN — PANTOPRAZOLE SODIUM 40 MG: 40 TABLET, DELAYED RELEASE ORAL at 06:43

## 2025-05-11 RX ADMIN — Medication 1 TABLET: at 08:58

## 2025-05-11 RX ADMIN — APIXABAN 2.5 MG: 2.5 TABLET, FILM COATED ORAL at 20:26

## 2025-05-11 RX ADMIN — MAGNESIUM OXIDE TAB 400 MG (240 MG ELEMENTAL MG) 400 MG: 400 (240 MG) TAB at 08:58

## 2025-05-11 RX ADMIN — CETIRIZINE HYDROCHLORIDE 5 MG: 10 TABLET, FILM COATED ORAL at 08:58

## 2025-05-11 RX ADMIN — APIXABAN 2.5 MG: 2.5 TABLET, FILM COATED ORAL at 08:58

## 2025-05-11 NOTE — PLAN OF CARE
Goal Outcome Evaluation:         Patient A&Ox3. Able to verbalize needs. Patient walked with PT today and is on a regular diet. Patient is on RA. Family currently in room with patient.

## 2025-05-11 NOTE — PROGRESS NOTES
[facility]  Cardiology Progress Note    Patient Identification:  Name: Mayuri Early  Age: 89 y.o.  Sex: female  :  1935  MRN: [id number]                 Follow Up / Chief Complaint: Follow up for chf    Interval History: resting I bed, no chest pain or shortness of breath      Objective:    Past Medical History:  Past Medical History:   Diagnosis Date    Fibrocystic breast     Well woman exam 10/20/2020     Past Surgical History:  Past Surgical History:   Procedure Laterality Date    BREAST CYST ASPIRATION      BREAST CYST EXCISION      HYSTERECTOMY          Social History:   Social History     Tobacco Use    Smoking status: Former     Passive exposure: Past    Smokeless tobacco: Never   Substance Use Topics    Alcohol use: Never      Family History:  History reviewed. No pertinent family history.       Allergies:  Allergies   Allergen Reactions    Cortisone Provider Review Needed     Elevates BP    Ezetimibe Provider Review Needed    Simvastatin Provider Review Needed    Oxycodone-Acetaminophen Itching    Azithromycin Provider Review Needed    Hydrocodone-Acetaminophen Itching     Scheduled Meds:  apixaban, 2.5 mg, Q12H  atorvastatin, 10 mg, Nightly  cetirizine, 5 mg, Daily  magnesium oxide, 400 mg, Daily  metoprolol succinate XL, 25 mg, Q24H  multivitamin with minerals, 1 tablet, Daily  pantoprazole, 40 mg, Q AM  torsemide, 40 mg, Daily  traZODone, 50 mg, Nightly            INTAKE AND OUTPUT:    Intake/Output Summary (Last 24 hours) at 2025 1233  Last data filed at 2025 0645  Gross per 24 hour   Intake 240 ml   Output 1600 ml   Net -1360 ml     Review of Systems:   GI: no n/v or abd pain  Cardiac: no chest pain or palpitations  Pulmonary: no shortness of breath or cough          Constitutional:  Temp:  [97.5 °F (36.4 °C)-97.9 °F (36.6 °C)] 97.5 °F (36.4 °C)  Heart Rate:  [85-95] 95  Resp:  [18] 18  BP: (116-131)/(57-76) 131/76    Physical Exam:  General:  Alert, cooperative,  "appears in no acute distress  Respiratory: Clear to auscultation.  Normal respiratory effort and rate.  Cardiovascular: S1S2 Regular rate and rhythm. No murmur, rub or gallop.   Gastrointestinal: soft, non tender. Bowel sounds present.   Extremities: DA SILVA x4. No pretibial pitting edema. Adequate musculoskeletal strength.   Neuro: AAO x3 CN II-XII grossly intact                Cardiographics    Lab Review   Results from last 7 days   Lab Units 05/09/25  0853 05/09/25  0416 05/07/25  1917   HSTROP T ng/L 14* 14* 12         Results from last 7 days   Lab Units 05/11/25  0352   SODIUM mmol/L 140   POTASSIUM mmol/L 4.0   BUN mg/dL 30*   CREATININE mg/dL 1.36*   CALCIUM mg/dL 8.8     @LABRCNTIPbnp@  Results from last 7 days   Lab Units 05/10/25  0419 05/09/25  0416 05/08/25  0320   WBC 10*3/mm3 5.90 6.22 5.73   HEMOGLOBIN g/dL 12.2 12.1 12.1   HEMATOCRIT % 37.1 36.8 37.3   PLATELETS 10*3/mm3 164 173 171             Assessment:  Shortness of breath  Congestive heart failure  Valvular heart disease      Plan:  Volume stable on exam, continue torsemide 40mg daily, cr stable  Bp and hr stable  No chest pain  Conservative management  Ok for discharge from cardiac standpoint      )5/11/2025  CRISTAL Munoz/Transcription:   \"Dictated utilizing Dragon dictation\".     "

## 2025-05-11 NOTE — PLAN OF CARE
Goal Outcome Evaluation:  Plan of Care Reviewed With: patient        Progress: improving  Outcome Evaluation: Pt supine in bed at start of session and agreeable to work with PT. Pt was able to ambulate 150' with RW and SBA. Sitting UIC at end of the session. Pt would benefit from RW at D/C, will need to order.

## 2025-05-11 NOTE — PROGRESS NOTES
Daily progress note    Primary care physician  Dr. Hidalgo    Subjective  Doing better with no new complaint    History of present illness  89-year-old white female with history of congestive heart failure valvular heart disease atrial fibrillation hypertension hyperlipidemia and chronic kidney disease stage IIIa who is well-known to our service presented to [facility] [place] emergency room with increased shortness of breath for last few days which is getting worse.  Patient also noticed increased leg swelling.  Patient denies any chest pain palpitation abdominal pain nausea vomiting diarrhea.  Patient does have nonproductive cough but no fever congestion night sweats.  Patient evaluated in ER found to have acute on chronic diastolic congestive heart failure admitted for management.     REVIEW OF SYSTEMS  Unremarkable except weakness     PHYSICAL EXAM   Blood pressure 131/76, pulse 95, temperature 97.5 °F (36.4 °C), temperature source Oral, resp. rate 18, weight 63.5 kg (140 lb), SpO2 93%.    GENERAL: Awake, alert, no acute distress  SKIN: Warm, dry  HENT: Normocephalic, atraumatic  EYES: no scleral icterus  CV: Irregular rhythm, irregular rate  RESPIRATORY: normal effort, decreased breath sound at bases  ABDOMEN: soft, nontender, nondistended bowel sounds positive  MUSCULOSKELETAL: no deformity, pitting edema to the bilateral lower extremities  NEURO: alert, moves all extremities, follows commands     LAB RESULTS  Lab Results (last 24 hours)       Procedure Component Value Units Date/Time    Basic Metabolic Panel [[id number]]  (Abnormal) Collected: 05/11/25 0352    Specimen: Blood Updated: 05/11/25 0435     Glucose 103 mg/dL      BUN 30 mg/dL      Creatinine 1.36 mg/dL      Sodium 140 mmol/L      Potassium 4.0 mmol/L      Chloride 97 mmol/L      CO2 32.6 mmol/L      Calcium 8.8 mg/dL      BUN/Creatinine Ratio 22.1     Anion Gap 10.4 mmol/L      eGFR 37.1 mL/min/1.73     Narrative:      GFR Categories in Chronic  Kidney Disease (CKD)              GFR Category          GFR (mL/min/1.73)    Interpretation  G1                    90 or greater        Normal or high (1)  G2                    60-89                Mild decrease (1)  G3a                   45-59                Mild to moderate decrease  G3b                   30-44                Moderate to severe decrease  G4                    15-29                Severe decrease  G5                    14 or less           Kidney failure    (1)In the absence of evidence of kidney disease, neither GFR category G1 or G2 fulfill the criteria for CKD.    eGFR calculation 2021 CKD-EPI creatinine equation, which does not include race as a factor          Imaging Results (Last 24 Hours)       ** No results found for the last 24 hours. **          Scan on 5/7/2025 1821 by [employer], [place]: ECG 12-LEAD         Author: -- Service: -- Author Type: --   Filed: Date of Service: Creation Time:   Status: (Other)   HEART RATE=65  bpm  RR Hygdamcd=548  ms  MS Interval=  ms  P Horizontal Axis=225  deg  P Front Axis=  deg  QRSD Wvihzqsu=766  ms  QT Knvhlsgr=050  ms  WAnH=158  ms  QRS Axis=-74  deg  T Wave Axis=90  deg  - ABNORMAL ECG -  Afib/flutter and ventricular-paced rhythm  No further analysis attempted due to paced rhythm  No change from prior tracing          Current Facility-Administered Medications:     acetaminophen (TYLENOL) tablet 650 mg, 650 mg, Oral, Q6H PRN, Kike Awad MD    albuterol (PROVENTIL) nebulizer solution 0.083% 2.5 mg/3mL, 2.5 mg, Nebulization, Q6H PRN, Kike Awad MD    apixaban (ELIQUIS) tablet 2.5 mg, 2.5 mg, Oral, Q12H, Kike Awad MD, 2.5 mg at 05/11/25 0858    atorvastatin (LIPITOR) tablet 10 mg, 10 mg, Oral, Nightly, Kike Awad MD, 10 mg at 05/10/25 2044    cetirizine (zyrTEC) tablet 5 mg, 5 mg, Oral, Daily, Kike Awad MD, 5 mg at 05/11/25 0858    magnesium oxide (MAG-OX) tablet 400 mg, 400 mg, Oral, Daily, Kike Awad MD, 400 mg at 05/11/25 0858     metoprolol succinate XL (TOPROL-XL) 24 hr tablet 25 mg, 25 mg, Oral, Q24H, He Oswald MD, 25 mg at 05/11/25 0857    multivitamin with minerals 1 tablet, 1 tablet, Oral, Daily, He Oswald MD, 1 tablet at 05/11/25 0858    ondansetron (ZOFRAN) injection 4 mg, 4 mg, Intravenous, Q4H PRN, He Oswald MD, 4 mg at 05/08/25 1255    pantoprazole (PROTONIX) EC tablet 40 mg, 40 mg, Oral, Q AM, He Oswald MD, 40 mg at 05/11/25 0643    [COMPLETED] Insert Peripheral IV, , , Once **AND** sodium chloride 0.9 % flush 10 mL, 10 mL, Intravenous, PRN, He Oswald MD    torsemide (DEMADEX) tablet 40 mg, 40 mg, Oral, Daily, Awilda Valdez, APRN, 40 mg at 05/11/25 0858    traZODone (DESYREL) tablet 50 mg, 50 mg, Oral, Nightly, He Oswald MD, 50 mg at 05/10/25 2044     ASSESSMENT  Acute on chronic diastolic congestive heart failure  Valvular heart disease  Paroxysmal atrial fibrillation  Hypertension  Hyperlipidemia  Chronic kidney disease stage IIIa    PLAN  CPM  Change diuretics to p.o.  Strict I's and O's and daily weight  Cardiology consult appreciated  Continue home medications  Stress ulcer DVT prophylaxis  Supportive care  PT OT  Discussed with nursing staff  Discharge home in a.m.    HE OSWALD MD    Copied text in this note has been reviewed and is accurate as of 05/11/25

## 2025-05-11 NOTE — THERAPY TREATMENT NOTE
Patient Name: Mayuri Early  : 1935    MRN: [id number]                              Today's Date: 2025       Admit Date: 2025    Visit Dx:     ICD-10-CM ICD-9-CM   1. Acute on chronic congestive heart failure, unspecified heart failure type  I50.9 428.0     Patient Active Problem List   Diagnosis    Well woman exam    Closed compression fracture of L1 vertebra, initial encounter    Compression fracture of T12 vertebra    PAF (paroxysmal atrial fibrillation)    Presence of cardiac pacemaker    HTN (hypertension)    CKD (chronic kidney disease)    Chronic anticoagulation    Chronic back pain    Anemia    Asthma    Hematoma of hip    Acute blood loss anemia    Postural dizziness    Pleural effusion    Acute exacerbation of CHF (congestive heart failure)     Past Medical History:   Diagnosis Date    Fibrocystic breast     Well woman exam 10/20/2020     Past Surgical History:   Procedure Laterality Date    BREAST CYST ASPIRATION      BREAST CYST EXCISION      HYSTERECTOMY        General Information       Row Name 25 1414          Physical Therapy Time and Intention    Document Type therapy note (daily note)  -DB     Mode of Treatment individual therapy;physical therapy  -DB       Row Name 25 1414          General Information    Patient Profile Reviewed yes  -DB               User Key  (r) = Recorded By, (t) = Taken By, (c) = Cosigned By      Initials Name Provider Type    DB Krystyna Kirk PT Physical Therapist                   Mobility       Row Name 25 1414          Bed Mobility    Bed Mobility supine-sit  -DB     Supine-Sit [place] (Bed Mobility) standby assist  -DB     Assistive Device (Bed Mobility) head of bed elevated;bed rails  -DB       Row Name 25 1414          Sit-Stand Transfer    Sit-Stand [place] (Transfers) standby assist  -DB     Assistive Device (Sit-Stand Transfers) walker, front-wheeled  -DB       Row Name 25 1414          Gait/Stairs  (Locomotion)    [place] Level (Gait) standby assist;verbal cues  -DB     Assistive Device (Gait) walker, front-wheeled  -DB     Distance in Feet (Gait) 150  -DB     Deviations/Abnormal Patterns (Gait) festinating/shuffling;gait speed decreased;stride length decreased;antalgic  -DB     Bilateral Gait Deviations forward flexed posture;heel strike decreased  -DB               User Key  (r) = Recorded By, (t) = Taken By, (c) = Cosigned By      Initials Name Provider Type    Krystyna Barriga PT Physical Therapist                   Obj/Interventions       Row Name 05/11/25 1414          Motor Skills    Therapeutic Exercise other (see comments)  seated LE ther ex x 10  -DB               User Key  (r) = Recorded By, (t) = Taken By, (c) = Cosigned By      Initials Name Provider Type    Krystyna Barriga PT Physical Therapist                   Goals/Plan    No documentation.                  Clinical Impression       Row Name 05/11/25 1415          Plan of Care Review    Plan of Care Reviewed With patient  -DB     Progress improving  -DB     Outcome Evaluation Pt supine in bed at start of session and agreeable to work with PT. Pt was able to ambulate 150' with RW and SBA. Sitting UIC at end of the session. Pt would benefit from RW at D/C, will need to order.  -DB       Row Name 05/11/25 1415          Vital Signs    O2 Delivery Pre Treatment room air  -DB     O2 Delivery Intra Treatment room air  -DB     O2 Delivery Post Treatment room air  -DB     Pre Patient Position Supine  -DB     Intra Patient Position Standing  -DB     Post Patient Position Sitting  -DB       Row Name 05/11/25 1415          Positioning and Restraints    Pre-Treatment Position in bed  -DB     Post Treatment Position chair  -DB     In Chair reclined;sitting;notified nsg;call light within reach;encouraged to call for assist  -DB               User Key  (r) = Recorded By, (t) = Taken By, (c) = Cosigned By      Initials Name Provider Type    DANIEL  Krystyna Kirk, PT Physical Therapist                   Outcome Measures       Row Name 05/11/25 1416          How much help from another person do you currently need...    Turning from your back to your side while in flat bed without using bedrails? 4  -DB     Moving from lying on back to sitting on the side of a flat bed without bedrails? 3  -DB     Moving to and from a bed to a chair (including a wheelchair)? 3  -DB     Standing up from a chair using your arms (e.g., wheelchair, bedside chair)? 3  -DB     Climbing 3-5 steps with a railing? 3  -DB     To walk in hospital room? 3  -DB     AM-PAC 6 Clicks Score (PT) 19  -DB     Highest Level of Mobility Goal 6 --> Walk 10 steps or more  -DB       Row Name 05/11/25 1416          Functional Assessment    Outcome Measure Options AM-PAC 6 Clicks Basic Mobility (PT)  -DB               User Key  (r) = Recorded By, (t) = Taken By, (c) = Cosigned By      Initials Name Provider Type    DB Krystyna Kirk, PT Physical Therapist                                 Physical Therapy Education       Title: PT OT SLP Therapies (In Progress)       Topic: Physical Therapy (Done)       Point: Mobility training (Done)       Learning Progress Summary            Patient Acceptance, E, VU by  at 5/11/2025 1416    Acceptance, E, VU,NR by  at 5/9/2025 0931    Eager, E, VU,NR by  at 5/8/2025 1159                      Point: Home exercise program (Done)       Learning Progress Summary            Patient Acceptance, E, VU by  at 5/11/2025 1416    Acceptance, E, VU,NR by  at 5/9/2025 0931    Eager, E, VU,NR by  at 5/8/2025 1159                                      User Key       Initials Effective Dates Name Provider Type Discipline     07/11/23 -  Kym Canseco PT Physical Therapist PT    DB 06/16/21 -  Krystyna Kirk, KEITH Physical Therapist PT                  PT Recommendation and Plan     Progress: improving  Outcome Evaluation: Pt supine in bed at start of session and  agreeable to work with PT. Pt was able to ambulate 150' with RW and SBA. Sitting UIC at end of the session. Pt would benefit from RW at D/C, will need to order.     Time Calculation:         PT Charges       Row Name 05/11/25 1417             Time Calculation    Start Time 1306  -DB      Stop Time 1318  -DB      Time Calculation (min) 12 min  -DB      PT Received On 05/11/25  -DB      PT - Next Appointment 05/12/25  -DB         Time Calculation- PT    Total Timed Code Minutes- PT 12 minute(s)  -DB                User Key  (r) = Recorded By, (t) = Taken By, (c) = Cosigned By      Initials Name Provider Type    DB Krystyna Kirk, PT Physical Therapist                  Therapy Charges for Today       Code Description Service Date Service Provider Modifiers Qty    [id number]  PT THERAPEUTIC ACT EA 15 MIN 5/11/2025 Krystyna Kirk PT GP 1            PT G-Codes  Outcome Measure Options: AM-PAC 6 Clicks Basic Mobility (PT)  AM-PAC 6 Clicks Score (PT): 19  AM-PAC 6 Clicks Score (OT): 20       Krystyna Kirk PT  5/11/2025

## 2025-05-12 ENCOUNTER — READMISSION MANAGEMENT (OUTPATIENT)
Dept: CALL CENTER | Facility: HOSPITAL | Age: OVER 89
End: 2025-05-12
Payer: OTHER GOVERNMENT

## 2025-05-12 VITALS
WEIGHT: 140 LBS | HEART RATE: 89 BPM | BODY MASS INDEX: 26.45 KG/M2 | SYSTOLIC BLOOD PRESSURE: 135 MMHG | OXYGEN SATURATION: 95 % | DIASTOLIC BLOOD PRESSURE: 65 MMHG | RESPIRATION RATE: 18 BRPM | TEMPERATURE: 98 F

## 2025-05-12 LAB
ANION GAP SERPL CALCULATED.3IONS-SCNC: 10.9 MMOL/L (ref 5–15)
BASOPHILS # BLD AUTO: 0.02 10*3/MM3 (ref 0–0.2)
BASOPHILS NFR BLD AUTO: 0.4 % (ref 0–1.5)
BUN SERPL-MCNC: 30 MG/DL (ref 8–23)
BUN/CREAT SERPL: 21 (ref 7–25)
CALCIUM SPEC-SCNC: 9.1 MG/DL (ref 8.2–9.6)
CHLORIDE SERPL-SCNC: 96 MMOL/L (ref 98–107)
CO2 SERPL-SCNC: 32.1 MMOL/L (ref 22–29)
CREAT SERPL-MCNC: 1.43 MG/DL (ref 0.57–1)
DEPRECATED RDW RBC AUTO: 38.7 FL (ref 37–54)
EGFRCR SERPLBLD CKD-EPI 2021: 34.9 ML/MIN/1.73
EOSINOPHIL # BLD AUTO: 0.23 10*3/MM3 (ref 0–0.4)
EOSINOPHIL NFR BLD AUTO: 4.2 % (ref 0.3–6.2)
ERYTHROCYTE [DISTWIDTH] IN BLOOD BY AUTOMATED COUNT: 11.5 % (ref 12.3–15.4)
GLUCOSE SERPL-MCNC: 91 MG/DL (ref 65–99)
HCT VFR BLD AUTO: 38.4 % (ref 34–46.6)
HGB BLD-MCNC: 13 G/DL (ref 12–15.9)
IMM GRANULOCYTES # BLD AUTO: 0.02 10*3/MM3 (ref 0–0.05)
IMM GRANULOCYTES NFR BLD AUTO: 0.4 % (ref 0–0.5)
LYMPHOCYTES # BLD AUTO: 1.06 10*3/MM3 (ref 0.7–3.1)
LYMPHOCYTES NFR BLD AUTO: 19.2 % (ref 19.6–45.3)
MCH RBC QN AUTO: 31.3 PG (ref 26.6–33)
MCHC RBC AUTO-ENTMCNC: 33.9 G/DL (ref 31.5–35.7)
MCV RBC AUTO: 92.5 FL (ref 79–97)
MONOCYTES # BLD AUTO: 0.7 10*3/MM3 (ref 0.1–0.9)
MONOCYTES NFR BLD AUTO: 12.7 % (ref 5–12)
NEUTROPHILS NFR BLD AUTO: 3.5 10*3/MM3 (ref 1.7–7)
NEUTROPHILS NFR BLD AUTO: 63.1 % (ref 42.7–76)
NRBC BLD AUTO-RTO: 0 /100 WBC (ref 0–0.2)
PLATELET # BLD AUTO: 181 10*3/MM3 (ref 140–450)
PMV BLD AUTO: 8.8 FL (ref 6–12)
POTASSIUM SERPL-SCNC: 4.3 MMOL/L (ref 3.5–5.2)
RBC # BLD AUTO: 4.15 10*6/MM3 (ref 3.77–5.28)
SODIUM SERPL-SCNC: 139 MMOL/L (ref 136–145)
WBC NRBC COR # BLD AUTO: 5.53 10*3/MM3 (ref 3.4–10.8)

## 2025-05-12 PROCEDURE — 85025 COMPLETE CBC W/AUTO DIFF WBC: CPT | Performed by: HOSPITALIST

## 2025-05-12 PROCEDURE — 80048 BASIC METABOLIC PNL TOTAL CA: CPT | Performed by: HOSPITALIST

## 2025-05-12 RX ORDER — PANTOPRAZOLE SODIUM 40 MG/1
40 TABLET, DELAYED RELEASE ORAL
Qty: 30 TABLET | Refills: 0 | Status: SHIPPED | OUTPATIENT
Start: 2025-05-13 | End: 2025-06-12

## 2025-05-12 RX ORDER — METOPROLOL SUCCINATE 25 MG/1
25 TABLET, EXTENDED RELEASE ORAL
Qty: 30 TABLET | Refills: 0 | Status: SHIPPED | OUTPATIENT
Start: 2025-05-13 | End: 2025-05-14

## 2025-05-12 RX ORDER — TORSEMIDE 20 MG/1
40 TABLET ORAL DAILY
Qty: 60 TABLET | Refills: 0 | Status: SHIPPED | OUTPATIENT
Start: 2025-05-13 | End: 2025-05-14

## 2025-05-12 RX ADMIN — PANTOPRAZOLE SODIUM 40 MG: 40 TABLET, DELAYED RELEASE ORAL at 06:24

## 2025-05-12 RX ADMIN — Medication 1 TABLET: at 08:54

## 2025-05-12 RX ADMIN — METOPROLOL SUCCINATE 25 MG: 25 TABLET, EXTENDED RELEASE ORAL at 08:53

## 2025-05-12 RX ADMIN — MAGNESIUM OXIDE TAB 400 MG (240 MG ELEMENTAL MG) 400 MG: 400 (240 MG) TAB at 08:53

## 2025-05-12 RX ADMIN — APIXABAN 2.5 MG: 2.5 TABLET, FILM COATED ORAL at 08:54

## 2025-05-12 RX ADMIN — TORSEMIDE 40 MG: 20 TABLET ORAL at 08:54

## 2025-05-12 RX ADMIN — CETIRIZINE HYDROCHLORIDE 5 MG: 10 TABLET, FILM COATED ORAL at 08:54

## 2025-05-12 NOTE — PLAN OF CARE
Goal Outcome Evaluation:  Plan of Care Reviewed With: patient        Progress: improving  Outcome Evaluation: Patient alert and oriented x 4. Vital signs stable, room air. No complaints of pain. No acute changes overnight. Possible discharge today.

## 2025-05-12 NOTE — PLAN OF CARE
Goal Outcome Evaluation:         Patient to d/c home. Her son will be picking her up today.

## 2025-05-12 NOTE — PROGRESS NOTES
Daily progress note    Primary care physician  Dr. Hidalgo    Subjective  Doing better with no new complaint and wants to go home    History of present illness  89-year-old white female with history of congestive heart failure valvular heart disease atrial fibrillation hypertension hyperlipidemia and chronic kidney disease stage IIIa who is well-known to our service presented to [facility] emergency room with increased shortness of breath for last few days which is getting worse.  Patient also noticed increased leg swelling.  Patient denies any chest pain palpitation abdominal pain nausea vomiting diarrhea.  Patient does have nonproductive cough but no fever congestion night sweats.  Patient evaluated in ER found to have acute on chronic diastolic congestive heart failure admitted for management.     REVIEW OF SYSTEMS  Unremarkable except weakness     PHYSICAL EXAM   Blood pressure 135/65, pulse 89, temperature 98 °F (36.7 °C), resp. rate 18, weight 63.5 kg (140 lb), SpO2 95%.    GENERAL: Awake, alert, no acute distress  SKIN: Warm, dry  HENT: Normocephalic, atraumatic  EYES: no scleral icterus  CV: Irregular rhythm, irregular rate  RESPIRATORY: normal effort, decreased breath sound at bases  ABDOMEN: soft, nontender, nondistended bowel sounds positive  MUSCULOSKELETAL: no deformity, pitting edema to the bilateral lower extremities  NEURO: alert, moves all extremities, follows commands     LAB RESULTS  Lab Results (last 24 hours)       Procedure Component Value Units Date/Time    Basic Metabolic Panel [[id number]]  (Abnormal) Collected: 05/12/25 0627    Specimen: Blood Updated: 05/12/25 0700     Glucose 91 mg/dL      BUN 30 mg/dL      Creatinine 1.43 mg/dL      Sodium 139 mmol/L      Potassium 4.3 mmol/L      Chloride 96 mmol/L      CO2 32.1 mmol/L      Calcium 9.1 mg/dL      BUN/Creatinine Ratio 21.0     Anion Gap 10.9 mmol/L      eGFR 34.9 mL/min/1.73     Narrative:      GFR Categories in Chronic Kidney  Disease (CKD)              GFR Category          GFR (mL/min/1.73)    Interpretation  G1                    90 or greater        Normal or high (1)  G2                    60-89                Mild decrease (1)  G3a                   45-59                Mild to moderate decrease  G3b                   30-44                Moderate to severe decrease  G4                    15-29                Severe decrease  G5                    14 or less           Kidney failure    (1)In the absence of evidence of kidney disease, neither GFR category G1 or G2 fulfill the criteria for CKD.    eGFR calculation 2021 CKD-EPI creatinine equation, which does not include race as a factor    CBC & Differential [[id number]]  (Abnormal) Collected: 05/12/25 0626    Specimen: Blood Updated: 05/12/25 0650    Narrative:      The following orders were created for panel order CBC & Differential.  Procedure                               Abnormality         Status                     ---------                               -----------         ------                     CBC Auto Differential[[id number]]        Abnormal            Final result                 Please view results for these tests on the individual orders.    CBC Auto Differential [[id number]]  (Abnormal) Collected: 05/12/25 0626    Specimen: Blood Updated: 05/12/25 0650     WBC 5.53 10*3/mm3      RBC 4.15 10*6/mm3      Hemoglobin 13.0 g/dL      Hematocrit 38.4 %      MCV 92.5 fL      MCH 31.3 pg      MCHC 33.9 g/dL      RDW 11.5 %      RDW-SD 38.7 fl      MPV 8.8 fL      Platelets 181 10*3/mm3      Neutrophil % 63.1 %      Lymphocyte % 19.2 %      Monocyte % 12.7 %      Eosinophil % 4.2 %      Basophil % 0.4 %      Immature Grans % 0.4 %      Neutrophils, Absolute 3.50 10*3/mm3      Lymphocytes, Absolute 1.06 10*3/mm3      Monocytes, Absolute 0.70 10*3/mm3      Eosinophils, Absolute 0.23 10*3/mm3      Basophils, Absolute 0.02 10*3/mm3      Immature Grans, Absolute 0.02 10*3/mm3       nRBC 0.0 /100 WBC           Imaging Results (Last 24 Hours)       ** No results found for the last 24 hours. **          Scan on 5/7/2025 1821 by [facility], [place]: ECG 12-LEAD         Author: -- Service: -- Author Type: --   Filed: Date of Service: Creation Time:   Status: (Other)   HEART RATE=65  bpm  RR Xmvhihnj=976  ms  GA Interval=  ms  P Horizontal Axis=225  deg  P Front Axis=  deg  QRSD Ytxhuqtp=357  ms  QT Vxcdtgtg=522  ms  SJmU=209  ms  QRS Axis=-74  deg  T Wave Axis=90  deg  - ABNORMAL ECG -  Afib/flutter and ventricular-paced rhythm  No further analysis attempted due to paced rhythm  No change from prior tracing          Current Facility-Administered Medications:     acetaminophen (TYLENOL) tablet 650 mg, 650 mg, Oral, Q6H PRN, Kike Awad MD, 650 mg at 05/11/25 1519    albuterol (PROVENTIL) nebulizer solution 0.083% 2.5 mg/3mL, 2.5 mg, Nebulization, Q6H PRN, Kike Awad MD    apixaban (ELIQUIS) tablet 2.5 mg, 2.5 mg, Oral, Q12H, Kike Awad MD, 2.5 mg at 05/12/25 0854    atorvastatin (LIPITOR) tablet 10 mg, 10 mg, Oral, Nightly, Kike Awad MD, 10 mg at 05/11/25 2026    cetirizine (zyrTEC) tablet 5 mg, 5 mg, Oral, Daily, Kike Awad MD, 5 mg at 05/12/25 0854    magnesium oxide (MAG-OX) tablet 400 mg, 400 mg, Oral, Daily, iKke Awad MD, 400 mg at 05/12/25 0853    metoprolol succinate XL (TOPROL-XL) 24 hr tablet 25 mg, 25 mg, Oral, Q24H, Kike Awad MD, 25 mg at 05/12/25 0853    multivitamin with minerals 1 tablet, 1 tablet, Oral, Daily, Kike Awad MD, 1 tablet at 05/12/25 0854    ondansetron (ZOFRAN) injection 4 mg, 4 mg, Intravenous, Q4H PRN, Kike Awad MD, 4 mg at 05/08/25 1255    pantoprazole (PROTONIX) EC tablet 40 mg, 40 mg, Oral, Q AM, Kike Awad MD, 40 mg at 05/12/25 0624    [COMPLETED] Insert Peripheral IV, , , Once **AND** sodium chloride 0.9 % flush 10 mL, 10 mL, Intravenous, PRN, Kike Awad MD    torsemide (DEMADEX) tablet 40 mg, 40 mg, Oral, Daily, Awilda Valdez  L, APRN, 40 mg at 05/12/25 0854    traZODone (DESYREL) tablet 50 mg, 50 mg, Oral, Nightly, He Awad MD, 50 mg at 05/11/25 2026    Current Outpatient Medications:     acetaminophen (TYLENOL) 325 MG tablet, Take 2 tablets by mouth Every 6 (Six) Hours As Needed for Mild Pain., Disp: , Rfl:     albuterol sulfate  (90 Base) MCG/ACT inhaler, Inhale 2 puffs Every 4 (Four) Hours As Needed for Wheezing., Disp: , Rfl:     apixaban (ELIQUIS) 2.5 MG tablet tablet, Take 1 tablet by mouth 2 (Two) Times a Day., Disp: , Rfl:     atorvastatin (LIPITOR) 10 MG tablet, Take 1 tablet by mouth Every Night., Disp: , Rfl:     Cetirizine HCl 10 MG capsule, Take 5 mg by mouth Daily As Needed (allergies)., Disp: , Rfl:     magnesium oxide (MAG-OX) 400 tablet tablet, Take 1 tablet by mouth Daily., Disp: , Rfl:     [START ON 5/13/2025] metoprolol succinate XL (TOPROL-XL) 25 MG 24 hr tablet, Take 1 tablet by mouth Daily for 30 days., Disp: 30 tablet, Rfl: 0    multivitamin with minerals (MULTIVITAMIN ADULT PO), Take 1 tablet by mouth Daily., Disp: , Rfl:     Omega-3 Fatty Acids (fish oil) 1000 MG capsule capsule, Take 1 capsule by mouth Daily With Breakfast., Disp: , Rfl:     [START ON 5/13/2025] pantoprazole (PROTONIX) 40 MG EC tablet, Take 1 tablet by mouth Every Morning for 30 days., Disp: 30 tablet, Rfl: 0    [START ON 5/13/2025] torsemide (DEMADEX) 20 MG tablet, Take 2 tablets by mouth Daily for 30 days., Disp: 60 tablet, Rfl: 0    traZODone (DESYREL) 50 MG tablet, Take 1 tablet by mouth Every Night., Disp: , Rfl:      ASSESSMENT  Acute on chronic diastolic congestive heart failure  Valvular heart disease  Paroxysmal atrial fibrillation  Hypertension  Hyperlipidemia  Chronic kidney disease stage IIIa    PLAN  Discharge home  Discharge summary dictated    HE AWAD MD    Copied text in this note has been reviewed and is accurate as of 05/12/25

## 2025-05-12 NOTE — OUTREACH NOTE
Prep Survey      Flowsheet Row Responses   Congregational facility patient discharged from? [place]   Is LACE score < 7 ? No   Eligibility Readm Mgmt   Discharge diagnosis Acute exacerbation of CHF (congestive heart failure)   Does the patient have one of the following disease processes/diagnoses(primary or secondary)? CHF   Prep survey completed? Yes            Anila LÓPEZ - Registered Nurse

## 2025-05-12 NOTE — DISCHARGE SUMMARY
Discharge summary    Date of admission 5/7/2025  Date of discharge 5/12/2025    Final diagnosis  Acute on chronic diastolic congestive heart failure  Valvular heart disease  Paroxysmal atrial fibrillation  Hypertension  Hyperlipidemia  Chronic kidney disease stage IIIa    Discharge medications    Current Facility-Administered Medications:     acetaminophen (TYLENOL) tablet 650 mg, 650 mg, Oral, Q6H PRN, Kike Awad MD, 650 mg at 05/11/25 1519    albuterol (PROVENTIL) nebulizer solution 0.083% 2.5 mg/3mL, 2.5 mg, Nebulization, Q6H PRN, Kike Awad MD    apixaban (ELIQUIS) tablet 2.5 mg, 2.5 mg, Oral, Q12H, Kike Awad MD, 2.5 mg at 05/12/25 0854    atorvastatin (LIPITOR) tablet 10 mg, 10 mg, Oral, Nightly, Kike Awad MD, 10 mg at 05/11/25 2026    cetirizine (zyrTEC) tablet 5 mg, 5 mg, Oral, Daily, Kike Awad MD, 5 mg at 05/12/25 0854    magnesium oxide (MAG-OX) tablet 400 mg, 400 mg, Oral, Daily, Kike Awad MD, 400 mg at 05/12/25 0853    metoprolol succinate XL (TOPROL-XL) 24 hr tablet 25 mg, 25 mg, Oral, Q24H, Kike Awad MD, 25 mg at 05/12/25 0853    multivitamin with minerals 1 tablet, 1 tablet, Oral, Daily, Kike Awad MD, 1 tablet at 05/12/25 0854    ondansetron (ZOFRAN) injection 4 mg, 4 mg, Intravenous, Q4H PRN, Kike Awad MD, 4 mg at 05/08/25 1255    pantoprazole (PROTONIX) EC tablet 40 mg, 40 mg, Oral, Q AM, Kike Awad MD, 40 mg at 05/12/25 0624    [COMPLETED] Insert Peripheral IV, , , Once **AND** sodium chloride 0.9 % flush 10 mL, 10 mL, Intravenous, PRN, Kike Awad MD    torsemide (DEMADEX) tablet 40 mg, 40 mg, Oral, Daily, Awilda Valdez APRN, 40 mg at 05/12/25 0854    traZODone (DESYREL) tablet 50 mg, 50 mg, Oral, Nightly, Kike Awad MD, 50 mg at 05/11/25 2026    Current Outpatient Medications:     acetaminophen (TYLENOL) 325 MG tablet, Take 2 tablets by mouth Every 6 (Six) Hours As Needed for Mild Pain., Disp: , Rfl:     albuterol sulfate  (90 Base) MCG/ACT  inhaler, Inhale 2 puffs Every 4 (Four) Hours As Needed for Wheezing., Disp: , Rfl:     apixaban (ELIQUIS) 2.5 MG tablet tablet, Take 1 tablet by mouth 2 (Two) Times a Day., Disp: , Rfl:     atorvastatin (LIPITOR) 10 MG tablet, Take 1 tablet by mouth Every Night., Disp: , Rfl:     Cetirizine HCl 10 MG capsule, Take 5 mg by mouth Daily As Needed (allergies)., Disp: , Rfl:     magnesium oxide (MAG-OX) 400 tablet tablet, Take 1 tablet by mouth Daily., Disp: , Rfl:     [START ON 5/13/2025] metoprolol succinate XL (TOPROL-XL) 25 MG 24 hr tablet, Take 1 tablet by mouth Daily for 30 days., Disp: 30 tablet, Rfl: 0    multivitamin with minerals (MULTIVITAMIN ADULT PO), Take 1 tablet by mouth Daily., Disp: , Rfl:     Omega-3 Fatty Acids (fish oil) 1000 MG capsule capsule, Take 1 capsule by mouth Daily With Breakfast., Disp: , Rfl:     [START ON 5/13/2025] pantoprazole (PROTONIX) 40 MG EC tablet, Take 1 tablet by mouth Every Morning for 30 days., Disp: 30 tablet, Rfl: 0    [START ON 5/13/2025] torsemide (DEMADEX) 20 MG tablet, Take 2 tablets by mouth Daily for 30 days., Disp: 60 tablet, Rfl: 0    traZODone (DESYREL) 50 MG tablet, Take 1 tablet by mouth Every Night., Disp: , Rfl:      Consults obtained  Cardiology    Procedures  None    Hospital course  89-year-old white female with history of diastolic congestive heart failure valvular heart disease atrial fibrillation hypertension hyperlipidemia chronic kidney disease who is well-known to our service admitted to emergency room with shortness of breath.  Patient workup in ER revealed acute on chronic diastolic congestive heart failure admitted for management.  Patient admitted treated with IV diuretics and she responded to the treatment.  Patient diuretics changed to by mouth and cleared for discharge.  Patient followed by cardiology.    Discharge diet regular    Activity as tolerated    Medication as above    Follow-up with primary doctor in 1 week and follow-up with her  cardiologist per the instructions and take medication as directed.    HE OSWALD MD

## 2025-05-12 NOTE — PROGRESS NOTES
Case Management Discharge Note      Final Note: DC home no needs         Selected Continued Care - Discharged on 5/12/2025 Admission date: 5/7/2025 - Discharge disposition: Home or Self Care      Destination    No services have been selected for the patient.                Durable Medical Equipment    No services have been selected for the patient.                Dialysis/Infusion    No services have been selected for the patient.                Home Medical Care    No services have been selected for the patient.                Therapy    No services have been selected for the patient.                Community Resources    No services have been selected for the patient.                Community & DME    No services have been selected for the patient.                    Selected Continued Care - Prior Encounters Includes continued care and service providers with selected services from prior encounters from 2/6/2025 to 5/12/2025      Discharged on 3/4/2025 Admission date: 2/24/2025 - Discharge disposition: Skilled Nursing Facility (DC - External)      Destination       Service Provider Services Address Phone Fax Patient Preferred    [facility] [address] [id number] [phone] --                      Discharged on 2/12/2025 Admission date: 2/8/2025 - Discharge disposition: Home or Self Care      Durable Medical Equipment       Service Provider Services Address Phone Fax Patient Preferred    [facility] [address] [id number] [phone] --                               Final Discharge Disposition Code: 01 - home or self-care

## 2025-05-12 NOTE — PAYOR COMM NOTE
"Mary Early \"Broadway\" (89 y.o. Female)     ATTN: NURSE REVIEWER  RE: INITIAL INPT AUTH CLINICALS   REF: [id number]  PLS REPLY TO KEYA MAIER [phone] OR FAX# [phone]      Date of Birth   1935    Social Security Number       Address   [address] [place] [id number]    Home Phone   [phone]    MRN   [id number]       Zoroastrianism   Evangelical    Marital Status                               Admission Date   5/7/2025    Admission Type   Emergency    Admitting Provider   Kike Awad MD    Attending Provider   Kike Awad MD    Department, Room/Bed   [address], S423/1       Discharge Date       Discharge Disposition       Discharge Destination                                 Attending Provider: Kike Awad MD    Allergies: Cortisone, Ezetimibe, Simvastatin, Oxycodone-acetaminophen, Azithromycin, Hydrocodone-acetaminophen    Isolation: None   Infection: None   Code Status: CPR    Ht: 154.9 cm (61\")   Wt: 63.5 kg (140 lb)    Admission Cmt: None   Principal Problem: Acute exacerbation of CHF (congestive heart failure) [I50.9]                   Active Insurance as of 5/7/2025       Primary Coverage       Payor Plan Insurance Group Employer/Plan Group    ANTHEM MEDICARE REPLACEMENT ANTHEM MEDICARE ADVANTAGE HMO [id number]       Payor Plan Address Payor Plan Phone Number Payor Plan Fax Number Effective Dates    [address] [phone]  1/1/2025 - None Entered    [facility] [address]         Subscriber Name Subscriber Birth Date Member ID       MARY EARLY 1935 [id number]               Secondary Coverage       Payor Plan Insurance Group Employer/Plan Group             Payor Plan Address Payor Plan Phone Number Payor Plan Fax Number Effective Dates    [facility] [phone]  4/28/2005 - None Entered    [address] [address]       [facility] [address]         Subscriber Name Subscriber Birth Date Member ID       " TIARRA EARLYE 1935 [id number]                     Emergency Contacts        (Rel.) Home Phone Work Phone Mobile Phone    Torres Early (Son) [phone] -- --    LASHAY early (Son) -- -- [phone]                 History & Physical        Kike Awad MD at 05/09/25 0901          Daily progress note    Primary care physician  Dr. Hidalgo    Subjective  Awake and alert and feeling much better than yesterday with no new complaints.    History of present illness  89-year-old white female with history of congestive heart failure valvular heart disease atrial fibrillation hypertension hyperlipidemia and chronic kidney disease stage IIIa who is well-known to our service presented to [facility] emergency room with increased shortness of breath for last few days which is getting worse.  Patient also noticed increased leg swelling.  Patient denies any chest pain palpitation abdominal pain nausea vomiting diarrhea.  Patient does have nonproductive cough but no fever congestion night sweats.  Patient evaluated in ER found to have acute on chronic diastolic congestive heart failure admitted for management.     REVIEW OF SYSTEMS  All systems reviewed and negative except for those discussed in HPI.     PHYSICAL EXAM   Blood pressure 132/60, pulse 83, temperature 98.2 °F (36.8 °C), temperature source Oral, resp. rate 18, weight 63.5 kg (140 lb), SpO2 98%.    GENERAL: Awake, alert, no acute distress  SKIN: Warm, dry  HENT: Normocephalic, atraumatic  EYES: no scleral icterus  CV: Irregular rhythm, irregular rate  RESPIRATORY: normal effort, decreased breath sound at bases  ABDOMEN: soft, nontender, nondistended bowel sounds positive  MUSCULOSKELETAL: no deformity, pitting edema to the bilateral lower extremities  NEURO: alert, moves all extremities, follows commands     LAB RESULTS  Lab Results (last 24 hours)       Procedure Component Value Units Date/Time    High Sensitivity Troponin T 1Hr  [[id number]]  (Abnormal) Collected: 05/09/25 0853    Specimen: Blood Updated: 05/09/25 0936     HS Troponin T 14 ng/L      Troponin T Numeric Delta 0 ng/L      Troponin T % Delta 0    Narrative:      High Sensitive Troponin T Reference Range:  <14.0 ng/L- Negative Female for AMI  <22.0 ng/L- Negative Male for AMI  >=14 - Abnormal Female indicating possible myocardial injury.  >=22 - Abnormal Male indicating possible myocardial injury.   Clinicians would have to utilize clinical acumen, EKG, Troponin, and serial changes to determine if it is an Acute Myocardial Infarction or myocardial injury due to an underlying chronic condition.         BNP [[id number]]  (Abnormal) Collected: 05/09/25 0416    Specimen: Blood Updated: 05/09/25 0506     proBNP 2,299.0 pg/mL     Narrative:      This assay is used as an aid in the diagnosis of individuals suspected of having heart failure. It can be used as an aid in the diagnosis of acute decompensated heart failure (ADHF) in patients presenting with signs and symptoms of ADHF to the emergency department (ED). In addition, NT-proBNP of <300 pg/mL indicates ADHF is not likely.    Age Range Result Interpretation  NT-proBNP Concentration (pg/mL:      <50             Positive            >450                   Gray                 300-450                    Negative             <300    50-75           Positive            >900                  Gray                300-900                  Negative            <300      >75             Positive            >1800                  Gray                300-1800                  Negative            <300    TSH [[id number]]  (Normal) Collected: 05/09/25 0416    Specimen: Blood Updated: 05/09/25 0506     TSH 2.590 uIU/mL     High Sensitivity Troponin T [[id number]]  (Abnormal) Collected: 05/09/25 0416    Specimen: Blood Updated: 05/09/25 0506     HS Troponin T 14 ng/L     Narrative:      High Sensitive Troponin T Reference Range:  <14.0 ng/L- Negative  Female for AMI  <22.0 ng/L- Negative Male for AMI  >=14 - Abnormal Female indicating possible myocardial injury.  >=22 - Abnormal Male indicating possible myocardial injury.   Clinicians would have to utilize clinical acumen, EKG, Troponin, and serial changes to determine if it is an Acute Myocardial Infarction or myocardial injury due to an underlying chronic condition.         Comprehensive Metabolic Panel [[id number]]  (Abnormal) Collected: 05/09/25 0416    Specimen: Blood Updated: 05/09/25 0502     Glucose 96 mg/dL      BUN 29 mg/dL      Creatinine 1.38 mg/dL      Sodium 139 mmol/L      Potassium 4.5 mmol/L      Chloride 98 mmol/L      CO2 33.8 mmol/L      Calcium 9.4 mg/dL      Total Protein 6.7 g/dL      Albumin 3.6 g/dL      ALT (SGPT) 10 U/L      AST (SGOT) 18 U/L      Alkaline Phosphatase 91 U/L      Total Bilirubin 0.7 mg/dL      Globulin 3.1 gm/dL      A/G Ratio 1.2 g/dL      BUN/Creatinine Ratio 21.0     Anion Gap 7.2 mmol/L      eGFR 36.4 mL/min/1.73     Narrative:      GFR Categories in Chronic Kidney Disease (CKD)              GFR Category          GFR (mL/min/1.73)    Interpretation  G1                    90 or greater        Normal or high (1)  G2                    60-89                Mild decrease (1)  G3a                   45-59                Mild to moderate decrease  G3b                   30-44                Moderate to severe decrease  G4                    15-29                Severe decrease  G5                    14 or less           Kidney failure    (1)In the absence of evidence of kidney disease, neither GFR category G1 or G2 fulfill the criteria for CKD.    eGFR calculation 2021 CKD-EPI creatinine equation, which does not include race as a factor    Lipid Panel [[id number]]  (Abnormal) Collected: 05/09/25 0416    Specimen: Blood Updated: 05/09/25 0501     Total Cholesterol 129 mg/dL      Triglycerides 52 mg/dL      HDL Cholesterol 62 mg/dL      LDL Cholesterol  55 mg/dL      VLDL  Cholesterol 12 mg/dL      LDL/HDL Ratio 0.91    Narrative:      Cholesterol Reference Ranges  (U.S. Department of Health and Human Services ATP III Classifications)    Desirable          <200 mg/dL  Borderline High    200-239 mg/dL  High Risk          >240 mg/dL      Triglyceride Reference Ranges  (U.S. Department of Health and Human Services ATP III Classifications)    Normal           <150 mg/dL  Borderline High  150-199 mg/dL  High             200-499 mg/dL  Very High        >500 mg/dL    HDL Reference Ranges  (U.S. Department of Health and Human Services ATP III Classifications)    Low     <40 mg/dl (major risk factor for CHD)  High    >60 mg/dl ('negative' risk factor for CHD)        LDL Reference Ranges  (U.S. Department of Health and Human Services ATP III Classifications)    Optimal          <100 mg/dL  Near Optimal     100-129 mg/dL  Borderline High  130-159 mg/dL  High             160-189 mg/dL  Very High        >189 mg/dL    LDL is calculated using the NIH LDL-C calculation.      Hemoglobin A1c [[id number]]  (Normal) Collected: 05/09/25 0416    Specimen: Blood Updated: 05/09/25 0452     Hemoglobin A1C 5.40 %     Narrative:      Hemoglobin A1C Ranges:    Increased Risk for Diabetes  5.7% to 6.4%  Diabetes                     >= 6.5%  Diabetic Goal                < 7.0%    CBC & Differential [[id number]]  (Abnormal) Collected: 05/09/25 0416    Specimen: Blood Updated: 05/09/25 0438    Narrative:      The following orders were created for panel order CBC & Differential.  Procedure                               Abnormality         Status                     ---------                               -----------         ------                     CBC Auto Differential[[id number]]        Abnormal            Final result                 Please view results for these tests on the individual orders.    CBC Auto Differential [[id number]]  (Abnormal) Collected: 05/09/25 0416    Specimen: Blood Updated: 05/09/25 0438      WBC 6.22 10*3/mm3      RBC 3.84 10*6/mm3      Hemoglobin 12.1 g/dL      Hematocrit 36.8 %      MCV 95.8 fL      MCH 31.5 pg      MCHC 32.9 g/dL      RDW 11.8 %      RDW-SD 40.5 fl      MPV 8.8 fL      Platelets 173 10*3/mm3      Neutrophil % 62.8 %      Lymphocyte % 20.6 %      Monocyte % 13.0 %      Eosinophil % 3.1 %      Basophil % 0.3 %      Immature Grans % 0.2 %      Neutrophils, Absolute 3.91 10*3/mm3      Lymphocytes, Absolute 1.28 10*3/mm3      Monocytes, Absolute 0.81 10*3/mm3      Eosinophils, Absolute 0.19 10*3/mm3      Basophils, Absolute 0.02 10*3/mm3      Immature Grans, Absolute 0.01 10*3/mm3      nRBC 0.0 /100 WBC           Imaging Results (Last 24 Hours)       ** No results found for the last 24 hours. **          Scan on 5/7/2025 1821 by [employer], [facility]: ECG 12-LEAD         Author: -- Service: -- Author Type: --   Filed: Date of Service: Creation Time:   Status: (Other)   HEART RATE=65  bpm  RR Bziqrhmf=293  ms  CA Interval=  ms  P Horizontal Axis=225  deg  P Front Axis=  deg  QRSD Hdxujajv=764  ms  QT Ibunpnoo=422  ms  JBoO=381  ms  QRS Axis=-74  deg  T Wave Axis=90  deg  - ABNORMAL ECG -  Afib/flutter and ventricular-paced rhythm  No further analysis attempted due to paced rhythm  No change from prior tracing          Current Facility-Administered Medications:     acetaminophen (TYLENOL) tablet 650 mg, 650 mg, Oral, Q6H PRN, Kike Awad MD    albuterol (PROVENTIL) nebulizer solution 0.083% 2.5 mg/3mL, 2.5 mg, Nebulization, Q6H PRN, Kike Awad MD    apixaban (ELIQUIS) tablet 2.5 mg, 2.5 mg, Oral, Q12H, Kike Awad MD, 2.5 mg at 05/09/25 0818    atorvastatin (LIPITOR) tablet 10 mg, 10 mg, Oral, Nightly, Kike Awad MD, 10 mg at 05/08/25 2125    cetirizine (zyrTEC) tablet 5 mg, 5 mg, Oral, Daily, Kike Awad MD, 5 mg at 05/09/25 0819    [Held by provider] furosemide (LASIX) injection 40 mg, 40 mg, Intravenous, BID Diuretics, Kike Awad MD, 40 mg at 05/08/25 1814    magnesium oxide  (MAG-OX) tablet 400 mg, 400 mg, Oral, Daily, He Awad MD, 400 mg at 05/09/25 0819    metoprolol succinate XL (TOPROL-XL) 24 hr tablet 25 mg, 25 mg, Oral, Q24H, He Awad MD, 25 mg at 05/09/25 0819    multivitamin with minerals 1 tablet, 1 tablet, Oral, Daily, He Awad MD, 1 tablet at 05/09/25 0819    ondansetron (ZOFRAN) injection 4 mg, 4 mg, Intravenous, Q4H PRN, He Awad MD, 4 mg at 05/08/25 1255    pantoprazole (PROTONIX) EC tablet 40 mg, 40 mg, Oral, Q AM, He Awad MD, 40 mg at 05/09/25 0650    [COMPLETED] Insert Peripheral IV, , , Once **AND** sodium chloride 0.9 % flush 10 mL, 10 mL, Intravenous, PRN, He Awad MD    traZODone (DESYREL) tablet 50 mg, 50 mg, Oral, Nightly, He Awad MD, 50 mg at 05/08/25 2125     ASSESSMENT  Acute on chronic diastolic congestive heart failure  Valvular heart disease  Paroxysmal atrial fibrillation  Hypertension  Hyperlipidemia  Chronic kidney disease stage IIIa    PLAN  CPM  Continue IV diuresis  Strict I's and O's and daily weight  Serial cardiac exam and EKG  Cardiology consult appreciated  Continue home medications  Stress ulcer DVT prophylaxis  Supportive care  PT OT  Discussed with nursing staff  Follow closely further recommendation current hospital course    HE AWAD MD    Copied text in this note has been reviewed and is accurate as of 05/09/25      Electronically signed by He Awad MD at 05/09/25 1402       He Awad MD at 05/08/25 0834          History and physical    Primary care physician  Dr. Hidalgo    Chief complaint  Shortness of breath    History of present illness  89-year-old white female with history of congestive heart failure valvular heart disease atrial fibrillation hypertension hyperlipidemia and chronic kidney disease stage IIIa who is well-known to our service presented to [facility] emergency room with increased shortness of breath for last few days which is getting worse.  Patient also noticed  increased leg swelling.  Patient denies any chest pain palpitation abdominal pain nausea vomiting diarrhea.  Patient does have nonproductive cough but no fever congestion night sweats.  Patient evaluated in ER found to have acute on chronic diastolic congestive heart failure admitted for management.    PAST MEDICAL HISTORY            Diagnosis Date Noted    Well woman exam 10/20/2020    Closed compression fracture of L1 vertebra, initial encounter 01/04/2024    Compression fracture of T12 vertebra 01/04/2024    PAF (paroxysmal atrial fibrillation) 01/04/2024    Presence of cardiac pacemaker 01/04/2024    HTN (hypertension) 01/04/2024    CKD (chronic kidney disease) 01/04/2024    Chronic anticoagulation 01/04/2024    Chronic back pain 01/04/2024    Anemia 01/04/2024    Asthma 01/04/2024    Hematoma of hip 01/08/2024    Acute blood loss anemia 01/08/2024    Postural dizziness 02/08/2025    Pleural effusion 02/24/2025     PAST SURGICAL HISTORY              Procedure Laterality Date    BREAST CYST ASPIRATION        BREAST CYST EXCISION        HYSTERECTOMY             FAMILY HISTORY  No family history on file.     SOCIAL HISTORY                 Socioeconomic History    Marital status:    Tobacco Use    Smoking status: Former       Passive exposure: Past    Smokeless tobacco: Never   Vaping Use    Vaping status: Never Used   Substance and Sexual Activity    Alcohol use: Never    Drug use: Never    Sexual activity: Defer         ALLERGIES  Cortisone, Ezetimibe, Simvastatin, Oxycodone-acetaminophen, Azithromycin, and Hydrocodone-acetaminophen  Home medications reviewed     REVIEW OF SYSTEMS  All systems reviewed and negative except for those discussed in HPI.     PHYSICAL EXAM   Blood pressure 150/73, pulse 68, temperature 97.7 °F (36.5 °C), temperature source Oral, resp. rate 18, SpO2 98%.    GENERAL: Awake, alert, no acute distress  SKIN: Warm, dry  HENT: Normocephalic, atraumatic  EYES: no scleral icterus  CV:  Irregular rhythm, irregular rate  RESPIRATORY: normal effort, decreased breath sound at bases  ABDOMEN: soft, nontender, nondistended bowel sounds positive  MUSCULOSKELETAL: no deformity, pitting edema to the bilateral lower extremities  NEURO: alert, moves all extremities, follows commands     LAB RESULTS  Lab Results (last 24 hours)       Procedure Component Value Units Date/Time    Basic Metabolic Panel [[id number]]  (Abnormal) Collected: 05/08/25 0320    Specimen: Blood Updated: 05/08/25 0436     Glucose 72 mg/dL      BUN 26 mg/dL      Creatinine 1.14 mg/dL      Sodium 139 mmol/L      Potassium 3.8 mmol/L      Chloride 101 mmol/L      CO2 28.0 mmol/L      Calcium 8.9 mg/dL      BUN/Creatinine Ratio 22.8     Anion Gap 10.0 mmol/L      eGFR 45.8 mL/min/1.73     Narrative:      GFR Categories in Chronic Kidney Disease (CKD)              GFR Category          GFR (mL/min/1.73)    Interpretation  G1                    90 or greater        Normal or high (1)  G2                    60-89                Mild decrease (1)  G3a                   45-59                Mild to moderate decrease  G3b                   30-44                Moderate to severe decrease  G4                    15-29                Severe decrease  G5                    14 or less           Kidney failure    (1)In the absence of evidence of kidney disease, neither GFR category G1 or G2 fulfill the criteria for CKD.    eGFR calculation 2021 CKD-EPI creatinine equation, which does not include race as a factor    CBC & Differential [[id number]]  (Abnormal) Collected: 05/08/25 0320    Specimen: Blood Updated: 05/08/25 0416    Narrative:      The following orders were created for panel order CBC & Differential.  Procedure                               Abnormality         Status                     ---------                               -----------         ------                     CBC Auto Differential[[id number]]        Abnormal            Final result                  Please view results for these tests on the individual orders.    CBC Auto Differential [[id number]]  (Abnormal) Collected: 05/08/25 0320    Specimen: Blood Updated: 05/08/25 0416     WBC 5.73 10*3/mm3      RBC 3.86 10*6/mm3      Hemoglobin 12.1 g/dL      Hematocrit 37.3 %      MCV 96.6 fL      MCH 31.3 pg      MCHC 32.4 g/dL      RDW 12.1 %      RDW-SD 42.7 fl      MPV 9.3 fL      Platelets 171 10*3/mm3      Neutrophil % 62.6 %      Lymphocyte % 20.9 %      Monocyte % 12.9 %      Eosinophil % 3.1 %      Basophil % 0.3 %      Immature Grans % 0.2 %      Neutrophils, Absolute 3.58 10*3/mm3      Lymphocytes, Absolute 1.20 10*3/mm3      Monocytes, Absolute 0.74 10*3/mm3      Eosinophils, Absolute 0.18 10*3/mm3      Basophils, Absolute 0.02 10*3/mm3      Immature Grans, Absolute 0.01 10*3/mm3      nRBC 0.0 /100 WBC     High Sensitivity Troponin T 1Hr [[id number]]  (Normal) Collected: 05/07/25 1917    Specimen: Blood Updated: 05/07/25 1947     HS Troponin T 12 ng/L      Troponin T Numeric Delta 0 ng/L     Narrative:      High Sensitive Troponin T Reference Range:  <14.0 ng/L- Negative Female for AMI  <22.0 ng/L- Negative Male for AMI  >=14 - Abnormal Female indicating possible myocardial injury.  >=22 - Abnormal Male indicating possible myocardial injury.   Clinicians would have to utilize clinical acumen, EKG, Troponin, and serial changes to determine if it is an Acute Myocardial Infarction or myocardial injury due to an underlying chronic condition.         Comprehensive Metabolic Panel [[id number]]  (Abnormal) Collected: 05/07/25 1812    Specimen: Blood Updated: 05/07/25 1843     Glucose 94 mg/dL      BUN 24 mg/dL      Creatinine 1.49 mg/dL      Sodium 139 mmol/L      Potassium 4.5 mmol/L      Chloride 102 mmol/L      CO2 30.0 mmol/L      Calcium 9.4 mg/dL      Total Protein 7.7 g/dL      Albumin 4.3 g/dL      ALT (SGPT) 17 U/L      AST (SGOT) 27 U/L      Alkaline Phosphatase 97 U/L      Total Bilirubin 0.4  mg/dL      Globulin 3.4 gm/dL      A/G Ratio 1.3 g/dL      BUN/Creatinine Ratio 16.1     Anion Gap 7.0 mmol/L      eGFR 33.2 mL/min/1.73     Narrative:      GFR Categories in Chronic Kidney Disease (CKD)              GFR Category          GFR (mL/min/1.73)    Interpretation  G1                    90 or greater        Normal or high (1)  G2                    60-89                Mild decrease (1)  G3a                   45-59                Mild to moderate decrease  G3b                   30-44                Moderate to severe decrease  G4                    15-29                Severe decrease  G5                    14 or less           Kidney failure    (1)In the absence of evidence of kidney disease, neither GFR category G1 or G2 fulfill the criteria for CKD.    eGFR calculation 2021 CKD-EPI creatinine equation, which does not include race as a factor    BNP [[id number]]  (Abnormal) Collected: 05/07/25 1812    Specimen: Blood Updated: 05/07/25 1843     proBNP 2,786.0 pg/mL     Narrative:      This assay is used as an aid in the diagnosis of individuals suspected of having heart failure. It can be used as an aid in the diagnosis of acute decompensated heart failure (ADHF) in patients presenting with signs and symptoms of ADHF to the emergency department (ED). In addition, NT-proBNP of <300 pg/mL indicates ADHF is not likely.    Age Range Result Interpretation  NT-proBNP Concentration (pg/mL:      <50             Positive            >450                   Gray                 300-450                    Negative             <300    50-75           Positive            >900                  Gray                300-900                  Negative            <300      >75             Positive            >1800                  Gray                300-1800                  Negative            <300    High Sensitivity Troponin T [[id number]]  (Normal) Collected: 05/07/25 1812    Specimen: Blood Updated: 05/07/25 1843     HS  Troponin T 12 ng/L     Narrative:      High Sensitive Troponin T Reference Range:  <14.0 ng/L- Negative Female for AMI  <22.0 ng/L- Negative Male for AMI  >=14 - Abnormal Female indicating possible myocardial injury.  >=22 - Abnormal Male indicating possible myocardial injury.   Clinicians would have to utilize clinical acumen, EKG, Troponin, and serial changes to determine if it is an Acute Myocardial Infarction or myocardial injury due to an underlying chronic condition.         CBC & Differential [[id number]]  (Abnormal) Collected: 05/07/25 1812    Specimen: Blood Updated: 05/07/25 1822    Narrative:      The following orders were created for panel order CBC & Differential.  Procedure                               Abnormality         Status                     ---------                               -----------         ------                     CBC Auto Differential[[id number]]        Abnormal            Final result                 Please view results for these tests on the individual orders.    CBC Auto Differential [[id number]]  (Abnormal) Collected: 05/07/25 1812    Specimen: Blood Updated: 05/07/25 1822     WBC 5.57 10*3/mm3      RBC 3.92 10*6/mm3      Hemoglobin 12.2 g/dL      Hematocrit 37.3 %      MCV 95.2 fL      MCH 31.1 pg      MCHC 32.7 g/dL      RDW 11.7 %      RDW-SD 39.5 fl      MPV 8.7 fL      Platelets 177 10*3/mm3      Neutrophil % 67.6 %      Lymphocyte % 18.5 %      Monocyte % 10.2 %      Eosinophil % 3.1 %      Basophil % 0.4 %      Immature Grans % 0.2 %      Neutrophils, Absolute 3.77 10*3/mm3      Lymphocytes, Absolute 1.03 10*3/mm3      Monocytes, Absolute 0.57 10*3/mm3      Eosinophils, Absolute 0.17 10*3/mm3      Basophils, Absolute 0.02 10*3/mm3      Immature Grans, Absolute 0.01 10*3/mm3      nRBC 0.0 /100 WBC           Imaging Results (Last 24 Hours)       Procedure Component Value Units Date/Time    XR Chest 1 View [[id number]] Collected: 05/1935     Updated: 05/07/25 1939     Narrative:      CXR ONE VIEW      HISTORY: soa; I50.9-Heart failure, unspecified     COMPARISON: 2/24/2025     TECHNIQUE: single portable AP       Impression:         Dual-lead left subclavian transvenous pacemaker in place.     Redemonstrated cardiomegaly.     The left lung is normally aerated.     Blunting of the right lateral costophrenic angle, probable small  effusion, unchanged. Right lung is otherwise normally aerated.     This report was finalized on 5/7/2025 7:36 PM by Dr. Anthony Turcios M.D  on Workstation: [id number]             Scan on 5/7/2025 1821 by [employer], [facility]: ECG 12-LEAD         Author: -- Service: -- Author Type: --   Filed: Date of Service: Creation Time:   Status: (Other)   HEART RATE=65  bpm  RR Stupgbmb=481  ms  MI Interval=  ms  P Horizontal Axis=225  deg  P Front Axis=  deg  QRSD Gdejcbpb=907  ms  QT Rzezvtlw=269  ms  JVtW=418  ms  QRS Axis=-74  deg  T Wave Axis=90  deg  - ABNORMAL ECG -  Afib/flutter and ventricular-paced rhythm  No further analysis attempted due to paced rhythm  No change from prior tracing          Current Facility-Administered Medications:     acetaminophen (TYLENOL) tablet 650 mg, 650 mg, Oral, Q6H PRN, Kike Awad MD    albuterol (PROVENTIL) nebulizer solution 0.083% 2.5 mg/3mL, 2.5 mg, Nebulization, Q6H PRN, Kike Awad MD    apixaban (ELIQUIS) tablet 2.5 mg, 2.5 mg, Oral, Q12H, Kike Awad MD, 2.5 mg at 05/08/25 0906    atorvastatin (LIPITOR) tablet 10 mg, 10 mg, Oral, Nightly, Kike Awad MD    cetirizine (zyrTEC) tablet 5 mg, 5 mg, Oral, Daily, Kike Awad MD, 5 mg at 05/08/25 1129    furosemide (LASIX) injection 40 mg, 40 mg, Intravenous, BID Diuretics, Kike Awad MD, 40 mg at 05/08/25 0630    magnesium oxide (MAG-OX) tablet 400 mg, 400 mg, Oral, Daily, Kike Awad MD, 400 mg at 05/08/25 1129    metoprolol succinate XL (TOPROL-XL) 24 hr tablet 25 mg, 25 mg, Oral, Q24H, Kike Awad MD, 25 mg at 05/08/25 0906    multivitamin with  minerals 1 tablet, 1 tablet, Oral, Daily, He Awad MD, 1 tablet at 25 1129    pantoprazole (PROTONIX) EC tablet 40 mg, 40 mg, Oral, Q AM, He Awad MD, 40 mg at 25 0630    [COMPLETED] Insert Peripheral IV, , , Once **AND** sodium chloride 0.9 % flush 10 mL, 10 mL, Intravenous, PRN, He Awad MD    traZODone (DESYREL) tablet 50 mg, 50 mg, Oral, Nightly, He Awad MD     ASSESSMENT  Acute on chronic diastolic congestive heart failure  Valvular heart disease  Paroxysmal atrial fibrillation  Hypertension  Hyperlipidemia  Chronic kidney disease stage IIIa    PLAN  Admit   IV diuresis  Strict I's and O's and daily weight  Serial cardiac exam and EKG  Cardiology consult  Continue home medications  Stress ulcer DVT prophylaxis  Supportive care  PT OT  Patient is full code  Discussed with nursing staff  Follow closely further recommendation current hospital course    HE AWAD MD    Electronically signed by He Awad MD at 25 1239       Facility-Administered Medications as of 2025   Medication Dose Route Frequency Provider Last Rate Last Admin    acetaminophen (TYLENOL) tablet 650 mg  650 mg Oral Q6H PRN He Awad MD   650 mg at 25 1519    albuterol (PROVENTIL) nebulizer solution 0.083% 2.5 mg/3mL  2.5 mg Nebulization Q6H PRN He Awad MD        apixaban (ELIQUIS) tablet 2.5 mg  2.5 mg Oral Q12H He Awad MD   2.5 mg at 25    atorvastatin (LIPITOR) tablet 10 mg  10 mg Oral Nightly He Awad MD   10 mg at 25    cetirizine (zyrTEC) tablet 5 mg  5 mg Oral Daily He Awad MD   5 mg at 25 0858    [] furosemide (LASIX) injection 40 mg  40 mg Intravenous BID Diuretics Awilda Valdez APRN   40 mg at 25 1741    [COMPLETED] furosemide (LASIX) injection 80 mg  80 mg Intravenous Once Peewee Martines MD   80 mg at 25 1907    magnesium oxide (MAG-OX) tablet 400 mg  400 mg Oral Daily He Awad MD   400 mg at  05/11/25 0858    metoprolol succinate XL (TOPROL-XL) 24 hr tablet 25 mg  25 mg Oral Q24H Kike Awad MD   25 mg at 05/11/25 0857    multivitamin with minerals 1 tablet  1 tablet Oral Daily Kike Awad MD   1 tablet at 05/11/25 0858    ondansetron (ZOFRAN) injection 4 mg  4 mg Intravenous Q4H PRN Kike Awad MD   4 mg at 05/08/25 1255    pantoprazole (PROTONIX) EC tablet 40 mg  40 mg Oral Q AM Kike Awad MD   40 mg at 05/11/25 0643    sodium chloride 0.9 % flush 10 mL  10 mL Intravenous PRN Kike Awad MD        torsemide (DEMADEX) tablet 40 mg  40 mg Oral Daily Awilda Valdez APRN   40 mg at 05/11/25 0858    traZODone (DESYREL) tablet 50 mg  50 mg Oral Nightly Kike Awad MD   50 mg at 05/11/25 2026     Lab Results (last 24 hours)       Procedure Component Value Units Date/Time    Basic Metabolic Panel [[id number]]  (Abnormal) Collected: 05/11/25 0352    Specimen: Blood Updated: 05/11/25 0435     Glucose 103 mg/dL      BUN 30 mg/dL      Creatinine 1.36 mg/dL      Sodium 140 mmol/L      Potassium 4.0 mmol/L      Chloride 97 mmol/L      CO2 32.6 mmol/L      Calcium 8.8 mg/dL      BUN/Creatinine Ratio 22.1     Anion Gap 10.4 mmol/L      eGFR 37.1 mL/min/1.73     Narrative:      GFR Categories in Chronic Kidney Disease (CKD)              GFR Category          GFR (mL/min/1.73)    Interpretation  G1                    90 or greater        Normal or high (1)  G2                    60-89                Mild decrease (1)  G3a                   45-59                Mild to moderate decrease  G3b                   30-44                Moderate to severe decrease  G4                    15-29                Severe decrease  G5                    14 or less           Kidney failure    (1)In the absence of evidence of kidney disease, neither GFR category G1 or G2 fulfill the criteria for CKD.    eGFR calculation 2021 CKD-EPI creatinine equation, which does not include race as a factor          Imaging Results (Last  24 Hours)       ** No results found for the last 24 hours. **          ECG/EMG Results (last 24 hours)       Procedure Component Value Units Date/Time    Telemetry Scan [[id number]] Resulted: 25     Updated: 2539    Telemetry Scan [[id number]] Resulted: 25     Updated: 25 0639          Orders (last 24 hrs)        Start     Ordered    25 0600  CBC & Differential  Morning Draw         25 1234    25 0600  Basic Metabolic Panel  Morning Draw         25 1234    25 0600  CBC Auto Differential  PROCEDURE ONCE         25 2201    25 1811  Inpatient Admission  Once         25 1811    25 1418  PT Plan of Care Cert / Re-Cert  Once        Comments: Physical Therapy Plan of Care  Initial Certification  Certification Period: 2025 - 2025    Patient Name: Mayuri Early  : 1935    (I50.9) Acute on chronic congestive heart failure, unspecified heart failure type  (primary encounter diagnosis)                  Assessment  PT Assessment  Rehab Potential (PT): good  Criteria for Skilled Interventions Met (PT): yes                    Plan  PT Plan  Therapy Frequency (PT): 6 times/wk  Predicted Duration of Therapy Intervention (PT): 1-2 weeks  Planned Therapy Interventions (PT): balance training, gait training, home exercise program, patient/family education, stretching, strengthening, stair training, ROM (range of motion), transfer training  Outcome Evaluation: Pt supine in bed at start of session and agreeable to work with PT. Pt was able to ambulate 150' with RW and SBA. Sitting UIC at end of the session. Pt would benefit from RW at D/C, will need to order.        Krystyna Kirk, PT  2025            By cosigning this order, either electronically or physically, I certify that the therapy services are furnished while this patient is under my care, the services outline above are required by this patient, and will be reviewed every 90  "days.        M.D.:__________________________________________ Date: ______________    05/11/25 1417    05/11/25 0600  Basic Metabolic Panel  Morning Draw         05/10/25 1336    05/10/25 0900  torsemide (DEMADEX) tablet 40 mg  Daily         05/09/25 1428    05/08/25 2100  atorvastatin (LIPITOR) tablet 10 mg  Nightly         05/08/25 1046    05/08/25 2100  traZODone (DESYREL) tablet 50 mg  Nightly         05/08/25 1046    05/08/25 1239  ondansetron (ZOFRAN) injection 4 mg  Every 4 Hours PRN         05/08/25 1239    05/08/25 1145  cetirizine (zyrTEC) tablet 5 mg  Daily         05/08/25 1046    05/08/25 1145  magnesium oxide (MAG-OX) tablet 400 mg  Daily         05/08/25 1046    05/08/25 1145  multivitamin with minerals 1 tablet  Daily         05/08/25 1046    05/08/25 1043  albuterol (PROVENTIL) nebulizer solution 0.083% 2.5 mg/3mL  Every 6 Hours PRN         05/08/25 1046    05/08/25 1043  acetaminophen (TYLENOL) tablet 650 mg  Every 6 Hours PRN         05/08/25 1046    05/08/25 0900  metoprolol succinate XL (TOPROL-XL) 24 hr tablet 25 mg  Every 24 Hours Scheduled         05/07/25 2144 05/08/25 0600  pantoprazole (PROTONIX) EC tablet 40 mg  Every Early Morning         05/07/25 2144 05/07/25 2230  apixaban (ELIQUIS) tablet 2.5 mg  Every 12 Hours Scheduled         05/07/25 2144 05/07/25 1802  sodium chloride 0.9 % flush 10 mL  As Needed        Placed in \"And\" Linked Group    05/07/25 1802    --  lidocaine (LIDODERM) 5 %  Every 24 Hours         05/07/25 1917    --  magnesium oxide (MAG-OX) 400 tablet tablet  Daily         05/07/25 1917                  Operative/Procedure Notes (last 24 hours)  Notes from 05/10/25 2222 through 05/11/25 2222   No notes of this type exist for this encounter.          Physician Progress Notes (last 24 hours)        Kike Awad MD at 05/11/25 1234          Daily progress note    Primary care physician  Dr. Hidalgo    Subjective  Doing better with no new complaint    History of " present illness  89-year-old white female with history of congestive heart failure valvular heart disease atrial fibrillation hypertension hyperlipidemia and chronic kidney disease stage IIIa who is well-known to our service presented to [facility] emergency room with increased shortness of breath for last few days which is getting worse.  Patient also noticed increased leg swelling.  Patient denies any chest pain palpitation abdominal pain nausea vomiting diarrhea.  Patient does have nonproductive cough but no fever congestion night sweats.  Patient evaluated in ER found to have acute on chronic diastolic congestive heart failure admitted for management.     REVIEW OF SYSTEMS  Unremarkable except weakness     PHYSICAL EXAM   Blood pressure 131/76, pulse 95, temperature 97.5 °F (36.4 °C), temperature source Oral, resp. rate 18, weight 63.5 kg (140 lb), SpO2 93%.    GENERAL: Awake, alert, no acute distress  SKIN: Warm, dry  HENT: Normocephalic, atraumatic  EYES: no scleral icterus  CV: Irregular rhythm, irregular rate  RESPIRATORY: normal effort, decreased breath sound at bases  ABDOMEN: soft, nontender, nondistended bowel sounds positive  MUSCULOSKELETAL: no deformity, pitting edema to the bilateral lower extremities  NEURO: alert, moves all extremities, follows commands     LAB RESULTS  Lab Results (last 24 hours)       Procedure Component Value Units Date/Time    Basic Metabolic Panel [[id number]]  (Abnormal) Collected: 05/11/25 0352    Specimen: Blood Updated: 05/11/25 0435     Glucose 103 mg/dL      BUN 30 mg/dL      Creatinine 1.36 mg/dL      Sodium 140 mmol/L      Potassium 4.0 mmol/L      Chloride 97 mmol/L      CO2 32.6 mmol/L      Calcium 8.8 mg/dL      BUN/Creatinine Ratio 22.1     Anion Gap 10.4 mmol/L      eGFR 37.1 mL/min/1.73     Narrative:      GFR Categories in Chronic Kidney Disease (CKD)              GFR Category          GFR (mL/min/1.73)    Interpretation  G1                    90 or  greater        Normal or high (1)  G2                    60-89                Mild decrease (1)  G3a                   45-59                Mild to moderate decrease  G3b                   30-44                Moderate to severe decrease  G4                    15-29                Severe decrease  G5                    14 or less           Kidney failure    (1)In the absence of evidence of kidney disease, neither GFR category G1 or G2 fulfill the criteria for CKD.    eGFR calculation 2021 CKD-EPI creatinine equation, which does not include race as a factor          Imaging Results (Last 24 Hours)       ** No results found for the last 24 hours. **          Scan on 5/7/2025 1821 by [employer], [place]: ECG 12-LEAD         Author: -- Service: -- Author Type: --   Filed: Date of Service: Creation Time:   Status: (Other)   HEART RATE=65  bpm  RR Epdqzwnp=065  ms  TX Interval=  ms  P Horizontal Axis=225  deg  P Front Axis=  deg  QRSD Tnbnjxpi=716  ms  QT Ejskpnyz=169  ms  UUpJ=954  ms  QRS Axis=-74  deg  T Wave Axis=90  deg  - ABNORMAL ECG -  Afib/flutter and ventricular-paced rhythm  No further analysis attempted due to paced rhythm  No change from prior tracing          Current Facility-Administered Medications:     acetaminophen (TYLENOL) tablet 650 mg, 650 mg, Oral, Q6H PRN, Kike Awad MD    albuterol (PROVENTIL) nebulizer solution 0.083% 2.5 mg/3mL, 2.5 mg, Nebulization, Q6H PRN, Kike Awad MD    apixaban (ELIQUIS) tablet 2.5 mg, 2.5 mg, Oral, Q12H, Kike Awad MD, 2.5 mg at 05/11/25 0858    atorvastatin (LIPITOR) tablet 10 mg, 10 mg, Oral, Nightly, Kike Awad MD, 10 mg at 05/10/25 2044    cetirizine (zyrTEC) tablet 5 mg, 5 mg, Oral, Daily, Kike Awad MD, 5 mg at 05/11/25 0858    magnesium oxide (MAG-OX) tablet 400 mg, 400 mg, Oral, Daily, Kike Awad MD, 400 mg at 05/11/25 0858    metoprolol succinate XL (TOPROL-XL) 24 hr tablet 25 mg, 25 mg, Oral, Q24H, Kike Awad MD, 25 mg at 05/11/25 0857     multivitamin with minerals 1 tablet, 1 tablet, Oral, Daily, He Oswald MD, 1 tablet at 25 0858    ondansetron (ZOFRAN) injection 4 mg, 4 mg, Intravenous, Q4H PRN, He Oswald MD, 4 mg at 25 1255    pantoprazole (PROTONIX) EC tablet 40 mg, 40 mg, Oral, Q AM, He Oswald MD, 40 mg at 25 0643    [COMPLETED] Insert Peripheral IV, , , Once **AND** sodium chloride 0.9 % flush 10 mL, 10 mL, Intravenous, PRN, He Oswald MD    torsemide (DEMADEX) tablet 40 mg, 40 mg, Oral, Daily, Awilda Valdez APRN, 40 mg at 25 0858    traZODone (DESYREL) tablet 50 mg, 50 mg, Oral, Nightly, He Oswald MD, 50 mg at 05/10/25 2044     ASSESSMENT  Acute on chronic diastolic congestive heart failure  Valvular heart disease  Paroxysmal atrial fibrillation  Hypertension  Hyperlipidemia  Chronic kidney disease stage IIIa    PLAN  CPM  Change diuretics to p.o.  Strict I's and O's and daily weight  Cardiology consult appreciated  Continue home medications  Stress ulcer DVT prophylaxis  Supportive care  PT OT  Discussed with nursing staff  Discharge home in a.m.    HE OSWALD MD    Copied text in this note has been reviewed and is accurate as of 25      Electronically signed by He Oswald MD at 25 1234       Awilda Valdez APRN at 25 1233          [facility]  Cardiology Progress Note    Patient Identification:  Name: Mayuri Early  Age: 89 y.o.  Sex: female  :  1935  MRN: [id number]                 Follow Up / Chief Complaint: Follow up for chf    Interval History: resting I bed, no chest pain or shortness of breath      Objective:    Past Medical History:  Past Medical History:   Diagnosis Date    Fibrocystic breast     Well woman exam 10/20/2020     Past Surgical History:  Past Surgical History:   Procedure Laterality Date    BREAST CYST ASPIRATION      BREAST CYST EXCISION      HYSTERECTOMY          Social History:   Social History     Tobacco Use    Smoking  status: Former     Passive exposure: Past    Smokeless tobacco: Never   Substance Use Topics    Alcohol use: Never      Family History:  History reviewed. No pertinent family history.       Allergies:  Allergies   Allergen Reactions    Cortisone Provider Review Needed     Elevates BP    Ezetimibe Provider Review Needed    Simvastatin Provider Review Needed    Oxycodone-Acetaminophen Itching    Azithromycin Provider Review Needed    Hydrocodone-Acetaminophen Itching     Scheduled Meds:  apixaban, 2.5 mg, Q12H  atorvastatin, 10 mg, Nightly  cetirizine, 5 mg, Daily  magnesium oxide, 400 mg, Daily  metoprolol succinate XL, 25 mg, Q24H  multivitamin with minerals, 1 tablet, Daily  pantoprazole, 40 mg, Q AM  torsemide, 40 mg, Daily  traZODone, 50 mg, Nightly            INTAKE AND OUTPUT:    Intake/Output Summary (Last 24 hours) at 5/11/2025 1233  Last data filed at 5/11/2025 0645  Gross per 24 hour   Intake 240 ml   Output 1600 ml   Net -1360 ml     Review of Systems:   GI: no n/v or abd pain  Cardiac: no chest pain or palpitations  Pulmonary: no shortness of breath or cough          Constitutional:  Temp:  [97.5 °F (36.4 °C)-97.9 °F (36.6 °C)] 97.5 °F (36.4 °C)  Heart Rate:  [85-95] 95  Resp:  [18] 18  BP: (116-131)/(57-76) 131/76    Physical Exam:  General:  Alert, cooperative, appears in no acute distress  Respiratory: Clear to auscultation.  Normal respiratory effort and rate.  Cardiovascular: S1S2 Regular rate and rhythm. No murmur, rub or gallop.   Gastrointestinal: soft, non tender. Bowel sounds present.   Extremities: DA SILVA x4. No pretibial pitting edema. Adequate musculoskeletal strength.   Neuro: AAO x3 CN II-XII grossly intact                Cardiographics    Lab Review   Results from last 7 days   Lab Units 05/09/25  0853 05/09/25  0416 05/07/25  1917   HSTROP T ng/L 14* 14* 12         Results from last 7 days   Lab Units 05/11/25  0352   SODIUM mmol/L 140   POTASSIUM mmol/L 4.0   BUN mg/dL 30*   CREATININE  "mg/dL 1.36*   CALCIUM mg/dL 8.8     @LABRCNTIPbnp@  Results from last 7 days   Lab Units 05/10/25  0419 05/09/25  0416 05/08/25  0320   WBC 10*3/mm3 5.90 6.22 5.73   HEMOGLOBIN g/dL 12.2 12.1 12.1   HEMATOCRIT % 37.1 36.8 37.3   PLATELETS 10*3/mm3 164 173 171             Assessment:  Shortness of breath  Congestive heart failure  Valvular heart disease      Plan:  Volume stable on exam, continue torsemide 40mg daily, cr stable  Bp and hr stable  No chest pain  Conservative management  Ok for discharge from cardiac standpoint      )5/11/2025  CRISTAL Munoz      EMR Dragon/Transcription:   \"Dictated utilizing Dragon dictation\".       Electronically signed by Awilda Valdez APRN at 05/11/25 1505       Consult Notes (last 24 hours)  Notes from 05/10/25 2222 through 05/11/25 2222   No notes of this type exist for this encounter.       "

## 2025-05-13 ENCOUNTER — READMISSION MANAGEMENT (OUTPATIENT)
Dept: CALL CENTER | Facility: HOSPITAL | Age: OVER 89
End: 2025-05-13
Payer: OTHER GOVERNMENT

## 2025-05-13 NOTE — OUTREACH NOTE
CHF Week 1 Survey      Flowsheet Row Responses   [facility] patient discharged from? [place]   Does the patient have one of the following disease processes/diagnoses(primary or secondary)? CHF   CHF Week 1 attempt successful? Yes   Call start time 0826   Call end time 0829   Discharge diagnosis Acute exacerbation of CHF (congestive heart failure)   Meds reviewed with patient/caregiver? Yes   Is the patient having any side effects they believe may be caused by any medication additions or changes? No   Does the patient have all medications ordered at discharge? Yes   Is the patient taking all medications as directed (includes completed medication regime)? Yes   Does the patient have a primary care provider?  Yes   Does the patient have an appointment with their PCP within 7 days of discharge? Yes   Has the patient kept scheduled appointments due by today? N/A   Has home health visited the patient within 72 hours of discharge? N/A   Pulse Ox monitoring None   Psychosocial issues? No   What is the patient's perception of their health status since discharge? Improving   If the patient is a current smoker, are they able to teach back resources for cessation? Not a smoker   Is the patient/caregiver able to teach back the hierarchy of who to call/visit for symptoms/problems? PCP, Specialist, Home health nurse, Urgent Care, ED, 911 Yes    CHF Week 1 call completed? Yes   Wrap up additional comments Pt doing well and CHF Clinic number given.   Call end time 0829            Emelina ALVAREZ - Registered Nurse

## 2025-05-14 RX ORDER — METOPROLOL SUCCINATE 25 MG/1
25 TABLET, EXTENDED RELEASE ORAL
Qty: 30 TABLET | Refills: 0 | Status: SHIPPED | OUTPATIENT
Start: 2025-05-14 | End: 2025-06-13

## 2025-05-14 RX ORDER — TORSEMIDE 20 MG/1
40 TABLET ORAL DAILY
Qty: 60 TABLET | Refills: 0 | Status: SHIPPED | OUTPATIENT
Start: 2025-05-14 | End: 2025-06-13

## 2025-05-20 PROBLEM — I50.32 CHRONIC HEART FAILURE WITH PRESERVED EJECTION FRACTION (HFPEF): Status: ACTIVE | Noted: 2025-05-20

## 2025-05-21 ENCOUNTER — READMISSION MANAGEMENT (OUTPATIENT)
Dept: CALL CENTER | Facility: HOSPITAL | Age: OVER 89
End: 2025-05-21
Payer: OTHER GOVERNMENT

## 2025-05-21 NOTE — OUTREACH NOTE
CHF Week 2 Survey      Flowsheet Row Responses   [facility] patient discharged from? [place]   Does the patient have one of the following disease processes/diagnoses(primary or secondary)? CHF   Week 2 attempt successful? Yes   Call start time 1557   Call end time 1559   Discharge diagnosis Acute exacerbation of CHF (congestive heart failure)   Is the patient taking all medications as directed (includes completed medication regime)? Yes   Comments regarding appointments Heart failure appt on 5/28   Does the patient have a primary care provider?  Yes   Comments regarding PCP seen PCP on 5/19   Has the patient kept scheduled appointments due by today? Yes   Has home health visited the patient within 72 hours of discharge? N/A   Psychosocial issues? No   What is the patient's perception of their health status since discharge? Improving   Nursing interventions Nurse provided patient education   Is the patient able to teach back signs and symptoms of worsening condition? (i.e. weight gain, shortness of air, etc.) Yes   If the patient is a current smoker, are they able to teach back resources for cessation? Not a smoker   Is the patient/caregiver able to teach back the hierarchy of who to call/visit for symptoms/problems? PCP, Specialist, Home health nurse, Urgent Care, ED, 911 Yes   CHF Zone this Call Green Zone   Green Zone Patient reports doing well, Physical activity level is normal for you, No new or worsening shortness of breath, No new swelling -  feet, ankles and legs look normal for you, No chest pain   Green Zone Interventions Meds as directed, Low sodium diet, Daily weight check, Follow up visits planned   CHF Week 2 call completed? Yes   Revoked No further contact(revokes)-requires comment   Is the patient interested in additional calls from an ambulatory ? No   Would this patient benefit from a Referral to [facility] Social Work? No   Wrap up additional comments Doing well, states she has not  been doing daily weight checks but that she will start doing those tomorrow, seen PCP earlier this week and going to follow up with the heart failure clinic next week, no further calls needed.   Call end time [id number]            Kari ZENDEJAS - Registered Nurse

## 2025-05-28 ENCOUNTER — HOSPITAL ENCOUNTER (OUTPATIENT)
Dept: CARDIOLOGY | Facility: HOSPITAL | Age: OVER 89
Discharge: HOME | End: 2025-05-28
Admitting: NURSE PRACTITIONER
Payer: MEDICARE

## 2025-05-28 VITALS
WEIGHT: 141.8 LBS | BODY MASS INDEX: 26.77 KG/M2 | HEART RATE: 68 BPM | HEIGHT: 61 IN | SYSTOLIC BLOOD PRESSURE: 139 MMHG | DIASTOLIC BLOOD PRESSURE: 80 MMHG | OXYGEN SATURATION: 97 %

## 2025-05-28 DIAGNOSIS — I50.32 CHRONIC HEART FAILURE WITH PRESERVED EJECTION FRACTION (HFPEF): Primary | ICD-10-CM

## 2025-05-28 PROCEDURE — G0463 HOSPITAL OUTPT CLINIC VISIT: HCPCS

## 2025-05-28 PROCEDURE — 94726 PLETHYSMOGRAPHY LUNG VOLUMES: CPT | Performed by: NURSE PRACTITIONER

## 2025-05-28 RX ORDER — DAPAGLIFLOZIN 10 MG/1
10 TABLET, FILM COATED ORAL DAILY
Qty: 30 TABLET | Refills: 3 | Status: SHIPPED | OUTPATIENT
Start: 2025-05-28

## 2025-05-28 NOTE — PROGRESS NOTES
[facility]      History of Present Illness    1.  HFpEF    Subjective      Mayuri Washington a 89 y.o. female who presents today for HFpEF.     Patient has valvular heart disease (mitral regurgitation and severe tricuspid valve regurgitation), hypertension, paroxysmal A-fib, pacemaker, CKD, anemia, asthma    Patient had hospitalization 2/24/2025-3/4/2025.  She was admitted for acute diastolic congestive heart failure.  She underwent IV diuresis as well as thoracentesis on 2/26/2025.  She had updated 2D TTE on 2/26 that showed LVEF 64.3%.  Also showed moderate mitral valve regurgitation and severe tricuspid valve regurgitation    Patient had recent hospitalization 5/7/2025 to 5/12/2025 she was admitted for heart failure exacerbation.  She underwent IV diuresis and responded well.  And was discharged        Review of Systems - Review of Systems   Constitutional: Negative for weight gain and weight loss.   Cardiovascular:  Negative for chest pain, dyspnea on exertion, leg swelling, orthopnea, paroxysmal nocturnal dyspnea and syncope.   Respiratory:  Negative for cough and shortness of breath.    Gastrointestinal:  Negative for bloating.   Neurological:  Negative for dizziness.          Patient Active Problem List   Diagnosis    Well woman exam    Closed compression fracture of L1 vertebra, initial encounter    Compression fracture of T12 vertebra    PAF (paroxysmal atrial fibrillation)    Presence of cardiac pacemaker    HTN (hypertension)    CKD (chronic kidney disease)    Chronic anticoagulation    Chronic back pain    Anemia    Asthma    Hematoma of hip    Acute blood loss anemia    Postural dizziness    Pleural effusion    Acute exacerbation of CHF (congestive heart failure)    Chronic heart failure with preserved ejection fraction (HFpEF)     family history is not on file.   reports that she has quit smoking. She has been exposed to tobacco smoke. She has never used smokeless  tobacco. She reports that she does not drink alcohol and does not use drugs.  Allergies   Allergen Reactions    Cortisone Other (See Comments)     Elevates BP    Ezetimibe Myalgia    Simvastatin Myalgia    Oxycodone-Acetaminophen Itching    Azithromycin Other (See Comments)     Causes red facial rash and hypertension    Hydrocodone-Acetaminophen Itching     Physical Activity: Not on file          Current Outpatient Medications:     acetaminophen (TYLENOL) 325 MG tablet, Take 2 tablets by mouth Every 6 (Six) Hours As Needed for Mild Pain., Disp: , Rfl:     albuterol sulfate  (90 Base) MCG/ACT inhaler, Inhale 2 puffs Every 4 (Four) Hours As Needed for Wheezing., Disp: , Rfl:     apixaban (ELIQUIS) 2.5 MG tablet tablet, Take 1 tablet by mouth 2 (Two) Times a Day., Disp: , Rfl:     atorvastatin (LIPITOR) 10 MG tablet, Take 1 tablet by mouth Every Night., Disp: , Rfl:     Cetirizine HCl 10 MG capsule, Take 5 mg by mouth Daily As Needed (allergies)., Disp: , Rfl:     magnesium oxide (MAG-OX) 400 tablet tablet, Take 1 tablet by mouth Daily., Disp: , Rfl:     metoprolol succinate XL (TOPROL-XL) 25 MG 24 hr tablet, Take 1 tablet by mouth Daily for 30 days., Disp: 30 tablet, Rfl: 0    multivitamin with minerals (MULTIVITAMIN ADULT PO), Take 1 tablet by mouth Daily., Disp: , Rfl:     Omega-3 Fatty Acids (fish oil) 1000 MG capsule capsule, Take 1 capsule by mouth Daily With Breakfast., Disp: , Rfl:     torsemide (DEMADEX) 20 MG tablet, Take 2 tablets by mouth Daily for 30 days., Disp: 60 tablet, Rfl: 0    traZODone (DESYREL) 50 MG tablet, Take 1 tablet by mouth Every Night., Disp: , Rfl:     Farxiga 10 MG tablet, Take 10 mg by mouth Daily., Disp: 30 tablet, Rfl: 3    Objective   Vital Sign Review:   Vitals:    05/28/25 1330   BP: 139/80   Pulse: 68   SpO2: 97%     Body mass index is 26.81 kg/m².      05/28/25  1330   Weight: 64.3 kg (141 lb 12.8 oz)     Physical Exam:  Constitutional:       Appearance: Normal and healthy  appearance.   Neck:      Vascular: No carotid bruit or JVD. JVD normal.   Pulmonary:      Effort: Pulmonary effort is normal.      Breath sounds: Normal breath sounds.   Cardiovascular:      PMI at left midclavicular line. Normal rate. Regular rhythm.   Pulses:     Intact distal pulses.   Edema:     Peripheral edema present.     Ankle: bilateral trace edema of the ankle.  Abdominal:      Palpations: Abdomen is soft.      Tenderness: There is no abdominal tenderness.   Skin:     General: Skin is warm and dry.   Neurological:      General: No focal deficit present.      Mental Status: Alert and oriented to person, place and time.   Psychiatric:         Behavior: Behavior is cooperative.          DATA REVIEWED:   EKG:      ---------------------------------------------------  ECHO:    Results for orders placed during the hospital encounter of 02/24/25    Adult Transthoracic Echo Complete W/ Cont if Necessary Per Protocol    Interpretation Summary    Left ventricular systolic function is normal. Calculated left ventricular EF = 64.3%    Left ventricular diastolic function was indeterminate in the setting of atrial fibrillation.    There is mild, bileaflet mitral valve thickening present.    Moderate mitral valve regurgitation is present.    Severe tricuspid valve regurgitation is present.    Estimated right ventricular systolic pressure from tricuspid regurgitation is normal (<35 mmHg) though this may be underestimated due to severe TR.    Moderate biatrial enlargement on manual quantitation, nor mall IVC size.    Pacemaker lead noted.          -----------------------------------------------------  RHC/LHC    No results found for this or any previous visit.      ---------------------------------------------------------------------------    CT:   XR Chest 1 View  Result Date: 5/7/2025   Dual-lead left subclavian transvenous pacemaker in place.  Redemonstrated cardiomegaly.  The left lung is normally aerated.  Blunting of  the right lateral costophrenic angle, probable small effusion, unchanged. Right lung is otherwise normally aerated.  This report was finalized on 5/7/2025 7:36 PM by Dr. Anthony Turcios M.D on Workstation: [id number]            --------------------------------------------------------------------------------------------------------------    Laboratory evaluations:  Renal Function: Estimated Creatinine Clearance: 22.5 mL/min (A) (by C-G formula based on SCr of 1.43 mg/dL (H)).    Lab Results   Component Value Date    GLUCOSE 91 05/12/2025    BUN 30 (H) 05/12/2025    CREATININE 1.43 (H) 05/12/2025    EGFRIFNONA 45 (L) 08/09/2021    EGFRIFAFRI 53 (L) 08/09/2021    BCR 21.0 05/12/2025    K 4.3 05/12/2025    CO2 32.1 (H) 05/12/2025    CALCIUM 9.1 05/12/2025    ALBUMIN 3.6 05/09/2025    LABIL2 1.7 08/09/2021    AST 18 05/09/2025    ALT 10 05/09/2025     Lab Results   Component Value Date    WBC 5.53 05/12/2025    HGB 13.0 05/12/2025    HCT 38.4 05/12/2025    MCV 92.5 05/12/2025     05/12/2025     Lab Results   Component Value Date    HGBA1C 5.40 05/09/2025     Lab Results   Component Value Date    HGBA1C 5.40 05/09/2025    HGBA1C 5.80 (H) 02/26/2025    HGBA1C 5.50 02/10/2025     Lab Results   Component Value Date    CREATININE 1.43 (H) 05/12/2025     Lab Results   Component Value Date    IRON 69 01/06/2024    TIBC 295 (L) 01/06/2024    FERRITIN 77.20 01/06/2024       Result Review:  I have personally reviewed the results from the time of this admission to 5/28/2025 14:21 EDT and agree with these findings:  [x]  Laboratory list / accordion  []  Microbiology  [x]  Radiology  [x]  EKG/Telemetry   [x]  Cardiology/Vascular   []  Pathology  [x]  Old records  []  Other:      PH Screening:  All patients with HFrEF need to be screened for pulmonary hypertension.  These patient should be seen, at least once, by a PH specialist. The PASP is reported on 2D TTE. PASP>40mmHg should prompt a referral.  Patients who do have  pulmonary hypertension on 2D TTE should be referred for PFTs.  Abnormal PFTs should prompt pulmonary referral.    -The patientwas screened today for PH.  The patient's last 2D TTE showed the patient does not currently have PH.        ReDS VOLUMETRIC ASSESSMENT:  ReDs Vest    Performed by: Sandra Locke APRN  Authorized by: Sandra Locke APRN    ReDS value:  26  ReDS Value Description:  25-35 (green) = Optimal Volume Status        Assessment & Plan      1. Chronic heart failure with preserved ejection fraction (HFpEF)        HFpEF--Last 2D TTE from 2/26/2025 shows LVEF 64.3%.  Moderate mitral valve regurgitation.  Severe tricuspid valve regurgitation.  Normal RVSP but may be underestimated due to severe tricuspid regurgitation.  Moderate biatrial enlargement  She appears euvolemic today.  Due to recent hospitalization for heart failure exacerbation, I would like to try adding SGLT2--start Farxiga 10mg daily.   Most recent labs reviewed from 5/12/2025 show creatinine 1.43, GFR 34.9, Sodium 139, potassium 4.3  Her general cardiologist is Dr Nelson--juancarlos    The [facility]Q-12 was updated at the visit today.  score:  42 .         NYHA stage C FC-II     Clinical status was assessed and has remained stable.  Treatment intent: curative   ReDS reading was obtained today.  ReDS result: 26       Today, Patient appears euvolemic. and with perfusion. The patient's hemodynamics are currently acceptable. HR is: normal and is at goal. BP/MAP was reviewed and there is room for medication up-titration. LVEF: 64.3%.     GDMT Assessment:      GDMT changes recommended today:  add sglt2      BB:   continue Metoprolol Succinate  25mg daily  Monitor heart rate.  Please call the [facility] for HR<50, dizziness, lightheadedness, syncope    A/A/A:     HFpEF--not indicated at this time  Occasional monitoring of Chem-7 is recommended; call for the development of a new cough or S/Sx angioedema    SGLT2-I:  Start farxiga 10mg daily  Call for  S/Sx genital mycotic infections  Do not take when inadequate oral intake, NPO, GI illness    MRA:  The patient is FC-NYHA Class II and MRA is indicated as she has had to HF exacerbations/admission this year.   With GFR 34.9 I dont think we can initiation sprionolactone at this time, but possible future addition if needed          Diuretic Regimen:  -DIURETIC:   toresemide 40 mg every day  -Fluid restriction:   -requested  -2000 ml  -Patient has been asked to weigh daily and was provided with a printed diuretic strategy.  -Sodium restriction:   -1,500 mg Na restriction was discussed.        Lifestyle Advice:   - call office if I gain more than 2 pounds in one day or 5 pounds in one week   - keep legs up while sitting   - use salt in moderation   - watch for swelling in feet, ankles and legs every day   - weigh myself daily   -call for concerning s/sx   -- activity or exercise based on tolerance encouraged           Return in about 2 weeks (around 6/11/2025).                Thank you for allowing me to participate in the care of your patient,    Time Spent: I spent 50 minutes caring for Mayuri Early  on this date of service. This time includes time spent by me in the following activities: preparing for the visit, reviewing tests, performing a medically appropriate examination and/or evaluations, counseling and educating the patient/family/caregiver, ordering medications, tests, or procedures, documenting information in the medical record, and independently interpreting results and communicating that information with the patient/family/caregiver.   I spent 1 minutes on the separately reported service interpreting the ReDs. This time is not included in the time used to support E/M service also reported on this date of service          Sandra Locke, APRN  05/28/25  10:06 EDT      Please note that portions of this note were completed with a voice recognition program.       The information in this note was  reviewed and updated during the visit to be as accurate as possible. As many patients have chronic medical problems, many of their individual problems and ongoing plans do not change significantly from visit to visit.  This information is correct and is consistent with my treatment plan as of today's visit.

## 2025-05-28 NOTE — PATIENT INSTRUCTIONS
Directions for when to call the clinic reviewed with the patient to include weight gain of 2 to 3 pounds in 24 hours, weight gain of over 5  within 7 days; worsening shortness of breath; worsening lower extremity edema or abdominal distention.    Start farxiga, take once daily in the morning.   We will follow-up in 2 weeks and recheck labs in 2 weeks to make sure kidney function is stable.

## 2025-05-28 NOTE — ADDENDUM NOTE
Encounter addended by: Wilma Borden MA on: 5/28/2025 3:45 PM   Actions taken: Charge Capture section accepted

## 2025-05-28 NOTE — LETTER
May 28, 2025     Tim Nelson MD  [address]  [address]  [facility] [address]    Patient: Mayuri Early   YOB: 1935   Date of Visit: 5/28/2025     Dear Tim Nelson MD:       Thank you for referring Mayuri Early to me for evaluation. Below are the relevant portions of my assessment and plan of care.    If you have questions, please do not hesitate to call me. I look forward to following Mayuri along with you.         Sincerely,        CRISTAL Ochoa        CC: MD Cornelia Santa, Sandra CRISTAL Singh  05/28/25 1428  Signed    [facility]      History of Present Illness    1.  HFpEF    Subjective     Mayuri Earlyis a 89 y.o. female who presents today for HFpEF.     Patient has valvular heart disease (mitral regurgitation and severe tricuspid valve regurgitation), hypertension, paroxysmal A-fib, pacemaker, CKD, anemia, asthma    Patient had hospitalization 2/24/2025-3/4/2025.  She was admitted for acute diastolic congestive heart failure.  She underwent IV diuresis as well as thoracentesis on 2/26/2025.  She had updated 2D TTE on 2/26 that showed LVEF 64.3%.  Also showed moderate mitral valve regurgitation and severe tricuspid valve regurgitation    Patient had recent hospitalization 5/7/2025 to 5/12/2025 she was admitted for heart failure exacerbation.  She underwent IV diuresis and responded well.  And was discharged        Review of Systems - Review of Systems   Constitutional: Negative for weight gain and weight loss.   Cardiovascular:  Negative for chest pain, dyspnea on exertion, leg swelling, orthopnea, paroxysmal nocturnal dyspnea and syncope.   Respiratory:  Negative for cough and shortness of breath.    Gastrointestinal:  Negative for bloating.   Neurological:  Negative for dizziness.          Patient Active Problem List   Diagnosis   • Well woman exam   • Closed compression fracture of L1 vertebra,  initial encounter   • Compression fracture of T12 vertebra   • PAF (paroxysmal atrial fibrillation)   • Presence of cardiac pacemaker   • HTN (hypertension)   • CKD (chronic kidney disease)   • Chronic anticoagulation   • Chronic back pain   • Anemia   • Asthma   • Hematoma of hip   • Acute blood loss anemia   • Postural dizziness   • Pleural effusion   • Acute exacerbation of CHF (congestive heart failure)   • Chronic heart failure with preserved ejection fraction (HFpEF)     family history is not on file.   reports that she has quit smoking. She has been exposed to tobacco smoke. She has never used smokeless tobacco. She reports that she does not drink alcohol and does not use drugs.  Allergies   Allergen Reactions   • Cortisone Other (See Comments)     Elevates BP   • Ezetimibe Myalgia   • Simvastatin Myalgia   • Oxycodone-Acetaminophen Itching   • Azithromycin Other (See Comments)     Causes red facial rash and hypertension   • Hydrocodone-Acetaminophen Itching     Physical Activity: Not on file          Current Outpatient Medications:   •  acetaminophen (TYLENOL) 325 MG tablet, Take 2 tablets by mouth Every 6 (Six) Hours As Needed for Mild Pain., Disp: , Rfl:   •  albuterol sulfate  (90 Base) MCG/ACT inhaler, Inhale 2 puffs Every 4 (Four) Hours As Needed for Wheezing., Disp: , Rfl:   •  apixaban (ELIQUIS) 2.5 MG tablet tablet, Take 1 tablet by mouth 2 (Two) Times a Day., Disp: , Rfl:   •  atorvastatin (LIPITOR) 10 MG tablet, Take 1 tablet by mouth Every Night., Disp: , Rfl:   •  Cetirizine HCl 10 MG capsule, Take 5 mg by mouth Daily As Needed (allergies)., Disp: , Rfl:   •  magnesium oxide (MAG-OX) 400 tablet tablet, Take 1 tablet by mouth Daily., Disp: , Rfl:   •  metoprolol succinate XL (TOPROL-XL) 25 MG 24 hr tablet, Take 1 tablet by mouth Daily for 30 days., Disp: 30 tablet, Rfl: 0  •  multivitamin with minerals (MULTIVITAMIN ADULT PO), Take 1 tablet by mouth Daily., Disp: , Rfl:   •  Omega-3 Fatty  Acids (fish oil) 1000 MG capsule capsule, Take 1 capsule by mouth Daily With Breakfast., Disp: , Rfl:   •  torsemide (DEMADEX) 20 MG tablet, Take 2 tablets by mouth Daily for 30 days., Disp: 60 tablet, Rfl: 0  •  traZODone (DESYREL) 50 MG tablet, Take 1 tablet by mouth Every Night., Disp: , Rfl:   •  Farxiga 10 MG tablet, Take 10 mg by mouth Daily., Disp: 30 tablet, Rfl: 3    Objective  Vital Sign Review:   Vitals:    05/28/25 1330   BP: 139/80   Pulse: 68   SpO2: 97%     Body mass index is 26.81 kg/m².      05/28/25  1330   Weight: 64.3 kg (141 lb 12.8 oz)     Physical Exam:  Constitutional:       Appearance: Normal and healthy appearance.   Neck:      Vascular: No carotid bruit or JVD. JVD normal.   Pulmonary:      Effort: Pulmonary effort is normal.      Breath sounds: Normal breath sounds.   Cardiovascular:      PMI at left midclavicular line. Normal rate. Regular rhythm.   Pulses:     Intact distal pulses.   Edema:     Peripheral edema present.     Ankle: bilateral trace edema of the ankle.  Abdominal:      Palpations: Abdomen is soft.      Tenderness: There is no abdominal tenderness.   Skin:     General: Skin is warm and dry.   Neurological:      General: No focal deficit present.      Mental Status: Alert and oriented to person, place and time.   Psychiatric:         Behavior: Behavior is cooperative.          DATA REVIEWED:   EKG:      ---------------------------------------------------  ECHO:    Results for orders placed during the hospital encounter of 02/24/25    Adult Transthoracic Echo Complete W/ Cont if Necessary Per Protocol    Interpretation Summary  •  Left ventricular systolic function is normal. Calculated left ventricular EF = 64.3%  •  Left ventricular diastolic function was indeterminate in the setting of atrial fibrillation.  •  There is mild, bileaflet mitral valve thickening present.  •  Moderate mitral valve regurgitation is present.  •  Severe tricuspid valve regurgitation is present.  •   Estimated right ventricular systolic pressure from tricuspid regurgitation is normal (<35 mmHg) though this may be underestimated due to severe TR.  •  Moderate biatrial enlargement on manual quantitation, nor mall IVC size.  •  Pacemaker lead noted.          -----------------------------------------------------  RHC/LHC    No results found for this or any previous visit.      ---------------------------------------------------------------------------    CT:   XR Chest 1 View  Result Date: 5/7/2025   Dual-lead left subclavian transvenous pacemaker in place.  Redemonstrated cardiomegaly.  The left lung is normally aerated.  Blunting of the right lateral costophrenic angle, probable small effusion, unchanged. Right lung is otherwise normally aerated.  This report was finalized on 5/7/2025 7:36 PM by Dr. Anthony Turcios M.D on Workstation: [id number]            --------------------------------------------------------------------------------------------------------------    Laboratory evaluations:  Renal Function: Estimated Creatinine Clearance: 22.5 mL/min (A) (by C-G formula based on SCr of 1.43 mg/dL (H)).    Lab Results   Component Value Date    GLUCOSE 91 05/12/2025    BUN 30 (H) 05/12/2025    CREATININE 1.43 (H) 05/12/2025    EGFRIFNONA 45 (L) 08/09/2021    EGFRIFAFRI 53 (L) 08/09/2021    BCR 21.0 05/12/2025    K 4.3 05/12/2025    CO2 32.1 (H) 05/12/2025    CALCIUM 9.1 05/12/2025    ALBUMIN 3.6 05/09/2025    LABIL2 1.7 08/09/2021    AST 18 05/09/2025    ALT 10 05/09/2025     Lab Results   Component Value Date    WBC 5.53 05/12/2025    HGB 13.0 05/12/2025    HCT 38.4 05/12/2025    MCV 92.5 05/12/2025     05/12/2025     Lab Results   Component Value Date    HGBA1C 5.40 05/09/2025     Lab Results   Component Value Date    HGBA1C 5.40 05/09/2025    HGBA1C 5.80 (H) 02/26/2025    HGBA1C 5.50 02/10/2025     Lab Results   Component Value Date    CREATININE 1.43 (H) 05/12/2025     Lab Results   Component Value  Date    IRON 69 01/06/2024    TIBC 295 (L) 01/06/2024    FERRITIN 77.20 01/06/2024       Result Review:  I have personally reviewed the results from the time of this admission to 5/28/2025 14:21 EDT and agree with these findings:  [x]  Laboratory list / accordion  []  Microbiology  [x]  Radiology  [x]  EKG/Telemetry   [x]  Cardiology/Vascular   []  Pathology  [x]  Old records  []  Other:      PH Screening:  All patients with HFrEF need to be screened for pulmonary hypertension.  These patient should be seen, at least once, by a PH specialist. The PASP is reported on 2D TTE. PASP>40mmHg should prompt a referral.  Patients who do have pulmonary hypertension on 2D TTE should be referred for PFTs.  Abnormal PFTs should prompt pulmonary referral.    -The patientwas screened today for PH.  The patient's last 2D TTE showed the patient does not currently have PH.        ReDS VOLUMETRIC ASSESSMENT:  ReDs Vest    Performed by: Sandra Locke APRN  Authorized by: Sandra Locke APRN    ReDS value:  26  ReDS Value Description:  25-35 (green) = Optimal Volume Status        Assessment & Plan     1. Chronic heart failure with preserved ejection fraction (HFpEF)        HFpEF--Last 2D TTE from 2/26/2025 shows LVEF 64.3%.  Moderate mitral valve regurgitation.  Severe tricuspid valve regurgitation.  Normal RVSP but may be underestimated due to severe tricuspid regurgitation.  Moderate biatrial enlargement  She appears euvolemic today.  Due to recent hospitalization for heart failure exacerbation, I would like to try adding SGLT2--start Farxiga 10mg daily.   Most recent labs reviewed from 5/12/2025 show creatinine 1.43, GFR 34.9, Sodium 139, potassium 4.3  Her general cardiologist is Dr Nelson--juancarlos    The KCCQ-12 was updated at the visit today.  score:  42 .         NYHA stage C FC-II     Clinical status was assessed and has remained stable.  Treatment intent: curative   ReDS reading was obtained today.  ReDS result: 26        Today, Patient appears euvolemic. and with perfusion. The patient's hemodynamics are currently acceptable. HR is: normal and is at goal. BP/MAP was reviewed and there is room for medication up-titration. LVEF: 64.3%.     GDMT Assessment:      GDMT changes recommended today:  add sglt2      BB:   continue Metoprolol Succinate  25mg daily  Monitor heart rate.  Please call the [facility] for HR<50, dizziness, lightheadedness, syncope    A/A/A:     HFpEF--not indicated at this time  Occasional monitoring of Chem-7 is recommended; call for the development of a new cough or S/Sx angioedema    SGLT2-I:  Start farxiga 10mg daily  Call for S/Sx genital mycotic infections  Do not take when inadequate oral intake, NPO, GI illness    MRA:  The patient is FC-NYHA Class II and MRA is indicated as she has had to HF exacerbations/admission this year.   With GFR 34.9 I dont think we can initiation sprionolactone at this time, but possible future addition if needed          Diuretic Regimen:  -DIURETIC:   toresemide 40 mg every day  -Fluid restriction:   -requested  -2000 ml  -Patient has been asked to weigh daily and was provided with a printed diuretic strategy.  -Sodium restriction:   -1,500 mg Na restriction was discussed.        Lifestyle Advice:   - call office if I gain more than 2 pounds in one day or 5 pounds in one week   - keep legs up while sitting   - use salt in moderation   - watch for swelling in feet, ankles and legs every day   - weigh myself daily   -call for concerning s/sx   -- activity or exercise based on tolerance encouraged           Return in about 2 weeks (around 6/11/2025).                Thank you for allowing me to participate in the care of your patient,    Time Spent: I spent 50 minutes caring for Mayuri Early  on this date of service. This time includes time spent by me in the following activities: preparing for the visit, reviewing tests, performing a medically appropriate examination and/or  evaluations, counseling and educating the patient/family/caregiver, ordering medications, tests, or procedures, documenting information in the medical record, and independently interpreting results and communicating that information with the patient/family/caregiver.   I spent 1 minutes on the separately reported service interpreting the ReDs. This time is not included in the time used to support E/M service also reported on this date of service          Sandra Locke, APRN  05/28/25  10:06 EDT      Please note that portions of this note were completed with a voice recognition program.       The information in this note was reviewed and updated during the visit to be as accurate as possible. As many patients have chronic medical problems, many of their individual problems and ongoing plans do not change significantly from visit to visit.  This information is correct and is consistent with my treatment plan as of today's visit.

## 2025-06-13 ENCOUNTER — APPOINTMENT (OUTPATIENT)
Dept: GENERAL RADIOLOGY | Facility: HOSPITAL | Age: OVER 89
End: 2025-06-13
Payer: MEDICARE

## 2025-06-13 ENCOUNTER — HOSPITAL ENCOUNTER (OUTPATIENT)
Facility: HOSPITAL | Age: OVER 89
Setting detail: OBSERVATION
Discharge: HOME | End: 2025-06-14
Attending: EMERGENCY MEDICINE | Admitting: EMERGENCY MEDICINE
Payer: MEDICARE

## 2025-06-13 DIAGNOSIS — R06.00 DYSPNEA, UNSPECIFIED TYPE: Primary | ICD-10-CM

## 2025-06-13 DIAGNOSIS — N18.9 CHRONIC RENAL IMPAIRMENT, UNSPECIFIED CKD STAGE: ICD-10-CM

## 2025-06-13 DIAGNOSIS — R60.0 PERIPHERAL EDEMA: ICD-10-CM

## 2025-06-13 DIAGNOSIS — Z86.79 HISTORY OF CHF (CONGESTIVE HEART FAILURE): ICD-10-CM

## 2025-06-13 DIAGNOSIS — R79.89 ELEVATED BRAIN NATRIURETIC PEPTIDE (BNP) LEVEL: ICD-10-CM

## 2025-06-13 LAB
ALBUMIN SERPL-MCNC: 4 G/DL (ref 3.5–5.2)
ALBUMIN/GLOB SERPL: 1.2 G/DL
ALP SERPL-CCNC: 101 U/L (ref 39–117)
ALT SERPL W P-5'-P-CCNC: 12 U/L (ref 1–33)
ANION GAP SERPL CALCULATED.3IONS-SCNC: 9.8 MMOL/L (ref 5–15)
AST SERPL-CCNC: 18 U/L (ref 1–32)
BASOPHILS # BLD AUTO: 0.03 10*3/MM3 (ref 0–0.2)
BASOPHILS NFR BLD AUTO: 0.6 % (ref 0–1.5)
BILIRUB SERPL-MCNC: 0.3 MG/DL (ref 0–1.2)
BUN SERPL-MCNC: 23 MG/DL (ref 8–23)
BUN/CREAT SERPL: 16.5 (ref 7–25)
CALCIUM SPEC-SCNC: 9.2 MG/DL (ref 8.2–9.6)
CHLORIDE SERPL-SCNC: 102 MMOL/L (ref 98–107)
CO2 SERPL-SCNC: 29.2 MMOL/L (ref 22–29)
CREAT SERPL-MCNC: 1.39 MG/DL (ref 0.57–1)
DEPRECATED RDW RBC AUTO: 41.5 FL (ref 37–54)
EGFRCR SERPLBLD CKD-EPI 2021: 36.1 ML/MIN/1.73
EOSINOPHIL # BLD AUTO: 0.19 10*3/MM3 (ref 0–0.4)
EOSINOPHIL NFR BLD AUTO: 4 % (ref 0.3–6.2)
ERYTHROCYTE [DISTWIDTH] IN BLOOD BY AUTOMATED COUNT: 11.8 % (ref 12.3–15.4)
GEN 5 1HR TROPONIN T REFLEX: 12 NG/L
GLOBULIN UR ELPH-MCNC: 3.4 GM/DL
GLUCOSE SERPL-MCNC: 81 MG/DL (ref 65–99)
HCT VFR BLD AUTO: 38 % (ref 34–46.6)
HGB BLD-MCNC: 12.4 G/DL (ref 12–15.9)
HOLD SPECIMEN: NORMAL
HOLD SPECIMEN: NORMAL
IMM GRANULOCYTES # BLD AUTO: 0.02 10*3/MM3 (ref 0–0.05)
IMM GRANULOCYTES NFR BLD AUTO: 0.4 % (ref 0–0.5)
LYMPHOCYTES # BLD AUTO: 0.91 10*3/MM3 (ref 0.7–3.1)
LYMPHOCYTES NFR BLD AUTO: 19 % (ref 19.6–45.3)
MAGNESIUM SERPL-MCNC: 2 MG/DL (ref 1.6–2.4)
MCH RBC QN AUTO: 31.4 PG (ref 26.6–33)
MCHC RBC AUTO-ENTMCNC: 32.6 G/DL (ref 31.5–35.7)
MCV RBC AUTO: 96.2 FL (ref 79–97)
MONOCYTES # BLD AUTO: 0.52 10*3/MM3 (ref 0.1–0.9)
MONOCYTES NFR BLD AUTO: 10.9 % (ref 5–12)
NEUTROPHILS NFR BLD AUTO: 3.11 10*3/MM3 (ref 1.7–7)
NEUTROPHILS NFR BLD AUTO: 65.1 % (ref 42.7–76)
NRBC BLD AUTO-RTO: 0 /100 WBC (ref 0–0.2)
NT-PROBNP SERPL-MCNC: 2672 PG/ML (ref 0–1800)
PLATELET # BLD AUTO: 202 10*3/MM3 (ref 140–450)
PMV BLD AUTO: 8.3 FL (ref 6–12)
POTASSIUM SERPL-SCNC: 4.3 MMOL/L (ref 3.5–5.2)
PROT SERPL-MCNC: 7.4 G/DL (ref 6–8.5)
QT INTERVAL: 388 MS
QTC INTERVAL: 430 MS
RBC # BLD AUTO: 3.95 10*6/MM3 (ref 3.77–5.28)
SODIUM SERPL-SCNC: 141 MMOL/L (ref 136–145)
TROPONIN T NUMERIC DELTA: 0 NG/L
TROPONIN T SERPL HS-MCNC: 12 NG/L
WBC NRBC COR # BLD AUTO: 4.78 10*3/MM3 (ref 3.4–10.8)
WHOLE BLOOD HOLD COAG: NORMAL
WHOLE BLOOD HOLD SPECIMEN: NORMAL

## 2025-06-13 PROCEDURE — 83880 ASSAY OF NATRIURETIC PEPTIDE: CPT | Performed by: EMERGENCY MEDICINE

## 2025-06-13 PROCEDURE — 93005 ELECTROCARDIOGRAM TRACING: CPT | Performed by: EMERGENCY MEDICINE

## 2025-06-13 PROCEDURE — G0378 HOSPITAL OBSERVATION PER HR: HCPCS

## 2025-06-13 PROCEDURE — 36415 COLL VENOUS BLD VENIPUNCTURE: CPT

## 2025-06-13 PROCEDURE — 93010 ELECTROCARDIOGRAM REPORT: CPT | Performed by: INTERNAL MEDICINE

## 2025-06-13 PROCEDURE — 99285 EMERGENCY DEPT VISIT HI MDM: CPT

## 2025-06-13 PROCEDURE — 71045 X-RAY EXAM CHEST 1 VIEW: CPT

## 2025-06-13 PROCEDURE — 93005 ELECTROCARDIOGRAM TRACING: CPT

## 2025-06-13 PROCEDURE — 96374 THER/PROPH/DIAG INJ IV PUSH: CPT

## 2025-06-13 PROCEDURE — 80053 COMPREHEN METABOLIC PANEL: CPT | Performed by: EMERGENCY MEDICINE

## 2025-06-13 PROCEDURE — 83735 ASSAY OF MAGNESIUM: CPT | Performed by: EMERGENCY MEDICINE

## 2025-06-13 PROCEDURE — 36415 COLL VENOUS BLD VENIPUNCTURE: CPT | Performed by: EMERGENCY MEDICINE

## 2025-06-13 PROCEDURE — 85025 COMPLETE CBC W/AUTO DIFF WBC: CPT | Performed by: EMERGENCY MEDICINE

## 2025-06-13 PROCEDURE — 84484 ASSAY OF TROPONIN QUANT: CPT | Performed by: EMERGENCY MEDICINE

## 2025-06-13 PROCEDURE — 99213 OFFICE O/P EST LOW 20 MIN: CPT | Performed by: INTERNAL MEDICINE

## 2025-06-13 PROCEDURE — 25010000002 FUROSEMIDE PER 20 MG: Performed by: EMERGENCY MEDICINE

## 2025-06-13 RX ORDER — CETIRIZINE HYDROCHLORIDE 10 MG/1
10 TABLET ORAL DAILY
Status: DISCONTINUED | OUTPATIENT
Start: 2025-06-13 | End: 2025-06-14 | Stop reason: HOSPADM

## 2025-06-13 RX ORDER — NITROGLYCERIN 0.4 MG/1
0.4 TABLET SUBLINGUAL
Status: DISCONTINUED | OUTPATIENT
Start: 2025-06-13 | End: 2025-06-14 | Stop reason: HOSPADM

## 2025-06-13 RX ORDER — SODIUM CHLORIDE 0.9 % (FLUSH) 0.9 %
10 SYRINGE (ML) INJECTION AS NEEDED
Status: DISCONTINUED | OUTPATIENT
Start: 2025-06-13 | End: 2025-06-14 | Stop reason: HOSPADM

## 2025-06-13 RX ORDER — FUROSEMIDE 10 MG/ML
20 INJECTION INTRAMUSCULAR; INTRAVENOUS EVERY 12 HOURS
Status: DISCONTINUED | OUTPATIENT
Start: 2025-06-14 | End: 2025-06-13

## 2025-06-13 RX ORDER — SODIUM CHLORIDE 0.9 % (FLUSH) 0.9 %
10 SYRINGE (ML) INJECTION EVERY 12 HOURS SCHEDULED
Status: DISCONTINUED | OUTPATIENT
Start: 2025-06-13 | End: 2025-06-14 | Stop reason: HOSPADM

## 2025-06-13 RX ORDER — ATORVASTATIN CALCIUM 10 MG/1
10 TABLET, FILM COATED ORAL NIGHTLY
Status: DISCONTINUED | OUTPATIENT
Start: 2025-06-13 | End: 2025-06-14 | Stop reason: HOSPADM

## 2025-06-13 RX ORDER — SODIUM CHLORIDE 9 MG/ML
40 INJECTION, SOLUTION INTRAVENOUS AS NEEDED
Status: DISCONTINUED | OUTPATIENT
Start: 2025-06-13 | End: 2025-06-14 | Stop reason: HOSPADM

## 2025-06-13 RX ORDER — TORSEMIDE 20 MG/1
40 TABLET ORAL DAILY
Status: DISCONTINUED | OUTPATIENT
Start: 2025-06-14 | End: 2025-06-14 | Stop reason: HOSPADM

## 2025-06-13 RX ORDER — MULTIPLE VITAMINS W/ MINERALS TAB 9MG-400MCG
1 TAB ORAL DAILY
Status: DISCONTINUED | OUTPATIENT
Start: 2025-06-14 | End: 2025-06-14 | Stop reason: HOSPADM

## 2025-06-13 RX ORDER — ALBUTEROL SULFATE 0.83 MG/ML
2.5 SOLUTION RESPIRATORY (INHALATION) EVERY 6 HOURS PRN
Status: DISCONTINUED | OUTPATIENT
Start: 2025-06-13 | End: 2025-06-14 | Stop reason: HOSPADM

## 2025-06-13 RX ORDER — FUROSEMIDE 10 MG/ML
80 INJECTION INTRAMUSCULAR; INTRAVENOUS ONCE
Status: COMPLETED | OUTPATIENT
Start: 2025-06-13 | End: 2025-06-13

## 2025-06-13 RX ORDER — POLYETHYLENE GLYCOL 3350 17 G/17G
17 POWDER, FOR SOLUTION ORAL DAILY PRN
Status: DISCONTINUED | OUTPATIENT
Start: 2025-06-13 | End: 2025-06-14 | Stop reason: HOSPADM

## 2025-06-13 RX ORDER — TRAZODONE HYDROCHLORIDE 50 MG/1
50 TABLET ORAL NIGHTLY
Status: DISCONTINUED | OUTPATIENT
Start: 2025-06-13 | End: 2025-06-14 | Stop reason: HOSPADM

## 2025-06-13 RX ORDER — METOPROLOL SUCCINATE 25 MG/1
25 TABLET, EXTENDED RELEASE ORAL
Status: DISCONTINUED | OUTPATIENT
Start: 2025-06-14 | End: 2025-06-14 | Stop reason: HOSPADM

## 2025-06-13 RX ORDER — FUROSEMIDE 10 MG/ML
40 INJECTION INTRAMUSCULAR; INTRAVENOUS EVERY 12 HOURS
Status: DISCONTINUED | OUTPATIENT
Start: 2025-06-14 | End: 2025-06-14

## 2025-06-13 RX ORDER — BISACODYL 5 MG/1
5 TABLET, DELAYED RELEASE ORAL DAILY PRN
Status: DISCONTINUED | OUTPATIENT
Start: 2025-06-13 | End: 2025-06-14 | Stop reason: HOSPADM

## 2025-06-13 RX ORDER — BISACODYL 10 MG
10 SUPPOSITORY, RECTAL RECTAL DAILY PRN
Status: DISCONTINUED | OUTPATIENT
Start: 2025-06-13 | End: 2025-06-14 | Stop reason: HOSPADM

## 2025-06-13 RX ORDER — AMOXICILLIN 250 MG
2 CAPSULE ORAL 2 TIMES DAILY
Status: DISCONTINUED | OUTPATIENT
Start: 2025-06-13 | End: 2025-06-14 | Stop reason: HOSPADM

## 2025-06-13 RX ADMIN — Medication 10 ML: at 13:36

## 2025-06-13 RX ADMIN — Medication 10 ML: at 20:00

## 2025-06-13 RX ADMIN — FUROSEMIDE 80 MG: 10 INJECTION, SOLUTION INTRAMUSCULAR; INTRAVENOUS at 13:36

## 2025-06-13 RX ADMIN — ATORVASTATIN CALCIUM 10 MG: 10 TABLET ORAL at 20:40

## 2025-06-13 RX ADMIN — TRAZODONE HYDROCHLORIDE 50 MG: 50 TABLET ORAL at 20:40

## 2025-06-13 RX ADMIN — APIXABAN 2.5 MG: 2.5 TABLET, FILM COATED ORAL at 20:40

## 2025-06-13 NOTE — PLAN OF CARE
Goal Outcome Evaluation: Admitted for bilateral leg swelling and SOB.  Short of breath on exertion but currently on room air.  1400mL output since arriving to the unit.  Cardio consulted.  A Fib on the monitor.  Vital signs stable

## 2025-06-13 NOTE — H&P
[facility]   HISTORY AND PHYSICAL    Patient Name: Mayuri Early  : 1935  MRN: [id number]  Primary Care Physician:  Gunjan Hidalgo MD  Date of admission: 2025    Subjective   Subjective     Chief Complaint:   Chief Complaint   Patient presents with    Shortness of Breath         HPI:    Mayuri Early is a 89 y.o. female with significant past medical history of CHF, A-fib on Eliquis, dyslipidemia, hypertension, GERD who presents to the ED complaining of progressive shortness of breath and lower extremity swelling over the past few days.  Patient reports she is currently on torsemide.  She states she has been taking extra dose without improvement.  Shortness of breath is worse on lying down and when ambulating or exerting self.  She denies associated chest pain, palpitations, fever, chills, cough.    Review of the patient's ED workup her ECG was nonischemic, creatinine 1.39 which is baseline, hemoglobin 12.4 which is baseline.  Chest x-ray showed cardiomegaly.  EKG showed a flutter with V paced complexes, RBBB, no acute ischemic changes.  Troponin was 12, BNP 2672    Echocardiogram on 2025 showed an LVEF of 64.3%, LV diastolic function was indeterminate, there was moderate to severe mitral and tricuspid valve regurg.        Review of Systems   All systems were reviewed and negative except for: Listed above    Personal History     Past Medical History:   Diagnosis Date    Fibrocystic breast     Well woman exam 10/20/2020       Past Surgical History:   Procedure Laterality Date    BREAST CYST ASPIRATION      BREAST CYST EXCISION      HYSTERECTOMY         Family History: family history is not on file. Otherwise pertinent FHx was reviewed and not pertinent to current issue.    Social History:  reports that she has quit smoking. She has been exposed to tobacco smoke. She has never used smokeless tobacco. She reports that she does not drink alcohol and does not use drugs.    Home  Medications:  Cetirizine HCl, acetaminophen, albuterol sulfate HFA, apixaban, atorvastatin, dapagliflozin Propanediol, fish oil, magnesium oxide, metoprolol succinate XL, multivitamin with minerals, torsemide, and traZODone    Allergies:  Allergies   Allergen Reactions    Cortisone Other (See Comments)     Elevates BP    Ezetimibe Myalgia    Simvastatin Myalgia    Oxycodone-Acetaminophen Itching    Azithromycin Other (See Comments)     Causes red facial rash and hypertension    Hydrocodone-Acetaminophen Itching       Objective   Objective     Vitals:   Temp:  [97.5 °F (36.4 °C)-97.9 °F (36.6 °C)] 97.5 °F (36.4 °C)  Heart Rate:  [69-92] 69  Resp:  [18-20] 18  BP: (147-162)/(71-78) 162/78  Physical Exam    Constitutional: Awake, alert   Eyes: PERRLA, sclerae anicteric, no conjunctival injection   HENT: NCAT, mucous membranes moist   Neck: Supple, no thyromegaly, no lymphadenopathy, trachea midline   Respiratory: Clear to auscultation bilaterally, nonlabored respirations    Cardiovascular: RRR, no murmurs, rubs, or gallops, palpable pedal pulses bilaterally   Gastrointestinal: Positive bowel sounds, soft, nontender, nondistended   Musculoskeletal: No bilateral ankle edema, no clubbing or cyanosis to extremities   Psychiatric: Appropriate affect, cooperative   Neurologic: Oriented x 3, strength symmetric in all extremities, Cranial Nerves grossly intact to confrontation, speech clear   Skin: No rashes     Result Review    Result Review:  I have personally reviewed the results from the time of this admission to 6/13/2025 14:54 EDT and agree with these findings:  [x]  Laboratory list / accordion  []  Microbiology  [x]  Radiology  [x]  EKG/Telemetry   []  Cardiology/Vascular   []  Pathology  []  Old records  []  Other:        Assessment & Plan   The ASCVD Risk score (Kunal DK, et al., 2019) failed to calculate for the following reasons:    The 2019 ASCVD risk score is only valid for ages 40 to 79    Assessment / Plan      Brief Patient Summary:  Mayuri Early is a 89 y.o. female who was admitted to the ED observation unit for further evaluation of acute CHF exacerbation.  Patient normally on torsemide 20 mg daily and has been taking 40 daily without improvement.    Active Hospital Problems:  Active Hospital Problems    Diagnosis     **Acute exacerbation of CHF (congestive heart failure)      Plan:     Acute CHF exacerbation  - Hold on echo since done February 26  - Lasix 40 mg IV twice daily  -Hold torsemide for now while getting Lasix  - Continuous cardiac monitoring  - Cardiology consultation  - Continue Farxiga    A-fib  - Continue home Eliquis  - Continue home metoprolol    Reactive airway disease  Albuterol nebs as needed    GERD  -Continue home Protonix    Hypertension  - Continue with losartan  - Continue metoprolol          VTE Prophylaxis:  Pharmacologic & mechanical VTE prophylaxis orders are present.        CODE STATUS:    Code Status (Patient has no pulse and is not breathing): CPR (Attempt to Resuscitate)  Medical Interventions (Patient has pulse or is breathing): Full Support  Level Of Support Discussed With: Patient    Admission Status:  I believe this patient meets observation status.    Electronically signed by Aditya Tracey III, PA, 06/13/25, 1:32 PM EDT.        75 minutes has been spent by [facility] providers in the care of this patient while under observation status on this date 06/13/25        I have worn appropriate PPE during this patient encounter, sanitized my hands both with entering and exiting patient's room.    I have discussed plan of care with patient including advance care plan and/or surrogate decision maker.  Patient advises that their spouse/Torres Early will be their primary surrogate decision maker

## 2025-06-13 NOTE — CONSULTS
"Assessment Date:  06/13/25    CLINICAL NUTRITION - EDUCATION     ASSESSMENT  Encounter Information         Consult from Physician    Education topic CHF    Learner Patient    Learning readiness Motivated to learn    Pertinent nutrition history      Anthropometrics         Height Height: 154.9 cm (60.98\")    Weight Weight: 68.7 kg (151 lb 6.4 oz) (06/13/25 1439)    BMI  Body mass index is 28.63 kg/m².   Overweight (25 - 29.9)    Comments      Medication/Biochemical          Pertinent medications       Pertinent lab values Results from last 7 days   Lab Units 06/13/25  1226   SODIUM mmol/L 141   POTASSIUM mmol/L 4.3   CHLORIDE mmol/L 102   CO2 mmol/L 29.2*   BUN mg/dL 23.0   CREATININE mg/dL 1.39*   CALCIUM mg/dL 9.2   BILIRUBIN mg/dL 0.3   ALK PHOS U/L 101   ALT (SGPT) U/L 12   AST (SGOT) U/L 18   GLUCOSE mg/dL 81       Lab Results   Component Value Date    HGBA1C 5.40 05/09/2025        DIAGNOSIS  PES Statement         Problem  Food and nutrition knowledge deficit    Etiology Medical Diagnosis - CHF    Signs/Symptoms Information deficit     INTERVENTION  Intervention/Evaluation         Goal   Disease management/therapy    Educated regarding Appropriate portions, Eating pattern, Food choices, Food preparation, Food shopping, Label reading, Seasoning foods, Snacks    Resources given Discussion only, Printed materials    Monitor/evaluation  Per protocol    Discharge planning Contact RD if questions/concerns     RD to follow per protocol.     Electronically signed by:  Harriet Cannon RD  06/13/25 16:32 EDT  "

## 2025-06-13 NOTE — ED PROVIDER NOTES
EMERGENCY DEPARTMENT ENCOUNTER    Room Number:  19/19  PCP: Gunjan Hidalgo MD  Historian: Patient      HPI:  Chief Complaint: Shortness of breath  A complete HPI/ROS/PMH/PSH/SH/FH are unobtainable due to: None    Context: Mayuri Early is a 89 y.o. female who presents to the ED via private vehicle c/o acute progressive dyspnea and leg swelling over the last couple days.  Has been taking an extra dose of her torsemide without relief.  Exertional dyspnea is her biggest complaint.  History of CHF.      MEDICAL RECORD REVIEW    External (non-ED) record review: Adult transthoracic echo reviewed in [facility] from February 26, 2025, ejection fraction of 64%, moderate mitral regurgitation, severe tricuspid regurgitation              PAST MEDICAL HISTORY  Active Ambulatory Problems     Diagnosis Date Noted    Well woman exam 10/20/2020    Closed compression fracture of L1 vertebra, initial encounter 01/04/2024    Compression fracture of T12 vertebra 01/04/2024    PAF (paroxysmal atrial fibrillation) 01/04/2024    Presence of cardiac pacemaker 01/04/2024    HTN (hypertension) 01/04/2024    CKD (chronic kidney disease) 01/04/2024    Chronic anticoagulation 01/04/2024    Chronic back pain 01/04/2024    Anemia 01/04/2024    Asthma 01/04/2024    Hematoma of hip 01/08/2024    Acute blood loss anemia 01/08/2024    Postural dizziness 02/08/2025    Pleural effusion 02/24/2025    Acute exacerbation of CHF (congestive heart failure) 05/07/2025    Chronic heart failure with preserved ejection fraction (HFpEF) 05/20/2025     Resolved Ambulatory Problems     Diagnosis Date Noted    No Resolved Ambulatory Problems     Past Medical History:   Diagnosis Date    Fibrocystic breast          PAST SURGICAL HISTORY  Past Surgical History:   Procedure Laterality Date    BREAST CYST ASPIRATION      BREAST CYST EXCISION      HYSTERECTOMY           FAMILY HISTORY  No family history on file.      SOCIAL HISTORY  Social History     Socioeconomic  History    Marital status:    Tobacco Use    Smoking status: Former     Passive exposure: Past    Smokeless tobacco: Never   Vaping Use    Vaping status: Never Used   Substance and Sexual Activity    Alcohol use: Never    Drug use: Never    Sexual activity: Defer         ALLERGIES  Cortisone, Ezetimibe, Simvastatin, Oxycodone-acetaminophen, Azithromycin, and Hydrocodone-acetaminophen        REVIEW OF SYSTEMS  Review of Systems     All systems reviewed and negative except for those discussed in HPI.       PHYSICAL EXAM    I have reviewed the triage vital signs and nursing notes.    ED Triage Vitals   Temp Heart Rate Resp BP SpO2   06/13/25 1216 06/13/25 1216 06/13/25 1216 06/13/25 1226 06/13/25 1216   97.9 °F (36.6 °C) 87 20 147/76 97 %      Temp src Heart Rate Source Patient Position BP Location FiO2 (%)   06/13/25 1216 06/13/25 1216 -- 06/13/25 1226 --   Oral Monitor  Left arm        Physical Exam  General: No acute distress, nontoxic  HEENT: Mucous membranes moist, atraumatic, EOMI  Neck: Full ROM  Pulm: Symmetric chest rise, nonlabored, lungs diminished bilaterally but no overt wheezing, rales, rhonchi  Cardiovascular: Regular rate and rhythm, intact distal pulses, 1+ pitting edema bilateral lower extremities  GI: Soft, nontender, nondistended, no rebound, no guarding, bowel sounds present  MSK: Full ROM, no deformity  Skin: Warm, dry  Neuro: Awake, alert, oriented x 4, GCS 15, moving all extremities, no focal deficits  Psych: Calm, cooperative        LAB RESULTS  Recent Results (from the past 24 hours)   Comprehensive Metabolic Panel    Collection Time: 06/13/25 12:26 PM    Specimen: Arm, Right; Blood   Result Value Ref Range    Glucose 81 65 - 99 mg/dL    BUN 23.0 8.0 - 23.0 mg/dL    Creatinine 1.39 (H) 0.57 - 1.00 mg/dL    Sodium 141 136 - 145 mmol/L    Potassium 4.3 3.5 - 5.2 mmol/L    Chloride 102 98 - 107 mmol/L    CO2 29.2 (H) 22.0 - 29.0 mmol/L    Calcium 9.2 8.2 - 9.6 mg/dL    Total Protein 7.4  6.0 - 8.5 g/dL    Albumin 4.0 3.5 - 5.2 g/dL    ALT (SGPT) 12 1 - 33 U/L    AST (SGOT) 18 1 - 32 U/L    Alkaline Phosphatase 101 39 - 117 U/L    Total Bilirubin 0.3 0.0 - 1.2 mg/dL    Globulin 3.4 gm/dL    A/G Ratio 1.2 g/dL    BUN/Creatinine Ratio 16.5 7.0 - 25.0    Anion Gap 9.8 5.0 - 15.0 mmol/L    eGFR 36.1 (L) >60.0 mL/min/1.73   BNP    Collection Time: 06/13/25 12:26 PM    Specimen: Arm, Right; Blood   Result Value Ref Range    proBNP 2,672.0 (H) 0.0 - 1,800.0 pg/mL   High Sensitivity Troponin T    Collection Time: 06/13/25 12:26 PM    Specimen: Arm, Right; Blood   Result Value Ref Range    HS Troponin T 12 <14 ng/L   Green Top (Gel)    Collection Time: 06/13/25 12:26 PM   Result Value Ref Range    Extra Tube Hold for add-ons.    Lavender Top    Collection Time: 06/13/25 12:26 PM   Result Value Ref Range    Extra Tube hold for add-on    Gold Top - SST    Collection Time: 06/13/25 12:26 PM   Result Value Ref Range    Extra Tube Hold for add-ons.    Light Blue Top    Collection Time: 06/13/25 12:26 PM   Result Value Ref Range    Extra Tube Hold for add-ons.    CBC Auto Differential    Collection Time: 06/13/25 12:26 PM    Specimen: Arm, Right; Blood   Result Value Ref Range    WBC 4.78 3.40 - 10.80 10*3/mm3    RBC 3.95 3.77 - 5.28 10*6/mm3    Hemoglobin 12.4 12.0 - 15.9 g/dL    Hematocrit 38.0 34.0 - 46.6 %    MCV 96.2 79.0 - 97.0 fL    MCH 31.4 26.6 - 33.0 pg    MCHC 32.6 31.5 - 35.7 g/dL    RDW 11.8 (L) 12.3 - 15.4 %    RDW-SD 41.5 37.0 - 54.0 fl    MPV 8.3 6.0 - 12.0 fL    Platelets 202 140 - 450 10*3/mm3    Neutrophil % 65.1 42.7 - 76.0 %    Lymphocyte % 19.0 (L) 19.6 - 45.3 %    Monocyte % 10.9 5.0 - 12.0 %    Eosinophil % 4.0 0.3 - 6.2 %    Basophil % 0.6 0.0 - 1.5 %    Immature Grans % 0.4 0.0 - 0.5 %    Neutrophils, Absolute 3.11 1.70 - 7.00 10*3/mm3    Lymphocytes, Absolute 0.91 0.70 - 3.10 10*3/mm3    Monocytes, Absolute 0.52 0.10 - 0.90 10*3/mm3    Eosinophils, Absolute 0.19 0.00 - 0.40 10*3/mm3     Basophils, Absolute 0.03 0.00 - 0.20 10*3/mm3    Immature Grans, Absolute 0.02 0.00 - 0.05 10*3/mm3    nRBC 0.0 0.0 - 0.2 /100 WBC   Magnesium    Collection Time: 06/13/25 12:26 PM    Specimen: Arm, Right; Blood   Result Value Ref Range    Magnesium 2.0 1.6 - 2.4 mg/dL   ECG 12 Lead ED Triage Standing Order; SOA    Collection Time: 06/13/25 12:26 PM   Result Value Ref Range    QT Interval 388 ms    QTC Interval 430 ms       Ordered the above labs and independently interpreted results. My findings will be discussed in the medical decision making section below        RADIOLOGY  XR Chest 1 View  Result Date: 6/13/2025  XR CHEST 1 VW-  HISTORY: Female who is 89 years-old, short of breath  TECHNIQUE: Frontal view of the chest  COMPARISON: 5/7/2025  FINDINGS: The heart is enlarged. Pulmonary vasculature is unremarkable. Left-sided pacemaker, cardiac leads are seen. Aorta is calcified. Old granulomatous disease is present. No focal pulmonary consolidation, pleural effusion, or pneumothorax. No acute osseous process.      No focal pulmonary consolidation. Cardiomegaly. Follow-up as clinical indications persist.  This report was finalized on 6/13/2025 12:57 PM by Dr. Giovanny Oconnell M.D on Workstation: [employer]        Ordered the above noted radiological studies.  Independently interpreted by me and my independent review of findings can be found in the ED Course.  See dictation for official radiology interpretation.      PROCEDURES    Procedures      EKG - Per my independent interpretation:    EKG Time: 1226  Rhythm/Rate: A-fib with rate of 74  Normal axis  Right bundle branch block  No acute ischemic changes  No STEMI       No emergent changes as compared to May 7, 2025      MEDICATIONS GIVEN IN ER    Medications   sodium chloride 0.9 % flush 10 mL (has no administration in time range)   furosemide (LASIX) injection 80 mg (has no administration in time range)         PROGRESS, DATA ANALYSIS, CONSULTS, AND MEDICAL DECISION  MAKING    Please note that this section constitutes my independent interpretation of clinical data including lab results, radiology, EKG's.  This constitutes my independent professional opinion regarding differential diagnosis and management of this patient.  It may include any factors such as history from outside sources, review of external records, social determinants of health, management of medications, response to those treatments, and discussions with other providers.    Differential Diagnosis and Plan: Initial concern for CHF exacerbation, renal failure, electrolyte abnormality, anemia, among others.  Plan for labs, chest x-ray, EKG, and reevaluate.  100% O2 on room air.    Additional sources:  - Discussed/ obtained information from independent historians:       - (Social Determinants of Health): None     - Shared decision making:  Patient and family at bedside fully updated on and in agreement with the course and plan moving forward    ED Course as of 06/13/25 1326   Fri Jun 13, 2025   1252 XR Chest 1 View  Per my independent interpretation of the chest x-ray, no evidence of pneumothorax [DC]   1303 WBC: 4.78 [DC]   1303 Hemoglobin: 12.4 [DC]   1303 Glucose: 81 [DC]   1303 BUN: 23.0 [DC]   1303 Creatinine(!): 1.39  1.43 one month ago [DC]   1303 Sodium: 141 [DC]   1303 Potassium: 4.3 [DC]   1303 ALT (SGPT): 12 [DC]   1303 AST (SGOT): 18 [DC]   1303 Alkaline Phosphatase: 101 [DC]   1303 Total Bilirubin: 0.3 [DC]   1303 Magnesium: 2.0 [DC]   1303 HS Troponin T: 12 [DC]   1303 proBNP(!): 2,672.0  2299 one month ago [DC]   1320 Plan for IV diuretics and overnight stay in the observation unit for further monitoring, cardiology evaluation. [DC]   1324 Discussed with JESUS MANUEL Cabrera, discussed patient's clinical course and findings today, treatment modalities, and need for observation unit stay. [DC]   1325 Patient and family at bedside been fully updated on the findings today and plans for observation stay.  All  questions and concerns addressed. [DC]      ED Course User Index  [DC] Chavez Raphael MD       Hospitalization Considered?: yes    Orders Placed During This Visit:  Orders Placed This Encounter   Procedures    XR Chest 1 View    Drumore Draw    Comprehensive Metabolic Panel    BNP    High Sensitivity Troponin T    CBC Auto Differential    Magnesium    High Sensitivity Troponin T 1Hr    NPO Diet NPO Type: Strict NPO    Undress & Gown    Continuous Pulse Oximetry    Vital Signs    Oxygen Therapy- Nasal Cannula; Titrate 1-6 LPM Per SpO2; 90 - 95%    ECG 12 Lead ED Triage Standing Order; SOA    Insert Peripheral IV    CBC & Differential    Green Top (Gel)    Lavender Top    Gold Top - SST    Light Blue Top       Additional orders considered but not placed:      Independent interpretation of labs, radiology studies, and discussions with consultants: See ED Course        AS OF 13:26 EDT VITALS:    BP - 151/71  HR - 92  TEMP - 97.9 °F (36.6 °C) (Oral)  02 SATS - 100%          DIAGNOSIS  Final diagnoses:   Dyspnea, unspecified type   Peripheral edema   Elevated brain natriuretic peptide (BNP) level   History of CHF (congestive heart failure)   Chronic renal impairment, unspecified CKD stage         DISPOSITION  ED Disposition       ED Disposition   Decision to Admit    Condition   --    Comment   --                Please note that portions of this document were completed with a voice recognition program.    Note Disclaimer: At [facility], we believe that sharing information builds trust and better relationships. You are receiving this note because you recently visited [facility]. It is possible you will see health information before a provider has talked with you about it. This kind of information can be easy to misunderstand. To help you fully understand what it means for your health, we urge you to discuss this note with your provider.                       Chavez Raphael MD  06/13/25 6743

## 2025-06-13 NOTE — CONSULTS
[facility]  Cardiology Consult Note    Patient Identification:  Name: Mayuri Early  Age: 89 y.o.  Sex: female  :  1935  MRN: [id number]             Requesting Physician: JESUS MANUEL Stark    Reason for Consultation / Chief Complaint: CHF exacerbation    History of Present Illness:     Mayuri Early is a 89-year-old female who follows for [place] at [facility] and CHF clinic here at [facility], [place].  She has a history of CAD, dual-chamber permanent pacemaker, valvular heart disease, permanent atrial fibrillation on anticoagulation.  She presented to [facility] with bilateral leg swelling and shortness of breath.  She had been taking extra doses of her diuretic prior to coming to the hospital.    Vital signs on admission with blood pressure 147/76, SpO2 97%, heart rate 87, respirations 20 and afebrile.  Labs revealed creatinine 1.39, hemoglobin 12.4, glucose 81, WBC 4.78, BNP 2672, ALT/AST WNL, magnesium 2, and troponin 12.  EKG shows A-flutter with heart rate controlled.    Chest x-ray shows no focal pulmonary consolidation.  Cardiomegaly.    Recently hospitalized 2025 to 2025 due to acute on chronic diastolic congestive heart failure, valvular disease, PAF, hypertension, hyperlipidemia and was admitted for IV diuretics.    2025 echo with EF 64%, moderate MV regurgitation and severe TV regurgitation.    In  cardiac cath showed 43% stenosis involving the mid LAD.  Comorbid cardiac risk factors:     Past Medical History:  Past Medical History:   Diagnosis Date    Fibrocystic breast     Well woman exam 10/20/2020     Past Surgical History:  Past Surgical History:   Procedure Laterality Date    BREAST CYST ASPIRATION      BREAST CYST EXCISION      HYSTERECTOMY        Allergies:  Allergies   Allergen Reactions    Cortisone Other (See Comments)     Elevates BP    Ezetimibe Myalgia    Simvastatin Myalgia    Oxycodone-Acetaminophen Itching    Azithromycin  Other (See Comments)     Causes red facial rash and hypertension    Hydrocodone-Acetaminophen Itching     Home Meds:  Medications Prior to Admission   Medication Sig Dispense Refill Last Dose/Taking    acetaminophen (TYLENOL) 325 MG tablet Take 2 tablets by mouth Every 6 (Six) Hours As Needed for Mild Pain.   Past Week    albuterol sulfate  (90 Base) MCG/ACT inhaler Inhale 2 puffs Every 4 (Four) Hours As Needed for Wheezing.   Past Month    apixaban (ELIQUIS) 2.5 MG tablet tablet Take 1 tablet by mouth 2 (Two) Times a Day.   6/13/2025    atorvastatin (LIPITOR) 10 MG tablet Take 1 tablet by mouth Every Night.   6/12/2025    Cetirizine HCl 10 MG capsule Take 5 mg by mouth Daily As Needed (allergies).   6/12/2025    Farxiga 10 MG tablet Take 10 mg by mouth Daily. 30 tablet 3 6/13/2025    magnesium oxide (MAG-OX) 400 tablet tablet Take 1 tablet by mouth Daily.   6/13/2025    metoprolol succinate XL (TOPROL-XL) 25 MG 24 hr tablet Take 1 tablet by mouth Daily for 30 days. 30 tablet 0 6/13/2025    multivitamin with minerals (MULTIVITAMIN ADULT PO) Take 1 tablet by mouth Daily.   6/13/2025    Omega-3 Fatty Acids (fish oil) 1000 MG capsule capsule Take 1 capsule by mouth Daily With Breakfast.   6/13/2025    torsemide (DEMADEX) 20 MG tablet Take 2 tablets by mouth Daily for 30 days. 60 tablet 0 6/13/2025    traZODone (DESYREL) 50 MG tablet Take 1 tablet by mouth Every Night.   6/12/2025     Current Meds:   [unfilled]  Social History:   Social History     Tobacco Use    Smoking status: Former     Passive exposure: Past    Smokeless tobacco: Never   Substance Use Topics    Alcohol use: Never      Family History:  History reviewed. No pertinent family history.     Review of Systems  Constitutional: No wt loss, fever   Gastrointestinal: No nausea , abdominal pain  Behavioral/Psych: No insomnia or anxiety   Cardiovascular ----positive for shortness of breath and leg swelling. All other systems reviewed and are  negative        Constitutional:  Temp:  [97.5 °F (36.4 °C)-97.9 °F (36.6 °C)] 97.5 °F (36.4 °C)  Heart Rate:  [69-92] 69  Resp:  [18-20] 18  BP: (147-162)/(71-78) 162/78    Physical Exam   General:  Appears in no acute distress  Eyes: PERTL,  HEENT:  No JVD. Thyroid not visibly enlarged. No mucosal pallor or cyanosis  Respiratory: Respirations regular and unlabored at rest. BBS with good air entry in all fields. No crackles, rubs or wheezes auscultated  Cardiovascular: S1S2 Regular rate and rhythm. sys murmur, rub or gallop auscultated. No carotid bruits. DP/PT pulses    . No pretibial pitting edema  Gastrointestinal: Abdomen soft, flat, non tender. Bowel sounds present. No hepatosplenomegaly. No ascites  Musculoskeletal: DA SILVA x4. No abnormal movements  Extremities: No digital clubbing or cyanosis  Skin: Color pink. Skin warm and dry to touch. No rashes  No xanthoma  Neuro: AAO x3 CN II-XII grossly intact              Cardiographics  ECG:     Telemetry:    Echocardiogram:     Imaging  Chest X-ray:     Lab Review   Results from last 7 days   Lab Units 06/13/25  1339 06/13/25  1226   HSTROP T ng/L 12 12     Results from last 7 days   Lab Units 06/13/25  1226   MAGNESIUM mg/dL 2.0     Results from last 7 days   Lab Units 06/13/25  1226   SODIUM mmol/L 141   POTASSIUM mmol/L 4.3   BUN mg/dL 23.0   CREATININE mg/dL 1.39*   CALCIUM mg/dL 9.2     @[id number]@  Results from last 7 days   Lab Units 06/13/25  1226   WBC 10*3/mm3 4.78   HEMOGLOBIN g/dL 12.4   HEMATOCRIT % 38.0   PLATELETS 10*3/mm3 202             Assessment:  Acute on chronic congestive heart failure secondary to valvular dysfunction  Moderate mitral regurgitation  Severe tricuspid regurgitation  Chronic atrial fibrillation  Chronic anticoagulation  Recommendations / Plan:   Patient's congestive heart failure appears to be secondary to mitral regurgitation and tricuspid regurgitation, volume overload will be treated with IV diuretics    Patient denies any  chest pains or tightness in the chest    Once the partial patient is euvolemic can be discharged and follow-up with the primary cardiologist where patient probably should have the transesophageal echocardiogram for the possible mitral valve as well as tricuspid valve repair    Right heart catheterization to evaluate the pulmonary hypertension    Ejection fractions has been normal      Angelica Martell, APRN  6/13/2025, 15:36 EDT      EMR Dragon/Transcription:   Dictated utilizing Dragon dictation

## 2025-06-13 NOTE — ED NOTES
"Nursing report ED to floor  Mayuri Early  89 y.o.  female    Mayuri Early is a 89 y.o. female who presents to the ED via private vehicle c/o acute progressive dyspnea and leg swelling over the last couple days.  Has been taking an extra dose of her torsemide without relief.  Exertional dyspnea is her biggest complaint.  History of CHF.     HPI :  HPI  Stated Reason for Visit: SOA  History Obtained From: patient    Chief Complaint  Chief Complaint   Patient presents with    Shortness of Breath       Admitting doctor:   Anthony Miner II, MD    Admitting diagnosis:   The primary encounter diagnosis was Dyspnea, unspecified type. Diagnoses of Peripheral edema, Elevated brain natriuretic peptide (BNP) level, History of CHF (congestive heart failure), and Chronic renal impairment, unspecified CKD stage were also pertinent to this visit.    Code status:   Current Code Status       Date Active Code Status Order ID Comments User Context       6/13/2025 1330 CPR (Attempt to Resuscitate) [id number]  Aditya Tracey III, PA ED        Question Answer    Code Status (Patient has no pulse and is not breathing) CPR (Attempt to Resuscitate)    Medical Interventions (Patient has pulse or is breathing) Full Support    Level Of Support Discussed With Patient                    Allergies:   Cortisone, Ezetimibe, Simvastatin, Oxycodone-acetaminophen, Azithromycin, and Hydrocodone-acetaminophen    Isolation:   No active isolations    Intake and Output  No intake or output data in the 24 hours ending 06/13/25 1333    Weight:       06/13/25  1220   Weight: 64.3 kg (141 lb 12.1 oz)       Most recent vitals:   Vitals:    06/13/25 1216 06/13/25 1220 06/13/25 1226 06/13/25 1247   BP:   147/76 151/71   BP Location:   Left arm    Pulse: 87   92   Resp: 20      Temp: 97.9 °F (36.6 °C)      TempSrc: Oral      SpO2: 97%   100%   Weight:  64.3 kg (141 lb 12.1 oz)     Height:  154.9 cm (60.98\")         Active LDAs/IV Access:   Lines, " Drains & Airways       Active LDAs       None                    Labs (abnormal labs have a star):   Labs Reviewed   COMPREHENSIVE METABOLIC PANEL - Abnormal; Notable for the following components:       Result Value    Creatinine 1.39 (*)     CO2 29.2 (*)     eGFR 36.1 (*)     All other components within normal limits    Narrative:     GFR Categories in Chronic Kidney Disease (CKD)              GFR Category          GFR (mL/min/1.73)    Interpretation  G1                    90 or greater        Normal or high (1)  G2                    60-89                Mild decrease (1)  G3a                   45-59                Mild to moderate decrease  G3b                   30-44                Moderate to severe decrease  G4                    15-29                Severe decrease  G5                    14 or less           Kidney failure    (1)In the absence of evidence of kidney disease, neither GFR category G1 or G2 fulfill the criteria for CKD.    eGFR calculation 2021 CKD-EPI creatinine equation, which does not include race as a factor   BNP (IN-HOUSE) - Abnormal; Notable for the following components:    proBNP 2,672.0 (*)     All other components within normal limits    Narrative:     This assay is used as an aid in the diagnosis of individuals suspected of having heart failure. It can be used as an aid in the diagnosis of acute decompensated heart failure (ADHF) in patients presenting with signs and symptoms of ADHF to the emergency department (ED). In addition, NT-proBNP of <300 pg/mL indicates ADHF is not likely.    Age Range Result Interpretation  NT-proBNP Concentration (pg/mL:      <50             Positive            >450                   Gray                 300-450                    Negative             <300    50-75           Positive            >900                  Gray                300-900                  Negative            <300      >75             Positive            >1800                  [place]                 300-1800                  Negative            <300   CBC WITH AUTO DIFFERENTIAL - Abnormal; Notable for the following components:    RDW 11.8 (*)     Lymphocyte % 19.0 (*)     All other components within normal limits   TROPONIN - Normal    Narrative:     High Sensitive Troponin T Reference Range:  <14.0 ng/L- Negative Female for AMI  <22.0 ng/L- Negative Male for AMI  >=14 - Abnormal Female indicating possible myocardial injury.  >=22 - Abnormal Male indicating possible myocardial injury.   Clinicians would have to utilize clinical acumen, EKG, Troponin, and serial changes to determine if it is an Acute Myocardial Infarction or myocardial injury due to an underlying chronic condition.        MAGNESIUM - Normal   RAINBOW DRAW    Narrative:     The following orders were created for panel order [place] Draw.  Procedure                               Abnormality         Status                     ---------                               -----------         ------                     Green Top (Gel)[[id number]]                                  Final result               Lavender Top[[id number]]                                     Final result               Gold Top - SST[[id number]]                                   Final result               Light Blue Top[[id number]]                                   Final result                 Please view results for these tests on the individual orders.   HIGH SENSITIVITIY TROPONIN T 1HR   CBC AND DIFFERENTIAL    Narrative:     The following orders were created for panel order CBC & Differential.  Procedure                               Abnormality         Status                     ---------                               -----------         ------                     CBC Auto Differential[[id number]]        Abnormal            Final result                 Please view results for these tests on the individual orders.   GREEN TOP   LAVENDER TOP   GOLD TOP - SST   LIGHT BLUE TOP        EKG:   ECG 12 Lead ED Triage Standing Order; SOA   Preliminary Result   HEART RATE=74  bpm   RR Apgbkaor=730  ms   NE Interval=  ms   P Horizontal Axis=  deg   P Front Axis=  deg   QRSD Wwumbppv=644  ms   QT Ynquzqbs=923  ms   TIvS=780  ms   QRS Axis=0  deg   T Wave Axis=-48  deg   - ABNORMAL ECG -   Afib/flut and V-paced complexes   No further rhythm analysis attempted due to paced rhythm   Right bundle branch block   Date and Time of Study:2025-06-13 12:26:13          Meds given in ED:   Medications   sodium chloride 0.9 % flush 10 mL (has no administration in time range)   furosemide (LASIX) injection 80 mg (has no administration in time range)   nitroglycerin (NITROSTAT) SL tablet 0.4 mg (has no administration in time range)   sodium chloride 0.9 % flush 10 mL (has no administration in time range)   sodium chloride 0.9 % flush 10 mL (has no administration in time range)   sodium chloride 0.9 % infusion 40 mL (has no administration in time range)   sennosides-docusate (PERICOLACE) 8.6-50 MG per tablet 2 tablet (has no administration in time range)     And   polyethylene glycol (MIRALAX) packet 17 g (has no administration in time range)     And   bisacodyl (DULCOLAX) EC tablet 5 mg (has no administration in time range)     And   bisacodyl (DULCOLAX) suppository 10 mg (has no administration in time range)       Imaging results:  XR Chest 1 View  Result Date: 6/13/2025  No focal pulmonary consolidation. Cardiomegaly. Follow-up as clinical indications persist.  This report was finalized on 6/13/2025 12:57 PM by Dr. Giovanny Oconnell M.D on Workstation: [employer]        Ambulatory status:   - stand by    Social issues:   Social History     Socioeconomic History    Marital status:    Tobacco Use    Smoking status: Former     Passive exposure: Past    Smokeless tobacco: Never   Vaping Use    Vaping status: Never Used   Substance and Sexual Activity    Alcohol use: Never    Drug use: Never    Sexual activity:  Defer       Peripheral Neurovascular  Peripheral Neurovascular (Adult)  Peripheral Neurovascular WDL: .WDL except, pulse assessment  Pulse Assessment: dorsalis pedis  Additional Documentation: Edema (Group)  Edema  Edema: ankle, right, ankle, left  Ankle, Left Edema: 3+ (Moderate)  Ankle, Right Edema: 3+ (Moderate)    Neuro Cognitive  Neuro Cognitive (Adult)  Cognitive/Neuro/Behavioral WDL: WDL, orientation  Orientation: oriented x 4    Learning  Learning Assessment  Learning Readiness and Ability: no barriers identified  Education Provided  Person Taught: patient  Teaching Method: verbal instruction  Teaching Focus: symptom/problem overview  Education Outcome Evaluation: eager to learn, acceptance expressed, verbalizes understanding    Respiratory  Respiratory  Airway WDL: WDL  Respiratory WDL  Respiratory WDL: rhythm/pattern, .WDL except  Rhythm/Pattern, Respiratory: shortness of breath    Abdominal Pain  Safety Interventions  Safety Precautions/Falls Reduction: family at bedside    Pain Assessments  Pain (Adult)  (0-10) Pain Rating: Rest: 0    NIH Stroke Scale       Ruy Zhao RN  06/13/25 13:33 EDT

## 2025-06-14 ENCOUNTER — READMISSION MANAGEMENT (OUTPATIENT)
Dept: CALL CENTER | Facility: HOSPITAL | Age: OVER 89
End: 2025-06-14
Payer: OTHER GOVERNMENT

## 2025-06-14 VITALS
SYSTOLIC BLOOD PRESSURE: 110 MMHG | OXYGEN SATURATION: 97 % | BODY MASS INDEX: 28.21 KG/M2 | TEMPERATURE: 97.7 F | WEIGHT: 149.4 LBS | DIASTOLIC BLOOD PRESSURE: 67 MMHG | HEART RATE: 78 BPM | HEIGHT: 61 IN | RESPIRATION RATE: 16 BRPM

## 2025-06-14 LAB
ANION GAP SERPL CALCULATED.3IONS-SCNC: 13.3 MMOL/L (ref 5–15)
BUN SERPL-MCNC: 29 MG/DL (ref 8–23)
BUN/CREAT SERPL: 25.7 (ref 7–25)
CALCIUM SPEC-SCNC: 9.4 MG/DL (ref 8.2–9.6)
CHLORIDE SERPL-SCNC: 99 MMOL/L (ref 98–107)
CO2 SERPL-SCNC: 29.7 MMOL/L (ref 22–29)
CREAT SERPL-MCNC: 1.13 MG/DL (ref 0.57–1)
DEPRECATED RDW RBC AUTO: 41.5 FL (ref 37–54)
EGFRCR SERPLBLD CKD-EPI 2021: 46.3 ML/MIN/1.73
ERYTHROCYTE [DISTWIDTH] IN BLOOD BY AUTOMATED COUNT: 11.8 % (ref 12.3–15.4)
GLUCOSE SERPL-MCNC: 89 MG/DL (ref 65–99)
HCT VFR BLD AUTO: 37.2 % (ref 34–46.6)
HGB BLD-MCNC: 12.3 G/DL (ref 12–15.9)
MCH RBC QN AUTO: 31.3 PG (ref 26.6–33)
MCHC RBC AUTO-ENTMCNC: 33.1 G/DL (ref 31.5–35.7)
MCV RBC AUTO: 94.7 FL (ref 79–97)
PLATELET # BLD AUTO: 200 10*3/MM3 (ref 140–450)
PMV BLD AUTO: 9 FL (ref 6–12)
POTASSIUM SERPL-SCNC: 3.9 MMOL/L (ref 3.5–5.2)
RBC # BLD AUTO: 3.93 10*6/MM3 (ref 3.77–5.28)
SODIUM SERPL-SCNC: 142 MMOL/L (ref 136–145)
WBC NRBC COR # BLD AUTO: 5.49 10*3/MM3 (ref 3.4–10.8)

## 2025-06-14 PROCEDURE — 85027 COMPLETE CBC AUTOMATED: CPT | Performed by: PHYSICIAN ASSISTANT

## 2025-06-14 PROCEDURE — 25010000002 FUROSEMIDE PER 20 MG: Performed by: NURSE PRACTITIONER

## 2025-06-14 PROCEDURE — 99214 OFFICE O/P EST MOD 30 MIN: CPT

## 2025-06-14 PROCEDURE — G0378 HOSPITAL OBSERVATION PER HR: HCPCS

## 2025-06-14 PROCEDURE — 96376 TX/PRO/DX INJ SAME DRUG ADON: CPT

## 2025-06-14 PROCEDURE — 80048 BASIC METABOLIC PNL TOTAL CA: CPT | Performed by: PHYSICIAN ASSISTANT

## 2025-06-14 RX ORDER — FUROSEMIDE 10 MG/ML
40 INJECTION INTRAMUSCULAR; INTRAVENOUS EVERY 12 HOURS
Status: DISCONTINUED | OUTPATIENT
Start: 2025-06-14 | End: 2025-06-14

## 2025-06-14 RX ADMIN — CETIRIZINE HYDROCHLORIDE 10 MG: 10 TABLET, FILM COATED ORAL at 09:06

## 2025-06-14 RX ADMIN — DOCUSATE SODIUM 50 MG AND SENNOSIDES 8.6 MG 2 TABLET: 8.6; 5 TABLET, FILM COATED ORAL at 09:07

## 2025-06-14 RX ADMIN — FUROSEMIDE 40 MG: 10 INJECTION, SOLUTION INTRAMUSCULAR; INTRAVENOUS at 04:46

## 2025-06-14 RX ADMIN — Medication 1 TABLET: at 09:07

## 2025-06-14 RX ADMIN — Medication 10 ML: at 09:07

## 2025-06-14 RX ADMIN — MAGNESIUM OXIDE TAB 400 MG (240 MG ELEMENTAL MG) 400 MG: 400 (240 MG) TAB at 09:07

## 2025-06-14 RX ADMIN — METOPROLOL SUCCINATE 25 MG: 25 TABLET, EXTENDED RELEASE ORAL at 09:07

## 2025-06-14 RX ADMIN — APIXABAN 2.5 MG: 2.5 TABLET, FILM COATED ORAL at 09:06

## 2025-06-14 RX ADMIN — EMPAGLIFLOZIN 25 MG: 25 TABLET, FILM COATED ORAL at 09:06

## 2025-06-14 NOTE — OUTREACH NOTE
Prep Survey      Flowsheet Row Responses   [facility] facility patient discharged from? [facility]   Is LACE score < 7 ? No   Eligibility Readm Mgmt   Discharge diagnosis Acute exacerbation of CHF (congestive heart failure)   Does the patient have one of the following disease processes/diagnoses(primary or secondary)? CHF   Prep survey completed? Yes            Anila LÓPEZ - Registered Nurse

## 2025-06-14 NOTE — PLAN OF CARE
Goal Outcome Evaluation:  Plan of Care Reviewed With: patient        Progress: improving  Outcome Evaluation: Patient is alert and oriented times 4. On room air. Vital signs are stable. Up ad trudy with activity. AVS and discharge instructions given to patient. Patient is agreeable with plan of care. Questions and concerns addressed. Patient instructed to follow up with primary care provider and cardiology. IV removed.Patient left with son via private vehicle with all personal belongings.

## 2025-06-14 NOTE — PROGRESS NOTES
LOS: 0 days   Patient Care Team:  Gunjan Hidalgo MD as PCP - General (Family Medicine)    Chief Complaint: follow up CHF     Interval History: She is resting in bed on my exam. She feels her breathing and swelling have improved significantly. She has ambulated in her room without issue.     Vital Signs:  Temp:  [97.5 °F (36.4 °C)-98.3 °F (36.8 °C)] 98.3 °F (36.8 °C)  Heart Rate:  [69-92] 71  Resp:  [16-20] 16  BP: (127-162)/(68-83) 135/69    Intake/Output Summary (Last 24 hours) at 6/14/2025 0939  Last data filed at 6/14/2025 0909  Gross per 24 hour   Intake 600 ml   Output 3050 ml   Net -2450 ml     Physical Exam  Vitals reviewed.   Constitutional:       General: She is not in acute distress.  HENT:      Head: Normocephalic.      Nose: Nose normal.   Eyes:      Extraocular Movements: Extraocular movements intact.      Pupils: Pupils are equal, round, and reactive to light.   Cardiovascular:      Rate and Rhythm: Normal rate. Rhythm irregularly irregular.      Pulses: Normal pulses.      Heart sounds: S1 normal and S2 normal. Heart sounds not distant. Murmur heard.      No friction rub. No gallop. No S3 or S4 sounds.   Pulmonary:      Effort: Pulmonary effort is normal.      Breath sounds: Normal breath sounds.   Abdominal:      General: Abdomen is flat. Bowel sounds are normal.      Palpations: Abdomen is soft.      Tenderness: There is no abdominal tenderness.   Skin:     General: Skin is warm and dry.   Neurological:      General: No focal deficit present.      Mental Status: She is alert and oriented to person, place, and time. Mental status is at baseline.   Psychiatric:         Mood and Affect: Mood normal.         Behavior: Behavior normal.       Results Review:    Results from last 7 days   Lab Units 06/14/25  0352   SODIUM mmol/L 142   POTASSIUM mmol/L 3.9   CHLORIDE mmol/L 99   CO2 mmol/L 29.7*   BUN mg/dL 29.0*   CREATININE mg/dL 1.13*   GLUCOSE mg/dL 89   CALCIUM mg/dL 9.4     Results from last 7  days   Lab Units 06/13/25  1339 06/13/25  1226   HSTROP T ng/L 12 12     Results from last 7 days   Lab Units 06/14/25  0352   WBC 10*3/mm3 5.49   HEMOGLOBIN g/dL 12.3   HEMATOCRIT % 37.2   PLATELETS 10*3/mm3 200             Results from last 7 days   Lab Units 06/13/25  1226   MAGNESIUM mg/dL 2.0           I reviewed the patient's new clinical results.        Assessment & Plan:  Acute on chronic HFpEF  Echocardiogram in February 2025 showed normal left ventricular systolic function, EF 65%.  She has improved significantly with IV diuresis.   Will resume home dose of torsemide.   Continue Farxiga and metoprolol succinate.   She follows with the Heart Failure clinic.   Valvular heart disease  Echocardiogram in February 2025 showed moderate mitral valve regurgitation and severe tricuspid valve regurgitation.   Atrial fibrillation   Rate controlled, continue Eliquis and metoprolol succinate.   GERD   Hypertension   BP reasonable.     No objection to discharge from a cardiology standpoint. She has a follow up appointment with Dr. Perry on 6/19/25 which she should keep.     Valery Sheldon, CRISTAL  06/14/25  09:39 EDT

## 2025-06-14 NOTE — DISCHARGE SUMMARY
ED OBSERVATION PROGRESS/DISCHARGE SUMMARY    Date of Admission: 6/13/2025   LOS: 0 days   PCP: Gunjan Hidalgo MD    Final Diagnosis CHF      Subjective     Hospital Outcome:   Mayuri Early is a 89 y.o. female who was admitted to the ED observation unit for further evaluation of acute CHF exacerbation with progressive dyspnea and leg swelling. BNP was 2672. Chest x-ray reported no focal pulmonary consolidation. Cardiomegaly. EKG showed atrial flutter with a controlled rate. Echocardiogram was performed on 2/26/2025 echo with EF 64%, moderate MV regurgitation and severe TV regurgitation.  She was given IV diuretics. Cardiology saw patient in consultation and recommends discharge once patient is euvolemic and to follow up with primary cardiologist for out-patient [facility].    6/14/25:  No acute events overnight. Continue I&O and fluid restriction. Net fluid deficit of 1800 ml the past 24 hours. Vital signs stable. Remains in atrial flutter on cardiac monitor with controlled rate.    1:32PM.  Patient feeling significantly better.  Patient has been cleared by cardololgy for discahrge.  She is to restart her torsemide.  They will see in office in 4 days for f/u.    ROS:  General: no fevers, chills  Respiratory: no cough, dyspnea  Cardiovascular: no chest pain, palpitations  Abdomen: No abdominal pain, nausea, vomiting, or diarrhea  Neurologic: No focal weakness    Objective   Physical Exam:  I have reviewed the vital signs.     General Appearance:    Alert, cooperative, no distress  Head:    Normocephalic, atraumatic  Eyes:    Sclerae anicteric  Neck:   Supple, no mass  Lungs: Clear to auscultation bilaterally, respirations unlabored  Heart: Regular rate and rhythm, S1 and S2 normal, no murmur, rub or gallop  Abdomen:  Soft, nontender, bowel sounds active, nondistended  Extremities: No clubbing, cyanosis, or edema to lower extremities  Pulses:  2+ and symmetric in distal lower extremities  Skin: No rashes   Neurologic:  Oriented x3, Normal strength to extremities    Results Review:    I have reviewed the labs, radiology results and diagnostic studies.    Results from last 7 days   Lab Units 06/14/25  0352   WBC 10*3/mm3 5.49   HEMOGLOBIN g/dL 12.3   HEMATOCRIT % 37.2   PLATELETS 10*3/mm3 200     Results from last 7 days   Lab Units 06/14/25  0352 06/13/25  1226   SODIUM mmol/L 142 141   POTASSIUM mmol/L 3.9 4.3   CHLORIDE mmol/L 99 102   CO2 mmol/L 29.7* 29.2*   BUN mg/dL 29.0* 23.0   CREATININE mg/dL 1.13* 1.39*   CALCIUM mg/dL 9.4 9.2   BILIRUBIN mg/dL  --  0.3   ALK PHOS U/L  --  101   ALT (SGPT) U/L  --  12   AST (SGOT) U/L  --  18   GLUCOSE mg/dL 89 81     Imaging Results (Last 24 Hours)       ** No results found for the last 24 hours. **            I have reviewed the medications.     Discharge Medications        Continue These Medications        Instructions Start Date   acetaminophen 325 MG tablet  Commonly known as: TYLENOL   650 mg, Oral, Every 6 Hours PRN      albuterol sulfate  (90 Base) MCG/ACT inhaler  Commonly known as: PROVENTIL HFA;VENTOLIN HFA;PROAIR HFA   2 puffs, Every 4 Hours PRN      apixaban 2.5 MG tablet tablet  Commonly known as: ELIQUIS   2.5 mg, Oral, 2 Times Daily      atorvastatin 10 MG tablet  Commonly known as: LIPITOR   10 mg, Nightly      Cetirizine HCl 10 MG capsule   5 mg, Oral, Daily PRN      Farxiga 10 MG tablet  Generic drug: dapagliflozin Propanediol   10 mg, Oral, Daily      fish oil 1000 MG capsule capsule   1,000 mg, Daily With Breakfast      magnesium oxide 400 tablet tablet  Commonly known as: MAG-OX   400 mg, Daily      metoprolol succinate XL 25 MG 24 hr tablet  Commonly known as: TOPROL-XL   25 mg, Oral, Every 24 Hours Scheduled      multivitamin with minerals tablet tablet   1 tablet, Daily      torsemide 20 MG tablet  Commonly known as: DEMADEX   40 mg, Oral, Daily      traZODone 50 MG tablet  Commonly known as: DESYREL   50 mg, Nightly               ---------------------------------------------------------------------------------------------  Assessment & Plan   Assessment/Problem List    Acute exacerbation of CHF (congestive heart failure)      Plan:    Acute CHF exacerbation  - Hold on echo since done February 26  - Lasix 40 mg IV twice daily  -Hold torsemide for now while getting Lasix  - Continuous cardiac monitoring  - Cardiology following with plan to discharge home once euvolemic  - Continue Farxiga  - Continue I&O and Fluid restriction  -Cleared for discharge with plan above     A-fib/Flutter  -Rate controlled  - Continue home Eliquis  - Continue home metoprolol     Reactive airway disease  Albuterol nebs as needed     GERD  -Continue home Protonix     Hypertension  - Continue with losartan  - Continue metoprolol       Disposition: Discahrge    Follow-up after Discharge: Cardiology    This note will serve as a discharge summary    Aditya Tracey III, PA 06/16/25 11:04 EDT    I have worn appropriate PPE during this patient encounter, sanitized my hands both with entering and exiting patient's room.      35 minutes has been spent by [facility] providers in the care of this patient while under observation status on this date 06/16/25

## 2025-06-14 NOTE — PROGRESS NOTES
MD ATTESTATION NOTE    The ABI and I have discussed this patient's history, physical exam, and treatment plan.  I have reviewed the documentation and personally had a face to face interaction with the patient. I affirm the documentation and agree with the treatment and plan.  The attached note describes my personal findings.      I provided a substantive portion of the care of the patient.  I personally performed the physical exam in its entirety, and below are my findings.       Brief HPI: This patient is a 89-year-old female admitted to the observation unit for further management and treatment of an acute CHF exacerbation.  She reports significant lower extremity swelling as well as shortness of breath.  This morning, following diuresis, she reports significant improvement in her symptoms and states that she is essentially back to her baseline state of health.      PHYSICAL EXAM  ED Triage Vitals   Temp Heart Rate Resp BP SpO2   06/13/25 1216 06/13/25 1216 06/13/25 1216 06/13/25 1226 06/13/25 1216   97.9 °F (36.6 °C) 87 20 147/76 97 %      Temp src Heart Rate Source Patient Position BP Location FiO2 (%)   06/13/25 1216 06/13/25 1216 06/13/25 1439 06/13/25 1226 --   Oral Monitor Lying Left arm          GENERAL: Resting comfortably and in no acute distress, nontoxic in appearance  HENT: nares patent  EYES: no scleral icterus  CV: regular rhythm, normal rate, no M/R/G  RESPIRATORY: normal effort, lungs clear bilaterally  ABDOMEN: soft, nontender, no rebound or guarding  MUSCULOSKELETAL: no deformity, no edema  NEURO: alert, moves all extremities, follows commands  PSYCH:  calm, cooperative  SKIN: warm, dry    Vital signs and nursing notes reviewed.        Plan: Cardiology has seen the patient in consultation and following diuresis, since showing significant clinical improvement, she will be stable for discharge today with appropriate cardiology follow-up.

## 2025-06-14 NOTE — DISCHARGE INSTRUCTIONS
Restart your torsemide 20 mg daily as previously directed.  Continue with all home medications as previously prescribed.  Return to the ER should you have any further symptoms or any further concerns.  Cardiology should reach out to you to schedule follow-up appointment.  If you have not heard from them in 2 weeks time call the number given at discharge.    Continue follow-up with your CHF clinic as previously scheduled.    Please read the attached heart failure eating plan which should also help with symptoms if followed

## 2025-06-14 NOTE — PLAN OF CARE
Goal Outcome Evaluation:  Plan of Care Reviewed With: patient        Progress: no change  Outcome Evaluation: Assumed care of patient. Patient reported having soa on exertion, sats okay, denies any other respiratory symptoms, noted trace edema on BLE. on IV lasix [id number]- FR 2000ml, strict I/O and daily weights, tele-afib/aflutter with pvcs and BBB, vpaced on demand. Pending AM labs. Plan of care ongoing.

## 2025-06-16 ENCOUNTER — READMISSION MANAGEMENT (OUTPATIENT)
Dept: CALL CENTER | Facility: HOSPITAL | Age: OVER 89
End: 2025-06-16
Payer: OTHER GOVERNMENT

## 2025-06-16 NOTE — OUTREACH NOTE
CHF Week 1 Survey      Flowsheet Row Responses   [facility] patient discharged from? [place]   Does the patient have one of the following disease processes/diagnoses(primary or secondary)? CHF   CHF Week 1 attempt successful? Yes   Call start time [id number]   Call end time [phone]   Discharge diagnosis Acute exacerbation of CHF (congestive heart failure)   Meds reviewed with patient/caregiver? Yes   Is the patient having any side effects they believe may be caused by any medication additions or changes? No   Does the patient have all medications ordered at discharge? Yes   Is the patient taking all medications as directed (includes completed medication regime)? Yes   Does the patient have a primary care provider?  Yes   Does the patient have an appointment with their PCP within 7 days of discharge? No   What is preventing the patient from scheduling follow up appointments within 7 days of discharge? Haven't had time   Nursing Interventions Educated patient on importance of making appointment, Advised patient to make appointment   Has the patient kept scheduled appointments due by today? N/A   Has home health visited the patient within 72 hours of discharge? N/A   Psychosocial issues? No   Did the patient receive a copy of their discharge instructions? Yes   Nursing interventions Reviewed instructions with patient   What is the patient's perception of their health status since discharge? Improving   Nursing interventions Nurse provided patient education   Is the patient able to teach back signs and symptoms of worsening condition? (i.e. weight gain, shortness of air, etc.) Yes   Is the patient/caregiver able to teach back the hierarchy of who to call/visit for symptoms/problems? PCP, Specialist, Home health nurse, Urgent Care, ED, 911 Yes    CHF Week 1 call completed? Yes   Wrap up additional comments Pt reports improvement. States she does get wt daily. Reviewed s/s of HF Call transfered to HF clinic as Pt not sure  when her appt is. Also reviewed appt with new cardio Dr. Araujo given to pt.   Call end time [id number]            AMERICA DEL CID - Registered Nurse

## 2025-06-17 ENCOUNTER — TELEPHONE (OUTPATIENT)
Dept: CARDIOLOGY | Facility: CLINIC | Age: OVER 89
End: 2025-06-17

## 2025-06-17 NOTE — TELEPHONE ENCOUNTER
"     Caller: Mayuri Early \"Latanya\"    Relationship to patient: Self    Best call back number:  [phone]    Patient is needing: PATIENT REQUESTING TO RESCHEDULE HOSPTIAL APPOINTMENT FOR NEXT WEEK. UNABLE TO FIND AVAILABILITY UNTIL JULY 10TH.        "

## 2025-06-18 NOTE — CASE MANAGEMENT/SOCIAL WORK
Case Management Discharge Note      Final Note: Home via private vehicle         Selected Continued Care - Discharged on 6/14/2025 Admission date: 6/13/2025 - Discharge disposition: Home or Self Care      Destination    No services have been selected for the patient.                Durable Medical Equipment    No services have been selected for the patient.                Dialysis/Infusion    No services have been selected for the patient.                Home Medical Care    No services have been selected for the patient.                Therapy    No services have been selected for the patient.                Community Resources    No services have been selected for the patient.                Community & DME    No services have been selected for the patient.                    Transportation Services  Private: Car    Final Discharge Disposition Code: 01 - home or self-care

## 2025-06-26 ENCOUNTER — OFFICE VISIT (OUTPATIENT)
Dept: CARDIOLOGY | Facility: CLINIC | Age: OVER 89
End: 2025-06-26
Payer: OTHER GOVERNMENT

## 2025-06-26 ENCOUNTER — READMISSION MANAGEMENT (OUTPATIENT)
Dept: CALL CENTER | Facility: HOSPITAL | Age: OVER 89
End: 2025-06-26
Payer: OTHER GOVERNMENT

## 2025-06-26 VITALS — HEIGHT: 61 IN | WEIGHT: 149 LBS | BODY MASS INDEX: 28.13 KG/M2

## 2025-06-26 DIAGNOSIS — I48.0 PAF (PAROXYSMAL ATRIAL FIBRILLATION): ICD-10-CM

## 2025-06-26 DIAGNOSIS — I50.32 CHRONIC HEART FAILURE WITH PRESERVED EJECTION FRACTION (HFPEF): Primary | ICD-10-CM

## 2025-06-26 DIAGNOSIS — I36.1 NONRHEUMATIC TRICUSPID VALVE REGURGITATION: ICD-10-CM

## 2025-06-26 DIAGNOSIS — I10 PRIMARY HYPERTENSION: ICD-10-CM

## 2025-06-26 RX ORDER — METOPROLOL SUCCINATE 25 MG/1
25 TABLET, EXTENDED RELEASE ORAL
Qty: 30 TABLET | Refills: 6 | Status: SHIPPED | OUTPATIENT
Start: 2025-06-26 | End: 2025-07-26

## 2025-06-26 RX ORDER — TORSEMIDE 20 MG/1
20 TABLET ORAL DAILY
COMMUNITY

## 2025-06-26 NOTE — PROGRESS NOTES
Follow up hospital CHF, MVR volume overload may need right heart cath   Subjective:        Mayuri Early is a 89 y.o. female who here for follow up    CC  Follow-up congestive heart failure  HPI  89-year-old with paroxysmal atrial fibrillation congestive heart failure continues to have shortness of breath     Problems Addressed this Visit          Cardiac and Vasculature    PAF (paroxysmal atrial fibrillation)    Relevant Medications    metoprolol succinate XL (TOPROL-XL) 25 MG 24 hr tablet    Other Relevant Orders    Adult Transthoracic Echo Complete W/ Cont if Necessary Per Protocol    HTN (hypertension)    Relevant Medications    torsemide (DEMADEX) 20 MG tablet    metoprolol succinate XL (TOPROL-XL) 25 MG 24 hr tablet    Chronic heart failure with preserved ejection fraction (HFpEF) - Primary    Relevant Medications    metoprolol succinate XL (TOPROL-XL) 25 MG 24 hr tablet    Other Relevant Orders    Adult Transthoracic Echo Complete W/ Cont if Necessary Per Protocol    Nonrheumatic tricuspid valve regurgitation    Relevant Medications    metoprolol succinate XL (TOPROL-XL) 25 MG 24 hr tablet     Diagnoses         Codes Comments      Chronic heart failure with preserved ejection fraction (HFpEF)    -  Primary ICD-10-CM: I50.32  ICD-9-CM: 428.9       PAF (paroxysmal atrial fibrillation)     ICD-10-CM: I48.0  ICD-9-CM: 427.31       Primary hypertension     ICD-10-CM: I10  ICD-9-CM: 401.9       Nonrheumatic tricuspid valve regurgitation     ICD-10-CM: I36.1  ICD-9-CM: 424.2           .    The following portions of the patient's history were reviewed and updated as appropriate: allergies, current medications, past family history, past medical history, past social history, past surgical history and problem list.    Past Medical History:   Diagnosis Date    CHF (congestive heart failure)     Chronic kidney disease     Fibrocystic breast     Hyperlipidemia     Hypertension     Well woman exam 10/20/2020     reports  "that she has quit smoking. She has been exposed to tobacco smoke. She has never used smokeless tobacco. She reports that she does not drink alcohol and does not use drugs. History reviewed. No pertinent family history.    Review of Systems  Constitutional: No wt loss, fever, fatigue  Gastrointestinal: No nausea, abdominal pain  Behavioral/Psych: No insomnia or anxiety   Cardiovascular shortness of breath  Objective:       Physical Exam  Ht 154.9 cm (60.98\")   Wt 67.6 kg (149 lb)   BMI 28.17 kg/m²   General appearance: No acute changes   Neck: Trachea midline; NECK, supple, no thyromegaly or lymphadenopathy   Lungs: Normal size and shape, normal breath sounds, equal distribution of air, no rales and rhonchi   CV: S1-S2 regular, no murmurs, no rub, no gallop   Abdomen: Soft, nontender; no masses , no abnormal abdominal sounds   Extremities: No deformity , normal color , no peripheral edema   Skin: Normal temperature, turgor and texture; no rash, ulcers          Procedures      Echocardiogram:    Results for orders placed during the hospital encounter of 02/24/25    Adult Transthoracic Echo Complete W/ Cont if Necessary Per Protocol    Interpretation Summary    Left ventricular systolic function is normal. Calculated left ventricular EF = 64.3%    Left ventricular diastolic function was indeterminate in the setting of atrial fibrillation.    There is mild, bileaflet mitral valve thickening present.    Moderate mitral valve regurgitation is present.    Severe tricuspid valve regurgitation is present.    Estimated right ventricular systolic pressure from tricuspid regurgitation is normal (<35 mmHg) though this may be underestimated due to severe TR.    Moderate biatrial enlargement on manual quantitation, nor mall IVC size.    Pacemaker lead noted.          Current Outpatient Medications:     acetaminophen (TYLENOL) 325 MG tablet, Take 2 tablets by mouth Every 6 (Six) Hours As Needed for Mild Pain., Disp: , Rfl:     " albuterol sulfate  (90 Base) MCG/ACT inhaler, Inhale 2 puffs Every 4 (Four) Hours As Needed for Wheezing., Disp: , Rfl:     apixaban (ELIQUIS) 2.5 MG tablet tablet, Take 1 tablet by mouth 2 (Two) Times a Day., Disp: , Rfl:     atorvastatin (LIPITOR) 10 MG tablet, Take 1 tablet by mouth Every Night., Disp: , Rfl:     Cetirizine HCl 10 MG capsule, Take 5 mg by mouth Daily As Needed (allergies)., Disp: , Rfl:     Farxiga 10 MG tablet, Take 10 mg by mouth Daily., Disp: 30 tablet, Rfl: 3    magnesium oxide (MAG-OX) 400 tablet tablet, Take 1 tablet by mouth Daily., Disp: , Rfl:     metoprolol succinate XL (TOPROL-XL) 25 MG 24 hr tablet, Take 1 tablet by mouth Daily for 30 days., Disp: 30 tablet, Rfl: 6    multivitamin with minerals (MULTIVITAMIN ADULT PO), Take 1 tablet by mouth Daily., Disp: , Rfl:     Omega-3 Fatty Acids (fish oil) 1000 MG capsule capsule, Take 1 capsule by mouth Daily With Breakfast., Disp: , Rfl:     torsemide (DEMADEX) 20 MG tablet, Take 1 tablet by mouth Daily., Disp: , Rfl:     traZODone (DESYREL) 50 MG tablet, Take 1 tablet by mouth Every Night., Disp: , Rfl:     torsemide (DEMADEX) 20 MG tablet, Take 2 tablets by mouth Daily for 30 days., Disp: 60 tablet, Rfl: 0   Assessment:                Plan:          ICD-10-CM ICD-9-CM   1. Chronic heart failure with preserved ejection fraction (HFpEF)  I50.32 428.9   2. PAF (paroxysmal atrial fibrillation)  I48.0 427.31   3. Primary hypertension  I10 401.9   4. Nonrheumatic tricuspid valve regurgitation  I36.1 424.2     1. Chronic heart failure with preserved ejection fraction (HFpEF)  Considering patient's medical condition as well as the risk factors, patient will require echocardiogram for further evaluation for the LV function, four-chamber evaluation, including the pressures, valvular function and  pericardial disease and pericardial effusion    - Adult Transthoracic Echo Complete W/ Cont if Necessary Per Protocol; Future    2. PAF (paroxysmal  atrial fibrillation)  Under control  - Adult Transthoracic Echo Complete W/ Cont if Necessary Per Protocol; Future    3. Primary hypertension  Blood pressure controlled    4. Nonrheumatic tricuspid valve regurgitation  May need further evaluation      6 MONTH WITH ECHO  COUNSELING:    Mayuri Fatima was given to patient for the following topics: diagnostic results, risk factor reductions, impressions, risks and benefits of treatment options and importance of treatment compliance .       SMOKING COUNSELING:        Dictated using Dragon dictation

## 2025-06-26 NOTE — OUTREACH NOTE
CHF Week 2 Survey      Flowsheet Row Responses   [facility] patient discharged from? [place]   Does the patient have one of the following disease processes/diagnoses(primary or secondary)? CHF   Week 2 attempt successful? No   Unsuccessful attempts Attempt 1   Revoke Mari DEL CID - Registered Nurse

## 2025-07-02 PROBLEM — I36.1 NONRHEUMATIC TRICUSPID VALVE REGURGITATION: Status: ACTIVE | Noted: 2025-07-02

## 2025-07-30 ENCOUNTER — HOSPITAL ENCOUNTER (EMERGENCY)
Facility: HOSPITAL | Age: OVER 89
Discharge: HOME | End: 2025-07-30
Attending: STUDENT IN AN ORGANIZED HEALTH CARE EDUCATION/TRAINING PROGRAM | Admitting: STUDENT IN AN ORGANIZED HEALTH CARE EDUCATION/TRAINING PROGRAM
Payer: MEDICARE

## 2025-07-30 ENCOUNTER — APPOINTMENT (OUTPATIENT)
Dept: GENERAL RADIOLOGY | Facility: HOSPITAL | Age: OVER 89
End: 2025-07-30
Payer: MEDICARE

## 2025-07-30 VITALS
WEIGHT: 150 LBS | DIASTOLIC BLOOD PRESSURE: 71 MMHG | HEIGHT: 62 IN | OXYGEN SATURATION: 94 % | SYSTOLIC BLOOD PRESSURE: 165 MMHG | BODY MASS INDEX: 27.6 KG/M2 | HEART RATE: 104 BPM | TEMPERATURE: 98.2 F | RESPIRATION RATE: 20 BRPM

## 2025-07-30 DIAGNOSIS — J20.6 ACUTE BRONCHITIS DUE TO RHINOVIRUS: Primary | ICD-10-CM

## 2025-07-30 LAB
ALBUMIN SERPL-MCNC: 3.7 G/DL (ref 3.5–5.2)
ALBUMIN/GLOB SERPL: 1.1 G/DL
ALP SERPL-CCNC: 103 U/L (ref 39–117)
ALT SERPL W P-5'-P-CCNC: 13 U/L (ref 1–33)
ANION GAP SERPL CALCULATED.3IONS-SCNC: 9.9 MMOL/L (ref 5–15)
AST SERPL-CCNC: 23 U/L (ref 1–32)
B PARAPERT DNA SPEC QL NAA+PROBE: NOT DETECTED
B PERT DNA SPEC QL NAA+PROBE: NOT DETECTED
BASOPHILS # BLD AUTO: 0.03 10*3/MM3 (ref 0–0.2)
BASOPHILS NFR BLD AUTO: 0.5 % (ref 0–1.5)
BILIRUB SERPL-MCNC: 0.5 MG/DL (ref 0–1.2)
BUN SERPL-MCNC: 18 MG/DL (ref 8–23)
BUN/CREAT SERPL: 15.4 (ref 7–25)
C PNEUM DNA NPH QL NAA+NON-PROBE: NOT DETECTED
CALCIUM SPEC-SCNC: 9.2 MG/DL (ref 8.2–9.6)
CHLORIDE SERPL-SCNC: 102 MMOL/L (ref 98–107)
CO2 SERPL-SCNC: 24.1 MMOL/L (ref 22–29)
CREAT SERPL-MCNC: 1.17 MG/DL (ref 0.57–1)
DEPRECATED RDW RBC AUTO: 42.3 FL (ref 37–54)
EGFRCR SERPLBLD CKD-EPI 2021: 44.4 ML/MIN/1.73
EOSINOPHIL # BLD AUTO: 0.29 10*3/MM3 (ref 0–0.4)
EOSINOPHIL NFR BLD AUTO: 4.4 % (ref 0.3–6.2)
ERYTHROCYTE [DISTWIDTH] IN BLOOD BY AUTOMATED COUNT: 12.4 % (ref 12.3–15.4)
FLUAV SUBTYP SPEC NAA+PROBE: NOT DETECTED
FLUBV RNA NPH QL NAA+NON-PROBE: NOT DETECTED
GLOBULIN UR ELPH-MCNC: 3.4 GM/DL
GLUCOSE SERPL-MCNC: 93 MG/DL (ref 65–99)
HADV DNA SPEC NAA+PROBE: NOT DETECTED
HCOV 229E RNA SPEC QL NAA+PROBE: NOT DETECTED
HCOV HKU1 RNA SPEC QL NAA+PROBE: NOT DETECTED
HCOV NL63 RNA SPEC QL NAA+PROBE: NOT DETECTED
HCOV OC43 RNA SPEC QL NAA+PROBE: NOT DETECTED
HCT VFR BLD AUTO: 40.3 % (ref 34–46.6)
HGB BLD-MCNC: 13.5 G/DL (ref 12–15.9)
HMPV RNA NPH QL NAA+NON-PROBE: NOT DETECTED
HPIV1 RNA ISLT QL NAA+PROBE: NOT DETECTED
HPIV2 RNA SPEC QL NAA+PROBE: NOT DETECTED
HPIV3 RNA NPH QL NAA+PROBE: NOT DETECTED
HPIV4 P GENE NPH QL NAA+PROBE: NOT DETECTED
IMM GRANULOCYTES # BLD AUTO: 0.01 10*3/MM3 (ref 0–0.05)
IMM GRANULOCYTES NFR BLD AUTO: 0.2 % (ref 0–0.5)
LYMPHOCYTES # BLD AUTO: 0.83 10*3/MM3 (ref 0.7–3.1)
LYMPHOCYTES NFR BLD AUTO: 12.7 % (ref 19.6–45.3)
M PNEUMO IGG SER IA-ACNC: NOT DETECTED
MCH RBC QN AUTO: 31.6 PG (ref 26.6–33)
MCHC RBC AUTO-ENTMCNC: 33.5 G/DL (ref 31.5–35.7)
MCV RBC AUTO: 94.4 FL (ref 79–97)
MONOCYTES # BLD AUTO: 0.79 10*3/MM3 (ref 0.1–0.9)
MONOCYTES NFR BLD AUTO: 12.1 % (ref 5–12)
NEUTROPHILS NFR BLD AUTO: 4.59 10*3/MM3 (ref 1.7–7)
NEUTROPHILS NFR BLD AUTO: 70.1 % (ref 42.7–76)
NRBC BLD AUTO-RTO: 0 /100 WBC (ref 0–0.2)
NT-PROBNP SERPL-MCNC: 2229 PG/ML (ref 0–1800)
PLATELET # BLD AUTO: 174 10*3/MM3 (ref 140–450)
PMV BLD AUTO: 8.6 FL (ref 6–12)
POTASSIUM SERPL-SCNC: 4.3 MMOL/L (ref 3.5–5.2)
PROT SERPL-MCNC: 7.1 G/DL (ref 6–8.5)
RBC # BLD AUTO: 4.27 10*6/MM3 (ref 3.77–5.28)
RHINOVIRUS RNA SPEC NAA+PROBE: DETECTED
RSV RNA NPH QL NAA+NON-PROBE: NOT DETECTED
SARS-COV-2 RNA NPH QL NAA+NON-PROBE: NOT DETECTED
SODIUM SERPL-SCNC: 136 MMOL/L (ref 136–145)
TROPONIN T SERPL HS-MCNC: 14 NG/L
WBC NRBC COR # BLD AUTO: 6.54 10*3/MM3 (ref 3.4–10.8)

## 2025-07-30 PROCEDURE — 94664 DEMO&/EVAL PT USE INHALER: CPT

## 2025-07-30 PROCEDURE — 71046 X-RAY EXAM CHEST 2 VIEWS: CPT

## 2025-07-30 PROCEDURE — 94799 UNLISTED PULMONARY SVC/PX: CPT

## 2025-07-30 PROCEDURE — 99283 EMERGENCY DEPT VISIT LOW MDM: CPT

## 2025-07-30 PROCEDURE — 83880 ASSAY OF NATRIURETIC PEPTIDE: CPT | Performed by: STUDENT IN AN ORGANIZED HEALTH CARE EDUCATION/TRAINING PROGRAM

## 2025-07-30 PROCEDURE — 85025 COMPLETE CBC W/AUTO DIFF WBC: CPT | Performed by: STUDENT IN AN ORGANIZED HEALTH CARE EDUCATION/TRAINING PROGRAM

## 2025-07-30 PROCEDURE — 36415 COLL VENOUS BLD VENIPUNCTURE: CPT

## 2025-07-30 PROCEDURE — 0202U NFCT DS 22 TRGT SARS-COV-2: CPT | Performed by: STUDENT IN AN ORGANIZED HEALTH CARE EDUCATION/TRAINING PROGRAM

## 2025-07-30 PROCEDURE — 94640 AIRWAY INHALATION TREATMENT: CPT

## 2025-07-30 PROCEDURE — 84484 ASSAY OF TROPONIN QUANT: CPT | Performed by: STUDENT IN AN ORGANIZED HEALTH CARE EDUCATION/TRAINING PROGRAM

## 2025-07-30 PROCEDURE — 80053 COMPREHEN METABOLIC PANEL: CPT | Performed by: STUDENT IN AN ORGANIZED HEALTH CARE EDUCATION/TRAINING PROGRAM

## 2025-07-30 RX ORDER — ALBUTEROL SULFATE 0.83 MG/ML
2.5 SOLUTION RESPIRATORY (INHALATION) EVERY 4 HOURS PRN
Qty: 120 EACH | Refills: 0 | Status: SHIPPED | OUTPATIENT
Start: 2025-07-30

## 2025-07-30 RX ORDER — FLUTICASONE PROPIONATE 110 UG/1
1 AEROSOL, METERED RESPIRATORY (INHALATION)
Qty: 12 G | Refills: 0 | Status: SHIPPED | OUTPATIENT
Start: 2025-07-30

## 2025-07-30 RX ORDER — ALBUTEROL SULFATE 0.83 MG/ML
2.5 SOLUTION RESPIRATORY (INHALATION)
Status: COMPLETED | OUTPATIENT
Start: 2025-07-30 | End: 2025-07-30

## 2025-07-30 RX ADMIN — ALBUTEROL SULFATE 2.5 MG: 2.5 SOLUTION RESPIRATORY (INHALATION) at 20:17

## 2025-07-30 RX ADMIN — ALBUTEROL SULFATE 2.5 MG: 2.5 SOLUTION RESPIRATORY (INHALATION) at 20:20

## 2025-07-30 NOTE — ED PROVIDER NOTES
EMERGENCY DEPARTMENT ENCOUNTER  Room Number:  09/09  PCP: Gunjan Hidalgo MD  Independent Historians: Historian: Patient      HPI:  Chief Complaint: had concerns including Cough.     A complete HPI/ROS/PMH/PSH/SH/FH are unobtainable due to: EM_Unobtainable History : None    Chronic or social conditions impacting patient care (Social Determinants of Health): None      Context: Mayuri Early is a 89 y.o. female with a medical history of A-fib, CHF, who presents to the ED c/o acute persistent cough.  She reports it has been present for about 1 month.  Associated congestion and difficulty breathing.  She states it is minimally productive from time to time.  She reports that she has been on 3 different antibiotics without improvement in her symptoms.  No significant fever, chills, chest pain.  No other complaints at this time, she has not been taking steroids due to them increasing her blood pressure, she has not been using breathing treatments      Review of prior external notes (non-ED) -and- Review of prior external test results outside of this encounter: I reviewed progress note 7/10/2025.  Acute bronchitis.  Augmentin and doxycycline prescriptions.      Prescription drug monitoring program review:         PAST MEDICAL HISTORY  Active Ambulatory Problems     Diagnosis Date Noted    Well woman exam 10/20/2020    Closed compression fracture of L1 vertebra, initial encounter 01/04/2024    Compression fracture of T12 vertebra 01/04/2024    PAF (paroxysmal atrial fibrillation) 01/04/2024    Presence of cardiac pacemaker 01/04/2024    HTN (hypertension) 01/04/2024    CKD (chronic kidney disease) 01/04/2024    Chronic anticoagulation 01/04/2024    Chronic back pain 01/04/2024    Anemia 01/04/2024    Asthma 01/04/2024    Hematoma of hip 01/08/2024    Acute blood loss anemia 01/08/2024    Postural dizziness 02/08/2025    Pleural effusion 02/24/2025    Acute exacerbation of CHF (congestive heart failure) 05/07/2025     Chronic heart failure with preserved ejection fraction (HFpEF) 05/20/2025    Nonrheumatic tricuspid valve regurgitation 07/02/2025     Resolved Ambulatory Problems     Diagnosis Date Noted    No Resolved Ambulatory Problems     Past Medical History:   Diagnosis Date    CHF (congestive heart failure)     Chronic kidney disease     Fibrocystic breast     Hyperlipidemia     Hypertension          PAST SURGICAL HISTORY  Past Surgical History:   Procedure Laterality Date    BREAST CYST ASPIRATION      BREAST CYST EXCISION      HYSTERECTOMY      REPLACEMENT TOTAL KNEE BILATERAL Bilateral          FAMILY HISTORY  No family history on file.      SOCIAL HISTORY  Social History     Socioeconomic History    Marital status:    Tobacco Use    Smoking status: Former     Passive exposure: Past    Smokeless tobacco: Never   Vaping Use    Vaping status: Never Used   Substance and Sexual Activity    Alcohol use: Never    Drug use: Never    Sexual activity: Defer       ALLERGIES  Cortisone, Ezetimibe, Hydrocortisone, Prednisone, Simvastatin, Oxycodone-acetaminophen, Azithromycin, Cortisone acetate, and Hydrocodone-acetaminophen      PHYSICAL EXAM    I have reviewed the triage vital signs and nursing notes.    ED Triage Vitals   Temp Heart Rate Resp BP SpO2   03/02/25 1448 03/02/25 1448 03/02/25 1448 03/02/25 1450 03/02/25 1448   97.4 °F (36.3 °C) 95 16 141/84 97 %      Temp src Heart Rate Source Patient Position BP Location FiO2 (%)   -- -- -- -- --              Physical Exam  GENERAL: alert, no acute distress  SKIN: Warm, dry  HENT: Normocephalic, atraumatic  EYES: no scleral icterus  CV: irregularly irregular, regular rate  RESPIRATORY: normal effort, significant expiratory wheeze with prolonged expiratory phase bilaterally  ABDOMEN: soft, nondistended, nontender  MUSCULOSKELETAL: no deformity, 1+ pitting edema at bilateral ankles  NEURO: alert, moves all extremities, follows commands        LAB RESULTS  Recent Results (from  the past 24 hours)   Respiratory Panel PCR w/COVID-19(SARS-CoV-2) NEISHA/RATNA/CHELA/PAD/COR/DAVIN In-House, NP Swab in UTM/VTM, 2 HR TAT - Swab, Nasopharynx    Collection Time: 07/30/25  6:15 PM    Specimen: Nasopharynx; Swab   Result Value Ref Range    ADENOVIRUS, PCR Not Detected Not Detected    Coronavirus 229E Not Detected Not Detected    Coronavirus HKU1 Not Detected Not Detected    Coronavirus NL63 Not Detected Not Detected    Coronavirus OC43 Not Detected Not Detected    COVID19 Not Detected Not Detected - Ref. Range    Human Metapneumovirus Not Detected Not Detected    Human Rhinovirus/Enterovirus Detected (A) Not Detected    Influenza A PCR Not Detected Not Detected    Influenza B PCR Not Detected Not Detected    Parainfluenza Virus 1 Not Detected Not Detected    Parainfluenza Virus 2 Not Detected Not Detected    Parainfluenza Virus 3 Not Detected Not Detected    Parainfluenza Virus 4 Not Detected Not Detected    RSV, PCR Not Detected Not Detected    Bordetella pertussis pcr Not Detected Not Detected    Bordetella parapertussis PCR Not Detected Not Detected    Chlamydophila pneumoniae PCR Not Detected Not Detected    Mycoplasma pneumo by PCR Not Detected Not Detected   BNP    Collection Time: 07/30/25  7:40 PM    Specimen: Blood   Result Value Ref Range    proBNP 2,229.0 (H) 0.0 - 1,800.0 pg/mL   High Sensitivity Troponin T    Collection Time: 07/30/25  7:40 PM    Specimen: Blood   Result Value Ref Range    HS Troponin T 14 (H) <14 ng/L   Comprehensive Metabolic Panel    Collection Time: 07/30/25  7:40 PM    Specimen: Blood   Result Value Ref Range    Glucose 93 65 - 99 mg/dL    BUN 18.0 8.0 - 23.0 mg/dL    Creatinine 1.17 (H) 0.57 - 1.00 mg/dL    Sodium 136 136 - 145 mmol/L    Potassium 4.3 3.5 - 5.2 mmol/L    Chloride 102 98 - 107 mmol/L    CO2 24.1 22.0 - 29.0 mmol/L    Calcium 9.2 8.2 - 9.6 mg/dL    Total Protein 7.1 6.0 - 8.5 g/dL    Albumin 3.7 3.5 - 5.2 g/dL    ALT (SGPT) 13 1 - 33 U/L    AST (SGOT) 23 1 -  32 U/L    Alkaline Phosphatase 103 39 - 117 U/L    Total Bilirubin 0.5 0.0 - 1.2 mg/dL    Globulin 3.4 gm/dL    A/G Ratio 1.1 g/dL    BUN/Creatinine Ratio 15.4 7.0 - 25.0    Anion Gap 9.9 5.0 - 15.0 mmol/L    eGFR 44.4 (L) >60.0 mL/min/1.73   CBC Auto Differential    Collection Time: 07/30/25  7:40 PM    Specimen: Blood   Result Value Ref Range    WBC 6.54 3.40 - 10.80 10*3/mm3    RBC 4.27 3.77 - 5.28 10*6/mm3    Hemoglobin 13.5 12.0 - 15.9 g/dL    Hematocrit 40.3 34.0 - 46.6 %    MCV 94.4 79.0 - 97.0 fL    MCH 31.6 26.6 - 33.0 pg    MCHC 33.5 31.5 - 35.7 g/dL    RDW 12.4 12.3 - 15.4 %    RDW-SD 42.3 37.0 - 54.0 fl    MPV 8.6 6.0 - 12.0 fL    Platelets 174 140 - 450 10*3/mm3    Neutrophil % 70.1 42.7 - 76.0 %    Lymphocyte % 12.7 (L) 19.6 - 45.3 %    Monocyte % 12.1 (H) 5.0 - 12.0 %    Eosinophil % 4.4 0.3 - 6.2 %    Basophil % 0.5 0.0 - 1.5 %    Immature Grans % 0.2 0.0 - 0.5 %    Neutrophils, Absolute 4.59 1.70 - 7.00 10*3/mm3    Lymphocytes, Absolute 0.83 0.70 - 3.10 10*3/mm3    Monocytes, Absolute 0.79 0.10 - 0.90 10*3/mm3    Eosinophils, Absolute 0.29 0.00 - 0.40 10*3/mm3    Basophils, Absolute 0.03 0.00 - 0.20 10*3/mm3    Immature Grans, Absolute 0.01 0.00 - 0.05 10*3/mm3    nRBC 0.0 0.0 - 0.2 /100 WBC       RADIOLOGY  XR Chest 2 View  Result Date: 7/30/2025  TWO VIEWS OF THE CHEST   HISTORY: cough  COMPARISON: 6/13/2025  TECHNIQUE: PA and lateral views were performed of the chest.  FINDINGS:  Redemonstrated mild cardiomegaly. Dual-lead pacemaker remains in place.  No acute pulmonary infiltrate, no effusion or pneumothorax. Minimal old granulomatous disease redemonstrated.  There is no acute bony abnormality.       Cardiomegaly, no acute infiltrate  This report was finalized on 7/30/2025 7:24 PM by Dr. Anthony Turcios M.D on Workstation: [id number]          MEDICATIONS GIVEN IN ER  Medications   albuterol (PROVENTIL) nebulizer solution 0.083% 2.5 mg/3mL (2.5 mg Nebulization Given 7/30/25 2020)          ORDERS PLACED DURING THIS VISIT:  Orders Placed This Encounter   Procedures    Home Nebulizer    Respiratory Panel PCR w/COVID-19(SARS-CoV-2) NEISHA/RATNA/CHELA/PAD/COR/DAVIN In-House, NP Swab in UTM/VTM, 2 HR TAT - Swab, Nasopharynx    XR Chest 2 View    BNP    High Sensitivity Troponin T    Comprehensive Metabolic Panel    CBC Auto Differential    CBC & Differential         OUTPATIENT MEDICATION MANAGEMENT:  No current Epic-ordered facility-administered medications on file.     Current Outpatient Medications Ordered in Epic   Medication Sig Dispense Refill    acetaminophen (TYLENOL) 325 MG tablet Take 2 tablets by mouth Every 6 (Six) Hours As Needed for Mild Pain.      albuterol (PROVENTIL) (2.5 MG/3ML) 0.083% nebulizer solution Take 2.5 mg by nebulization Every 4 (Four) Hours As Needed for Wheezing. 120 each 0    apixaban (ELIQUIS) 2.5 MG tablet tablet Take 1 tablet by mouth 2 (Two) Times a Day.      atorvastatin (LIPITOR) 10 MG tablet Take 1 tablet by mouth Every Night.      Cetirizine HCl 10 MG capsule Take 5 mg by mouth Daily As Needed (allergies).      Farxiga 10 MG tablet Take 10 mg by mouth Daily. 30 tablet 3    fluticasone (FLOVENT HFA) 110 MCG/ACT inhaler Inhale 1 puff 2 (Two) Times a Day. 12 g 0    magnesium oxide (MAG-OX) 400 tablet tablet Take 1 tablet by mouth Daily.      metoprolol succinate XL (TOPROL-XL) 25 MG 24 hr tablet Take 1 tablet by mouth Daily for 30 days. 30 tablet 6    multivitamin with minerals (MULTIVITAMIN ADULT PO) Take 1 tablet by mouth Daily.      Omega-3 Fatty Acids (fish oil) 1000 MG capsule capsule Take 1 capsule by mouth Daily With Breakfast.      torsemide (DEMADEX) 20 MG tablet Take 2 tablets by mouth Daily for 30 days. 60 tablet 0    torsemide (DEMADEX) 20 MG tablet Take 1 tablet by mouth Daily.      traZODone (DESYREL) 50 MG tablet Take 1 tablet by mouth Every Night.           PROCEDURES  Procedures            PROGRESS, DATA ANALYSIS, CONSULTS, AND MEDICAL DECISION  MAKING  All labs have been independently interpreted by me.  All radiology studies have been reviewed by me. All EKG's have been independently viewed and interpreted by me.  Discussion below represents my analysis of pertinent findings related to patient's condition, differential diagnosis, treatment plan and final disposition.    Differential diagnosis includes but is not limited to viral illness, acute versus chronic bronchitis, pneumonia, CHF, electrolyte metabolic derangement.    Clinical Scores:                                       ED Course as of 07/30/25 2047 Wed Jul 30, 2025 1903 Patient presents to the ED for evaluation of 1 month of persistent cough and congestion, difficulty breathing.  She states it is minimally productive from time to time.  She reports that she has been on 3 different antibiotics without improvement in her symptoms.  No significant fever, chills, chest pain.  No other complaints at this time, she has not been taking steroids due to them increasing her blood pressure, she has not been using breathing treatments    On exam patient has severe expiratory wheeze and prolonged expiratory phase bilaterally, she is in A-fib, no distress, 1+ pitting edema bilateral ankles    Plan to obtain basic labs, cardiac enzymes, viral PCR, and chest x-ray.  Will give breathing treatment x 2.  Discussed plan for likely steroid inhaler prescription, patient and family are agreeable [DN]   1905 XR Chest 2 View  I reviewed patient's xray image(s), AICD, no focal infiltrate, interpreted by self  I reviewed the radiologist's interpretation of above image(s)   [DN]   1928 Human Rhinovirus/Enterovirus(!): Detected [DN]   1930 Patient family updated regarding rhinovirus bronchitis [DN]   1951 WBC: 6.54 [DN]   1951 Hemoglobin: 13.5 [DN]   2018 proBNP(!): 2,229.0  Stable from 1 month ago [DN]   2018 HS Troponin T(!): 14  Stable from 1 month ago [DN]   2044 Patient reassessed, still with moderate wheeze, however  she is moving more air than prior.  Discussed viral bronchitis from rhinovirus.  Will give nebulizer with prescription for albuterol solution, plan for steroid inhaler.  Patient reports hypertension with oral steroids, discussed risks and benefits of inhaled steroids and significant lower systemic dose.  Patient family are agreeable with plan for prescription [DN]      ED Course User Index  [DN] Darwin Bowen MD             AS OF 20:47 EDT VITALS:    BP - (!) 180/103  HR - (!) 122  TEMP - 98.1 °F (36.7 °C) (Oral)  O2 SATS - 100%    COMPLEXITY OF CARE  Admission was considered but after careful review of the patient's presentation, physical examination, diagnostic results, and response to treatment the patient may be safely discharged with outpatient follow-up.      DIAGNOSIS  Final diagnoses:   Acute bronchitis due to Rhinovirus         DISPOSITION  ED Disposition       ED Disposition   Discharge    Condition   Stable    Comment   --               New Medications Ordered This Visit   Medications    albuterol (PROVENTIL) nebulizer solution 0.083% 2.5 mg/3mL    albuterol (PROVENTIL) (2.5 MG/3ML) 0.083% nebulizer solution     Sig: Take 2.5 mg by nebulization Every 4 (Four) Hours As Needed for Wheezing.     Dispense:  120 each     Refill:  0    fluticasone (FLOVENT HFA) 110 MCG/ACT inhaler     Sig: Inhale 1 puff 2 (Two) Times a Day.     Dispense:  12 g     Refill:  0     Patient reports hypertension with oral steroids, inhaled steroid benefits outweigh risk, discussed this with patient and family, they are agreeable       Please note that portions of this document were completed with a voice recognition program.    Note Disclaimer: At [facility], we believe that sharing information builds trust and better relationships. You are receiving this note because you recently visited [facility]. It is possible you will see health information before a provider has talked with you about it. This kind of information  can be easy to misunderstand. To help you fully understand what it means for your health, we urge you to discuss this note with your provider.         Darwin Bowen MD  07/30/25 1922       Darwin Bowen MD  07/30/25 2047